# Patient Record
Sex: FEMALE | Race: OTHER | NOT HISPANIC OR LATINO | URBAN - METROPOLITAN AREA
[De-identification: names, ages, dates, MRNs, and addresses within clinical notes are randomized per-mention and may not be internally consistent; named-entity substitution may affect disease eponyms.]

---

## 2018-01-16 ENCOUNTER — INPATIENT (INPATIENT)
Facility: HOSPITAL | Age: 78
LOS: 6 days | Discharge: ANOTHER HOSPITAL | DRG: 72 | End: 2018-01-23
Attending: INTERNAL MEDICINE | Admitting: INTERNAL MEDICINE
Payer: MEDICARE

## 2018-01-16 VITALS — WEIGHT: 139.99 LBS | HEIGHT: 64 IN

## 2018-01-16 DIAGNOSIS — G40.201 LOCALIZATION-RELATED (FOCAL) (PARTIAL) SYMPTOMATIC EPILEPSY AND EPILEPTIC SYNDROMES WITH COMPLEX PARTIAL SEIZURES, NOT INTRACTABLE, WITH STATUS EPILEPTICUS: ICD-10-CM

## 2018-01-16 DIAGNOSIS — Z98.890 OTHER SPECIFIED POSTPROCEDURAL STATES: Chronic | ICD-10-CM

## 2018-01-16 LAB
ALBUMIN SERPL ELPH-MCNC: 3.9 G/DL — SIGNIFICANT CHANGE UP (ref 3.5–5)
ALP SERPL-CCNC: 73 U/L — SIGNIFICANT CHANGE UP (ref 40–120)
ALT FLD-CCNC: 29 U/L DA — SIGNIFICANT CHANGE UP (ref 10–60)
ANION GAP SERPL CALC-SCNC: 14 MMOL/L — SIGNIFICANT CHANGE UP (ref 5–17)
APPEARANCE UR: CLEAR — SIGNIFICANT CHANGE UP
APTT BLD: 25.7 SEC — LOW (ref 27.5–37.4)
AST SERPL-CCNC: 23 U/L — SIGNIFICANT CHANGE UP (ref 10–40)
BASOPHILS # BLD AUTO: 0.1 K/UL — SIGNIFICANT CHANGE UP (ref 0–0.2)
BASOPHILS NFR BLD AUTO: 0.9 % — SIGNIFICANT CHANGE UP (ref 0–2)
BILIRUB SERPL-MCNC: 0.4 MG/DL — SIGNIFICANT CHANGE UP (ref 0.2–1.2)
BILIRUB UR-MCNC: NEGATIVE — SIGNIFICANT CHANGE UP
BUN SERPL-MCNC: 14 MG/DL — SIGNIFICANT CHANGE UP (ref 7–18)
CALCIUM SERPL-MCNC: 9.6 MG/DL — SIGNIFICANT CHANGE UP (ref 8.4–10.5)
CHLORIDE SERPL-SCNC: 105 MMOL/L — SIGNIFICANT CHANGE UP (ref 96–108)
CO2 SERPL-SCNC: 19 MMOL/L — LOW (ref 22–31)
COLOR SPEC: YELLOW — SIGNIFICANT CHANGE UP
CREAT SERPL-MCNC: 0.94 MG/DL — SIGNIFICANT CHANGE UP (ref 0.5–1.3)
DIFF PNL FLD: NEGATIVE — SIGNIFICANT CHANGE UP
EOSINOPHIL # BLD AUTO: 0.4 K/UL — SIGNIFICANT CHANGE UP (ref 0–0.5)
EOSINOPHIL NFR BLD AUTO: 3.2 % — SIGNIFICANT CHANGE UP (ref 0–6)
GLUCOSE SERPL-MCNC: 139 MG/DL — HIGH (ref 70–99)
GLUCOSE UR QL: NEGATIVE — SIGNIFICANT CHANGE UP
HCT VFR BLD CALC: 39.8 % — SIGNIFICANT CHANGE UP (ref 34.5–45)
HGB BLD-MCNC: 12.8 G/DL — SIGNIFICANT CHANGE UP (ref 11.5–15.5)
INR BLD: 1.08 RATIO — SIGNIFICANT CHANGE UP (ref 0.88–1.16)
KETONES UR-MCNC: NEGATIVE — SIGNIFICANT CHANGE UP
LACTATE SERPL-SCNC: 8 MMOL/L — CRITICAL HIGH (ref 0.7–2)
LEUKOCYTE ESTERASE UR-ACNC: ABNORMAL
LYMPHOCYTES # BLD AUTO: 26.8 % — SIGNIFICANT CHANGE UP (ref 13–44)
LYMPHOCYTES # BLD AUTO: 3 K/UL — SIGNIFICANT CHANGE UP (ref 1–3.3)
MCHC RBC-ENTMCNC: 29.4 PG — SIGNIFICANT CHANGE UP (ref 27–34)
MCHC RBC-ENTMCNC: 32.1 GM/DL — SIGNIFICANT CHANGE UP (ref 32–36)
MCV RBC AUTO: 91.6 FL — SIGNIFICANT CHANGE UP (ref 80–100)
MONOCYTES # BLD AUTO: 1.2 K/UL — HIGH (ref 0–0.9)
MONOCYTES NFR BLD AUTO: 10.3 % — SIGNIFICANT CHANGE UP (ref 2–14)
NEUTROPHILS # BLD AUTO: 6.6 K/UL — SIGNIFICANT CHANGE UP (ref 1.8–7.4)
NEUTROPHILS NFR BLD AUTO: 58.8 % — SIGNIFICANT CHANGE UP (ref 43–77)
NITRITE UR-MCNC: NEGATIVE — SIGNIFICANT CHANGE UP
PH UR: 7 — SIGNIFICANT CHANGE UP (ref 5–8)
PLATELET # BLD AUTO: 460 K/UL — HIGH (ref 150–400)
POTASSIUM SERPL-MCNC: 4.5 MMOL/L — SIGNIFICANT CHANGE UP (ref 3.5–5.3)
POTASSIUM SERPL-SCNC: 4.5 MMOL/L — SIGNIFICANT CHANGE UP (ref 3.5–5.3)
PROT SERPL-MCNC: 8 G/DL — SIGNIFICANT CHANGE UP (ref 6–8.3)
PROT UR-MCNC: NEGATIVE — SIGNIFICANT CHANGE UP
PROTHROM AB SERPL-ACNC: 11.8 SEC — SIGNIFICANT CHANGE UP (ref 9.8–12.7)
RBC # BLD: 4.35 M/UL — SIGNIFICANT CHANGE UP (ref 3.8–5.2)
RBC # FLD: 13 % — SIGNIFICANT CHANGE UP (ref 10.3–14.5)
SODIUM SERPL-SCNC: 138 MMOL/L — SIGNIFICANT CHANGE UP (ref 135–145)
SP GR SPEC: 1.01 — SIGNIFICANT CHANGE UP (ref 1.01–1.02)
TROPONIN I SERPL-MCNC: <0.015 NG/ML — SIGNIFICANT CHANGE UP (ref 0–0.04)
UROBILINOGEN FLD QL: NEGATIVE — SIGNIFICANT CHANGE UP
WBC # BLD: 11.3 K/UL — HIGH (ref 3.8–10.5)
WBC # FLD AUTO: 11.3 K/UL — HIGH (ref 3.8–10.5)

## 2018-01-16 PROCEDURE — 70450 CT HEAD/BRAIN W/O DYE: CPT | Mod: 26

## 2018-01-16 PROCEDURE — 99285 EMERGENCY DEPT VISIT HI MDM: CPT

## 2018-01-16 PROCEDURE — 71045 X-RAY EXAM CHEST 1 VIEW: CPT | Mod: 26

## 2018-01-16 RX ORDER — SODIUM CHLORIDE 9 MG/ML
1000 INJECTION INTRAMUSCULAR; INTRAVENOUS; SUBCUTANEOUS ONCE
Qty: 0 | Refills: 0 | Status: COMPLETED | OUTPATIENT
Start: 2018-01-16 | End: 2018-01-16

## 2018-01-16 RX ORDER — DEXAMETHASONE 0.5 MG/5ML
10 ELIXIR ORAL ONCE
Qty: 0 | Refills: 0 | Status: COMPLETED | OUTPATIENT
Start: 2018-01-16 | End: 2018-01-16

## 2018-01-16 RX ORDER — VALPROIC ACID (AS SODIUM SALT) 250 MG/5ML
1000 SOLUTION, ORAL ORAL ONCE
Qty: 0 | Refills: 0 | Status: DISCONTINUED | OUTPATIENT
Start: 2018-01-16 | End: 2018-01-16

## 2018-01-16 RX ORDER — FOSPHENYTOIN 50 MG/ML
1200 INJECTION INTRAMUSCULAR; INTRAVENOUS ONCE
Qty: 0 | Refills: 0 | Status: COMPLETED | OUTPATIENT
Start: 2018-01-16 | End: 2018-01-16

## 2018-01-16 RX ADMIN — SODIUM CHLORIDE 4000 MILLILITER(S): 9 INJECTION INTRAMUSCULAR; INTRAVENOUS; SUBCUTANEOUS at 18:45

## 2018-01-16 RX ADMIN — Medication 2 MILLIGRAM(S): at 18:07

## 2018-01-16 RX ADMIN — Medication 2 MILLIGRAM(S): at 18:05

## 2018-01-16 NOTE — H&P ADULT - ASSESSMENT
Patient is a 78 y/o F pt with PMHx of Breast CA Dx Nov 2017 (s/p resection of L breast x1 week ago at [facility]) with recent negative PET scan and BIB EMS to ED with 30 min seizure (L facial twitch) at home while seated, witnessed by , with associated L-sided facial droop since 5pm on 1/16. As per EMS, pt was awake and alert throughout the entire duration seizure like episodes with facial twitching. Admitted for new onset seizure, status epilepticus, likely 2/2 brain mets from breast CA.

## 2018-01-16 NOTE — ED PROVIDER NOTE - CONDUCTED A DETAILED DISCUSSION WITH PATIENT AND/OR GUARDIAN REGARDING, MDM
lab results/radiology results/need for outpatient follow-up radiology results/need to admit/lab results

## 2018-01-16 NOTE — ED PROVIDER NOTE - OBJECTIVE STATEMENT
78 y/o F pt with PMHx of Breast CA (s/p resection of L breast x1 week ago) and PSHx of Breast Surgery (resection of L breast) BIB EMS to ED with onset of tremors to the L side of the face with associated L-sided facial droop since 17:00pm today. Per EMS, pt was awake and alert throughout the entire duration fo sx's onset. In ED, pt denies any other complaints. Pt also denies recent infections. NKDA.

## 2018-01-16 NOTE — H&P ADULT - HISTORY OF PRESENT ILLNESS
Patient is a 76 y/o F pt with PMHx of Breast CA (s/p resection of L breast x1 week ago) and PSHx of Breast Surgery (resection of L breast) BIB EMS to ED with onset of tremors to the L side of the face with associated L-sided facial droop since 17:00pm today. As per EMS, pt was awake and alert throughout the entire duration seizure like episodes with facial twitching. In the ED, patient was seen and examined, follows commands but disoriented, AAOx2, can state her name and  but does not know date, month or year. Unable to move entire L side on exam. ICU was consulted for concern for status epilepticus in the setting of brain mass, and downgraded. In the ED was given 2mg ativan x 2 IM, with temporary resolution of L facial twitching, but then returned, so was started on dilantin 100mg TID after 1 dose of 1200mg fosphenytoin loading. Decadron x 1 given for vasogenic edema on CT head. Patient denies fever, chills, SOB, palpitations, chest pain, nausea, vomiting, diarrhea or constipation but is confused and does not know how she came to the hospital. Unable to reach , Seb, over the phone, left message with callback to discuss further history as patient cannot provide. Primary team to get medication list. Patient is a 76 y/o F pt with PMHx of Breast CA Dx 2017 (s/p resection of L breast x1 week ago at [facility]) with recent negative PET scan and BIB EMS to ED with 30 min seizure (L facial twitch) at home while seated, witnessed by , with associated L-sided facial droop since 5pm on . As per EMS, pt was awake and alert throughout the entire duration seizure like episodes with facial twitching. In the ED, patient was seen and examined, follows commands but disoriented, AAOx2, can state her name and  but does not know date, month or year. Unable to move entire L side on exam. ICU was consulted for concern for status epilepticus in the setting of brain mass, and downgraded. In the ED was given 2mg ativan x 2 IM, with temporary resolution of L facial twitching, but then returned, so was started on dilantin 100mg TID after 1 dose of 1200mg fosphenytoin loading. Decadron x 1 given for vasogenic edema on CT head. Patient denies fever, chills, SOB, palpitations, chest pain, nausea, vomiting, diarrhea or constipation but is confused and does not know how she came to the hospital. Unable to reach , Seb, over the phone, left message with callback to discuss further history as patient cannot provide. Primary team to obtain med list, patient cannot recall medications or pharmacy.

## 2018-01-16 NOTE — H&P ADULT - NSHPLABSRESULTS_GEN_ALL_CORE
LABS:                        12.8   11.3  )-----------( 460      ( 2018 18:15 )             39.8         138  |  105  |  14  ----------------------------<  139<H>  4.5   |  19<L>  |  0.94    Ca    9.6      2018 18:15    TPro  8.0  /  Alb  3.9  /  TBili  0.4  /  DBili  x   /  AST  23  /  ALT  29  /  AlkPhos  73  -    PT/INR - ( 2018 18:15 )   PT: 11.8 sec;   INR: 1.08 ratio       PTT - ( 2018 18:15 )  PTT:25.7 sec  Urinalysis Basic - ( 2018 22:38 )    Color: Yellow / Appearance: Clear / S.010 / pH: x  Gluc: x / Ketone: Negative  / Bili: Negative / Urobili: Negative   Blood: x / Protein: Negative / Nitrite: Negative   Leuk Esterase: Small / RBC: 0-2 /HPF / WBC 3-5 /HPF   Sq Epi: x / Non Sq Epi: Moderate /HPF / Bacteria: Few /HPF    < from: CT Brain Stroke Protocol (18 @ 18:33) >    INTERPRETATION:  CT brain without contrast    History left facial droop    There is no intracranial hemorrhage or cortical edema or hydrocephalus.   There is a segment of vasogenic edema in the centrum semiovale measuring   roughly 3.5 cm in maximum axial dimension consistent with an underlying   mass. There is mild ventricular effacement without midline shift. There   is no skull fracture.    IMPRESSION:    Probable intracranial mass lesion. Contrast-enhanced MR suggested    < end of copied text >

## 2018-01-16 NOTE — H&P ADULT - PROBLEM SELECTOR PLAN 1
30 min episode of seizure like activity witnessed by  at home  s/p ativan x 2 with temporary resolution of L facial twitch, which later resumed on admission  1 dose fosphenytoin followed by dilantin 100mg TID  CT head: There is no intracranial hemorrhage or cortical edema or hydrocephalus.   There is a segment of vasogenic edema in the centrum semiovale measuring   roughly 3.5 cm in maximum axial dimension consistent with an underlying   mass. There is mild ventricular effacement without midline shift. There   is no skull fracture. Probable intracranial mass lesion. Contrast-enhanced MR suggested  f/u MRI head with IV contrast  EEG  Dr. Mckeon neuro  decadron x 1 (10mg), c/w 4mg q 6 hours x 2 days for vasogenic edema  seizure precautions, aspiration precautions  ativan 2mg q 5 min x 3 doses PRN for seizure  elevated lactate 8 on admission 2/2 seizure, f/u am lactate s/p 2L bolus  maintenance fluid with dextrose while NPO  NPO for now  UA and cxr neg, follow up cultures  ICU evaluation appreciated  monitor for improvement of mental status on anti-epileptics and steroids

## 2018-01-16 NOTE — H&P ADULT - NSHPPHYSICALEXAM_GEN_ALL_CORE
INTERVAL HPI/OVERNIGHT EVENTS:  T(C): --  HR: 121 (01-16-18 @ 19:05) (121 - 121)  BP: 96/62 (01-16-18 @ 19:05) (96/62 - 96/62)  RR: 22 (01-16-18 @ 19:05) (22 - 22)  SpO2: 100% (01-16-18 @ 19:05) (100% - 100%)    PHYSICAL EXAM:  GENERAL: appears disoriented, well-groomed, well-developed, noticeable L facial twitching  HEAD:  Atraumatic, Normocephalic  EYES: EOMI, PERRLA, conjunctiva and sclera clear  ENMT: No tonsillar erythema, exudates, or enlargement; Moist mucous membranes  NECK: Supple, No JVD, Normal thyroid  NERVOUS SYSTEM:  Alert & Oriented X3, Good concentration; Motor Strength 0/5 L arm and L leg, 5/5 R arm and R leg; mild L facial droop; loss of sensation L arm and leg; DTRs 2+ intact and symmetric  CHEST/LUNG: Clear to percussion bilaterally; No rales, rhonchi, wheezing, or rubs  HEART: Regular rate and rhythm; No murmurs, rubs, or gallops  ABDOMEN: Soft, Nontender, Nondistended; Bowel sounds present  EXTREMITIES:  2+ Peripheral Pulses, No clubbing, cyanosis, or edema  LYMPH: No lymphadenopathy noted  SKIN: No rashes or lesions

## 2018-01-16 NOTE — H&P ADULT - ATTENDING COMMENTS
Patient seen/evaluated at bedside 1/17/2018 at 4:30 pm in the ED. I agree with the resident H&p note/outlined plan of care. My independent findings and conclusions are documented. Patient seen/evaluated at bedside 1/17/2018 at 4:30 pm in the ED. I agree with the resident H&p note/outlined plan of care. My independent findings and conclusions are documented.      Briefly, this is a 76 y/o f w/ pmhx of anemia, breast ca (dx 11/2017) p/w left sided facial twitching/droop x 30 min. In the ED with noted left sided shaking, confusion administered ativan 2mg x 2 doses with termination of seizure event. During the first episode, patient remained awake and alert. In the ED, she was noted to be altered with inability to move her left side entirely. ICU was consulted with concern for status epilepticus. Patient's mental status improved and patient was deemed appropriate for management on the medical floor.   Pt states at the time of diagnosis she was informed her breast ca was stage I and is s/p L breast mastectomy with SUSSY drains currently in place. Per patient report, she was supposed to be seen by her breast surgeon for staple removal either today or tomorrow. Patient reports she noted left lower extremity weakness 2 months ago and was seen by a vascular surgeon, neurology with no explanation for symptoms in outpt setting.     Patient reports breast surgeon is Dr. Lee Zepeda  PMD: Dr. Dumont    PE: AAOx3 NAD  PERRLA, HEENT  S1S2 RRR  thorax: surgical incision with staples, 2 sussy drains in place with 10 cc of serosanguinous fluid  CTAB/l no accessory muscle use  soft, NT, ND, + BS  left lower extremity strength 4/5    1. seizure  2. brain lesions  3. breast ca s/p mastectomy  4. left sided paresis (improved)  5.  h/o anemia, likely of chronic disease    CT chest abd/pelvis ordered  -hematology/oncology: Dr Flaherty notified  -appreciate input from ICU and neurology  -decadron, dilantin load  -neuro checks  -needs MRI brain, however, may not be able to obtain due to staples in chest  -reach out to breast surgeon for notification, may need to discuss with our inpatient surgical staff as well

## 2018-01-16 NOTE — ED PROVIDER NOTE - PHYSICAL EXAMINATION
NEUROLOGICAL: L arm jerking. Tremulous on the L side of the face with associated L-sided facial droop.

## 2018-01-16 NOTE — ED PROVIDER NOTE - MEDICAL DECISION MAKING DETAILS
Code Stroke cancelled. Code Stroke cancelled.   shaking and droop resolved. ct concerning for brain met. admit for mri, eeg, neuro consult and further workup.

## 2018-01-16 NOTE — ED ADULT NURSE NOTE - OBJECTIVE STATEMENT
BIBA as notification to r/o stroke patient has a r  facial droop an hour ago s/p surgery of l breast recently SARBJIT drain present with bloody drainage. on arrival patient has tonic contractions BIBA as notification to r/o stroke patient has a L  facial droop an hour ago s/p surgery of l breast recently SARBJIT drain present with bloody drainage. on arrival patient has tonic contractions

## 2018-01-17 DIAGNOSIS — Z29.9 ENCOUNTER FOR PROPHYLACTIC MEASURES, UNSPECIFIED: ICD-10-CM

## 2018-01-17 DIAGNOSIS — G40.901 EPILEPSY, UNSPECIFIED, NOT INTRACTABLE, WITH STATUS EPILEPTICUS: ICD-10-CM

## 2018-01-17 DIAGNOSIS — C50.919 MALIGNANT NEOPLASM OF UNSPECIFIED SITE OF UNSPECIFIED FEMALE BREAST: ICD-10-CM

## 2018-01-17 DIAGNOSIS — G93.9 DISORDER OF BRAIN, UNSPECIFIED: ICD-10-CM

## 2018-01-17 LAB
BASOPHILS # BLD AUTO: 0 K/UL — SIGNIFICANT CHANGE UP (ref 0–0.2)
BASOPHILS NFR BLD AUTO: 0.2 % — SIGNIFICANT CHANGE UP (ref 0–2)
CHOLEST SERPL-MCNC: 189 MG/DL — SIGNIFICANT CHANGE UP (ref 10–199)
EOSINOPHIL # BLD AUTO: 0 K/UL — SIGNIFICANT CHANGE UP (ref 0–0.5)
EOSINOPHIL NFR BLD AUTO: 0.3 % — SIGNIFICANT CHANGE UP (ref 0–6)
FOLATE SERPL-MCNC: >20 NG/ML — SIGNIFICANT CHANGE UP (ref 4.8–24.2)
HBA1C BLD-MCNC: 5.7 % — HIGH (ref 4–5.6)
HCT VFR BLD CALC: 38.1 % — SIGNIFICANT CHANGE UP (ref 34.5–45)
HDLC SERPL-MCNC: 51 MG/DL — SIGNIFICANT CHANGE UP (ref 40–125)
HGB BLD-MCNC: 11.9 G/DL — SIGNIFICANT CHANGE UP (ref 11.5–15.5)
LACTATE SERPL-SCNC: 1.5 MMOL/L — SIGNIFICANT CHANGE UP (ref 0.7–2)
LIPID PNL WITH DIRECT LDL SERPL: 118 MG/DL — SIGNIFICANT CHANGE UP
LYMPHOCYTES # BLD AUTO: 0.6 K/UL — LOW (ref 1–3.3)
LYMPHOCYTES # BLD AUTO: 6.1 % — LOW (ref 13–44)
MAGNESIUM SERPL-MCNC: 2.1 MG/DL — SIGNIFICANT CHANGE UP (ref 1.6–2.6)
MCHC RBC-ENTMCNC: 28.4 PG — SIGNIFICANT CHANGE UP (ref 27–34)
MCHC RBC-ENTMCNC: 31.2 GM/DL — LOW (ref 32–36)
MCV RBC AUTO: 90.9 FL — SIGNIFICANT CHANGE UP (ref 80–100)
MONOCYTES # BLD AUTO: 0.4 K/UL — SIGNIFICANT CHANGE UP (ref 0–0.9)
MONOCYTES NFR BLD AUTO: 3.7 % — SIGNIFICANT CHANGE UP (ref 2–14)
NEUTROPHILS # BLD AUTO: 9.5 K/UL — HIGH (ref 1.8–7.4)
NEUTROPHILS NFR BLD AUTO: 89.6 % — HIGH (ref 43–77)
PHOSPHATE SERPL-MCNC: 2.6 MG/DL — SIGNIFICANT CHANGE UP (ref 2.5–4.5)
PLATELET # BLD AUTO: 384 K/UL — SIGNIFICANT CHANGE UP (ref 150–400)
RBC # BLD: 4.19 M/UL — SIGNIFICANT CHANGE UP (ref 3.8–5.2)
RBC # FLD: 13.2 % — SIGNIFICANT CHANGE UP (ref 10.3–14.5)
TOTAL CHOLESTEROL/HDL RATIO MEASUREMENT: 3.7 RATIO — SIGNIFICANT CHANGE UP (ref 3.3–7.1)
TRIGL SERPL-MCNC: 99 MG/DL — SIGNIFICANT CHANGE UP (ref 10–149)
TROPONIN I SERPL-MCNC: <0.015 NG/ML — SIGNIFICANT CHANGE UP (ref 0–0.04)
TSH SERPL-MCNC: 0.67 UU/ML — SIGNIFICANT CHANGE UP (ref 0.34–4.82)
VIT B12 SERPL-MCNC: 620 PG/ML — SIGNIFICANT CHANGE UP (ref 232–1245)
WBC # BLD: 10.6 K/UL — HIGH (ref 3.8–10.5)
WBC # FLD AUTO: 10.6 K/UL — HIGH (ref 3.8–10.5)

## 2018-01-17 PROCEDURE — 99223 1ST HOSP IP/OBS HIGH 75: CPT | Mod: GC

## 2018-01-17 PROCEDURE — 74177 CT ABD & PELVIS W/CONTRAST: CPT | Mod: 26

## 2018-01-17 PROCEDURE — 95819 EEG AWAKE AND ASLEEP: CPT | Mod: 26

## 2018-01-17 PROCEDURE — 71260 CT THORAX DX C+: CPT | Mod: 26

## 2018-01-17 PROCEDURE — 99222 1ST HOSP IP/OBS MODERATE 55: CPT | Mod: GC

## 2018-01-17 PROCEDURE — 70552 MRI BRAIN STEM W/DYE: CPT | Mod: 26

## 2018-01-17 PROCEDURE — 99223 1ST HOSP IP/OBS HIGH 75: CPT

## 2018-01-17 RX ORDER — SODIUM CHLORIDE 9 MG/ML
1000 INJECTION, SOLUTION INTRAVENOUS
Qty: 0 | Refills: 0 | Status: DISCONTINUED | OUTPATIENT
Start: 2018-01-17 | End: 2018-01-18

## 2018-01-17 RX ORDER — INSULIN LISPRO 100/ML
VIAL (ML) SUBCUTANEOUS EVERY 6 HOURS
Qty: 0 | Refills: 0 | Status: DISCONTINUED | OUTPATIENT
Start: 2018-01-17 | End: 2018-01-23

## 2018-01-17 RX ORDER — DEXTROSE 50 % IN WATER 50 %
25 SYRINGE (ML) INTRAVENOUS ONCE
Qty: 0 | Refills: 0 | Status: DISCONTINUED | OUTPATIENT
Start: 2018-01-17 | End: 2018-01-23

## 2018-01-17 RX ORDER — DEXAMETHASONE 0.5 MG/5ML
4 ELIXIR ORAL EVERY 6 HOURS
Qty: 0 | Refills: 0 | Status: COMPLETED | OUTPATIENT
Start: 2018-01-17 | End: 2018-01-18

## 2018-01-17 RX ORDER — GLUCAGON INJECTION, SOLUTION 0.5 MG/.1ML
1 INJECTION, SOLUTION SUBCUTANEOUS ONCE
Qty: 0 | Refills: 0 | Status: DISCONTINUED | OUTPATIENT
Start: 2018-01-17 | End: 2018-01-23

## 2018-01-17 RX ORDER — SODIUM CHLORIDE 9 MG/ML
1000 INJECTION, SOLUTION INTRAVENOUS
Qty: 0 | Refills: 0 | Status: DISCONTINUED | OUTPATIENT
Start: 2018-01-17 | End: 2018-01-19

## 2018-01-17 RX ORDER — DEXTROSE 50 % IN WATER 50 %
12.5 SYRINGE (ML) INTRAVENOUS ONCE
Qty: 0 | Refills: 0 | Status: DISCONTINUED | OUTPATIENT
Start: 2018-01-17 | End: 2018-01-19

## 2018-01-17 RX ORDER — SODIUM CHLORIDE 9 MG/ML
1000 INJECTION INTRAMUSCULAR; INTRAVENOUS; SUBCUTANEOUS
Qty: 0 | Refills: 0 | Status: DISCONTINUED | OUTPATIENT
Start: 2018-01-17 | End: 2018-01-17

## 2018-01-17 RX ORDER — ENOXAPARIN SODIUM 100 MG/ML
40 INJECTION SUBCUTANEOUS DAILY
Qty: 0 | Refills: 0 | Status: DISCONTINUED | OUTPATIENT
Start: 2018-01-17 | End: 2018-01-23

## 2018-01-17 RX ORDER — DEXTROSE 50 % IN WATER 50 %
1 SYRINGE (ML) INTRAVENOUS ONCE
Qty: 0 | Refills: 0 | Status: DISCONTINUED | OUTPATIENT
Start: 2018-01-17 | End: 2018-01-23

## 2018-01-17 RX ADMIN — Medication 104 MILLIGRAM(S): at 06:02

## 2018-01-17 RX ADMIN — Medication 4 MILLIGRAM(S): at 23:33

## 2018-01-17 RX ADMIN — Medication 204 MILLIGRAM(S): at 23:33

## 2018-01-17 RX ADMIN — Medication 10 MILLIGRAM(S): at 01:53

## 2018-01-17 RX ADMIN — Medication 4 MILLIGRAM(S): at 13:56

## 2018-01-17 RX ADMIN — ENOXAPARIN SODIUM 40 MILLIGRAM(S): 100 INJECTION SUBCUTANEOUS at 13:56

## 2018-01-17 RX ADMIN — Medication 4 MILLIGRAM(S): at 08:48

## 2018-01-17 RX ADMIN — Medication 4 MILLIGRAM(S): at 21:47

## 2018-01-17 RX ADMIN — Medication 104 MILLIGRAM(S): at 17:38

## 2018-01-17 RX ADMIN — FOSPHENYTOIN 148 MILLIGRAM(S) PE: 50 INJECTION INTRAMUSCULAR; INTRAVENOUS at 01:53

## 2018-01-17 RX ADMIN — Medication: at 22:00

## 2018-01-17 NOTE — CONSULT NOTE ADULT - RS GEN PE MLT RESP DETAILS PC
clear to auscultation bilaterally/no chest wall tenderness/respirations non-labored/good air movement/breath sounds equal/normal/airway patent

## 2018-01-17 NOTE — SWALLOW BEDSIDE ASSESSMENT ADULT - ASR SWALLOW ASPIRATION MONITOR
pneumonia/upper respiratory infection/change of breathing pattern/oral hygiene/position upright (90Y)/gurgly voice/cough/fever/throat clearing

## 2018-01-17 NOTE — CONSULT NOTE ADULT - GASTROINTESTINAL DETAILS
soft/no bruit/normal/bowel sounds normal/no guarding/no rebound tenderness/no rigidity/no distention/no masses palpable/nontender

## 2018-01-17 NOTE — ED ADULT NURSE REASSESSMENT NOTE - NS ED NURSE REASSESS COMMENT FT1
Pt. assisted with bedpan through out the shift. Pt. is more alert at the end of the shift. Pt. is responsive and interactive with staff. Pt. chanced and turned. No complaints voiced. No signs of acute distress noted. Pt. awaiting bed.
Pt received in no distress. SARBJIT DRAIN TO LEFT BREAST RESECTION NOTED.

## 2018-01-17 NOTE — SWALLOW BEDSIDE ASSESSMENT ADULT - SWALLOW EVAL: DIAGNOSIS
Adequate labial seal, Functional rotary chew, Unremarkable oral phase; Timely and efficient oropharyngeal swallow with no overt s/s of laryngeal penetration or aspiration at this exam.

## 2018-01-17 NOTE — CONSULT NOTE ADULT - SUBJECTIVE AND OBJECTIVE BOX
Patient is a 77y old  Female who presents with a chief complaint of     HPI:  Patient is a 78 y/o F pt with PMHx of Breast CA Dx 2017 (s/p resection of L breast x1 week ago at [facility]) with recent negative PET scan and BIB EMS to ED with 30 min seizure (L facial twitch) at home while seated, witnessed by , with associated L-sided facial droop since 5pm on . As per EMS, pt was awake and alert throughout the entire duration seizure like episodes with facial twitching. In the ED, patient was seen and examined, follows commands but disoriented, AAOx2, can state her name and  but does not know date, month or year. Unable to move entire L side on exam. ICU was consulted for concern for status epilepticus in the setting of brain mass, and downgraded. In the ED was given 2mg ativan x 2 IM, with temporary resolution of L facial twitching, but then returned, so was started on dilantin 100mg TID after 1 dose of 1200mg fosphenytoin loading. Decadron x 1 given for vasogenic edema on CT head. Patient denies fever, chills, SOB, palpitations, chest pain, nausea, vomiting, diarrhea or constipation but is confused and does not know how she came to the hospital. Unable to reach , Seb, over the phone, left message with callback to discuss further history as patient cannot provide. Primary team to obtain med list, patient cannot recall medications or pharmacy. (2018 23:54)         The seizure is described as  Seizure onset at  The frequency of seizure is  The seizure is brought up by  The patient has been previously on     History of head trauma/ concussion:  History of meningitis:  History of febrile seizures:  Family history of seizures:    Neurological Review of Systems:  No difficulty with language.  No vision loss or double vision.  No dizziness, vertigo or new hearing loss.  No difficulty with speech or swallowing.  No focal weakness.  No focal sensory changes.  No numbness or tingling in the bilateral lower extremities.  No difficulty with balance.  No difficulty with ambulation.        MEDICATIONS  (STANDING):  dexamethasone  Injectable 4 milliGRAM(s) IV Push every 6 hours  dextrose 5% + sodium chloride 0.9%. 1000 milliLiter(s) (75 mL/Hr) IV Continuous <Continuous>  dextrose 5%. 1000 milliLiter(s) (50 mL/Hr) IV Continuous <Continuous>  dextrose 50% Injectable 12.5 Gram(s) IV Push once  dextrose 50% Injectable 25 Gram(s) IV Push once  dextrose 50% Injectable 25 Gram(s) IV Push once  enoxaparin Injectable 40 milliGRAM(s) SubCutaneous daily  insulin lispro (HumaLOG) corrective regimen sliding scale   SubCutaneous every 6 hours  LORazepam     Tablet 0.5 milliGRAM(s) Oral once  phenytoin  IVPB 100 milliGRAM(s) IV Intermittent three times a day    MEDICATIONS  (PRN):  dextrose Gel 1 Dose(s) Oral once PRN Blood Glucose LESS THAN 70 milliGRAM(s)/deciliter  glucagon  Injectable 1 milliGRAM(s) IntraMuscular once PRN Glucose LESS THAN 70 milligrams/deciliter  LORazepam   Injectable 2 milliGRAM(s) IV Push every 5 minutes PRN seizure    Allergies    No Known Allergies    Intolerances      PAST MEDICAL & SURGICAL HISTORY:  Breast cancer  H/O breast surgery    FAMILY HISTORY:  No pertinent family history in first degree relatives    SOCIAL HISTORY: non smoker/ former smoker/ active smoker    Review of Systems:  Constitutional: No generalized weakness. No fevers or chills.                    Eyes, Ears, Mouth, Throat: No vision loss   Respiratory: No shortness of breath or cough.                                Cardiovascular: No chest pain or palpitations  Gastrointestinal: No nausea or vomiting.                                         Genitourinary: No urinary incontinence or burning on urination.  Musculoskeletal: No joint pain.                                                           Dermatologic: No rash.  Neurological: as per HPI                                                                      Psychiatric: No behavioral problems.  Endocrine: No known hypoglycemia.               Hematologic/Lymphatic: No easy bleeding.    O:  Vital Signs Last 24 Hrs  T(C): 36.4 (2018 08:28), Max: 36.8 (2018 05:37)  T(F): 97.5 (2018 08:28), Max: 98.2 (2018 05:37)  HR: 117 (2018 08:28) (96 - 121)  BP: 126/73 (2018 08:28) (96/62 - 132/72)  BP(mean): --  RR: 24 (2018 08:28) (20 - 24)  SpO2: 98% (2018 08:28) (98% - 100%)    General Exam:   General appearance: No acute distress                 Cardiovascular: Pedal dorsalis pulses intact bilaterally    Mental Status: Orientated to self, date and place.  Attention intact.  No dysarthria, aphasia or neglect.  Knowledge intact.  Registration intact.  Short and long term memory grossly intact.      Cranial Nerves: CN I - not tested.  PERRL, EOMI, VFF, no nystagmus or diplopia.  No APD.  Fundi not visualized.  CN V1-3 intact to light touch and pinprick.  No facial asymmetry.  Hearing intact to finger rub bilaterally.  Tongue, uvula and palate midline.  Sternocleidomastoid and Trapezius intact bilaterally.    Motor:   Tone: normal.                  Strength intact throughout  No pronator drift bilaterally                      No dysmetria on finger-nose-finger or heel-shin-heel  No truncal ataxia.  No resting, postural or action tremor.  No myoclonus.    Sensation: intact to light touch, pinprick, vibration and proprioception    Deep Tendon Reflexes: 1+ bilateral biceps, triceps, brachioradialis, knee and ankle  Toes flexor bilaterally    Gait: normal and stable.  Rhomberg -raymond.    Other:     LABS:                        11.9   10.6  )-----------( 384      ( 2018 05:55 )             38.1     -16    138  |  105  |  14  ----------------------------<  139<H>  4.5   |  19<L>  |  0.94    Ca    9.6      2018 18:15  Phos  2.6       Mg     2.1         TPro  8.0  /  Alb  3.9  /  TBili  0.4  /  DBili  x   /  AST  23  /  ALT  29  /  AlkPhos  73  -16    PT/INR - ( 2018 18:15 )   PT: 11.8 sec;   INR: 1.08 ratio         PTT - ( 2018 18:15 )  PTT:25.7 sec  Urinalysis Basic - ( 2018 22:38 )    Color: Yellow / Appearance: Clear / S.010 / pH: x  Gluc: x / Ketone: Negative  / Bili: Negative / Urobili: Negative   Blood: x / Protein: Negative / Nitrite: Negative   Leuk Esterase: Small / RBC: 0-2 /HPF / WBC 3-5 /HPF   Sq Epi: x / Non Sq Epi: Moderate /HPF / Bacteria: Few /HPF        RADIOLOGY & ADDITIONAL STUDIES:    EKG:  < from: CT Brain Stroke Protocol (18 @ 18:33) > (iamgzeus reviwed)  NTERPRETATION:  CT brain without contrast    History left facial droop    There is no intracranial hemorrhage or cortical edema or hydrocephalus.   There is a segment of vasogenic edema in the centrum semiovale measuring   roughly 3.5 cm in maximum axial dimension consistent with an underlying   mass. There is mild ventricular effacement without midline shift. There   is no skull fracture.    IMPRESSION:    Probable intracranial mass lesion. Contrast-enhanced MR suggested    < end of copied text >      Impression:  Left sided weakness and twitching for 30min in patient with right sided mass with vasogenic edema and Hx of breast cancer concerning for seizure foci due to primary or metastatic brain mass.  The seizure resolved and reoccured, patient loaded with Fosphenytoin.       Recommendations:  1.         cw Dilantin 200mg tid, check Dilantin level and LFT in AM  2.         MRI brain with and without contrast  3.         EEG  4.         Please give Ativan 2mg for active seizure, can give another 2mg dose of Ativan if the seizure continues or re-occurs, for a maximum of 6mg Ativan in 24 hours.  5.         Seizure and fall precautions  6.        The patient has been advised that driving is not allowed for 1 year after a seizure by [place] law.  The patient is advised to avoid swimming and any activities that may put their life at danger shall they have a seizure.    7.        DVT PPx    Thank you for the courtesy of this consult. Patient is a 77y old  Female who presents with a chief complaint of     HPI:  Patient is a 76 y/o F pt with PMHx of Breast CA Dx 2017 (s/p resection of L breast x1 week ago at [facility]) with recent negative PET scan and BIB EMS to ED with 30 min seizure (L facial twitch) at home while seated, witnessed by , with associated L-sided facial droop since 5pm on . As per EMS, pt was awake and alert throughout the entire duration event.  The patient and  also endorsed right arm twitching as well. In the ED was given 2mg ativan x 2 IM, with temporary resolution of L facial twitching, but then reoccured, so was started on dilantin 100mg TID after 1 dose of 1200mg fosphenytoin loading. Decadron x 1 given for vasogenic edema on CT head.    The patient also had left leg weakness for the past 2 months, unchanged.  She was seen by neurologist and as per the patient had normal ncs.emg of the legs.  Her leg weakness is currently unchanged but she has new left arm weakness since after the seizure.  The facial weakness has resolved.    Neurological Review of Systems:  No difficulty with language.  No vision loss or double vision.  No dizziness, vertigo or new hearing loss.  No difficulty with speech or swallowing.  No focal sensory changes.  No numbness or tingling in the bilateral lower extremities.  No difficulty with balance.  + difficulty with ambulation.  The patient denies headaches. Denies waking up from sleep due to headache.      MEDICATIONS  (STANDING):  dexamethasone  Injectable 4 milliGRAM(s) IV Push every 6 hours  dextrose 5% + sodium chloride 0.9%. 1000 milliLiter(s) (75 mL/Hr) IV Continuous <Continuous>  dextrose 5%. 1000 milliLiter(s) (50 mL/Hr) IV Continuous <Continuous>  dextrose 50% Injectable 12.5 Gram(s) IV Push once  dextrose 50% Injectable 25 Gram(s) IV Push once  dextrose 50% Injectable 25 Gram(s) IV Push once  enoxaparin Injectable 40 milliGRAM(s) SubCutaneous daily  insulin lispro (HumaLOG) corrective regimen sliding scale   SubCutaneous every 6 hours  LORazepam     Tablet 0.5 milliGRAM(s) Oral once  phenytoin  IVPB 100 milliGRAM(s) IV Intermittent three times a day    MEDICATIONS  (PRN):  dextrose Gel 1 Dose(s) Oral once PRN Blood Glucose LESS THAN 70 milliGRAM(s)/deciliter  glucagon  Injectable 1 milliGRAM(s) IntraMuscular once PRN Glucose LESS THAN 70 milligrams/deciliter  LORazepam   Injectable 2 milliGRAM(s) IV Push every 5 minutes PRN seizure    Allergies    No Known Allergies    Intolerances      PAST MEDICAL & SURGICAL HISTORY:  Breast cancer  H/O breast surgery    FAMILY HISTORY:  seizure in son    SOCIAL HISTORY: non smoker    Review of Systems:  Constitutional: No fevers or chills.                    Eyes, Ears, Mouth, Throat: No vision loss   Respiratory: No shortness of breath.                                Cardiovascular: No chest pain or palpitations  Gastrointestinal: No vomiting.                                         Genitourinary: No  burning on urination.  Musculoskeletal: No joint pain.                                                           Dermatologic: No rash.  Neurological: as per HPI                                                                      Psychiatric: +anxiety.  Endocrine: No known hypoglycemia.               Hematologic/Lymphatic: No easy bleeding.    O:  Vital Signs Last 24 Hrs  T(C): 36.4 (2018 08:28), Max: 36.8 (2018 05:37)  T(F): 97.5 (2018 08:28), Max: 98.2 (2018 05:37)  HR: 117 (2018 08:28) (96 - 121)  BP: 126/73 (2018 08:28) (96/62 - 132/72)  BP(mean): --  RR: 24 (2018 08:28) (20 - 24)  SpO2: 98% (2018 08:28) (98% - 100%)    General Exam:   General appearance: No acute distress                 Cardiovascular: Pedal dorsalis pulses intact bilaterally    Mental Status: Orientated to self, date and place.  Attention intact.  No dysarthria, aphasia or neglect.  Knowledge intact.  Registration intact.  Short and long term memory grossly intact.      Cranial Nerves: CN I - not tested.  PERRL, EOMI, VFF, no nystagmus or diplopia.  No APD.  Fundi not visualized.  CN V1-3 intact to light touch.  left NLF, central (1).  Hearing intact to finger rub bilaterally.  Tongue, uvula and palate midline.  Sternocleidomastoid and Trapezius intact bilaterally.    Motor:   Tone: decreased in the left arm and increased in the left leg.                  Strength intact on right.  left arm (3/4) and left leg (2/4) on NIHSS.      No dysmetria on finger-nose-finger or heel-shin-heel    Sensation: intact to light touch    Deep Tendon Reflexes: 1+ right biceps, triceps, brachioradialis, knee and 2+ on the left  Toes flexor on right and extensor on left.    Gait: unable to ambulate due to weakness.    Other:     LABS:                        11.9   10.6  )-----------( 384      ( 2018 05:55 )             38.1         138  |  105  |  14  ----------------------------<  139<H>  4.5   |  19<L>  |  0.94    Ca    9.6      2018 18:15  Phos  2.6       Mg     2.1         TPro  8.0  /  Alb  3.9  /  TBili  0.4  /  DBili  x   /  AST  23  /  ALT  29  /  AlkPhos  73      PT/INR - ( 2018 18:15 )   PT: 11.8 sec;   INR: 1.08 ratio         PTT - ( 2018 18:15 )  PTT:25.7 sec  Urinalysis Basic - ( 2018 22:38 )    Color: Yellow / Appearance: Clear / S.010 / pH: x  Gluc: x / Ketone: Negative  / Bili: Negative / Urobili: Negative   Blood: x / Protein: Negative / Nitrite: Negative   Leuk Esterase: Small / RBC: 0-2 /HPF / WBC 3-5 /HPF   Sq Epi: x / Non Sq Epi: Moderate /HPF / Bacteria: Few /HPF        RADIOLOGY & ADDITIONAL STUDIES:    EKG: NST  < from: CT Brain Stroke Protocol (18 @ 18:33) > (iaes reviwed)  NTERPRETATION:  CT brain without contrast    History left facial droop    There is no intracranial hemorrhage or cortical edema or hydrocephalus.   There is a segment of vasogenic edema in the centrum semiovale measuring   roughly 3.5 cm in maximum axial dimension consistent with an underlying   mass. There is mild ventricular effacement without midline shift. There   is no skull fracture.    IMPRESSION:    Probable intracranial mass lesion. Contrast-enhanced MR suggested    < end of copied text >  < from: MR Head w/ IV Cont (18 @ 10:55) > (reviwed with rads)  IMPRESSION:  Multiple enhancing lesions in the posterior right frontal and parietal   lobes as described above which may represent metastases given history of   breast cancer. GBM could have similar appearance. Lymphoma is less likely.    < end of copied text >

## 2018-01-17 NOTE — CONSULT NOTE ADULT - ATTENDING COMMENTS
Agree with above. Pt seen and examined in ED. Agree with resident history, physical, assessment and plan.    76 y/o female with breast ca s/p mastectomy presenting with left sided weakness found to have right brain lesion and new onset seizure.    Possible metastatic disease  MRI brain  Hemodynamically stable  Airway stable  Currently no indication for MICU at this time  Agree with decadrone and empiric load with phosphentyoin with monitoring of levels after loading and adjusting from albumin levels.

## 2018-01-17 NOTE — CONSULT NOTE ADULT - NEUROLOGICAL DETAILS
alert and oriented x 3/sensation intact responds to pain/sensation intact/responds to verbal commands

## 2018-01-17 NOTE — EEG REPORT - NS EEG TEXT BOX
1/17/2018    Hx: Concern for Seizures      Study Interpretation:    FINDINGS:  The background was continuous, spontaneously variable and reactive.  During wakefulness, the posteriorly dominant rhythm consisted of 7 Hz activity, with an amplitude to 40 uV, that attenuated to eye opening.  Low amplitude central beta was noted in wakefulness.    Sleep Background:  Stages of sleep could not be delineated.    Background Slowing:  Continuous excess of polymorphic delta and theta frequency activity, worse on the right.    Other Paroxysmal Non-Epileptiform Findings:    None.    Epileptiform Activity:   No epileptiform discharges were present.    Events:  No clinical events were recorded.  No seizures were recorded.    Activation Procedures:   -Hyperventilation was not performed.    -Photic stimulation was not performed.    Artifacts:  Intermittent myogenic and movement artifacts were noted.    EEG Classification:  Abnormal study  - moderate diffuse slowing, right worse than left    Impression:  Findings indicate non-specific moderate diffuse or multifocal cerebral dysfunction, right worse than left. There were no epileptiform abnormalities recorded, which does not exclude the diagnosis of epilepsy.  Consider repeat study if clinically indicated.

## 2018-01-17 NOTE — SWALLOW BEDSIDE ASSESSMENT ADULT - SLP PERTINENT HISTORY OF CURRENT PROBLEM
78 y/o F pt with PMHx of Breast CA Dx Nov 2017 (s/p resection of L breast x1 week ago at [facility]) with recent negative PET scan and BIB EMS to ED with 30 min seizure (L facial twitch) at home while seated, witnessed by , with associated L-sided facial droop since 5pm on 1/16. Reportedly, pt was awake and alert throughout the entire duration seizure like episodes with facial twitching. CT Head (+)Probable intracranial mass lesion.

## 2018-01-17 NOTE — SWALLOW BEDSIDE ASSESSMENT ADULT - COMMENTS
Pt alert & cooperative, A+Ox2-3. Pt seen seated upright at edge of bed. LUE & LLE weakness apparent; only able to self-feed with right hand.

## 2018-01-17 NOTE — CONSULT NOTE ADULT - SUBJECTIVE AND OBJECTIVE BOX
PGY 3 note    77 F from home history of left breast cancer s/p mastectomy 5 days ago came with left arm weakness and generalized seizure. Seizure activity was witnessed by the . She was sitting on the chair when she started seizing which lasted for 30 minutes. Seizure was generalized followed by loss of consciousness. EMS brought the patient to ED. As per ED provider she was awake and alert in the ED and was given 2 mg ativan. Patient has residual left arm weakness and left facial droop. ICU consult was called for status epilepticus.    Patient was assessed at the bedside. She is AAO x 3, she is complaining of left arm weakness. She was diagnosed with breast cancer in November 2017. Her PET scan was negative for any mets. She received antibiotics post surgery for few days as per .    Patient denies chest pain, shortness of breath, nausea, vomiting, fall, trauma, diarrhea, photophobia, phonophobia, hematochezia, dizziness, smoking, alcohol abuse and illicit drug use.    Vital Signs Last 24 Hrs    T(F): --98 F  HR: 121 (16 Jan 2018 19:05) (121 - 121)  BP: 96/62 (16 Jan 2018 19:05) (96/62 - 96/62)  BP(mean): --  RR: 22 (16 Jan 2018 19:05) (22 - 22)  SpO2: 100% (16 Jan 2018 19:05) (100% - 100%)

## 2018-01-18 ENCOUNTER — TRANSCRIPTION ENCOUNTER (OUTPATIENT)
Age: 78
End: 2018-01-18

## 2018-01-18 LAB
ALBUMIN SERPL ELPH-MCNC: 3.1 G/DL — LOW (ref 3.5–5)
ALP SERPL-CCNC: 62 U/L — SIGNIFICANT CHANGE UP (ref 40–120)
ALT FLD-CCNC: 29 U/L DA — SIGNIFICANT CHANGE UP (ref 10–60)
ANION GAP SERPL CALC-SCNC: 11 MMOL/L — SIGNIFICANT CHANGE UP (ref 5–17)
AST SERPL-CCNC: 21 U/L — SIGNIFICANT CHANGE UP (ref 10–40)
BILIRUB DIRECT SERPL-MCNC: 0.1 MG/DL — SIGNIFICANT CHANGE UP (ref 0–0.2)
BILIRUB INDIRECT FLD-MCNC: 0.2 MG/DL — SIGNIFICANT CHANGE UP (ref 0.2–1)
BILIRUB SERPL-MCNC: 0.3 MG/DL — SIGNIFICANT CHANGE UP (ref 0.2–1.2)
BUN SERPL-MCNC: 16 MG/DL — SIGNIFICANT CHANGE UP (ref 7–18)
CALCIUM SERPL-MCNC: 9.3 MG/DL — SIGNIFICANT CHANGE UP (ref 8.4–10.5)
CHLORIDE SERPL-SCNC: 107 MMOL/L — SIGNIFICANT CHANGE UP (ref 96–108)
CO2 SERPL-SCNC: 23 MMOL/L — SIGNIFICANT CHANGE UP (ref 22–31)
CREAT SERPL-MCNC: 0.51 MG/DL — SIGNIFICANT CHANGE UP (ref 0.5–1.3)
GLUCOSE SERPL-MCNC: 111 MG/DL — HIGH (ref 70–99)
HCT VFR BLD CALC: 34.2 % — LOW (ref 34.5–45)
HGB BLD-MCNC: 11.7 G/DL — SIGNIFICANT CHANGE UP (ref 11.5–15.5)
MCHC RBC-ENTMCNC: 31.1 PG — SIGNIFICANT CHANGE UP (ref 27–34)
MCHC RBC-ENTMCNC: 34.3 GM/DL — SIGNIFICANT CHANGE UP (ref 32–36)
MCV RBC AUTO: 90.8 FL — SIGNIFICANT CHANGE UP (ref 80–100)
PHENYTOIN FREE SERPL-MCNC: 1.9 UG/ML — LOW (ref 10–20)
PLATELET # BLD AUTO: 413 K/UL — HIGH (ref 150–400)
POTASSIUM SERPL-MCNC: 3.3 MMOL/L — LOW (ref 3.5–5.3)
POTASSIUM SERPL-SCNC: 3.3 MMOL/L — LOW (ref 3.5–5.3)
PROT SERPL-MCNC: 6.9 G/DL — SIGNIFICANT CHANGE UP (ref 6–8.3)
RBC # BLD: 3.77 M/UL — LOW (ref 3.8–5.2)
RBC # FLD: 13.2 % — SIGNIFICANT CHANGE UP (ref 10.3–14.5)
SODIUM SERPL-SCNC: 141 MMOL/L — SIGNIFICANT CHANGE UP (ref 135–145)
WBC # BLD: 14.4 K/UL — HIGH (ref 3.8–10.5)
WBC # FLD AUTO: 14.4 K/UL — HIGH (ref 3.8–10.5)

## 2018-01-18 PROCEDURE — 99233 SBSQ HOSP IP/OBS HIGH 50: CPT | Mod: GC

## 2018-01-18 PROCEDURE — 99232 SBSQ HOSP IP/OBS MODERATE 35: CPT

## 2018-01-18 RX ORDER — POTASSIUM CHLORIDE 20 MEQ
40 PACKET (EA) ORAL EVERY 4 HOURS
Qty: 0 | Refills: 0 | Status: COMPLETED | OUTPATIENT
Start: 2018-01-18 | End: 2018-01-18

## 2018-01-18 RX ADMIN — SODIUM CHLORIDE 75 MILLILITER(S): 9 INJECTION, SOLUTION INTRAVENOUS at 01:43

## 2018-01-18 RX ADMIN — Medication 100 MILLIGRAM(S): at 21:40

## 2018-01-18 RX ADMIN — Medication 40 MILLIEQUIVALENT(S): at 10:17

## 2018-01-18 RX ADMIN — Medication 4 MILLIGRAM(S): at 17:29

## 2018-01-18 RX ADMIN — SODIUM CHLORIDE 75 MILLILITER(S): 9 INJECTION, SOLUTION INTRAVENOUS at 10:20

## 2018-01-18 RX ADMIN — Medication 4 MILLIGRAM(S): at 23:56

## 2018-01-18 RX ADMIN — Medication 4 MILLIGRAM(S): at 05:24

## 2018-01-18 RX ADMIN — Medication 40 MILLIEQUIVALENT(S): at 13:35

## 2018-01-18 RX ADMIN — Medication 1: at 18:11

## 2018-01-18 RX ADMIN — Medication 4 MILLIGRAM(S): at 12:00

## 2018-01-18 RX ADMIN — Medication 204 MILLIGRAM(S): at 05:24

## 2018-01-18 RX ADMIN — Medication 100 MILLIGRAM(S): at 13:36

## 2018-01-18 RX ADMIN — ENOXAPARIN SODIUM 40 MILLIGRAM(S): 100 INJECTION SUBCUTANEOUS at 12:00

## 2018-01-18 NOTE — PROGRESS NOTE ADULT - SUBJECTIVE AND OBJECTIVE BOX
PGY1 Note discussed with supervising resident and primary attending.    Patient is a 77y old  Female who presents with a chief complaint of     INTERVAL HPI/OVERNIGHT EVENTS:    MEDICATIONS  (STANDING):  dexamethasone  Injectable 4 milliGRAM(s) IV Push every 6 hours  dextrose 5% + sodium chloride 0.9%. 1000 milliLiter(s) (75 mL/Hr) IV Continuous <Continuous>  dextrose 5%. 1000 milliLiter(s) (50 mL/Hr) IV Continuous <Continuous>  dextrose 50% Injectable 12.5 Gram(s) IV Push once  dextrose 50% Injectable 25 Gram(s) IV Push once  dextrose 50% Injectable 25 Gram(s) IV Push once  enoxaparin Injectable 40 milliGRAM(s) SubCutaneous daily  insulin lispro (HumaLOG) corrective regimen sliding scale   SubCutaneous every 6 hours  phenytoin  IVPB 100 milliGRAM(s) IV Intermittent three times a day    MEDICATIONS  (PRN):  dextrose Gel 1 Dose(s) Oral once PRN Blood Glucose LESS THAN 70 milliGRAM(s)/deciliter  glucagon  Injectable 1 milliGRAM(s) IntraMuscular once PRN Glucose LESS THAN 70 milligrams/deciliter  LORazepam   Injectable 2 milliGRAM(s) IV Push every 5 minutes PRN seizure      Allergies    No Known Allergies    Intolerances        REVIEW OF SYSTEMS:  CONSTITUTIONAL: No fever, weight loss, or fatigue  RESPIRATORY: No cough, wheezing, chills or hemoptysis; No shortness of breath  CARDIOVASCULAR: No chest pain, palpitations, dizziness, or leg swelling  GASTROINTESTINAL: No abdominal or epigastric pain. No nausea, vomiting, or hematemesis; No diarrhea or constipation. No melena or hematochezia.  NEUROLOGICAL: No headaches, memory loss, loss of strength, numbness, or tremors  SKIN: No itching, burning, rashes, or lesions     Vital Signs Last 24 Hrs  T(C): 37 (2018 05:00), Max: 37.2 (2018 21:08)  T(F): 98.6 (2018 05:00), Max: 99 (2018 21:08)  HR: 110 (2018 05:00) (86 - 117)  BP: 137/69 (2018 05:00) (122/74 - 156/73)  BP(mean): --  RR: 20 (2018 05:00) (17 - 24)  SpO2: 95% (2018 05:00) (95% - 100%)    PHYSICAL EXAM:  GENERAL: NAD, well-groomed, well-developed  HEAD:  Atraumatic, Normocephalic  EYES: EOMI, PERRLA, conjunctiva and sclera clear  NECK: Supple, No JVD, Normal thyroid  CHEST/LUNG: Clear to percussion bilaterally; No rales, rhonchi, wheezing, or rubs  HEART: Regular rate and rhythm; No murmurs, rubs, or gallops  ABDOMEN: Soft, Nontender, Nondistended; Bowel sounds present  NERVOUS SYSTEM:  Alert & Oriented X3, Good concentration; Motor Strength 5/5 B/L   EXTREMITIES:  2+ Peripheral Pulses, No clubbing, cyanosis, or edema  SKIN;    LABS:                        11.9   10.6  )-----------( 384      ( 2018 05:55 )             38.1     01-16    138  |  105  |  14  ----------------------------<  139<H>  4.5   |  19<L>  |  0.94    Ca    9.6      2018 18:15  Phos  2.6     -  Mg     2.1     -17    TPro  8.0  /  Alb  3.9  /  TBili  0.4  /  DBili  x   /  AST  23  /  ALT  29  /  AlkPhos  73  01-16    PT/INR - ( 2018 18:15 )   PT: 11.8 sec;   INR: 1.08 ratio         PTT - ( 2018 18:15 )  PTT:25.7 sec  Urinalysis Basic - ( 2018 22:38 )    Color: Yellow / Appearance: Clear / S.010 / pH: x  Gluc: x / Ketone: Negative  / Bili: Negative / Urobili: Negative   Blood: x / Protein: Negative / Nitrite: Negative   Leuk Esterase: Small / RBC: 0-2 /HPF / WBC 3-5 /HPF   Sq Epi: x / Non Sq Epi: Moderate /HPF / Bacteria: Few /HPF      CAPILLARY BLOOD GLUCOSE      POCT Blood Glucose.: 103 mg/dL (2018 07:11)  POCT Blood Glucose.: 103 mg/dL (2018 23:35)  POCT Blood Glucose.: 201 mg/dL (2018 20:13)      RADIOLOGY & ADDITIONAL TESTS:    Imaging Personally Reviewed:  [ ] YES  [ ] NO    Consultant(s) Notes Reviewed:  [ ] YES  [ ] NO PGY1 Note discussed with supervising resident and primary attending.    Patient is a 77y old  Female who presents with a chief complaint of     INTERVAL HPI/OVERNIGHT EVENTS: no new overnight events. Pt muscle strengthen 4/5 on left arm and leg, no left facial twitching, mild left facial droop.     MEDICATIONS  (STANDING):  dexamethasone  Injectable 4 milliGRAM(s) IV Push every 6 hours  dextrose 5% + sodium chloride 0.9%. 1000 milliLiter(s) (75 mL/Hr) IV Continuous <Continuous>  dextrose 5%. 1000 milliLiter(s) (50 mL/Hr) IV Continuous <Continuous>  dextrose 50% Injectable 12.5 Gram(s) IV Push once  dextrose 50% Injectable 25 Gram(s) IV Push once  dextrose 50% Injectable 25 Gram(s) IV Push once  enoxaparin Injectable 40 milliGRAM(s) SubCutaneous daily  insulin lispro (HumaLOG) corrective regimen sliding scale   SubCutaneous every 6 hours  phenytoin  IVPB 100 milliGRAM(s) IV Intermittent three times a day    MEDICATIONS  (PRN):  dextrose Gel 1 Dose(s) Oral once PRN Blood Glucose LESS THAN 70 milliGRAM(s)/deciliter  glucagon  Injectable 1 milliGRAM(s) IntraMuscular once PRN Glucose LESS THAN 70 milligrams/deciliter  LORazepam   Injectable 2 milliGRAM(s) IV Push every 5 minutes PRN seizure      Allergies    No Known Allergies    Intolerances        REVIEW OF SYSTEMS:  CONSTITUTIONAL: No fever, weight loss, or fatigue  RESPIRATORY: No cough, wheezing, chills or hemoptysis; No shortness of breath  CARDIOVASCULAR: No chest pain, palpitations, dizziness, or leg swelling  GASTROINTESTINAL: No abdominal or epigastric pain. No nausea, vomiting, or hematemesis; No diarrhea or constipation. No melena or hematochezia.  NEUROLOGICAL: No headaches, memory loss, mild loss of strength on left arm and leg   SKIN: No itching, burning, rashes, or lesions     Vital Signs Last 24 Hrs  T(C): 37 (2018 05:00), Max: 37.2 (2018 21:08)  T(F): 98.6 (2018 05:00), Max: 99 (2018 21:08)  HR: 110 (2018 05:00) (86 - 117)  BP: 137/69 (2018 05:00) (122/74 - 156/73)  BP(mean): --  RR: 20 (2018 05:00) (17 - 24)  SpO2: 95% (2018 05:00) (95% - 100%)    PHYSICAL EXAM:  GENERAL: NAD, well-groomed, well-developed  CHEST/LUNG: Clear to percussion bilaterally; No rales, rhonchi, wheezing, or rubs  HEART: Regular rate and rhythm; No murmurs, rubs, or gallops  ABDOMEN: Soft, Nontender, Nondistended; Bowel sounds present  NERVOUS SYSTEM:  Alert & Oriented X3, Good concentration; muscle strengthen 4/5 on left arm and leg, mild left facial droop.   EXTREMITIES:  2+ Peripheral Pulses, No clubbing, cyanosis, or edema      LABS:                        11.9   10.6  )-----------( 384      ( 2018 05:55 )             38.1     01-16    138  |  105  |  14  ----------------------------<  139<H>  4.5   |  19<L>  |  0.94    Ca    9.6      2018 18:15  Phos  2.6     -  Mg     2.1     -17    TPro  8.0  /  Alb  3.9  /  TBili  0.4  /  DBili  x   /  AST  23  /  ALT  29  /  AlkPhos  73  01-16    PT/INR - ( 2018 18:15 )   PT: 11.8 sec;   INR: 1.08 ratio         PTT - ( 2018 18:15 )  PTT:25.7 sec  Urinalysis Basic - ( 2018 22:38 )    Color: Yellow / Appearance: Clear / S.010 / pH: x  Gluc: x / Ketone: Negative  / Bili: Negative / Urobili: Negative   Blood: x / Protein: Negative / Nitrite: Negative   Leuk Esterase: Small / RBC: 0-2 /HPF / WBC 3-5 /HPF   Sq Epi: x / Non Sq Epi: Moderate /HPF / Bacteria: Few /HPF      CAPILLARY BLOOD GLUCOSE      POCT Blood Glucose.: 103 mg/dL (2018 07:11)  POCT Blood Glucose.: 103 mg/dL (2018 23:35)  POCT Blood Glucose.: 201 mg/dL (2018 20:13)      RADIOLOGY & ADDITIONAL TESTS:    Imaging Personally Reviewed:  [ ] YES  [ ] NO    Consultant(s) Notes Reviewed:  [ ] YES  [ ] NO

## 2018-01-18 NOTE — CONSULT NOTE ADULT - PROBLEM SELECTOR RECOMMENDATION 2
one dominant mass with adjacent small ones.  not typical for mets, ?glioblastoma.  will need biopsy or debulking  on steroid
F/u outpatient hemeonc

## 2018-01-18 NOTE — DISCHARGE NOTE ADULT - MEDICATION SUMMARY - MEDICATIONS TO TAKE
I will START or STAY ON the medications listed below when I get home from the hospital:    dexamethasone 4 mg oral tablet  -- 1 tab(s) by mouth every 6 hours  -- Indication: For Brain mass    phenytoin 100 mg oral capsule, extended release  -- 1 cap(s) by mouth 3 times a day  -- Indication: For Status epilepticus

## 2018-01-18 NOTE — DISCHARGE NOTE ADULT - PLAN OF CARE
symptom resolved, prevent reoccur Please continue with dilantin 100mg, three times a day and follow up with neurologist with in one week. The patient has been advised that driving is not allowed for 1 year after a seizure by [place] state law.  The patient is advised to avoid swimming and any activities that may put their life at danger shall they have a seizure. transfer for brain mass biopsy Patient had one dominant mass with adjacent small ones.  not typical for mets, doubt glioblastoma.  consulted with Heme oncologist Dr. Flaherty. Patient will be transferred to [facility] for further management for brain mass biopsy. Accepted attending is Dr. Alberts. Continue dexamethasone 4mg every 6 hrs. Left breast cancer status post mastectomy and LND 2 weeks ago, stage 2. Two  SARBJIT Drains removed by surgery PA on 01/22, defers removal of staples with concern for wound dehiscence.

## 2018-01-18 NOTE — PROGRESS NOTE ADULT - PROBLEM SELECTOR PLAN 2
s/p L breast surgery 1 week ago at [facility]  MRI head shows Multiple enhancing lesions in the posterior right frontal and parietal lobes , represent metastases given history of breast cancer  two Drains need to be remove today  f/u surgery consult s/p L breast surgery 1 week ago at [facility]  MRI head shows Multiple enhancing lesions in the posterior right frontal and parietal lobes , represent metastases given history of breast cancer  CT chest and abd/pelvic no acute findings.  Two Drains need to be removed today as per her Surgeon Dr. Deep Zepeda ([phone])  f/u [facility] consult with Dr. Flaherty s/p L breast surgery 1 week ago at [facility]  MRI head shows Multiple enhancing lesions in the posterior right frontal and parietal lobes , represent metastases given history of breast cancer  CT chest and abd/pelvic no acute findings.  Two Drains need to be removed today as per her Surgeon Dr. Deep Zepeda ([phone])  f/u [facility] consult with Dr. Flaherty  f/u  surgery consult

## 2018-01-18 NOTE — PROGRESS NOTE ADULT - SUBJECTIVE AND OBJECTIVE BOX
Neurology Follow up note    Name  MAGALI HUGHES    HPI:  Patient is a 76 y/o F pt with PMHx of Breast CA Dx 2017 (s/p resection of L breast x1 week ago at [facility]) with recent negative PET scan and BIB EMS to ED with 30 min seizure (L facial twitch) at home while seated, witnessed by , with associated L-sided facial droop since 5pm on . As per EMS, pt was awake and alert throughout the entire duration seizure like episodes with facial twitching. In the ED, patient was seen and examined, follows commands but disoriented, AAOx2, can state her name and  but does not know date, month or year. Unable to move entire L side on exam. ICU was consulted for concern for status epilepticus in the setting of brain mass, and downgraded. In the ED was given 2mg ativan x 2 IM, with temporary resolution of L facial twitching, but then returned, so was started on dilantin 100mg TID after 1 dose of 1200mg fosphenytoin loading. Decadron x 1 given for vasogenic edema on CT head. Patient denies fever, chills, SOB, palpitations, chest pain, nausea, vomiting, diarrhea or constipation but is confused and does not know how she came to the hospital. Unable to reach , Seb, over the phone, left message with callback to discuss further history as patient cannot provide. Primary team to obtain med list, patient cannot recall medications or pharmacy. (2018 23:54)      Interval History -        Subjective:    Review of Systems:  Constitutional:        Eyes, Ears, Mouth, Throat:   Respiratory:                            Cardiovascular:   Gastrointestinal:                                     Genitourinary:   Musculoskeletal:                                    Dermatologic:   Neurological: as per above                                                                 Psychiatric:   Endocrine:              Hematologic/Lymphatic:     MEDICATIONS  (STANDING):  dexamethasone  Injectable 4 milliGRAM(s) IV Push every 6 hours  dextrose 5% + sodium chloride 0.9%. 1000 milliLiter(s) (75 mL/Hr) IV Continuous <Continuous>  dextrose 5%. 1000 milliLiter(s) (50 mL/Hr) IV Continuous <Continuous>  dextrose 50% Injectable 12.5 Gram(s) IV Push once  dextrose 50% Injectable 25 Gram(s) IV Push once  dextrose 50% Injectable 25 Gram(s) IV Push once  enoxaparin Injectable 40 milliGRAM(s) SubCutaneous daily  insulin lispro (HumaLOG) corrective regimen sliding scale   SubCutaneous every 6 hours  phenytoin   Capsule 100 milliGRAM(s) Oral three times a day  potassium chloride    Tablet ER 40 milliEquivalent(s) Oral every 4 hours    MEDICATIONS  (PRN):  dextrose Gel 1 Dose(s) Oral once PRN Blood Glucose LESS THAN 70 milliGRAM(s)/deciliter  glucagon  Injectable 1 milliGRAM(s) IntraMuscular once PRN Glucose LESS THAN 70 milligrams/deciliter  LORazepam   Injectable 2 milliGRAM(s) IV Push every 5 minutes PRN seizure      Allergies    No Known Allergies    Intolerances        Objective:   Vital Signs Last 24 Hrs  T(C): 37 (2018 07:00), Max: 37.2 (2018 21:08)  T(F): 98.6 (2018 07:00), Max: 99 (2018 21:08)  HR: 115 (2018 07:00) (86 - 115)  BP: 156/82 (2018 07:00) (122/74 - 156/82)  BP(mean): --  RR: 20 (2018 07:00) (17 - 21)  SpO2: 97% (2018 07:00) (95% - 100%)    General Exam:   General appearance: No acute distress                 Cardiovascular: Pedal dorsalis pulses intact bilaterally    Neurological Exam:  Mental Status: Orientated to self, date and place.  Attention intact.  No dysarthria, aphasia or neglect.  Knowledge intact.  Registration intact.  Short and long term memory grossly intact.      Cranial Nerves: CN I - not tested.  PERRL, EOMI, VFF, no nystagmus or diplopia.  No APD.  Fundi not visualized bilaterally.  CN V1-3 intact to light touch and pinprick.  No facial asymmetry.  Hearing intact to finger rub bilaterally.  Tongue, uvula and palate midline.  Sternocleidomastoid and Trapezius intact bilaterally.    Motor:   Tone: normal.                  Strength: intact throughout  Pronator drift: none                 Dysmeria: None to finger-nose-finger or heel-shin-heel  No truncal ataxia.    Tremor: No resting, postural or action tremor.  No myoclonus.    Sensation: intact to light touch, pinprick, vibration and proprioception    Deep Tendon Reflexes: 1+ bilateral biceps, triceps, brachioradialis, knee and ankle  Toes flexor bilaterally    Gait: normal and stable.      Other:    01-18    141  |  107  |  16  ----------------------------<  111<H>  3.3<L>   |  23  |  0.51    Ca    9.3      2018 07:28  Phos  2.6       Mg     2.1         TPro  6.9  /  Alb  3.1<L>  /  TBili  0.3  /  DBili  0.1  /  AST  21  /  ALT  29  /  AlkPhos  62      141  |  107  |  16  ----------------------------<  111<H>  3.3<L>   |  23  |  0.51    Ca    9.3      2018 07:28  Phos  2.6       Mg     2.1         TPro  6.9  /  Alb  3.1<L>  /  TBili  0.3  /  DBili  0.1  /  AST  21  /  ALT  29  /  AlkPhos  62      LIVER FUNCTIONS - ( 2018 10:36 )  Alb: 3.1 g/dL / Pro: 6.9 g/dL / ALK PHOS: 62 U/L / ALT: 29 U/L DA / AST: 21 U/L / GGT: x               Radiology    EKG:  tele:  TTE:  EEG: non specific diffuse dysfunction right worse than left.  no epileptoform activity Neurology Follow up note    Name  MAGALI HUGHES    Subjective:  left arm strength continues to improve  no change in left leg weakness  no further shaking episodes    Review of Systems:  Respiratory:           no sob                 Cardiovascular: no cp    MEDICATIONS  (STANDING):  dexamethasone  Injectable 4 milliGRAM(s) IV Push every 6 hours  dextrose 5% + sodium chloride 0.9%. 1000 milliLiter(s) (75 mL/Hr) IV Continuous <Continuous>  dextrose 5%. 1000 milliLiter(s) (50 mL/Hr) IV Continuous <Continuous>  dextrose 50% Injectable 12.5 Gram(s) IV Push once  dextrose 50% Injectable 25 Gram(s) IV Push once  dextrose 50% Injectable 25 Gram(s) IV Push once  enoxaparin Injectable 40 milliGRAM(s) SubCutaneous daily  insulin lispro (HumaLOG) corrective regimen sliding scale   SubCutaneous every 6 hours  phenytoin   Capsule 100 milliGRAM(s) Oral three times a day  potassium chloride    Tablet ER 40 milliEquivalent(s) Oral every 4 hours    MEDICATIONS  (PRN):  dextrose Gel 1 Dose(s) Oral once PRN Blood Glucose LESS THAN 70 milliGRAM(s)/deciliter  glucagon  Injectable 1 milliGRAM(s) IntraMuscular once PRN Glucose LESS THAN 70 milligrams/deciliter  LORazepam   Injectable 2 milliGRAM(s) IV Push every 5 minutes PRN seizure      Allergies    No Known Allergies    Intolerances        Objective:   Vital Signs Last 24 Hrs  T(C): 37 (18 Jan 2018 07:00), Max: 37.2 (17 Jan 2018 21:08)  T(F): 98.6 (18 Jan 2018 07:00), Max: 99 (17 Jan 2018 21:08)  HR: 115 (18 Jan 2018 07:00) (86 - 115)  BP: 156/82 (18 Jan 2018 07:00) (122/74 - 156/82)  BP(mean): --  RR: 20 (18 Jan 2018 07:00) (17 - 21)  SpO2: 97% (18 Jan 2018 07:00) (95% - 100%)    General Exam:   General appearance: No acute distress                 Cardiovascular: Pedal dorsalis pulses intact bilaterally    Neurological Exam:  Mental Status: Orientated to self, date and place.  Attention intact.  No dysarthria, aphasia or neglect.  Knowledge intact.  Registration intact.  Short and long term memory grossly intact.      Cranial Nerves: CN I - not tested.  PERRL, EOMI, VFF, no nystagmus or diplopia.  No APD.  Fundi not visualized bilaterally.   left NLF.    Motor:   Tone: increased left leg.                  Strength: left leg 3/4 and left arm 2/4 on nihss      Other:    01-18    141  |  107  |  16  ----------------------------<  111<H>  3.3<L>   |  23  |  0.51    Ca    9.3      18 Jan 2018 07:28  Phos  2.6     01-17  Mg     2.1     01-17    TPro  6.9  /  Alb  3.1<L>  /  TBili  0.3  /  DBili  0.1  /  AST  21  /  ALT  29  /  AlkPhos  62  01-18 01-18    141  |  107  |  16  ----------------------------<  111<H>  3.3<L>   |  23  |  0.51    Ca    9.3      18 Jan 2018 07:28  Phos  2.6     01-17  Mg     2.1     01-17    TPro  6.9  /  Alb  3.1<L>  /  TBili  0.3  /  DBili  0.1  /  AST  21  /  ALT  29  /  AlkPhos  62  01-18    LIVER FUNCTIONS - ( 18 Jan 2018 10:36 )  Alb: 3.1 g/dL / Pro: 6.9 g/dL / ALK PHOS: 62 U/L / ALT: 29 U/L DA / AST: 21 U/L / GGT: x             Phenytoin Level, Serum: 1.9 ug/mL (01.18.18 @ 10:36)        Radiology    EEG: non specific diffuse dysfunction right worse than left.  no epileptiform activity    < from: CT Chest w/ Oral Cont and w/ IV Cont (01.17.18 @ 21:30) >  MPRESSION:  Postsurgical changes of the left chest identified in this   patient status post mastectomy. No CT findings to suggest intrathoracic,   intra-abdominal or pelvic malignancy. Nonspecific nodular pleural-based   opacities identified as described above for which follow-up CT of the   chest in 3-4 months recommended to establish continuing stability.    Preliminary result by Dr. Logan follows:    "No acute emergent findings."    < end of copied text >

## 2018-01-18 NOTE — CONSULT NOTE ADULT - SUBJECTIVE AND OBJECTIVE BOX
77 year old lady with left breast ca s/p mastectomy and LND.  the cancer was a inch size and 2/14 nodes were positive.  partial seizure at left face and left arm developed and came to ER.  she never lost her consciousness.  but her left extremities have been weak since then.  no fever, chills, headache, N/V.  alert, no plapbale nodes at neck. chest is clear. wound well healed. no nodes at axillae. abd is soft and flat. no leg edema.                        11.7   14.4  )-----------( 413      ( 18 Jan 2018 07:28 )             34.2   01-18    141  |  107  |  16  ----------------------------<  111<H>  3.3<L>   |  23  |  0.51    Ca    9.3      18 Jan 2018 07:28  Phos  2.6     01-17  Mg     2.1     01-17    TPro  6.9  /  Alb  3.1<L>  /  TBili  0.3  /  DBili  0.1  /  AST  21  /  ALT  29  /  AlkPhos  62  01-18  < from: MR Head w/ IV Cont (01.17.18 @ 10:55) >  INTERPRETATION:  CLINICAL STATEMENT: Seizure. History of breast cancer    TECHNIQUE: MRI of the brain was performed with and without gadolinium.    COMPARISON: CT head 1/16/2018    FINDINGS:  There is abnormal paramedian heterogeneous enhancement in the posterior   right frontal parietal region measuring 3.0 x 2.1 x 3.4 cm with   surrounding vasogenic edema. There are associated areas of mild   restricted diffusion. There are adjacent small 3-4 nodular enhancements   in the right parietal lobe.    There is mild diffuse parenchymal volume loss. There are T2 prolongation   signal abnormalities in the periventricular white matter likely related   to mild chronicmicrovascular ischemic changes.    There is no. Midline shift. There is no extra-axial fluid collection.    There is no hydrocephalus.  There is no acute infarct.    Small retention cyst/polyp sphenoid sinus.    IMPRESSION:  Multiple enhancing lesions in the posterior right frontal and parietal   lobes as described above which may represent metastases given history of   breast cancer. GBM could have similar appearance. Lymphoma is less likely.    < end of copied text >

## 2018-01-18 NOTE — CONSULT NOTE ADULT - SUBJECTIVE AND OBJECTIVE BOX
General Surgery  Consultation Note        HPI:  Patient is a 76 y/o F pt with PMHx of Breast CA Dx 2017 s/p L MRM at [facility]  admitted for seizure and recent finding of brain metastases on MRI with L MRM wound staples and JPs. Per patient and daughter, monitoring SARBJIT output from  to . Patient has not measured output in past 2 days. Pt denies fever, chills, swelling of L chest or L arm.      PAST MEDICAL & SURGICAL HISTORY:  Breast cancer  H/O L MRM      Allergies    No Known Allergies    Intolerances        MEDICATIONS  (STANDING):  dexamethasone  Injectable 4 milliGRAM(s) IV Push every 6 hours  dextrose 5% + sodium chloride 0.9%. 1000 milliLiter(s) (75 mL/Hr) IV Continuous <Continuous>  dextrose 5%. 1000 milliLiter(s) (50 mL/Hr) IV Continuous <Continuous>  dextrose 50% Injectable 12.5 Gram(s) IV Push once  dextrose 50% Injectable 25 Gram(s) IV Push once  dextrose 50% Injectable 25 Gram(s) IV Push once  enoxaparin Injectable 40 milliGRAM(s) SubCutaneous daily  insulin lispro (HumaLOG) corrective regimen sliding scale   SubCutaneous every 6 hours  phenytoin   Capsule 100 milliGRAM(s) Oral three times a day    MEDICATIONS  (PRN):  dextrose Gel 1 Dose(s) Oral once PRN Blood Glucose LESS THAN 70 milliGRAM(s)/deciliter  glucagon  Injectable 1 milliGRAM(s) IntraMuscular once PRN Glucose LESS THAN 70 milligrams/deciliter  LORazepam   Injectable 2 milliGRAM(s) IV Push every 5 minutes PRN seizure      Vital Signs Last 24 Hrs  T(C): 36.6 (2018 11:10), Max: 37.2 (2018 21:08)  T(F): 97.8 (2018 11:10), Max: 99 (2018 21:08)  HR: 111 (2018 11:10) (86 - 115)  BP: 143/66 (2018 11:10) (122/74 - 156/82)  BP(mean): --  RR: 20 (2018 11:10) (17 - 21)  SpO2: 95% (2018 11:10) (95% - 100%)    Physical Exam:  Gen: awake, alert oriented NAD  Chest: L chest wound stapled, flat, ld drains with serosanguinous output, no suction on tubes. Suction replaced and drain milked. Axillary drain with 25ml and chest drain with 5cc.     Labs:                          11.7   14.4  )-----------( 413      ( 2018 07:28 )             34.2     01-18    141  |  107  |  16  ----------------------------<  111<H>  3.3<L>   |  23  |  0.51    Ca    9.3      2018 07:28  Phos  2.6     17  Mg     2.1     17    TPro  6.9  /  Alb  3.1<L>  /  TBili  0.3  /  DBili  0.1  /  AST  21  /  ALT  29  /  AlkPhos  62  01-18    PT/INR - ( 2018 18:15 )   PT: 11.8 sec;   INR: 1.08 ratio         PTT - ( 2018 18:15 )  PTT:25.7 sec  Urinalysis Basic - ( 2018 22:38 )    Color: Yellow / Appearance: Clear / S.010 / pH: x  Gluc: x / Ketone: Negative  / Bili: Negative / Urobili: Negative   Blood: x / Protein: Negative / Nitrite: Negative   Leuk Esterase: Small / RBC: 0-2 /HPF / WBC 3-5 /HPF   Sq Epi: x / Non Sq Epi: Moderate /HPF / Bacteria: Few /HPF

## 2018-01-18 NOTE — DISCHARGE NOTE ADULT - CARE PLAN
Principal Discharge DX:	Status epilepticus  Goal:	symptom resolved, prevent reoccur  Assessment and plan of treatment:	Please continue with dilantin 100mg, three times a day and follow up with neurologist with in one week. The patient has been advised that driving is not allowed for 1 year after a seizure by [place] state law.  The patient is advised to avoid swimming and any activities that may put their life at danger shall they have a seizure.  Secondary Diagnosis:	Breast cancer  Secondary Diagnosis:	Brain mass Principal Discharge DX:	Brain mass  Goal:	transfer for brain mass biopsy  Assessment and plan of treatment:	Patient had one dominant mass with adjacent small ones.  not typical for mets, doubt glioblastoma.  consulted with Heme oncologist Dr. Flaherty. Patient will be transferred to [facility] for further management for brain mass biopsy. Accepted attending is Dr. Alberts. Continue dexamethasone 4mg every 6 hrs.  Secondary Diagnosis:	Breast cancer  Assessment and plan of treatment:	Left breast cancer status post mastectomy and LND 2 weeks ago, stage 2. Two  SARBJIT Drains removed by surgery PA on 01/22, defers removal of staples with concern for wound dehiscence.  Secondary Diagnosis:	Status epilepticus  Assessment and plan of treatment:	Please continue with dilantin 100mg, three times a day and follow up with neurologist with in one week. The patient has been advised that driving is not allowed for 1 year after a seizure by [place] state law.  The patient is advised to avoid swimming and any activities that may put their life at danger shall they have a seizure.

## 2018-01-18 NOTE — DISCHARGE NOTE ADULT - PATIENT PORTAL LINK FT
“You can access the [employer] Patient Portal, offered by [facility], by registering with the following website: [email or web address]”

## 2018-01-18 NOTE — CONSULT NOTE ADULT - ASSESSMENT
breast ca and brain mass
h/o L MRM at [facility] 1/8 with wound staples and ld drains x 2  1. Recommend evaluation of drain after 24hrs to see output. Possible removal of drain tomorrow afternoon  2. will not remove staples as the wound has risk of dehiscence.   3. d/w Ricardo and agreed.
Impression:  Left sided weakness and twitching for 30min in patient with right sided mass with vasogenic edema and Hx of breast cancer concerning for seizure foci due to primary or metastatic brain mass (likely breast ca but should r/o other primaries).  The seizure resolved and reoccurred patient loaded with Fosphenytoin.       Recommendations:  1.         cw Dilantin 200mg tid, check Dilantin level and LFT in AM  2.         CT chest abd and plevis with and w/o timmy  3.         EEG  4.         Please give Ativan 2mg for active seizure, can give another 2mg dose of Ativan if the seizure continues or re-occurs, for a maximum of 6mg Ativan in 24 hours.  5.         Seizure and fall precautions  6.        The patient has been advised that driving is not allowed for 1 year after a seizure by [place] law.  The patient is advised to avoid swimming and any activities that may put their life at danger shall they have a seizure.    7.        DVT PPx    Thank you for the courtesy of this consult.
77 F h/o left breast cancer s/p mastectomy came with left arm weakness and seizure.

## 2018-01-18 NOTE — PHYSICAL THERAPY INITIAL EVALUATION ADULT - GENERAL OBSERVATIONS, REHAB EVAL
Consult received,EMR, radiology and labs reviewed. Patient received supine in bed, NAD, Left arm precaution, 2 SARBJIT tube drains.  Patient agreed to EVALUATION from Physical Therapist.

## 2018-01-18 NOTE — CONSULT NOTE ADULT - PROBLEM SELECTOR RECOMMENDATION 9
left breast s/p mastectomy and LND 2 weeks ago, stage 2
Likely 2/2 brain mets with primary breast cancer  CT head showed a segment of vasogenic edema in the centrum semiovale measuring roughly 3.5 cm in maximum axial dimension consistent with an underlying mass.  Lactate 8, given 2 L bolus. c/w IVF  Given lorazepam 2 mg and fosphenytoin 1200mg  Starting on decadron 4 mg q 6.  c/w phenytoin  NPO till swallow evaluation  F/u contrast enhanced MR head  Neuro consult Dr. Bridges  On fall, seizure and aspiration precaution  Keep on tele for now

## 2018-01-18 NOTE — DISCHARGE NOTE ADULT - HOSPITAL COURSE
Patient is a 76 y/o F pt with PMHx of Breast CA Dx 2017 (s/p resection of L breast x1 week ago at [facility]) with recent negative PET scan and BIB EMS to ED with 30 min seizure (L facial twitch) at home while seated, witnessed by , with associated L-sided facial droop since 5pm on . As per EMS, pt was awake and alert throughout the entire duration seizure like episodes with facial twitching. In the ED, patient was seen and examined, follows commands but disoriented, AAOx2, can state her name and  but does not know date, month or year. Unable to move entire L side on exam. ICU was consulted for concern for status epilepticus in the setting of brain mass, and downgraded. In the ED was given 2mg ativan x 2 IM, with temporary resolution of L facial twitching, but then returned, so was started on dilantin 100mg TID after 1 dose of 1200mg fosphenytoin loading. Decadron x 1 given for vasogenic edema on CT head. Patient is a 78 y/o F pt with PMHx of Breast CA Dx Nov 2017 (s/p resection of L breast x1 week ago at [facility]) with recent negative PET scan and BIB EMS to ED with 30 min seizure (L facial twitch) at home while seated, witnessed by , with associated L-sided facial droop since 5pm on 1/16. As per EMS, pt was awake and alert throughout the entire duration seizure like episodes with facial twitching. Admitted for new onset seizure, status epilepticus, likely 2/2 brain mets from breast CA. Neurologist consulted for seizure. one dose fosphenytoin followed by dilantin IV 100mg TID, changed IV to PO  decadron x 1 (10mg), c/w 4mg q 6 hours for vasogenic edema. Two SARBJIT Drains were removed by surgery. CT chest and abd/pelvic no acute findings. MRI head shows Multiple enhancing lesions in the posterior right frontal and parietal lobes , represent metastases given history of breast cancer.  One dominant mass with adjacent small ones, not typical for mets. Hemeoncology consulted , doubt glioblastoma, and recommended brain mass biopsy. Pt will be transferred to [facility] for Brain mass biopsy, accepted attending is Dr. Alberts. Discussed with Dr. Pascal about the transfer plan.

## 2018-01-19 LAB
ANION GAP SERPL CALC-SCNC: 7 MMOL/L — SIGNIFICANT CHANGE UP (ref 5–17)
BUN SERPL-MCNC: 14 MG/DL — SIGNIFICANT CHANGE UP (ref 7–18)
CALCIUM SERPL-MCNC: 9.2 MG/DL — SIGNIFICANT CHANGE UP (ref 8.4–10.5)
CHLORIDE SERPL-SCNC: 106 MMOL/L — SIGNIFICANT CHANGE UP (ref 96–108)
CO2 SERPL-SCNC: 28 MMOL/L — SIGNIFICANT CHANGE UP (ref 22–31)
CREAT SERPL-MCNC: 0.47 MG/DL — LOW (ref 0.5–1.3)
GLUCOSE SERPL-MCNC: 104 MG/DL — HIGH (ref 70–99)
HCT VFR BLD CALC: 36 % — SIGNIFICANT CHANGE UP (ref 34.5–45)
HGB BLD-MCNC: 11.5 G/DL — SIGNIFICANT CHANGE UP (ref 11.5–15.5)
MAGNESIUM SERPL-MCNC: 2.2 MG/DL — SIGNIFICANT CHANGE UP (ref 1.6–2.6)
MCHC RBC-ENTMCNC: 28.9 PG — SIGNIFICANT CHANGE UP (ref 27–34)
MCHC RBC-ENTMCNC: 31.9 GM/DL — LOW (ref 32–36)
MCV RBC AUTO: 90.8 FL — SIGNIFICANT CHANGE UP (ref 80–100)
PHOSPHATE SERPL-MCNC: 3.4 MG/DL — SIGNIFICANT CHANGE UP (ref 2.5–4.5)
PLATELET # BLD AUTO: 382 K/UL — SIGNIFICANT CHANGE UP (ref 150–400)
POTASSIUM SERPL-MCNC: 3.8 MMOL/L — SIGNIFICANT CHANGE UP (ref 3.5–5.3)
POTASSIUM SERPL-SCNC: 3.8 MMOL/L — SIGNIFICANT CHANGE UP (ref 3.5–5.3)
RBC # BLD: 3.97 M/UL — SIGNIFICANT CHANGE UP (ref 3.8–5.2)
RBC # FLD: 13.2 % — SIGNIFICANT CHANGE UP (ref 10.3–14.5)
SODIUM SERPL-SCNC: 141 MMOL/L — SIGNIFICANT CHANGE UP (ref 135–145)
WBC # BLD: 12.4 K/UL — HIGH (ref 3.8–10.5)
WBC # FLD AUTO: 12.4 K/UL — HIGH (ref 3.8–10.5)

## 2018-01-19 PROCEDURE — 99233 SBSQ HOSP IP/OBS HIGH 50: CPT | Mod: GC

## 2018-01-19 PROCEDURE — 70450 CT HEAD/BRAIN W/O DYE: CPT | Mod: 26

## 2018-01-19 RX ORDER — DEXAMETHASONE 0.5 MG/5ML
4 ELIXIR ORAL EVERY 6 HOURS
Qty: 0 | Refills: 0 | Status: DISCONTINUED | OUTPATIENT
Start: 2018-01-19 | End: 2018-01-19

## 2018-01-19 RX ORDER — PANTOPRAZOLE SODIUM 20 MG/1
40 TABLET, DELAYED RELEASE ORAL
Qty: 0 | Refills: 0 | Status: DISCONTINUED | OUTPATIENT
Start: 2018-01-19 | End: 2018-01-23

## 2018-01-19 RX ORDER — DEXAMETHASONE 0.5 MG/5ML
4 ELIXIR ORAL EVERY 6 HOURS
Qty: 0 | Refills: 0 | Status: DISCONTINUED | OUTPATIENT
Start: 2018-01-19 | End: 2018-01-23

## 2018-01-19 RX ADMIN — ENOXAPARIN SODIUM 40 MILLIGRAM(S): 100 INJECTION SUBCUTANEOUS at 13:09

## 2018-01-19 RX ADMIN — Medication 100 MILLIGRAM(S): at 22:28

## 2018-01-19 RX ADMIN — Medication 100 MILLIGRAM(S): at 13:09

## 2018-01-19 RX ADMIN — Medication 4 MILLIGRAM(S): at 17:57

## 2018-01-19 RX ADMIN — Medication 100 MILLIGRAM(S): at 05:38

## 2018-01-19 RX ADMIN — Medication 2 MILLIGRAM(S): at 06:57

## 2018-01-19 RX ADMIN — Medication 1: at 18:01

## 2018-01-19 NOTE — ACUTE INTERFACILITY TRANSFER NOTE - PLAN OF CARE
Brain mass biospy Patient will be transferred to [facility] for Brain mass biopsy, accepted attending is Dr. Alberts. Continue dexamethasone 4mg every 6 hrs. left breast cancer status post mastectomy and LND 2 weeks ago, stage 2. Left breast cancer status post mastectomy and LND 2 weeks ago, stage 2. Two Drains need to be removed as per her Surgeon Dr. Deep Zepeda ([phone])  surgery consulted for SARBJIT drains removal, defers removal of staples with concern for wound dehiscence. please continue Dilantin 100mg, three times a day Iglesia had one dominant mass with adjacent small ones.  not typical for mets, doubt glioblastoma.  consulted with Hemeoncologist Dr. Flaherty. Plan to transfer to [facility] for further management for brain mass biopsy, pending for bed available. Patient will be transferred to [facility] for Brain mass biopsy, accepted attending is Dr. Alberts. Continue dexamethasone 4mg every 6 hrs. Left breast cancer status post mastectomy and LND 2 weeks ago, stage 2. Two  SARBJIT Drains removed by surgery PA on 01/22, defers removal of staples with concern for wound dehiscence. Neurology consulted with , recommended continue Dilantin 100mg, three times a day, patient has been advised that driving is not allowed for 1 year after a seizure by [place] law.  The patient is advised to avoid swimming and any activities that may put their life at danger shall they have a seizure. Brain mass biopsy

## 2018-01-19 NOTE — PROGRESS NOTE ADULT - PROBLEM SELECTOR PLAN 2
one dominant mass with adjacent small ones.  not typical for mets, ?glioblastoma.  consulted with Dr. Flaherty, will need biopsy or debulking  c/w steroid.   plan transfer to [facility] one dominant mass with adjacent small ones.  not typical for mets, ?glioblastoma.  consulted with Dr. Flaherty, will need biopsy or debulking  c/w steroid.   PT recommended to [facility]  Plan to transfer to [facility] for further management for malignancy

## 2018-01-19 NOTE — PROGRESS NOTE ADULT - SUBJECTIVE AND OBJECTIVE BOX
Patient examined at bedside, no complaints  No nausea, no vomiting  Tolerating diet    T(C): 36.8 (01-19-18 @ 05:19), Max: 37 (01-19-18 @ 00:09)  HR: 76 (01-19-18 @ 05:19) (76 - 111)  BP: 119/58 (01-19-18 @ 05:19) (119/58 - 144/75)  RR: 18 (01-19-18 @ 05:19) (18 - 20)  SpO2: 98% (01-19-18 @ 05:19) (93% - 98%)  Wt(kg): --      01-18 @ 07:01  -  01-19 @ 07:00  --------------------------------------------------------  IN: 10 mL / OUT: 825 mL / NET: -815 mL        Physical Exam  General: AAOx3, No acute distress  Skin: No jaundice, no icterus  Chest: left chest wall mastectomy scar c/d/i, no hematoma, no erythema, staples in place, SARBJIT drainsx2 with serosanguinous fluid (25,10)  Abdomen: soft, nontender, nondistended  Extremities: non edematous, no calf pain bilaterally                          11.5   12.4  )-----------( 382      ( 19 Jan 2018 06:18 )             36.0

## 2018-01-19 NOTE — ACUTE INTERFACILITY TRANSFER NOTE - HOSPITAL COURSE
Patient is a 78 y/o F pt with PMHx of Breast CA Dx Nov 2017 (s/p resection of L breast x1 week ago at [facility]) with recent negative PET scan and BIB EMS to ED with 30 min seizure (L facial twitch) at home while seated, witnessed by , with associated L-sided facial droop since 5pm on 1/16. As per EMS, pt was awake and alert throughout the entire duration seizure like episodes with facial twitching. Admitted for new onset seizure, status epilepticus, likely 2/2 brain mets from breast CA. Patient is a 78 y/o F pt with PMHx of Breast CA Dx Nov 2017 (s/p resection of L breast x1 week ago at [facility]) with recent negative PET scan and BIB EMS to ED with 30 min seizure (L facial twitch) at home while seated, witnessed by , with associated L-sided facial droop since 5pm on 1/16. As per EMS, pt was awake and alert throughout the entire duration seizure like episodes with facial twitching. Admitted for new onset seizure, status epilepticus, likely 2/2 brain mets from breast CA. one dose fosphenytoin followed by dilantin IV 100mg TID, changed IV to PO  decadron x 1 (10mg), c/w 4mg q 6 hours for vasogenic edema. Two Drains need to be removed as per her Surgeon Dr. Deep Zepeda ([phone]),  surgery consulted for SARBJIT drains removal, defers removal of staples with concern for wound dehiscence.  CT chest and abd/pelvic no acute findings. MRI head shows Multiple enhancing lesions in the posterior right frontal and parietal lobes , represent metastases given history of breast cancer.  One dominant mass with adjacent small ones, not typical for mets, ?glioblastoma.  consulted with Dr. Flaherty,Pt will be transferred to [facility] for Brain mass biopsy on Monday, accepted attending is Dr. Alberts.   . Patient is a 76 y/o F pt with PMHx of Breast CA Dx Nov 2017 (s/p resection of L breast x1 week ago at [facility]) with recent negative PET scan and BIB EMS to ED with 30 min seizure (L facial twitch) at home while seated, witnessed by , with associated L-sided facial droop since 5pm on 1/16. As per EMS, pt was awake and alert throughout the entire duration seizure like episodes with facial twitching. Admitted for new onset seizure, status epilepticus, likely 2/2 brain mets from breast CA. Neurologist consulted for seizure. one dose fosphenytoin followed by dilantin IV 100mg TID, changed IV to PO  decadron x 1 (10mg), c/w 4mg q 6 hours for vasogenic edema. Two SARBJIT Drains were removed by surgery. CT chest and abd/pelvic no acute findings. MRI head shows Multiple enhancing lesions in the posterior right frontal and parietal lobes , represent metastases given history of breast cancer.  One dominant mass with adjacent small ones, not typical for mets. Hemeoncology consulted , doubt glioblastoma, and recommended brain mass biopsy. Pt will be transferred to [facility] for Brain mass biopsy, accepted attending is Dr. Alberts. DIscussed with Dr. Pascal about the transfer plan.

## 2018-01-19 NOTE — PROGRESS NOTE ADULT - PROBLEM SELECTOR PLAN 3
left breast s/p mastectomy and LND 2 weeks ago, stage 2 at [facility]  MRI head shows Multiple enhancing lesions in the posterior right frontal and parietal lobes , represent metastases given history of breast cancer  CT chest and abd/pelvic no acute findings.  Two Drains need to be removed today as per her Surgeon Dr. Deep Zepeda ([phone])  f/u [facility] consult with Dr. Flaherty  f/u  surgery consult left breast s/p mastectomy and LND 2 weeks ago, stage 2 at [facility]  MRI head shows Multiple enhancing lesions in the posterior right frontal and parietal lobes , represent metastases given history of breast cancer  CT chest and abd/pelvic no acute findings.  Two Drains need to be removed as per her Surgeon Dr. Deep Zepeda ([phone])  surgery consulted for SARBJIT drains later today, defers removal of staples with concern for wound dehiscence

## 2018-01-19 NOTE — PROGRESS NOTE ADULT - SUBJECTIVE AND OBJECTIVE BOX
PGY1 Note discussed with supervising resident and primary attending.    Patient is a 77y old  Female who presents with a chief complaint of seziure (18 Jan 2018 20:20)      INTERVAL HPI/OVERNIGHT EVENTS:    MEDICATIONS  (STANDING):  dextrose 5%. 1000 milliLiter(s) (50 mL/Hr) IV Continuous <Continuous>  dextrose 50% Injectable 12.5 Gram(s) IV Push once  dextrose 50% Injectable 25 Gram(s) IV Push once  dextrose 50% Injectable 25 Gram(s) IV Push once  enoxaparin Injectable 40 milliGRAM(s) SubCutaneous daily  insulin lispro (HumaLOG) corrective regimen sliding scale   SubCutaneous every 6 hours  phenytoin   Capsule 100 milliGRAM(s) Oral three times a day    MEDICATIONS  (PRN):  dextrose Gel 1 Dose(s) Oral once PRN Blood Glucose LESS THAN 70 milliGRAM(s)/deciliter  glucagon  Injectable 1 milliGRAM(s) IntraMuscular once PRN Glucose LESS THAN 70 milligrams/deciliter  LORazepam   Injectable 2 milliGRAM(s) IV Push every 5 minutes PRN seizure      Allergies    No Known Allergies    Intolerances        REVIEW OF SYSTEMS:  CONSTITUTIONAL: No fever, weight loss, or fatigue  RESPIRATORY: No cough, wheezing, chills or hemoptysis; No shortness of breath  CARDIOVASCULAR: No chest pain, palpitations, dizziness, or leg swelling  GASTROINTESTINAL: No abdominal or epigastric pain. No nausea, vomiting, or hematemesis; No diarrhea or constipation. No melena or hematochezia.  NEUROLOGICAL: No headaches, memory loss, loss of strength, numbness, or tremors  SKIN: No itching, burning, rashes, or lesions     Vital Signs Last 24 Hrs  T(C): 36.3 (19 Jan 2018 08:29), Max: 37 (19 Jan 2018 00:09)  T(F): 97.3 (19 Jan 2018 08:29), Max: 98.6 (19 Jan 2018 00:09)  HR: 83 (19 Jan 2018 08:29) (76 - 111)  BP: 103/75 (19 Jan 2018 08:29) (103/75 - 144/75)  BP(mean): --  RR: 14 (19 Jan 2018 08:29) (14 - 20)  SpO2: 99% (19 Jan 2018 08:29) (93% - 99%)    PHYSICAL EXAM:  GENERAL: NAD, well-groomed, well-developed  HEAD:  Atraumatic, Normocephalic  EYES: EOMI, PERRLA, conjunctiva and sclera clear  NECK: Supple, No JVD, Normal thyroid  CHEST/LUNG: Clear to percussion bilaterally; No rales, rhonchi, wheezing, or rubs  HEART: Regular rate and rhythm; No murmurs, rubs, or gallops  ABDOMEN: Soft, Nontender, Nondistended; Bowel sounds present  NERVOUS SYSTEM:  Alert & Oriented X3, Good concentration; Motor Strength 5/5 B/L   EXTREMITIES:  2+ Peripheral Pulses, No clubbing, cyanosis, or edema  SKIN;    LABS:                        11.5   12.4  )-----------( 382      ( 19 Jan 2018 06:18 )             36.0     01-19    141  |  106  |  14  ----------------------------<  104<H>  3.8   |  28  |  0.47<L>    Ca    9.2      19 Jan 2018 06:18  Phos  3.4     01-19  Mg     2.2     01-19    TPro  6.9  /  Alb  3.1<L>  /  TBili  0.3  /  DBili  0.1  /  AST  21  /  ALT  29  /  AlkPhos  62  01-18        CAPILLARY BLOOD GLUCOSE      POCT Blood Glucose.: 107 mg/dL (19 Jan 2018 05:37)  POCT Blood Glucose.: 132 mg/dL (18 Jan 2018 23:30)  POCT Blood Glucose.: 163 mg/dL (18 Jan 2018 18:01)  POCT Blood Glucose.: 135 mg/dL (18 Jan 2018 11:06)      RADIOLOGY & ADDITIONAL TESTS:    Imaging Personally Reviewed:  [ ] YES  [ ] NO    Consultant(s) Notes Reviewed:  [ ] YES  [ ] NO PGY1 Note discussed with supervising resident and primary attending.    Patient is a 77y old  Female who presents with a chief complaint of seziure (18 Jan 2018 20:20)      INTERVAL HPI/OVERNIGHT EVENTS: Pt had worsening weakness on her left leg and arm, noted that 2mg ativan was given by RN due to pt agitate.  Urgent CT head show stable vasogenic edema related to the right parafalcine mass lesion. There is no acute hemorrhage, cortical edema or hydrocephalus.    MEDICATIONS  (STANDING):  dextrose 5%. 1000 milliLiter(s) (50 mL/Hr) IV Continuous <Continuous>  dextrose 50% Injectable 12.5 Gram(s) IV Push once  dextrose 50% Injectable 25 Gram(s) IV Push once  dextrose 50% Injectable 25 Gram(s) IV Push once  enoxaparin Injectable 40 milliGRAM(s) SubCutaneous daily  insulin lispro (HumaLOG) corrective regimen sliding scale   SubCutaneous every 6 hours  phenytoin   Capsule 100 milliGRAM(s) Oral three times a day    MEDICATIONS  (PRN):  dextrose Gel 1 Dose(s) Oral once PRN Blood Glucose LESS THAN 70 milliGRAM(s)/deciliter  glucagon  Injectable 1 milliGRAM(s) IntraMuscular once PRN Glucose LESS THAN 70 milligrams/deciliter  LORazepam   Injectable 2 milliGRAM(s) IV Push every 5 minutes PRN seizure      Allergies    No Known Allergies    Intolerances        REVIEW OF SYSTEMS:  CONSTITUTIONAL: No fever, weight loss, or fatigue  RESPIRATORY: No cough, wheezing, chills or hemoptysis; No shortness of breath  CARDIOVASCULAR: No chest pain, palpitations, dizziness, or leg swelling  GASTROINTESTINAL: No abdominal or epigastric pain. No nausea, vomiting, or hematemesis; No diarrhea or constipation. No melena or hematochezia.  NEUROLOGICAL: No headaches, memory loss,  worsening weakness on left arm and leg compared to yesterday       Vital Signs Last 24 Hrs  T(C): 36.3 (19 Jan 2018 08:29), Max: 37 (19 Jan 2018 00:09)  T(F): 97.3 (19 Jan 2018 08:29), Max: 98.6 (19 Jan 2018 00:09)  HR: 83 (19 Jan 2018 08:29) (76 - 111)  BP: 103/75 (19 Jan 2018 08:29) (103/75 - 144/75)  BP(mean): --  RR: 14 (19 Jan 2018 08:29) (14 - 20)  SpO2: 99% (19 Jan 2018 08:29) (93% - 99%)    PHYSICAL EXAM:  GENERAL: NAD, well-groomed, well-developed  CHEST/LUNG: Clear to percussion bilaterally; No rales, rhonchi, wheezing, or rubs  HEART: Regular rate and rhythm; No murmurs, rubs, or gallops  ABDOMEN: Soft, Nontender, Nondistended; Bowel sounds present  NERVOUS SYSTEM: Alert & Oriented X3, muscle strengthen 3/5 on left arm and leg  EXTREMITIES:  2+ Peripheral Pulses, No clubbing, cyanosis, or edema    LABS:                        11.5   12.4  )-----------( 382      ( 19 Jan 2018 06:18 )             36.0     01-19    141  |  106  |  14  ----------------------------<  104<H>  3.8   |  28  |  0.47<L>    Ca    9.2      19 Jan 2018 06:18  Phos  3.4     01-19  Mg     2.2     01-19    TPro  6.9  /  Alb  3.1<L>  /  TBili  0.3  /  DBili  0.1  /  AST  21  /  ALT  29  /  AlkPhos  62  01-18        CAPILLARY BLOOD GLUCOSE      POCT Blood Glucose.: 107 mg/dL (19 Jan 2018 05:37)  POCT Blood Glucose.: 132 mg/dL (18 Jan 2018 23:30)  POCT Blood Glucose.: 163 mg/dL (18 Jan 2018 18:01)  POCT Blood Glucose.: 135 mg/dL (18 Jan 2018 11:06)      RADIOLOGY & ADDITIONAL TESTS:    Imaging Personally Reviewed:  [x ] YES  [ ] NO    Consultant(s) Notes Reviewed:  [ x] YES  [ ] NO

## 2018-01-20 LAB
HCT VFR BLD CALC: 35.4 % — SIGNIFICANT CHANGE UP (ref 34.5–45)
HGB BLD-MCNC: 11.7 G/DL — SIGNIFICANT CHANGE UP (ref 11.5–15.5)
MAGNESIUM SERPL-MCNC: 2.3 MG/DL — SIGNIFICANT CHANGE UP (ref 1.6–2.6)
MCHC RBC-ENTMCNC: 30.4 PG — SIGNIFICANT CHANGE UP (ref 27–34)
MCHC RBC-ENTMCNC: 33.1 GM/DL — SIGNIFICANT CHANGE UP (ref 32–36)
MCV RBC AUTO: 91.8 FL — SIGNIFICANT CHANGE UP (ref 80–100)
PHENYTOIN FREE SERPL-MCNC: < 0.5 MG/L — LOW (ref 1–2)
PHOSPHATE SERPL-MCNC: 3.8 MG/DL — SIGNIFICANT CHANGE UP (ref 2.5–4.5)
PLATELET # BLD AUTO: 407 K/UL — HIGH (ref 150–400)
RBC # BLD: 3.85 M/UL — SIGNIFICANT CHANGE UP (ref 3.8–5.2)
RBC # FLD: 13.2 % — SIGNIFICANT CHANGE UP (ref 10.3–14.5)
WBC # BLD: 10.9 K/UL — HIGH (ref 3.8–10.5)
WBC # FLD AUTO: 10.9 K/UL — HIGH (ref 3.8–10.5)

## 2018-01-20 PROCEDURE — 99233 SBSQ HOSP IP/OBS HIGH 50: CPT

## 2018-01-20 RX ADMIN — Medication 100 MILLIGRAM(S): at 05:45

## 2018-01-20 RX ADMIN — Medication 1: at 12:16

## 2018-01-20 RX ADMIN — Medication 100 MILLIGRAM(S): at 13:38

## 2018-01-20 RX ADMIN — PANTOPRAZOLE SODIUM 40 MILLIGRAM(S): 20 TABLET, DELAYED RELEASE ORAL at 05:45

## 2018-01-20 RX ADMIN — Medication 4 MILLIGRAM(S): at 05:45

## 2018-01-20 RX ADMIN — Medication 4 MILLIGRAM(S): at 18:33

## 2018-01-20 RX ADMIN — Medication 4 MILLIGRAM(S): at 12:01

## 2018-01-20 RX ADMIN — Medication 100 MILLIGRAM(S): at 21:18

## 2018-01-20 RX ADMIN — Medication 4 MILLIGRAM(S): at 00:22

## 2018-01-20 RX ADMIN — ENOXAPARIN SODIUM 40 MILLIGRAM(S): 100 INJECTION SUBCUTANEOUS at 12:00

## 2018-01-20 NOTE — PROGRESS NOTE ADULT - PROBLEM SELECTOR PLAN 3
left breast s/p mastectomy and LND 2 weeks ago, stage 2 at [facility]  MRI head shows Multiple enhancing lesions in the posterior right frontal and parietal lobes , represent metastases given history of breast cancer  CT chest and abd/pelvic no acute findings.  Two Drains need to be removed as per her Surgeon Dr. Deep Zepeda ([phone])  surgery consulted for SARBJIT drains later today, defers removal of staples with concern for wound dehiscence

## 2018-01-20 NOTE — PROGRESS NOTE ADULT - PROBLEM SELECTOR PLAN 2
one dominant mass with adjacent small ones.  not typical for mets, ?glioblastoma.  consulted with Dr. Flaherty, will need biopsy or debulking  c/w steroid.   PT recommended to [facility]  Plan to transfer to [facility] for further management for malignancy

## 2018-01-21 LAB
ANION GAP SERPL CALC-SCNC: 7 MMOL/L — SIGNIFICANT CHANGE UP (ref 5–17)
BUN SERPL-MCNC: 20 MG/DL — HIGH (ref 7–18)
CALCIUM SERPL-MCNC: 8.9 MG/DL — SIGNIFICANT CHANGE UP (ref 8.4–10.5)
CHLORIDE SERPL-SCNC: 103 MMOL/L — SIGNIFICANT CHANGE UP (ref 96–108)
CO2 SERPL-SCNC: 28 MMOL/L — SIGNIFICANT CHANGE UP (ref 22–31)
CREAT SERPL-MCNC: 0.45 MG/DL — LOW (ref 0.5–1.3)
GLUCOSE SERPL-MCNC: 88 MG/DL — SIGNIFICANT CHANGE UP (ref 70–99)
HCT VFR BLD CALC: 36.6 % — SIGNIFICANT CHANGE UP (ref 34.5–45)
HGB BLD-MCNC: 12 G/DL — SIGNIFICANT CHANGE UP (ref 11.5–15.5)
MAGNESIUM SERPL-MCNC: 2.3 MG/DL — SIGNIFICANT CHANGE UP (ref 1.6–2.6)
MCHC RBC-ENTMCNC: 30 PG — SIGNIFICANT CHANGE UP (ref 27–34)
MCHC RBC-ENTMCNC: 32.7 GM/DL — SIGNIFICANT CHANGE UP (ref 32–36)
MCV RBC AUTO: 91.9 FL — SIGNIFICANT CHANGE UP (ref 80–100)
PHOSPHATE SERPL-MCNC: 3.9 MG/DL — SIGNIFICANT CHANGE UP (ref 2.5–4.5)
PLATELET # BLD AUTO: 423 K/UL — HIGH (ref 150–400)
POTASSIUM SERPL-MCNC: 3.4 MMOL/L — LOW (ref 3.5–5.3)
POTASSIUM SERPL-SCNC: 3.4 MMOL/L — LOW (ref 3.5–5.3)
RBC # BLD: 3.98 M/UL — SIGNIFICANT CHANGE UP (ref 3.8–5.2)
RBC # FLD: 13 % — SIGNIFICANT CHANGE UP (ref 10.3–14.5)
SODIUM SERPL-SCNC: 138 MMOL/L — SIGNIFICANT CHANGE UP (ref 135–145)
WBC # BLD: 10.7 K/UL — HIGH (ref 3.8–10.5)
WBC # FLD AUTO: 10.7 K/UL — HIGH (ref 3.8–10.5)

## 2018-01-21 PROCEDURE — 99233 SBSQ HOSP IP/OBS HIGH 50: CPT | Mod: GC

## 2018-01-21 RX ORDER — POTASSIUM CHLORIDE 20 MEQ
40 PACKET (EA) ORAL EVERY 4 HOURS
Qty: 0 | Refills: 0 | Status: COMPLETED | OUTPATIENT
Start: 2018-01-21 | End: 2018-01-21

## 2018-01-21 RX ORDER — SIMVASTATIN 20 MG/1
1 TABLET, FILM COATED ORAL
Qty: 0 | Refills: 0 | COMMUNITY

## 2018-01-21 RX ORDER — RAMIPRIL 5 MG
1 CAPSULE ORAL
Qty: 0 | Refills: 0 | COMMUNITY

## 2018-01-21 RX ADMIN — Medication 100 MILLIGRAM(S): at 22:39

## 2018-01-21 RX ADMIN — Medication 100 MILLIGRAM(S): at 05:40

## 2018-01-21 RX ADMIN — Medication 100 MILLIGRAM(S): at 14:47

## 2018-01-21 RX ADMIN — Medication 4 MILLIGRAM(S): at 18:05

## 2018-01-21 RX ADMIN — Medication 40 MILLIEQUIVALENT(S): at 14:47

## 2018-01-21 RX ADMIN — PANTOPRAZOLE SODIUM 40 MILLIGRAM(S): 20 TABLET, DELAYED RELEASE ORAL at 05:40

## 2018-01-21 RX ADMIN — Medication 4 MILLIGRAM(S): at 12:43

## 2018-01-21 RX ADMIN — ENOXAPARIN SODIUM 40 MILLIGRAM(S): 100 INJECTION SUBCUTANEOUS at 12:43

## 2018-01-21 RX ADMIN — Medication 40 MILLIEQUIVALENT(S): at 18:06

## 2018-01-21 RX ADMIN — Medication 4 MILLIGRAM(S): at 05:40

## 2018-01-21 RX ADMIN — Medication 4 MILLIGRAM(S): at 00:55

## 2018-01-21 NOTE — PROGRESS NOTE ADULT - PROBLEM SELECTOR PLAN 2
one dominant mass with adjacent small ones.  not typical for mets, ?glioblastoma.  consulted with Dr. Flaherty, will need biopsy or debulking  c/w steroid.   PT recommended to [facility]  Plan to transfer to [facility] for further management for brain mass biopsy on Monday

## 2018-01-21 NOTE — PROGRESS NOTE ADULT - SUBJECTIVE AND OBJECTIVE BOX
PGY1 Note discussed with supervising resident and primary attending.    Patient is a 77y old  Female who presents with a chief complaint of seziure (19 Jan 2018 10:20)      INTERVAL HPI/OVERNIGHT EVENTS: no new overnight events.     MEDICATIONS  (STANDING):  dexamethasone     Tablet 4 milliGRAM(s) Oral every 6 hours  dextrose 50% Injectable 25 Gram(s) IV Push once  dextrose 50% Injectable 25 Gram(s) IV Push once  enoxaparin Injectable 40 milliGRAM(s) SubCutaneous daily  insulin lispro (HumaLOG) corrective regimen sliding scale   SubCutaneous every 6 hours  pantoprazole    Tablet 40 milliGRAM(s) Oral before breakfast  phenytoin   Capsule 100 milliGRAM(s) Oral three times a day    MEDICATIONS  (PRN):  dextrose Gel 1 Dose(s) Oral once PRN Blood Glucose LESS THAN 70 milliGRAM(s)/deciliter  glucagon  Injectable 1 milliGRAM(s) IntraMuscular once PRN Glucose LESS THAN 70 milligrams/deciliter  LORazepam   Injectable 2 milliGRAM(s) IV Push every 5 minutes PRN seizure      Allergies    No Known Allergies    Intolerances        REVIEW OF SYSTEMS:  CONSTITUTIONAL: No fever, weight loss, or fatigue  RESPIRATORY: No cough, wheezing, chills or hemoptysis; No shortness of breath  CARDIOVASCULAR: No chest pain, palpitations, dizziness, or leg swelling  GASTROINTESTINAL: No abdominal or epigastric pain. No nausea, vomiting,   NEUROLOGICAL: No headaches, memory loss, loss of strength, numbness, or tremors  SKIN: No itching, burning, rashes, or lesions     Vital Signs Last 24 Hrs  T(C): 36.8 (21 Jan 2018 04:38), Max: 36.8 (21 Jan 2018 04:38)  T(F): 98.2 (21 Jan 2018 04:38), Max: 98.2 (21 Jan 2018 04:38)  HR: 105 (21 Jan 2018 04:38) (99 - 107)  BP: 129/76 (21 Jan 2018 04:38) (114/68 - 132/77)  BP(mean): --  RR: 20 (21 Jan 2018 04:38) (16 - 20)  SpO2: 98% (21 Jan 2018 04:38) (97% - 98%)    PHYSICAL EXAM:  GENERAL: NAD, well-groomed, well-developed  CHEST/LUNG: Clear to percussion bilaterally; No rales, rhonchi, wheezing, or rubs  HEART: Regular rate and rhythm; No murmurs, rubs, or gallops  ABDOMEN: Soft, Nontender, Nondistended; Bowel sounds present  NERVOUS SYSTEM:  Alert & Oriented X3, Good concentration; Motor Strength 5/5 B/L   EXTREMITIES:  2+ Peripheral Pulses, No clubbing, cyanosis, or edema      LABS:                        11.7   10.9  )-----------( 407      ( 20 Jan 2018 07:44 )             35.4     01-20    137  |  101  |  19<H>  ----------------------------<  94  3.6   |  29  |  0.46<L>    Ca    8.9      20 Jan 2018 07:44  Phos  3.8     01-20  Mg     2.3     01-20          CAPILLARY BLOOD GLUCOSE      POCT Blood Glucose.: 100 mg/dL (21 Jan 2018 05:39)  POCT Blood Glucose.: 117 mg/dL (21 Jan 2018 00:53)  POCT Blood Glucose.: 110 mg/dL (20 Jan 2018 16:26)  POCT Blood Glucose.: 151 mg/dL (20 Jan 2018 11:55)      RADIOLOGY & ADDITIONAL TESTS:    Imaging Personally Reviewed:  [x ] YES  [ ] NO    Consultant(s) Notes Reviewed:  [x ] YES  [ ] NO

## 2018-01-21 NOTE — PROGRESS NOTE ADULT - SUBJECTIVE AND OBJECTIVE BOX
condition stable, awake alert  able to move left arm and leg although still weak  await transfer tp [facility] for biopsy

## 2018-01-21 NOTE — PROGRESS NOTE ADULT - PROBLEM SELECTOR PLAN 3
left breast s/p mastectomy and LND 2 weeks ago, stage 2 at [facility]  MRI head shows Multiple enhancing lesions in the posterior right frontal and parietal lobes , represent metastases given history of breast cancer  CT chest and abd/pelvic no acute findings.  Two Drains need to be removed as per her Surgeon Dr. Deep Zepeda ([phone])  surgery consulted for SARBJIT drains removal, defers removal of staples with concern for wound dehiscence

## 2018-01-22 LAB
ANION GAP SERPL CALC-SCNC: 9 MMOL/L — SIGNIFICANT CHANGE UP (ref 5–17)
BUN SERPL-MCNC: 20 MG/DL — HIGH (ref 7–18)
CALCIUM SERPL-MCNC: 9.1 MG/DL — SIGNIFICANT CHANGE UP (ref 8.4–10.5)
CANCER AG27-29 SERPL-ACNC: 20.8 U/ML — SIGNIFICANT CHANGE UP (ref 0–37.7)
CEA SERPL-MCNC: 1.6 NG/ML — SIGNIFICANT CHANGE UP (ref 0–3.8)
CHLORIDE SERPL-SCNC: 103 MMOL/L — SIGNIFICANT CHANGE UP (ref 96–108)
CO2 SERPL-SCNC: 26 MMOL/L — SIGNIFICANT CHANGE UP (ref 22–31)
CREAT SERPL-MCNC: 0.45 MG/DL — LOW (ref 0.5–1.3)
CULTURE RESULTS: SIGNIFICANT CHANGE UP
CULTURE RESULTS: SIGNIFICANT CHANGE UP
GLUCOSE SERPL-MCNC: 97 MG/DL — SIGNIFICANT CHANGE UP (ref 70–99)
HCT VFR BLD CALC: 38.6 % — SIGNIFICANT CHANGE UP (ref 34.5–45)
HGB BLD-MCNC: 12.6 G/DL — SIGNIFICANT CHANGE UP (ref 11.5–15.5)
MAGNESIUM SERPL-MCNC: 2.4 MG/DL — SIGNIFICANT CHANGE UP (ref 1.6–2.6)
MCHC RBC-ENTMCNC: 30 PG — SIGNIFICANT CHANGE UP (ref 27–34)
MCHC RBC-ENTMCNC: 32.7 GM/DL — SIGNIFICANT CHANGE UP (ref 32–36)
MCV RBC AUTO: 91.6 FL — SIGNIFICANT CHANGE UP (ref 80–100)
PHOSPHATE SERPL-MCNC: 3.6 MG/DL — SIGNIFICANT CHANGE UP (ref 2.5–4.5)
PLATELET # BLD AUTO: 462 K/UL — HIGH (ref 150–400)
POTASSIUM SERPL-MCNC: 3.9 MMOL/L — SIGNIFICANT CHANGE UP (ref 3.5–5.3)
POTASSIUM SERPL-SCNC: 3.9 MMOL/L — SIGNIFICANT CHANGE UP (ref 3.5–5.3)
RBC # BLD: 4.22 M/UL — SIGNIFICANT CHANGE UP (ref 3.8–5.2)
RBC # FLD: 12.9 % — SIGNIFICANT CHANGE UP (ref 10.3–14.5)
SODIUM SERPL-SCNC: 138 MMOL/L — SIGNIFICANT CHANGE UP (ref 135–145)
SPECIMEN SOURCE: SIGNIFICANT CHANGE UP
SPECIMEN SOURCE: SIGNIFICANT CHANGE UP
WBC # BLD: 10 K/UL — SIGNIFICANT CHANGE UP (ref 3.8–10.5)
WBC # FLD AUTO: 10 K/UL — SIGNIFICANT CHANGE UP (ref 3.8–10.5)

## 2018-01-22 PROCEDURE — 99233 SBSQ HOSP IP/OBS HIGH 50: CPT | Mod: GC

## 2018-01-22 RX ADMIN — Medication 4 MILLIGRAM(S): at 12:00

## 2018-01-22 RX ADMIN — Medication 100 MILLIGRAM(S): at 22:13

## 2018-01-22 RX ADMIN — Medication 100 MILLIGRAM(S): at 05:49

## 2018-01-22 RX ADMIN — ENOXAPARIN SODIUM 40 MILLIGRAM(S): 100 INJECTION SUBCUTANEOUS at 12:00

## 2018-01-22 RX ADMIN — Medication 4 MILLIGRAM(S): at 00:37

## 2018-01-22 RX ADMIN — Medication 100 MILLIGRAM(S): at 13:39

## 2018-01-22 RX ADMIN — PANTOPRAZOLE SODIUM 40 MILLIGRAM(S): 20 TABLET, DELAYED RELEASE ORAL at 05:49

## 2018-01-22 RX ADMIN — Medication 4 MILLIGRAM(S): at 05:49

## 2018-01-22 RX ADMIN — Medication 4 MILLIGRAM(S): at 18:09

## 2018-01-22 NOTE — CHART NOTE - NSCHARTNOTEFT_GEN_A_CORE
2 SARBJIT's removed from L breast at bedside. Pt tolerated procedure well. No active drainage or bleeding. Tegaderm applied. May d/c dressing 1/24/18

## 2018-01-22 NOTE — PROGRESS NOTE ADULT - PROBLEM SELECTOR PLAN 2
one dominant mass with adjacent small ones.  not typical for mets, ?glioblastoma.  consulted with Dr. Flaherty, will need biopsy or debulking  c/w steroid.   PT recommended to [facility]  Plan to transfer to [facility] for further management for brain mass biopsy, pending for bed available

## 2018-01-22 NOTE — PROGRESS NOTE ADULT - SUBJECTIVE AND OBJECTIVE BOX
condition stable  no new deficit  drains very little form mastectomy site  drain can be removed  for brain biopsy

## 2018-01-22 NOTE — PROGRESS NOTE ADULT - PROBLEM SELECTOR PLAN 3
left breast s/p mastectomy and LND 2 weeks ago, stage 2 at [facility]  MRI head shows Multiple enhancing lesions in the posterior right frontal and parietal lobes , represent metastases given history of breast cancer  CT chest and abd/pelvic no acute findings.  Two SARBJIT Drains removed by surgery PA today, defers removal of staples with concern for wound dehiscence

## 2018-01-22 NOTE — PROGRESS NOTE ADULT - ATTENDING COMMENTS
Patient seen/evaluated at bedside 1/18/2018. I agree with the resident progress note/outlined plan of care. My independent findings and conclusions are documented.    Still with LLE weakness. No twitching, convulsions, HA, nausea/vomiting, visual changes  Update provided to daughter at bedside and Dr. Zepeda breast surgeon    PE: AAOx3 NAD  neck suupple  PERRLA, HEENT  S1S2 RRR  thorax: surgical incision with staples, 2 sussy drains in place with 10 cc of serosanguinous fluid  CTAB/l no accessory muscle use  soft, NT, ND, + BS  left lower extremity strength 4/5    MRI brain: Multiple enhancing lesions in the posterior right frontal and parietal   lobes as described above which may represent metastases given history of   breast cancer. GBM could have similar appearance. Lymphoma is less likely.    CT chest/abd/pelvis: Postsurgical changes of the left chest identified in this   patient status post mastectomy. No CT findings to suggest intrathoracic,   intra-abdominal or pelvic malignancy. Nonspecific nodular pleural-based   opacities identified as described above for which follow-up CT of the   chest in 3-4 months recommended to establish continuing stability.    Preliminary result by Dr. Logan follows:    "No acute emergent findings."    1. seizure  2. brain lesions  3. breast ca s/p mastectomy  4. left sided paresis (improved)  5.  h/o anemia, likely of chronic disease    -hematology/oncology: Dr Flaherty notified  -appreciate input from ICU and neurology  -decadron, dilantin load  -neuro checks  -surgery may potentially remove drain tomorrow, defers removal of staples with concern for wound dehiscence  -needs close follow-up with outpt neuro-oncology  -patient has no primary oncologist in outpt setting  -physical therapy has rec [facility], however, will need to discuss next step for malignancy management with heme onc
Patient seen/evaluated at bedside 1/20/2018.  She is alert and cooperative.     Transfer procedure initiated with acceptance to Neurosurgeon Dr. Alberts' service with planned f/u by neuro-hospitalist service at [facility]. Informed the patient is unlikely to obtain a bed this weekend, more likely Monday. Patient aware and consented to transfer.     AAOx3 NAD (at time of re-assessment)  neck supple  thorax: surgical incision w/ staples: clean/dry, drains x2 in place  S1S2 RRR  CTAb/l no accessory muscle use  soft, NT, ND, + BS  4/5 LUE  3-4/5 LLE    1. seizure  2. brain lesions  3. breast ca s/p mastectomy  4. left sided paresis (improved)  5.  h/o anemia, likely of chronic disease    -appreciate discussion w/ neurology/neurosurgery/ [facility] hospitalist staff (Dr. Lopez), heme-onc  -records/imaging/clinical case reviewed by neurosurgery Dr. Alberts, pt would benefit from brain biopsy. Not deemed an emergency transfer and there are no bed currently available. Patient most likely to be transferred on Monday  -convert decadron to oral,  c/w dilantin l  -neuro checks  -general surgery may potentially remove drain pending output (less than 10 cc currently) defers removal of staples with concern for wound dehiscence  -needs close follow-up with outpt neuro-oncology as well  -patient has no primary oncologist in outpt setting  -physical therapy has rec [facility], however, will need to discuss next step for malignancy management with heme onc .
Patient seen/evaluated at bedside 1/22/2018. I agree with the resident progress note/outlined plan of care. My independent findings and conclusions are documented.    s/p removal of 2 post surgical drains. No chest pain, sob    vitals reviewed  AAOx3 NAD   neck supple  thorax: surgical incision w/ staples: clean/dry,   S1S2 RRR  CTAb/l no accessory muscle use  soft, NT, ND, + BS  4/5 LUE (improved)  -4/5 LLE    1. seizure  2. brain lesions  3. breast ca s/p mastectomy  4. left sided paresis (improved)  5.  h/o anemia, likely of chronic disease    -appreciate discussion w/ neurology/neurosurgery/ [facility] hospitalist staff (Dr. Lopez), heme-onc  -records/imaging/clinical case reviewed by neurosurgery Dr. Alberts, pt would benefit from brain biopsy. Not deemed an emergency transfer and there are no bed currently available. Patient most likely to be transferred on Monday  -converted IV decadron to oral,  c/w dilantin 4mg q 6 hours oral  -neuro checks  -general surgery may potentially remove drain pending output (less than 10 cc currently) defers removal of staples with concern for wound dehiscence  -needs close follow-up with outpt neuro-oncology as well  -patient has no primary oncologist in outpt setting  -physical therapy has rec [facility], however, will need to discuss next step for malignancy management with heme onc   -awaiting bed on neurosurgery service
Patient was seen at the bedside earlier today with Dr. Peters.  Her neurologic status is improving slightly.  Plan is to transfer for biopsy in AM.
Patient seen/evaluated at bedside 1/19/2018. I agree with the resident progress note/outlined plan of care. My independent findings and conclusions are documented.    Notified by heme onc provider this am that patient appeared less responsive at approx 9:50 am with left sided weakness. I assessed patient immediately and bedside and found her to be somnolent with stable left lower extremity weakness, though worsened LUE weakness. Chart review revealed she had received ativan 2mg IV at 6 am for "anxiety".  Active order is for seizure only. There was no seizure event overnight or this morning  Stat CT head showed stability in neuro imaging and frequent assessment revealed the patient had returned to her mental status baseline. Discussed with neurology who indicated patient has some degree of Tod's Paralysis with some exacerbation of symptomology by ativan administered this morning.    Transfer procedure initiated with acceptance to Neurosurgeon Dr. Alberts' service with planned f/u by neuro-hospitalist service at [facility]. Informed the patient is unlikely to obtain a bed this weekend, more likely Monday. Patient aware and consented to transfer.     AAOx3 NAD (at time of re-assessment)  neck supple  thorax: surgical incision w/ staples: clean/dry, drains x2 in place  S1S2 RRR  CTAb/l no accessory muscle use  soft, NT, ND, + BS  4/5 LUE  3-4/5 LLE    1. seizure  2. brain lesions  3. breast ca s/p mastectomy  4. left sided paresis (improved)  5.  h/o anemia, likely of chronic disease    -appreciate discussion w/ neurology/neurosurgery/ [facility] hospitalist staff (Dr. Lopez), heme-onc  -records/imaging/clinical case reviewed by neurosurgery Dr. Alberts, pt would benefit from brain biopsy. Not deemed an emergency transfer and there are no bed currently available. Patient most likely to be transferred on Monday  -convert decadron to oral,  c/w dilantin l  -neuro checks  -general surgery may potentially remove drain pending output (less than 10 cc currently) defers removal of staples with concern for wound dehiscence  -needs close follow-up with outpt neuro-oncology as well  -patient has no primary oncologist in outpt setting  -physical therapy has rec [facility], however, will need to discuss next step for malignancy management with heme onc .

## 2018-01-22 NOTE — PROGRESS NOTE ADULT - SUBJECTIVE AND OBJECTIVE BOX
PGY1 Note discussed with supervising resident and primary attending.    Patient is a 77y old  Female who presents with a chief complaint of seziure (19 Jan 2018 10:20)      INTERVAL HPI/OVERNIGHT EVENTS: No new overnight events.     MEDICATIONS  (STANDING):  dexamethasone     Tablet 4 milliGRAM(s) Oral every 6 hours  dextrose 50% Injectable 25 Gram(s) IV Push once  dextrose 50% Injectable 25 Gram(s) IV Push once  enoxaparin Injectable 40 milliGRAM(s) SubCutaneous daily  insulin lispro (HumaLOG) corrective regimen sliding scale   SubCutaneous every 6 hours  pantoprazole    Tablet 40 milliGRAM(s) Oral before breakfast  phenytoin   Capsule 100 milliGRAM(s) Oral three times a day    MEDICATIONS  (PRN):  dextrose Gel 1 Dose(s) Oral once PRN Blood Glucose LESS THAN 70 milliGRAM(s)/deciliter  glucagon  Injectable 1 milliGRAM(s) IntraMuscular once PRN Glucose LESS THAN 70 milligrams/deciliter  LORazepam   Injectable 2 milliGRAM(s) IV Push every 5 minutes PRN seizure      Allergies    No Known Allergies    Intolerances        REVIEW OF SYSTEMS:  CONSTITUTIONAL: No fever, weight loss, or fatigue  RESPIRATORY: No cough, wheezing, chills or hemoptysis; No shortness of breath  CARDIOVASCULAR: No chest pain, palpitations, dizziness, or leg swelling  GASTROINTESTINAL: No abdominal or epigastric pain. No nausea, vomiting,   NEUROLOGICAL: No headaches, memory loss, loss of strength, numbness, or tremors    Vital Signs Last 24 Hrs  T(C): 36.9 (22 Jan 2018 11:28), Max: 36.9 (22 Jan 2018 08:00)  T(F): 98.4 (22 Jan 2018 11:28), Max: 98.4 (22 Jan 2018 08:00)  HR: 99 (22 Jan 2018 11:28) (89 - 104)  BP: 138/85 (22 Jan 2018 11:28) (128/72 - 138/85)  BP(mean): --  RR: 18 (22 Jan 2018 11:28) (18 - 20)  SpO2: 97% (22 Jan 2018 11:28) (97% - 98%)    PHYSICAL EXAM:  GENERAL: NAD, well-groomed, well-developed  CHEST/LUNG: Clear to percussion bilaterally; No rales, rhonchi, wheezing, or rubs  HEART: Regular rate and rhythm; No murmurs, rubs, or gallops  ABDOMEN: Soft, Nontender, Nondistended; Bowel sounds present  NERVOUS SYSTEM:  Alert & Oriented X3, Good concentration; Motor Strength 5/5 B/L   EXTREMITIES:  2+ Peripheral Pulses, No clubbing, cyanosis, or edema    LABS:                        12.6   10.0  )-----------( 462      ( 22 Jan 2018 06:00 )             38.6     01-22    138  |  103  |  20<H>  ----------------------------<  97  3.9   |  26  |  0.45<L>    Ca    9.1      22 Jan 2018 06:00  Phos  3.6     01-22  Mg     2.4     01-22          CAPILLARY BLOOD GLUCOSE      POCT Blood Glucose.: 95 mg/dL (22 Jan 2018 05:47)  POCT Blood Glucose.: 105 mg/dL (21 Jan 2018 23:35)  POCT Blood Glucose.: 118 mg/dL (21 Jan 2018 16:07)      RADIOLOGY & ADDITIONAL TESTS:    Imaging Personally Reviewed:  [x ] YES  [ ] NO    Consultant(s) Notes Reviewed:  [x ] YES  [ ] NO

## 2018-01-23 ENCOUNTER — INPATIENT (INPATIENT)
Facility: HOSPITAL | Age: 78
LOS: 7 days | Discharge: NURSING, REHAB OR LONG-TERM CARE | DRG: 25 | End: 2018-01-31
Attending: NEUROLOGICAL SURGERY | Admitting: NEUROLOGICAL SURGERY
Payer: MEDICARE

## 2018-01-23 VITALS
TEMPERATURE: 98 F | DIASTOLIC BLOOD PRESSURE: 83 MMHG | RESPIRATION RATE: 18 BRPM | HEART RATE: 125 BPM | OXYGEN SATURATION: 99 % | SYSTOLIC BLOOD PRESSURE: 151 MMHG

## 2018-01-23 VITALS
WEIGHT: 119.93 LBS | HEART RATE: 117 BPM | DIASTOLIC BLOOD PRESSURE: 85 MMHG | RESPIRATION RATE: 16 BRPM | SYSTOLIC BLOOD PRESSURE: 127 MMHG | OXYGEN SATURATION: 96 % | TEMPERATURE: 98 F | HEIGHT: 61 IN

## 2018-01-23 DIAGNOSIS — G93.9 DISORDER OF BRAIN, UNSPECIFIED: ICD-10-CM

## 2018-01-23 DIAGNOSIS — Z98.890 OTHER SPECIFIED POSTPROCEDURAL STATES: Chronic | ICD-10-CM

## 2018-01-23 LAB
ANION GAP SERPL CALC-SCNC: 7 MMOL/L — SIGNIFICANT CHANGE UP (ref 5–17)
BUN SERPL-MCNC: 28 MG/DL — HIGH (ref 7–18)
CALCIUM SERPL-MCNC: 9.7 MG/DL — SIGNIFICANT CHANGE UP (ref 8.4–10.5)
CHLORIDE SERPL-SCNC: 99 MMOL/L — SIGNIFICANT CHANGE UP (ref 96–108)
CO2 SERPL-SCNC: 29 MMOL/L — SIGNIFICANT CHANGE UP (ref 22–31)
CREAT SERPL-MCNC: 0.48 MG/DL — LOW (ref 0.5–1.3)
GLUCOSE BLDC GLUCOMTR-MCNC: 116 MG/DL — HIGH (ref 70–99)
GLUCOSE SERPL-MCNC: 108 MG/DL — HIGH (ref 70–99)
HCT VFR BLD CALC: 46.2 % — HIGH (ref 34.5–45)
HGB BLD-MCNC: 14.1 G/DL — SIGNIFICANT CHANGE UP (ref 11.5–15.5)
MCHC RBC-ENTMCNC: 27.8 PG — SIGNIFICANT CHANGE UP (ref 27–34)
MCHC RBC-ENTMCNC: 30.6 GM/DL — LOW (ref 32–36)
MCV RBC AUTO: 91.1 FL — SIGNIFICANT CHANGE UP (ref 80–100)
PLATELET # BLD AUTO: 619 K/UL — HIGH (ref 150–400)
POTASSIUM SERPL-MCNC: 3.7 MMOL/L — SIGNIFICANT CHANGE UP (ref 3.5–5.3)
POTASSIUM SERPL-SCNC: 3.7 MMOL/L — SIGNIFICANT CHANGE UP (ref 3.5–5.3)
RBC # BLD: 5.07 M/UL — SIGNIFICANT CHANGE UP (ref 3.8–5.2)
RBC # FLD: 12.9 % — SIGNIFICANT CHANGE UP (ref 10.3–14.5)
SODIUM SERPL-SCNC: 135 MMOL/L — SIGNIFICANT CHANGE UP (ref 135–145)
WBC # BLD: 10.4 K/UL — SIGNIFICANT CHANGE UP (ref 3.8–10.5)
WBC # FLD AUTO: 10.4 K/UL — SIGNIFICANT CHANGE UP (ref 3.8–10.5)

## 2018-01-23 PROCEDURE — 84100 ASSAY OF PHOSPHORUS: CPT

## 2018-01-23 PROCEDURE — 87040 BLOOD CULTURE FOR BACTERIA: CPT

## 2018-01-23 PROCEDURE — 74177 CT ABD & PELVIS W/CONTRAST: CPT

## 2018-01-23 PROCEDURE — 80048 BASIC METABOLIC PNL TOTAL CA: CPT

## 2018-01-23 PROCEDURE — 83735 ASSAY OF MAGNESIUM: CPT

## 2018-01-23 PROCEDURE — 83036 HEMOGLOBIN GLYCOSYLATED A1C: CPT

## 2018-01-23 PROCEDURE — 99239 HOSP IP/OBS DSCHRG MGMT >30: CPT

## 2018-01-23 PROCEDURE — 95819 EEG AWAKE AND ASLEEP: CPT

## 2018-01-23 PROCEDURE — 70450 CT HEAD/BRAIN W/O DYE: CPT

## 2018-01-23 PROCEDURE — 80076 HEPATIC FUNCTION PANEL: CPT

## 2018-01-23 PROCEDURE — 86300 IMMUNOASSAY TUMOR CA 15-3: CPT

## 2018-01-23 PROCEDURE — 84443 ASSAY THYROID STIM HORMONE: CPT

## 2018-01-23 PROCEDURE — 92610 EVALUATE SWALLOWING FUNCTION: CPT

## 2018-01-23 PROCEDURE — 71260 CT THORAX DX C+: CPT

## 2018-01-23 PROCEDURE — 85610 PROTHROMBIN TIME: CPT

## 2018-01-23 PROCEDURE — 85027 COMPLETE CBC AUTOMATED: CPT

## 2018-01-23 PROCEDURE — 80186 ASSAY OF PHENYTOIN FREE: CPT

## 2018-01-23 PROCEDURE — 99285 EMERGENCY DEPT VISIT HI MDM: CPT | Mod: 25

## 2018-01-23 PROCEDURE — 85730 THROMBOPLASTIN TIME PARTIAL: CPT

## 2018-01-23 PROCEDURE — 80061 LIPID PANEL: CPT

## 2018-01-23 PROCEDURE — 82607 VITAMIN B-12: CPT

## 2018-01-23 PROCEDURE — 71045 X-RAY EXAM CHEST 1 VIEW: CPT

## 2018-01-23 PROCEDURE — 80185 ASSAY OF PHENYTOIN TOTAL: CPT

## 2018-01-23 PROCEDURE — 82962 GLUCOSE BLOOD TEST: CPT

## 2018-01-23 PROCEDURE — 96372 THER/PROPH/DIAG INJ SC/IM: CPT | Mod: XU

## 2018-01-23 PROCEDURE — 81001 URINALYSIS AUTO W/SCOPE: CPT

## 2018-01-23 PROCEDURE — 93005 ELECTROCARDIOGRAM TRACING: CPT

## 2018-01-23 PROCEDURE — 82746 ASSAY OF FOLIC ACID SERUM: CPT

## 2018-01-23 PROCEDURE — 70552 MRI BRAIN STEM W/DYE: CPT

## 2018-01-23 PROCEDURE — 82378 CARCINOEMBRYONIC ANTIGEN: CPT

## 2018-01-23 PROCEDURE — 97163 PT EVAL HIGH COMPLEX 45 MIN: CPT

## 2018-01-23 PROCEDURE — 83605 ASSAY OF LACTIC ACID: CPT

## 2018-01-23 PROCEDURE — 84484 ASSAY OF TROPONIN QUANT: CPT

## 2018-01-23 PROCEDURE — 95957 EEG DIGITAL ANALYSIS: CPT

## 2018-01-23 PROCEDURE — 80053 COMPREHEN METABOLIC PANEL: CPT

## 2018-01-23 RX ORDER — DEXTROSE 50 % IN WATER 50 %
25 SYRINGE (ML) INTRAVENOUS ONCE
Qty: 0 | Refills: 0 | Status: DISCONTINUED | OUTPATIENT
Start: 2018-01-23 | End: 2018-01-26

## 2018-01-23 RX ORDER — PANTOPRAZOLE SODIUM 20 MG/1
40 TABLET, DELAYED RELEASE ORAL DAILY
Qty: 0 | Refills: 0 | Status: DISCONTINUED | OUTPATIENT
Start: 2018-01-23 | End: 2018-01-26

## 2018-01-23 RX ORDER — DEXTROSE 50 % IN WATER 50 %
1 SYRINGE (ML) INTRAVENOUS ONCE
Qty: 0 | Refills: 0 | Status: DISCONTINUED | OUTPATIENT
Start: 2018-01-23 | End: 2018-01-26

## 2018-01-23 RX ORDER — DEXTROSE 50 % IN WATER 50 %
12.5 SYRINGE (ML) INTRAVENOUS ONCE
Qty: 0 | Refills: 0 | Status: DISCONTINUED | OUTPATIENT
Start: 2018-01-23 | End: 2018-01-26

## 2018-01-23 RX ORDER — INSULIN LISPRO 100/ML
VIAL (ML) SUBCUTANEOUS
Qty: 0 | Refills: 0 | Status: DISCONTINUED | OUTPATIENT
Start: 2018-01-23 | End: 2018-01-26

## 2018-01-23 RX ORDER — SODIUM CHLORIDE 9 MG/ML
1000 INJECTION, SOLUTION INTRAVENOUS
Qty: 0 | Refills: 0 | Status: DISCONTINUED | OUTPATIENT
Start: 2018-01-23 | End: 2018-01-26

## 2018-01-23 RX ORDER — DEXAMETHASONE 0.5 MG/5ML
4 ELIXIR ORAL EVERY 6 HOURS
Qty: 0 | Refills: 0 | Status: DISCONTINUED | OUTPATIENT
Start: 2018-01-23 | End: 2018-01-26

## 2018-01-23 RX ORDER — INSULIN LISPRO 100/ML
VIAL (ML) SUBCUTANEOUS AT BEDTIME
Qty: 0 | Refills: 0 | Status: DISCONTINUED | OUTPATIENT
Start: 2018-01-23 | End: 2018-01-26

## 2018-01-23 RX ORDER — SENNA PLUS 8.6 MG/1
2 TABLET ORAL AT BEDTIME
Qty: 0 | Refills: 0 | Status: DISCONTINUED | OUTPATIENT
Start: 2018-01-23 | End: 2018-01-26

## 2018-01-23 RX ORDER — LISINOPRIL 2.5 MG/1
40 TABLET ORAL DAILY
Qty: 0 | Refills: 0 | Status: DISCONTINUED | OUTPATIENT
Start: 2018-01-23 | End: 2018-01-26

## 2018-01-23 RX ORDER — ACETAMINOPHEN 500 MG
650 TABLET ORAL EVERY 6 HOURS
Qty: 0 | Refills: 0 | Status: DISCONTINUED | OUTPATIENT
Start: 2018-01-23 | End: 2018-01-26

## 2018-01-23 RX ORDER — DEXAMETHASONE 0.5 MG/5ML
1 ELIXIR ORAL
Qty: 120 | Refills: 0 | OUTPATIENT
Start: 2018-01-23 | End: 2018-02-21

## 2018-01-23 RX ORDER — LANOLIN ALCOHOL/MO/W.PET/CERES
1 CREAM (GRAM) TOPICAL AT BEDTIME
Qty: 0 | Refills: 0 | Status: DISCONTINUED | OUTPATIENT
Start: 2018-01-23 | End: 2018-01-23

## 2018-01-23 RX ORDER — DEXTROSE MONOHYDRATE, SODIUM CHLORIDE, AND POTASSIUM CHLORIDE 50; .745; 4.5 G/1000ML; G/1000ML; G/1000ML
1000 INJECTION, SOLUTION INTRAVENOUS
Qty: 0 | Refills: 0 | Status: DISCONTINUED | OUTPATIENT
Start: 2018-01-23 | End: 2018-01-24

## 2018-01-23 RX ORDER — ONDANSETRON 8 MG/1
4 TABLET, FILM COATED ORAL EVERY 6 HOURS
Qty: 0 | Refills: 0 | Status: DISCONTINUED | OUTPATIENT
Start: 2018-01-23 | End: 2018-01-26

## 2018-01-23 RX ORDER — DOCUSATE SODIUM 100 MG
100 CAPSULE ORAL THREE TIMES A DAY
Qty: 0 | Refills: 0 | Status: DISCONTINUED | OUTPATIENT
Start: 2018-01-23 | End: 2018-01-26

## 2018-01-23 RX ORDER — GLUCAGON INJECTION, SOLUTION 0.5 MG/.1ML
1 INJECTION, SOLUTION SUBCUTANEOUS ONCE
Qty: 0 | Refills: 0 | Status: DISCONTINUED | OUTPATIENT
Start: 2018-01-23 | End: 2018-01-26

## 2018-01-23 RX ADMIN — Medication 1: at 11:27

## 2018-01-23 RX ADMIN — Medication 100 MILLIGRAM(S): at 13:21

## 2018-01-23 RX ADMIN — ENOXAPARIN SODIUM 40 MILLIGRAM(S): 100 INJECTION SUBCUTANEOUS at 11:27

## 2018-01-23 RX ADMIN — Medication 4 MILLIGRAM(S): at 00:54

## 2018-01-23 RX ADMIN — Medication 4 MILLIGRAM(S): at 11:26

## 2018-01-23 RX ADMIN — Medication 100 MILLIGRAM(S): at 21:47

## 2018-01-23 RX ADMIN — Medication 4 MILLIGRAM(S): at 23:59

## 2018-01-23 RX ADMIN — Medication 4 MILLIGRAM(S): at 05:49

## 2018-01-23 RX ADMIN — Medication 100 MILLIGRAM(S): at 05:48

## 2018-01-23 RX ADMIN — PANTOPRAZOLE SODIUM 40 MILLIGRAM(S): 20 TABLET, DELAYED RELEASE ORAL at 05:48

## 2018-01-23 NOTE — H&P ADULT - NSHPLABSRESULTS_GEN_ALL_CORE
< from: MR Head w/ IV Cont (01.17.18 @ 10:55) >    IMPRESSION:  Multiple enhancing lesions in the posterior right frontal and parietal   lobes as described above which may represent metastases given history of   breast cancer. GBM could have similar appearance. Lymphoma is less likely.    < end of copied text >

## 2018-01-23 NOTE — PROGRESS NOTE ADULT - PROBLEM SELECTOR PLAN 1
30 min episode of seizure like activity witnessed by  at home  MRI head shows Multiple enhancing lesions in the posterior right frontal and parietal lobes  EEG negative   one dose fosphenytoin followed by dilantin IV 100mg TID, changed IV to PO    decadron x 1 (10mg), c/w 4mg q 6 hours x 2 days for vasogenic edema  seizure precautions, aspiration precautions  ativan 2mg q 5 min x 3 doses PRN for seizure  UA, BCx and CXR negative   passed speech and swallow, on regular diet  d/cd tele as per Dr. Morena Bridges- neuro  The patient has been advised that driving is not allowed for 1 year after a seizure by [place] law.  The patient is advised to avoid swimming and any activities that may put their life at danger shall they have a seizure.
30 min episode of seizure like activity witnessed by  at home  MRI head shows Multiple enhancing lesions in the posterior right frontal and parietal lobes  EEG negative   one dose fosphenytoin followed by dilantin IV 100mg TID, changed IV to PO    decadron x 1 (10mg), c/w 4mg q 6 hours x 2 days for vasogenic edema  seizure precautions, aspiration precautions  ativan 2mg q 5 min x 3 doses PRN for seizure  UA, BCx and CXR negative   passed speech and swallow, on regular diet  d/cd tele as per Dr. Morena Bridges- neuro  The patient has been advised that driving is not allowed for 1 year after a seizure by [email or web address] law.  The patient is advised to avoid swimming and any activities that may put their life at danger shall they have a seizure.
30 min episode of seizure like activity witnessed by  at home  MRI head shows Multiple enhancing lesions in the posterior right frontal and parietal lobes  EEG negative   one dose fosphenytoin followed by dilantin IV 100mg TID, changed IV to PO    decadron x 1 (10mg), c/w 4mg q 6 hours x 2 days for vasogenic edema  seizure precautions, aspiration precautions  ativan 2mg q 5 min x 3 doses PRN for seizure  UA, BCx and CXR negative   passed speech and swallow, on regular diet  d/cd tele as per Dr. Morena Bridges- neuro  The patient has been advised that driving is not allowed for 1 year after a seizure by [place] law.  The patient is advised to avoid swimming and any activities that may put their life at danger shall they have a seizure.
30 min episode of seizure like activity witnessed by  at home  MRI head shows Multiple enhancing lesions in the posterior right frontal and parietal lobes , represent metastases given history of breast cancer  EEG negative   one dose fosphenytoin followed by dilantin IV 100mg TID, changed IV to PO today   decadron x 1 (10mg), c/w 4mg q 6 hours x 2 days for vasogenic edema  seizure precautions, aspiration precautions  ativan 2mg q 5 min x 3 doses PRN for seizure  UA, BCx and CXR negative   passed speech and swallow, on regular diet  f/u Dilatin level   d/cd tele as per Dr. Morena Bridges- neuro
30 min episode of seizure like activity witnessed by  at home  MRI head shows Multiple enhancing lesions in the posterior right frontal and parietal lobes  EEG negative   one dose fosphenytoin followed by dilantin IV 100mg TID, changed IV to PO    decadron x 1 (10mg), c/w 4mg q 6 hours x 2 days for vasogenic edema  seizure precautions, aspiration precautions  ativan 2mg q 5 min x 3 doses PRN for seizure  UA, BCx and CXR negative   passed speech and swallow, on regular diet  d/cd tele as per Dr. Morena Bridges- neuro  The patient has been advised that driving is not allowed for 1 year after a seizure by [employer] law.  The patient is advised to avoid swimming and any activities that may put their life at danger shall they have a seizure.
30 min episode of seizure like activity witnessed by  at home  MRI head shows Multiple enhancing lesions in the posterior right frontal and parietal lobes  EEG negative   one dose fosphenytoin followed by dilantin IV 100mg TID, changed IV to PO    decadron x 1 (10mg), c/w 4mg q 6 hours x 2 days for vasogenic edema  seizure precautions, aspiration precautions  ativan 2mg q 5 min x 3 doses PRN for seizure  UA, BCx and CXR negative   passed speech and swallow, on regular diet  d/cd tele as per Dr. Morena Bridges- neuro  The patient has been advised that driving is not allowed for 1 year after a seizure by [place] law.  The patient is advised to avoid swimming and any activities that may put their life at danger shall they have a seizure.

## 2018-01-23 NOTE — PROGRESS NOTE ADULT - PROBLEM SELECTOR PLAN 4
improve score 4, lovenox for DVT ppx

## 2018-01-23 NOTE — DIETITIAN INITIAL EVALUATION ADULT. - NS FNS WEIGHT USED FOR CALC
ideal/Ht=5' 4"   DDR=647 lb   Admission nf=599 lb ??   Current nh=028.9 lb 1/18/18  (BMI=19.9)-->108.9 lb 1/22/18 ??, may due to scale variance

## 2018-01-23 NOTE — DIETITIAN INITIAL EVALUATION ADULT. - OTHER INFO
nutrition assessment for length of stay; lives home with family; s/p swallow evaluation with regular food, thin liquid recommended 1/17/18, denied GI distress, chewing or swallowing problem at present, no specific food choices reported, but dislikes food served at times depending on how food prepared, varied intake 50 to 100% per flow sheets; skin surgical wound noted, s/p left mastectomy recently for breast cancer, ? metastasis, awaiting transfer to [facility] for brain mass biopsy; denied recent wt changes, usual wt 110 to 120 lb

## 2018-01-23 NOTE — H&P ADULT - HISTORY OF PRESENT ILLNESS
Patient is a 76 y/o F pt with PMHx of Breast CA Dx Nov 2017 (s/p resection of L breast x2 week ago at [facility]) with recent negative PET scan and BIB EMS to [facility] ED s/p 30 min seizure (L facial twitch) at home while seated, witnessed by , with associated L-sided facial droop since 5pm on 1/16. She was found to have multiple brain lesions. Heme onc consulted, reported the lesions were not classic for breast CA mets, possible GBM. The patient was transferred to [facility] for biopsy versus resection. Patient denies fever, chills, SOB, palpitations, chest pain, nausea, vomiting, diarrhea or constipation.

## 2018-01-23 NOTE — H&P ADULT - ASSESSMENT
77F PMH breast CA s/p mastectomy 2 weeks ago, now s/p partial seizure.   -Continue dilantin/dexamethasone  - Medical clearance   - Pre-op per Dr. Alberts   - q. 4 neuro checks

## 2018-01-23 NOTE — PROGRESS NOTE ADULT - PROBLEM SELECTOR PLAN 2
one dominant mass with adjacent small ones.  not typical for mets, ?glioblastoma.  consulted with Dr. Flaherty, will need biopsy or debulking  c/w steroid.   PT recommended to [facility]  Plan to transfer to [facility] for further management for brain mass biopsy, pending for bed available

## 2018-01-23 NOTE — PROGRESS NOTE ADULT - PROBLEM SELECTOR PROBLEM 3
Breast cancer
Breast cancer in female
Need for prophylactic measure
Breast cancer in female
Breast cancer in female

## 2018-01-23 NOTE — DIETITIAN INITIAL EVALUATION ADULT. - NUTRITION INTERVENTION
Medical Food Supplements/Meals and Snack/Feeding Assistance/Collaboration and Referral of Nutrition Care

## 2018-01-23 NOTE — H&P ADULT - NSHPPHYSICALEXAM_GEN_ALL_CORE
AOX3  PERRL, EOMI, face equal, tongue m/l  LUE 4/5, RUE 5/5   LLE 4/5 with DF/PF 0/5 + clonus  RLE 5/5   SILT

## 2018-01-23 NOTE — PROGRESS NOTE ADULT - PROBLEM SELECTOR PROBLEM 1
Orthopedics
Status epilepticus

## 2018-01-23 NOTE — PROGRESS NOTE ADULT - SUBJECTIVE AND OBJECTIVE BOX
PGY1 Note discussed with supervising resident and primary attending.    Patient is a 77y old  Female who presents with a chief complaint of seziure (19 Jan 2018 10:20)      INTERVAL HPI/OVERNIGHT EVENTS:    MEDICATIONS  (STANDING):  dexamethasone     Tablet 4 milliGRAM(s) Oral every 6 hours  dextrose 50% Injectable 25 Gram(s) IV Push once  dextrose 50% Injectable 25 Gram(s) IV Push once  enoxaparin Injectable 40 milliGRAM(s) SubCutaneous daily  insulin lispro (HumaLOG) corrective regimen sliding scale   SubCutaneous every 6 hours  pantoprazole    Tablet 40 milliGRAM(s) Oral before breakfast  phenytoin   Capsule 100 milliGRAM(s) Oral three times a day    MEDICATIONS  (PRN):  dextrose Gel 1 Dose(s) Oral once PRN Blood Glucose LESS THAN 70 milliGRAM(s)/deciliter  glucagon  Injectable 1 milliGRAM(s) IntraMuscular once PRN Glucose LESS THAN 70 milligrams/deciliter  LORazepam   Injectable 2 milliGRAM(s) IV Push every 5 minutes PRN seizure      Allergies    No Known Allergies    Intolerances        REVIEW OF SYSTEMS:  CONSTITUTIONAL: No fever, weight loss, or fatigue  RESPIRATORY: No cough, wheezing, chills or hemoptysis; No shortness of breath  CARDIOVASCULAR: No chest pain, palpitations, dizziness, or leg swelling  GASTROINTESTINAL: No abdominal or epigastric pain. No nausea, vomiting, or hematemesis; No diarrhea or constipation. No melena or hematochezia.  NEUROLOGICAL: No headaches, memory loss, loss of strength, numbness, or tremors  SKIN: No itching, burning, rashes, or lesions     Vital Signs Last 24 Hrs  T(C): 37 (23 Jan 2018 08:03), Max: 37.1 (23 Jan 2018 04:56)  T(F): 98.6 (23 Jan 2018 08:03), Max: 98.7 (23 Jan 2018 04:56)  HR: 118 (23 Jan 2018 08:58) (51 - 118)  BP: 127/76 (23 Jan 2018 08:58) (124/76 - 164/48)  BP(mean): --  RR: 17 (23 Jan 2018 08:58) (16 - 181)  SpO2: 96% (23 Jan 2018 04:56) (96% - 97%)    PHYSICAL EXAM:  GENERAL: NAD, well-groomed, well-developed  HEAD:  Atraumatic, Normocephalic  EYES: EOMI, PERRLA, conjunctiva and sclera clear  NECK: Supple, No JVD, Normal thyroid  CHEST/LUNG: Clear to percussion bilaterally; No rales, rhonchi, wheezing, or rubs  HEART: Regular rate and rhythm; No murmurs, rubs, or gallops  ABDOMEN: Soft, Nontender, Nondistended; Bowel sounds present  NERVOUS SYSTEM:  Alert & Oriented X3, Good concentration; Motor Strength 5/5 B/L   EXTREMITIES:  2+ Peripheral Pulses, No clubbing, cyanosis, or edema  SKIN;    LABS:                        14.1   10.4  )-----------( 619      ( 23 Jan 2018 08:42 )             46.2     01-23    135  |  99  |  28<H>  ----------------------------<  108<H>  3.7   |  29  |  0.48<L>    Ca    9.7      23 Jan 2018 08:42  Phos  3.6     01-22  Mg     2.4     01-22          CAPILLARY BLOOD GLUCOSE      POCT Blood Glucose.: 100 mg/dL (23 Jan 2018 07:24)  POCT Blood Glucose.: 99 mg/dL (23 Jan 2018 05:18)  POCT Blood Glucose.: 134 mg/dL (22 Jan 2018 23:48)  POCT Blood Glucose.: 108 mg/dL (22 Jan 2018 19:10)  POCT Blood Glucose.: 83 mg/dL (22 Jan 2018 12:14)      RADIOLOGY & ADDITIONAL TESTS:    Imaging Personally Reviewed:  [ ] YES  [ ] NO    Consultant(s) Notes Reviewed:  [ ] YES  [ ] NO PGY1 Note discussed with supervising resident and primary attending.    Patient is a 77y old  Female who presents with a chief complaint of seziure (19 Jan 2018 10:20)      INTERVAL HPI/OVERNIGHT EVENTS: no new overnight events. pt vitals stable    MEDICATIONS  (STANDING):  dexamethasone     Tablet 4 milliGRAM(s) Oral every 6 hours  dextrose 50% Injectable 25 Gram(s) IV Push once  dextrose 50% Injectable 25 Gram(s) IV Push once  enoxaparin Injectable 40 milliGRAM(s) SubCutaneous daily  insulin lispro (HumaLOG) corrective regimen sliding scale   SubCutaneous every 6 hours  pantoprazole    Tablet 40 milliGRAM(s) Oral before breakfast  phenytoin   Capsule 100 milliGRAM(s) Oral three times a day    MEDICATIONS  (PRN):  dextrose Gel 1 Dose(s) Oral once PRN Blood Glucose LESS THAN 70 milliGRAM(s)/deciliter  glucagon  Injectable 1 milliGRAM(s) IntraMuscular once PRN Glucose LESS THAN 70 milligrams/deciliter  LORazepam   Injectable 2 milliGRAM(s) IV Push every 5 minutes PRN seizure      Allergies    No Known Allergies    Intolerances        REVIEW OF SYSTEMS:  CONSTITUTIONAL: No fever, weight loss, or fatigue  RESPIRATORY: No cough, wheezing, chills or hemoptysis; No shortness of breath  CARDIOVASCULAR: No chest pain, palpitations, dizziness, or leg swelling  GASTROINTESTINAL: No abdominal or epigastric pain.   NEUROLOGICAL: No headaches, memory loss, loss of strength, numbness, or tremors      Vital Signs Last 24 Hrs  T(C): 37 (23 Jan 2018 08:03), Max: 37.1 (23 Jan 2018 04:56)  T(F): 98.6 (23 Jan 2018 08:03), Max: 98.7 (23 Jan 2018 04:56)  HR: 118 (23 Jan 2018 08:58) (51 - 118)  BP: 127/76 (23 Jan 2018 08:58) (124/76 - 164/48)  BP(mean): --  RR: 17 (23 Jan 2018 08:58) (16 - 181)  SpO2: 96% (23 Jan 2018 04:56) (96% - 97%)    PHYSICAL EXAM:  GENERAL: NAD, well-groomed, well-developed  CHEST/LUNG: Clear to percussion bilaterally; No rales, rhonchi, wheezing, or rubs  HEART: Regular rate and rhythm; No murmurs, rubs, or gallops  ABDOMEN: Soft, Nontender, Nondistended; Bowel sounds present  NERVOUS SYSTEM:  Alert & Oriented X3, Good concentration; Motor Strength 5/5 B/L   EXTREMITIES:  2+ Peripheral Pulses, No clubbing, cyanosis, or edema    LABS:                        14.1   10.4  )-----------( 619      ( 23 Jan 2018 08:42 )             46.2     01-23    135  |  99  |  28<H>  ----------------------------<  108<H>  3.7   |  29  |  0.48<L>    Ca    9.7      23 Jan 2018 08:42  Phos  3.6     01-22  Mg     2.4     01-22          CAPILLARY BLOOD GLUCOSE      POCT Blood Glucose.: 100 mg/dL (23 Jan 2018 07:24)  POCT Blood Glucose.: 99 mg/dL (23 Jan 2018 05:18)  POCT Blood Glucose.: 134 mg/dL (22 Jan 2018 23:48)  POCT Blood Glucose.: 108 mg/dL (22 Jan 2018 19:10)  POCT Blood Glucose.: 83 mg/dL (22 Jan 2018 12:14)      RADIOLOGY & ADDITIONAL TESTS:    Imaging Personally Reviewed:  [x ] YES  [ ] NO    Consultant(s) Notes Reviewed:  [x ] YES  [ ] NO

## 2018-01-23 NOTE — PROGRESS NOTE ADULT - PROBLEM SELECTOR PLAN 3
left breast s/p mastectomy and LND 2 weeks ago, stage 2 at [facility]  MRI head shows Multiple enhancing lesions in the posterior right frontal and parietal lobes , represent metastases given history of breast cancer  CT chest and abd/pelvic no acute findings.  Two SARBJIT Drains removed yesterday.

## 2018-01-23 NOTE — PROGRESS NOTE ADULT - ASSESSMENT
77 year old female s/p Left MRM in [facility] in 1/8    1) keep staples in place  2) monitor SARBJIT drain output  3) cont medical management
Patient is a 76 y/o F pt with PMHx of Breast CA Dx Nov 2017 (s/p resection of L breast x1 week ago at [facility]) with recent negative PET scan and BIB EMS to ED with 30 min seizure (L facial twitch) at home while seated, witnessed by , with associated L-sided facial droop since 5pm on 1/16. As per EMS, pt was awake and alert throughout the entire duration seizure like episodes with facial twitching. Admitted for new onset seizure, status epilepticus, likely 2/2 brain mets from breast CA.
Patient is a 76 y/o F pt with PMHx of Breast CA Dx Nov 2017 (s/p resection of L breast x1 week ago at [facility]) with recent negative PET scan and BIB EMS to ED with 30 min seizure (L facial twitch) at home while seated, witnessed by , with associated L-sided facial droop since 5pm on 1/16. As per EMS, pt was awake and alert throughout the entire duration seizure like episodes with facial twitching. Admitted for new onset seizure, status epilepticus, likely 2/2 brain mets from breast CA.
Patient is a 78 y/o F pt with PMHx of Breast CA Dx Nov 2017 (s/p resection of L breast x1 week ago at [facility]) with recent negative PET scan and BIB EMS to ED with 30 min seizure (L facial twitch) at home while seated, witnessed by , with associated L-sided facial droop since 5pm on 1/16. As per EMS, pt was awake and alert throughout the entire duration seizure like episodes with facial twitching. Admitted for new onset seizure, status epilepticus, likely 2/2 brain mets from breast CA.
Patient is a 78 y/o F pt with PMHx of Breast CA Dx Nov 2017 (s/p resection of L breast x1 week ago at [facility]) with recent negative PET scan and BIB EMS to ED with 30 min seizure (L facial twitch) at home while seated, witnessed by , with associated L-sided facial droop since 5pm on 1/16. As per EMS, pt was awake and alert throughout the entire duration seizure like episodes with facial twitching. Admitted for new onset seizure, status epilepticus, likely 2/2 brain mets from breast CA.
Impression:  Left sided weakness and twitching for 30min in patient with right sided mass with vasogenic edema and Hx of breast cancer concerning for seizure foci due to metastatic brain mass (breast ca primary  The seizure resolved and reoccurred patient loaded with Fosphenytoin.       Recommendations:  1.         cw Dilantin 100mg tid, give extra Dilantin 500mg po  2.         heme-onc and will need neurooncology  3. spoke with daughter, patient due to have her staples removed  4.         Please give Ativan 2mg for active seizure, can give another 2mg dose of Ativan if the seizure continues or re-occurs, for a maximum of 6mg Ativan in 24 hours.  5.         Seizure and fall precautions  6.        The patient has been advised that driving is not allowed for 1 year after a seizure by [place] law.  The patient is advised to avoid swimming and any activities that may put their life at danger shall they have a seizure.    7.        DVT PPx
Patient is a 76 y/o F pt with PMHx of Breast CA Dx Nov 2017 (s/p resection of L breast x1 week ago at [facility]) with recent negative PET scan and BIB EMS to ED with 30 min seizure (L facial twitch) at home while seated, witnessed by , with associated L-sided facial droop since 5pm on 1/16. As per EMS, pt was awake and alert throughout the entire duration seizure like episodes with facial twitching. Admitted for new onset seizure, status epilepticus, likely 2/2 brain mets from breast CA vs glioblastoma.
Patient is a 76 y/o F pt with PMHx of Breast CA Dx Nov 2017 (s/p resection of L breast x1 week ago at [facility]) with recent negative PET scan and BIB EMS to ED with 30 min seizure (L facial twitch) at home while seated, witnessed by , with associated L-sided facial droop since 5pm on 1/16. As per EMS, pt was awake and alert throughout the entire duration seizure like episodes with facial twitching. Admitted for new onset seizure, status epilepticus, likely 2/2 brain mets from breast CA.

## 2018-01-24 DIAGNOSIS — E87.5 HYPERKALEMIA: ICD-10-CM

## 2018-01-24 DIAGNOSIS — D72.829 ELEVATED WHITE BLOOD CELL COUNT, UNSPECIFIED: ICD-10-CM

## 2018-01-24 DIAGNOSIS — I10 ESSENTIAL (PRIMARY) HYPERTENSION: ICD-10-CM

## 2018-01-24 DIAGNOSIS — Z01.818 ENCOUNTER FOR OTHER PREPROCEDURAL EXAMINATION: ICD-10-CM

## 2018-01-24 DIAGNOSIS — C50.912 MALIGNANT NEOPLASM OF UNSPECIFIED SITE OF LEFT FEMALE BREAST: ICD-10-CM

## 2018-01-24 DIAGNOSIS — E78.5 HYPERLIPIDEMIA, UNSPECIFIED: ICD-10-CM

## 2018-01-24 DIAGNOSIS — G93.9 DISORDER OF BRAIN, UNSPECIFIED: ICD-10-CM

## 2018-01-24 DIAGNOSIS — R56.9 UNSPECIFIED CONVULSIONS: ICD-10-CM

## 2018-01-24 LAB
ALBUMIN SERPL ELPH-MCNC: 3.7 G/DL — SIGNIFICANT CHANGE UP (ref 3.3–5)
ALP SERPL-CCNC: 59 U/L — SIGNIFICANT CHANGE UP (ref 40–120)
ALT FLD-CCNC: 30 U/L — SIGNIFICANT CHANGE UP (ref 10–45)
ANION GAP SERPL CALC-SCNC: 13 MMOL/L — SIGNIFICANT CHANGE UP (ref 5–17)
APTT BLD: 22.9 SEC — LOW (ref 27.5–37.4)
AST SERPL-CCNC: 22 U/L — SIGNIFICANT CHANGE UP (ref 10–40)
BASOPHILS # BLD AUTO: 0.01 K/UL — SIGNIFICANT CHANGE UP (ref 0–0.2)
BASOPHILS NFR BLD AUTO: 0.1 % — SIGNIFICANT CHANGE UP (ref 0–2)
BILIRUB SERPL-MCNC: 0.2 MG/DL — SIGNIFICANT CHANGE UP (ref 0.2–1.2)
BUN SERPL-MCNC: 31 MG/DL — HIGH (ref 7–23)
CALCIUM SERPL-MCNC: 8.5 MG/DL — SIGNIFICANT CHANGE UP (ref 8.4–10.5)
CHLORIDE SERPL-SCNC: 104 MMOL/L — SIGNIFICANT CHANGE UP (ref 96–108)
CO2 SERPL-SCNC: 23 MMOL/L — SIGNIFICANT CHANGE UP (ref 22–31)
CREAT SERPL-MCNC: 0.5 MG/DL — SIGNIFICANT CHANGE UP (ref 0.5–1.3)
EOSINOPHIL # BLD AUTO: 0.06 K/UL — SIGNIFICANT CHANGE UP (ref 0–0.5)
EOSINOPHIL NFR BLD AUTO: 0.6 % — SIGNIFICANT CHANGE UP (ref 0–6)
GLUCOSE BLDC GLUCOMTR-MCNC: 116 MG/DL — HIGH (ref 70–99)
GLUCOSE BLDC GLUCOMTR-MCNC: 136 MG/DL — HIGH (ref 70–99)
GLUCOSE BLDC GLUCOMTR-MCNC: 162 MG/DL — HIGH (ref 70–99)
GLUCOSE BLDC GLUCOMTR-MCNC: 79 MG/DL — SIGNIFICANT CHANGE UP (ref 70–99)
GLUCOSE SERPL-MCNC: 98 MG/DL — SIGNIFICANT CHANGE UP (ref 70–99)
HCT VFR BLD CALC: 35.2 % — SIGNIFICANT CHANGE UP (ref 34.5–45)
HGB BLD-MCNC: 11.7 G/DL — SIGNIFICANT CHANGE UP (ref 11.5–15.5)
IMM GRANULOCYTES NFR BLD AUTO: 0.3 % — SIGNIFICANT CHANGE UP (ref 0–1.5)
INR BLD: 0.98 RATIO — SIGNIFICANT CHANGE UP (ref 0.88–1.16)
LYMPHOCYTES # BLD AUTO: 1.03 K/UL — SIGNIFICANT CHANGE UP (ref 1–3.3)
LYMPHOCYTES # BLD AUTO: 9.7 % — LOW (ref 13–44)
MCHC RBC-ENTMCNC: 29.3 PG — SIGNIFICANT CHANGE UP (ref 27–34)
MCHC RBC-ENTMCNC: 33.2 GM/DL — SIGNIFICANT CHANGE UP (ref 32–36)
MCV RBC AUTO: 88.2 FL — SIGNIFICANT CHANGE UP (ref 80–100)
MONOCYTES # BLD AUTO: 0.89 K/UL — SIGNIFICANT CHANGE UP (ref 0–0.9)
MONOCYTES NFR BLD AUTO: 8.3 % — SIGNIFICANT CHANGE UP (ref 2–14)
NEUTROPHILS # BLD AUTO: 8.64 K/UL — HIGH (ref 1.8–7.4)
NEUTROPHILS NFR BLD AUTO: 81 % — HIGH (ref 43–77)
PLATELET # BLD AUTO: 501 K/UL — HIGH (ref 150–400)
POTASSIUM SERPL-MCNC: 5.6 MMOL/L — HIGH (ref 3.5–5.3)
POTASSIUM SERPL-SCNC: 5.6 MMOL/L — HIGH (ref 3.5–5.3)
PROT SERPL-MCNC: 6.8 G/DL — SIGNIFICANT CHANGE UP (ref 6–8.3)
PROTHROM AB SERPL-ACNC: 11.1 SEC — SIGNIFICANT CHANGE UP (ref 10–13.1)
RBC # BLD: 3.99 M/UL — SIGNIFICANT CHANGE UP (ref 3.8–5.2)
RBC # FLD: 14 % — SIGNIFICANT CHANGE UP (ref 10.3–14.5)
SODIUM SERPL-SCNC: 140 MMOL/L — SIGNIFICANT CHANGE UP (ref 135–145)
WBC # BLD: 10.66 K/UL — HIGH (ref 3.8–10.5)
WBC # FLD AUTO: 10.66 K/UL — HIGH (ref 3.8–10.5)

## 2018-01-24 PROCEDURE — 70553 MRI BRAIN STEM W/O & W/DYE: CPT | Mod: 26

## 2018-01-24 PROCEDURE — 99223 1ST HOSP IP/OBS HIGH 75: CPT

## 2018-01-24 PROCEDURE — 93010 ELECTROCARDIOGRAM REPORT: CPT

## 2018-01-24 RX ORDER — DIAZEPAM 5 MG
5 TABLET ORAL ONCE
Qty: 0 | Refills: 0 | Status: DISCONTINUED | OUTPATIENT
Start: 2018-01-24 | End: 2018-01-24

## 2018-01-24 RX ORDER — SIMVASTATIN 20 MG/1
10 TABLET, FILM COATED ORAL AT BEDTIME
Qty: 0 | Refills: 0 | Status: DISCONTINUED | OUTPATIENT
Start: 2018-01-24 | End: 2018-01-26

## 2018-01-24 RX ADMIN — Medication 100 MILLIGRAM(S): at 15:15

## 2018-01-24 RX ADMIN — Medication 100 MILLIGRAM(S): at 06:20

## 2018-01-24 RX ADMIN — Medication 4 MILLIGRAM(S): at 06:20

## 2018-01-24 RX ADMIN — Medication 100 MILLIGRAM(S): at 23:08

## 2018-01-24 RX ADMIN — Medication 4 MILLIGRAM(S): at 18:29

## 2018-01-24 RX ADMIN — SIMVASTATIN 10 MILLIGRAM(S): 20 TABLET, FILM COATED ORAL at 23:08

## 2018-01-24 RX ADMIN — PANTOPRAZOLE SODIUM 40 MILLIGRAM(S): 20 TABLET, DELAYED RELEASE ORAL at 15:14

## 2018-01-24 RX ADMIN — Medication 100 MILLIGRAM(S): at 23:09

## 2018-01-24 RX ADMIN — Medication 650 MILLIGRAM(S): at 23:32

## 2018-01-24 RX ADMIN — Medication 5 MILLIGRAM(S): at 11:47

## 2018-01-24 RX ADMIN — Medication 4 MILLIGRAM(S): at 23:09

## 2018-01-24 RX ADMIN — Medication 650 MILLIGRAM(S): at 00:33

## 2018-01-24 RX ADMIN — Medication 100 MILLIGRAM(S): at 06:21

## 2018-01-24 RX ADMIN — LISINOPRIL 40 MILLIGRAM(S): 2.5 TABLET ORAL at 07:07

## 2018-01-24 RX ADMIN — Medication 650 MILLIGRAM(S): at 01:20

## 2018-01-24 RX ADMIN — Medication 4 MILLIGRAM(S): at 15:15

## 2018-01-24 NOTE — PROGRESS NOTE ADULT - SUBJECTIVE AND OBJECTIVE BOX
SUBJECTIVE: Pt seen and examined, reports left leg weakness, otherwise no complaints    OVERNIGHT EVENTS: none    Vital Signs Last 24 Hrs  T(C): 36.7 (24 Jan 2018 07:47), Max: 36.9 (23 Jan 2018 23:39)  T(F): 98 (24 Jan 2018 07:47), Max: 98.4 (23 Jan 2018 23:39)  HR: 98 (24 Jan 2018 07:47) (97 - 125)  BP: 109/71 (24 Jan 2018 07:47) (109/71 - 151/83)  BP(mean): --  RR: 18 (24 Jan 2018 07:47) (16 - 18)  SpO2: 96% (24 Jan 2018 07:47) (96% - 99%)    PHYSICAL EXAM:    General: No Acute Distress     Neurological: Awake, alert oriented to person, place and time, Following Commands, Face Symmetrical, Speech Fluent, LLE 4/5 with DF/PF 0/5 + clonus, No Drift, Sensation to Light Touch Intact    Pulmonary: Clear to Auscultation, No Rales, No Rhonchi, No Wheezes     Cardiovascular: S1, S2, Regular Rate and Rhythm     Gastrointestinal: Soft, Nontender, Nondistended     Extremities: No calf tenderness     Incision: none    LABS:                        11.7   10.66 )-----------( 501      ( 24 Jan 2018 07:42 )             35.2    01-24    140  |  104  |  31<H>  ----------------------------<  98  5.6<H>   |  23  |  0.50    Ca    8.5      24 Jan 2018 07:27    TPro  6.8  /  Alb  3.7  /  TBili  0.2  /  DBili  x   /  AST  22  /  ALT  30  /  AlkPhos  59  01-24  PT/INR - ( 24 Jan 2018 07:42 )   PT: 11.1 sec;   INR: 0.98 ratio         PTT - ( 24 Jan 2018 07:42 )  PTT:22.9 sec  Hemoglobin A1C, Whole Blood: 5.7 % (01-17 @ 10:02)      01-23 @ 07:01  -  01-24 @ 07:00  --------------------------------------------------------  IN: 1340 mL / OUT: 0 mL / NET: 1340 mL    01-24 @ 07:01  - 01-24 @ 12:04  --------------------------------------------------------  IN: 280 mL / OUT: 0 mL / NET: 280 mL      DRAINS: none    MEDICATIONS:  Antibiotics:    Neuro:  acetaminophen   Tablet 650 milliGRAM(s) Oral every 6 hours PRN For Temp greater than 38 C (100.4 F)  acetaminophen   Tablet. 650 milliGRAM(s) Oral every 6 hours PRN Mild Pain (1 - 3)  ondansetron   Disintegrating Tablet 4 milliGRAM(s) Oral every 6 hours PRN Nausea  phenytoin   Capsule 100 milliGRAM(s) Oral three times a day    Cardiac:  lisinopril 40 milliGRAM(s) Oral daily    Pulm:    GI/:  docusate sodium 100 milliGRAM(s) Oral three times a day  pantoprazole    Tablet 40 milliGRAM(s) Oral daily  senna 2 Tablet(s) Oral at bedtime PRN Constipation    Other:   dexamethasone     Tablet 4 milliGRAM(s) Oral every 6 hours  dextrose 5%. 1000 milliLiter(s) IV Continuous <Continuous>  dextrose 50% Injectable 12.5 Gram(s) IV Push once  dextrose 50% Injectable 25 Gram(s) IV Push once  dextrose 50% Injectable 25 Gram(s) IV Push once  dextrose Gel 1 Dose(s) Oral once PRN Blood Glucose LESS THAN 70 milliGRAM(s)/deciliter  glucagon  Injectable 1 milliGRAM(s) IntraMuscular once PRN Glucose LESS THAN 70 milligrams/deciliter  insulin lispro (HumaLOG) corrective regimen sliding scale   SubCutaneous three times a day before meals  insulin lispro (HumaLOG) corrective regimen sliding scale   SubCutaneous at bedtime  sodium chloride 0.9% with potassium chloride 20 mEq/L 1000 milliLiter(s) IV Continuous <Continuous>    DIET: [x] Regular [] CCD [] Renal [] Puree [] Dysphagia [] Tube Feeds:     IMAGING: < from: MR Head w/ IV Cont (01.17.18 @ 10:55) >    IMPRESSION:  Multiple enhancing lesions in the posterior right frontal and parietal   lobes as described above which may represent metastases given history of   breast cancer. GBM could have similar appearance. Lymphoma is less likely.    < end of copied text >  < from: CT Chest w/ Oral Cont and w/ IV Cont (01.17.18 @ 21:30) >  IMPRESSION:  Postsurgical changes of the left chest identified in this   patient status post mastectomy. No CT findings to suggest intrathoracic,   intra-abdominal or pelvic malignancy. Nonspecific nodular pleural-based   opacities identified as described above for which follow-up CT of the   chest in 3-4 months recommended to establish continuing stability.    < end of copied text >

## 2018-01-24 NOTE — CONSULT NOTE ADULT - PROBLEM SELECTOR RECOMMENDATION 8
-EKG reviewed, no acute ischemic changes   -patient denies any CP/SOB/palpitations, exercises 3 times a week (METS>4)   -creatinine wnl   -patient is medically optimized for surgical procedure, no further testing required -EKG reviewed, no acute ischemic changes   -patient denies any CP/SOB/palpitations, exercises 3 times a week (METS>4)   -creatinine wnl   -patient is medically optimized for surgical procedure as long as K normalizes, no further cardiac testing required

## 2018-01-24 NOTE — PROGRESS NOTE ADULT - ASSESSMENT
Patient is a 78 y/o F pt with PMHx of Breast CA Dx Nov 2017 (s/p resection of L breast x2 week ago at [facility]) with recent negative PET scan and BIB EMS to [facility] ED s/p 30 min seizure (L facial twitch) at home while seated, witnessed by , with associated L-sided facial droop since 5pm on 1/16. She was found to have multiple brain lesions. Heme onc consulted, reported the lesions were not classic for breast CA mets, possible GBM. The patient was transferred to [facility] for biopsy versus resection. Patient denies fever, chills, SOB, palpitations, chest pain, nausea, vomiting, diarrhea or constipation.         PLAN:  Neuro:   - MRI brain pending, pt needs to be pre-medicated for MRI  - likely no plan for surgery at this time, Pt will likely have radiation treatment to multiple brain lesions  - continue decadron 4q6 for cerebral edema  - continue phenytoin for seizure ppx  -leukocytosis likely to steroids  Respiratory:   - encouraged incentive spirometry  CV:  -HTN- continue lisinopril  Endocrine:   -Hgb A1c 5.7  -continue fingersticks while on decadron, FS all <200  Heme/Onc:      -pt will need Radiation to brain tumors as outpatient           DVT ppx:   -venodynes, chemoprophylaxis  PT/OT: TBD  Discussed with Dr Aristeo Thomas # [id number]

## 2018-01-24 NOTE — CONSULT NOTE ADULT - PROBLEM SELECTOR RECOMMENDATION 3
-s/p recent L mastectomy 2 weeks ago  -recent PET scan negative   -contact Oncologist -recently diagnosed stage 2 breast CA s/p recent L mastectomy 2 weeks ago  -recent PET scan negative

## 2018-01-24 NOTE — CONSULT NOTE ADULT - PROBLEM SELECTOR RECOMMENDATION 7
-repeat STAT potassium level   -will hold ACE if still elevated -repeat STAT potassium level   -d/c IVF w/ KCL   -will hold ACE if still elevated -repeat potassium level, reverse as needed (may call [phone] with questions)   -d/c IVF w/ KCL   -will hold ACE if still elevated

## 2018-01-24 NOTE — PROGRESS NOTE ADULT - ASSESSMENT
Patient presented at tumor board  today and consensus was for fractionated SRS for multiple mets to brain from breast primary.

## 2018-01-24 NOTE — CONSULT NOTE ADULT - SUBJECTIVE AND OBJECTIVE BOX
HPI:  77F h/o HTN, HLD, breast CA diagnosed Nov 2017 s/p L mastectomy 2 weeks ago at [facility] and with negative PET scan, admitted to [facility] 1/16/18 after seizure. Patient had a seizure at home while she was sitting, it was witnessed by her , lasted about 30 minutes (L facial twitch) with associated L-sided facial droop since 5pm on 1/16. Patient says she remembers starting to shake. Seizure was generalized followed by loss of consciousness. Patient was given 2mg of Ativan in ER. She was found to have multiple brain lesions. Heme onc consulted, reported the lesions were not classic for breast CA mets, possible GBM. The patient was transferred to [facility] for biopsy versus resection. Patient denies fever, chills, SOB, palpitations, chest pain, nausea, vomiting, diarrhea or constipation.       PAST MEDICAL & SURGICAL HISTORY:  Breast cancer  H/O breast surgery  HTN  HLD       Review of Systems:   CONSTITUTIONAL: No fever, weight loss, or fatigue  EYES: No eye pain, visual disturbances, or discharge  ENMT:  No difficulty hearing, tinnitus, vertigo; No sinus or throat pain  NECK: No pain or stiffness  BREASTS: No pain, masses, or nipple discharge  RESPIRATORY: No cough, wheezing, chills or hemoptysis; No shortness of breath  CARDIOVASCULAR: No chest pain, palpitations, dizziness, or leg swelling  GASTROINTESTINAL: No abdominal or epigastric pain. No nausea, vomiting, or hematemesis; No diarrhea or constipation. No melena or hematochezia.  GENITOURINARY: No dysuria, frequency, hematuria, or incontinence  NEUROLOGICAL: No headaches, memory loss, loss of strength, numbness, or tremors.   SKIN: No itching, burning, rashes, or lesions   LYMPH NODES: No enlarged glands  ENDOCRINE: No heat or cold intolerance; No hair loss  MUSCULOSKELETAL: No joint pain or swelling; No muscle, back, or extremity pain  PSYCHIATRIC: No depression, anxiety, mood swings, or difficulty sleeping  HEME/LYMPH: No easy bruising, or bleeding gums  ALLERY AND IMMUNOLOGIC: No hives or eczema    Allergies    No Known Allergies    Intolerances        Social History:   Never smoker  Drinks a glass of wine some days     FAMILY HISTORY:  No pertinent family history in first degree relatives  No family history of cancer       HOME MEDICATIONS:  Ramipril 10mg Oral daily   Simvastatin 10mg Oral daily       Vital Signs Last 24 Hrs  T(C): 36.7 (24 Jan 2018 07:47), Max: 36.9 (23 Jan 2018 23:39)  T(F): 98 (24 Jan 2018 07:47), Max: 98.4 (23 Jan 2018 23:39)  HR: 98 (24 Jan 2018 07:47) (97 - 125)  BP: 109/71 (24 Jan 2018 07:47) (109/71 - 151/83)  BP(mean): --  RR: 18 (24 Jan 2018 07:47) (16 - 18)  SpO2: 96% (24 Jan 2018 07:47) (96% - 99%)  CAPILLARY BLOOD GLUCOSE      POCT Blood Glucose.: 116 mg/dL (24 Jan 2018 10:11)  POCT Blood Glucose.: 116 mg/dL (23 Jan 2018 21:46)    I&O's Summary    23 Jan 2018 07:01  -  24 Jan 2018 07:00  --------------------------------------------------------  IN: 1340 mL / OUT: 0 mL / NET: 1340 mL    24 Jan 2018 07:01  -  24 Jan 2018 12:40  --------------------------------------------------------  IN: 280 mL / OUT: 0 mL / NET: 280 mL        PHYSICAL EXAM:  GENERAL: NAD, well-developed  HEAD:  Atraumatic, Normocephalic  EYES: EOMI, PERRLA, conjunctiva and sclera clear  NECK: Supple, No JVD  CHEST/LUNG: Clear to auscultation bilaterally; No wheeze  HEART: Regular rate and rhythm; No murmurs, rubs, or gallops  ABDOMEN: Soft, Nontender, Nondistended; Bowel sounds present  EXTREMITIES:  2+ Peripheral Pulses, No clubbing, cyanosis, or edema  PSYCH: AAOx3  NEUROLOGY: CN's intact, LLE 4/5 with DF/PF 0/5   SKIN: No rashes or lesions    LABS:                        11.7   10.66 )-----------( 501      ( 24 Jan 2018 07:42 )             35.2     01-24    140  |  104  |  31<H>  ----------------------------<  98  5.6<H>   |  23  |  0.50    Ca    8.5      24 Jan 2018 07:27    TPro  6.8  /  Alb  3.7  /  TBili  0.2  /  DBili  x   /  AST  22  /  ALT  30  /  AlkPhos  59  01-24    PT/INR - ( 24 Jan 2018 07:42 )   PT: 11.1 sec;   INR: 0.98 ratio         PTT - ( 24 Jan 2018 07:42 )  PTT:22.9 sec          RADIOLOGY & ADDITIONAL TESTS:    Imaging Personally Reviewed:  MRI brain:   Right frontal parietal dural based mass with   surrounding edema.    CXR 1/16: clear lungs     EKG tracing reviewed and interpreted: Sinus tach, left axis deviation     Consultant(s) Notes Reviewed:      Care Discussed with Consultants/Other Providers: HPI:  77F h/o HTN, HLD, breast CA diagnosed Nov 2017 s/p L mastectomy 2 weeks ago at [facility] and with negative PET scan, admitted to [facility] 1/16/18 after seizure. Patient had a seizure at home while she was sitting, it was witnessed by her , lasted about 30 minutes (L facial twitch) with associated L-sided facial droop since 5pm on 1/16. Patient says she remembers starting to shake. Seizure was generalized followed by loss of consciousness. Patient was given 2mg of Ativan in ER. She was found to have multiple brain lesions. Heme onc consulted, reported the lesions were not classic for breast CA mets, possible GBM. The patient was transferred to [facility] for biopsy versus resection. Patient denies fever, chills, SOB, palpitations, chest pain, nausea, vomiting, diarrhea or constipation.       PAST MEDICAL & SURGICAL HISTORY:  Breast cancer  H/O breast surgery  HTN  HLD       Review of Systems:   CONSTITUTIONAL: No fever, weight loss, or fatigue  EYES: No eye pain, visual disturbances, or discharge  ENMT:  No difficulty hearing, tinnitus, vertigo; No sinus or throat pain  NECK: No pain or stiffness  BREASTS: No pain, masses, or nipple discharge  RESPIRATORY: No cough, wheezing, chills or hemoptysis; No shortness of breath  CARDIOVASCULAR: No chest pain, palpitations, dizziness, or leg swelling  GASTROINTESTINAL: No abdominal or epigastric pain. No nausea, vomiting, or hematemesis; No diarrhea or constipation. No melena or hematochezia.  GENITOURINARY: No dysuria, frequency, hematuria, or incontinence  NEUROLOGICAL: No headaches, memory loss, loss of strength, numbness, or tremors.   SKIN: No itching, burning, rashes, or lesions   LYMPH NODES: No enlarged glands  ENDOCRINE: No heat or cold intolerance; No hair loss  MUSCULOSKELETAL: No joint pain or swelling; No muscle, back, or extremity pain  PSYCHIATRIC: No depression, anxiety, mood swings, or difficulty sleeping  HEME/LYMPH: No easy bruising, or bleeding gums  ALLERY AND IMMUNOLOGIC: No hives or eczema    Allergies    No Known Allergies    Intolerances        Social History:   Never smoker  Drinks a glass of wine some days     FAMILY HISTORY:  No pertinent family history in first degree relatives  No family history of cancer       HOME MEDICATIONS:  Ramipril 10mg Oral daily   Simvastatin 10mg Oral daily       Vital Signs Last 24 Hrs  T(C): 36.7 (24 Jan 2018 07:47), Max: 36.9 (23 Jan 2018 23:39)  T(F): 98 (24 Jan 2018 07:47), Max: 98.4 (23 Jan 2018 23:39)  HR: 98 (24 Jan 2018 07:47) (97 - 125)  BP: 109/71 (24 Jan 2018 07:47) (109/71 - 151/83)  BP(mean): --  RR: 18 (24 Jan 2018 07:47) (16 - 18)  SpO2: 96% (24 Jan 2018 07:47) (96% - 99%)  CAPILLARY BLOOD GLUCOSE      POCT Blood Glucose.: 116 mg/dL (24 Jan 2018 10:11)  POCT Blood Glucose.: 116 mg/dL (23 Jan 2018 21:46)    I&O's Summary    23 Jan 2018 07:01  -  24 Jan 2018 07:00  --------------------------------------------------------  IN: 1340 mL / OUT: 0 mL / NET: 1340 mL    24 Jan 2018 07:01  -  24 Jan 2018 12:40  --------------------------------------------------------  IN: 280 mL / OUT: 0 mL / NET: 280 mL        PHYSICAL EXAM:  GENERAL: NAD, well-developed  HEAD:  Atraumatic, Normocephalic  EYES: EOMI, PERRLA, conjunctiva and sclera clear  NECK: Supple, No JVD  CHEST/LUNG: Clear to auscultation bilaterally; No wheeze. S/P L mastectomy, surgical site c/d/i   HEART: Regular rate and rhythm; No murmurs, rubs, or gallops  ABDOMEN: Soft, Nontender, Nondistended; Bowel sounds present  EXTREMITIES:  2+ Peripheral Pulses, No clubbing, cyanosis, or edema  PSYCH: AAOx3  NEUROLOGY: CN's intact, LLE 4/5 with DF/PF 0/5   SKIN: No rashes or lesions    LABS:                        11.7   10.66 )-----------( 501      ( 24 Jan 2018 07:42 )             35.2     01-24    140  |  104  |  31<H>  ----------------------------<  98  5.6<H>   |  23  |  0.50    Ca    8.5      24 Jan 2018 07:27    TPro  6.8  /  Alb  3.7  /  TBili  0.2  /  DBili  x   /  AST  22  /  ALT  30  /  AlkPhos  59  01-24    PT/INR - ( 24 Jan 2018 07:42 )   PT: 11.1 sec;   INR: 0.98 ratio         PTT - ( 24 Jan 2018 07:42 )  PTT:22.9 sec          RADIOLOGY & ADDITIONAL TESTS:    Imaging Personally Reviewed:  MRI brain:   Right frontal parietal dural based mass with   surrounding edema.    CXR 1/16: clear lungs     EKG tracing reviewed and interpreted: Sinus tach, left axis deviation     Consultant(s) Notes Reviewed:      Care Discussed with Consultants/Other Providers:

## 2018-01-24 NOTE — PROGRESS NOTE ADULT - SUBJECTIVE AND OBJECTIVE BOX
subjective: c/o left leg weakness    PHYSICAL EXAM:    Constitutional:    Neurological:     Motor exam:          Upper extremity                         Delt     Bicep     Tricep    HG                                                 R         5/5 5/5        5/5       5/5                                               L          4/5 4/5        4/5       4/5          Lower extremity                        HF         KF        KE       DF         PF                                                  R        5/5 5/5 5/5 5/5         5/5                                               L         4/5 4/5       4/5       0/5          0/5                                                        [] warm well perfused; capillary refill <3 seconds     Sensation: [x] intact to light touch  [] decrease      Incision:     Misc:

## 2018-01-24 NOTE — CONSULT NOTE ADULT - PROBLEM SELECTOR RECOMMENDATION 9
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

## 2018-01-24 NOTE — CONSULT NOTE ADULT - SUBJECTIVE AND OBJECTIVE BOX
CC: seizure     HPI:  Patient is a 76 y/o F with recent pmhx of Breast CA Dx Nov 2017 (s/p resection of L breast x2 week ago at [facility]) presented to [facility] ED s/p 30 min seizure (L facial twitch) at home while seated, witnessed by , with associated L-sided facial droop.  She came to the ED on 1/23/18 and was a.o. x 3 .  She had a brain MRI on 1/24/18 without contrast that showed a right frontal parietal dural based mass with surrounding edema however this exam was limited due to her claustrophobia.   Patient denies fever, chills, SOB, palpitations, chest pain, nausea, vomiting, diarrhea or constipation.  The case was discussed at tumor board and there was consensus for Ms. Rivas to have outpatient SRS arranged.      I have arranged an outpatient appointment with Dr. Morris on 1/31/18 at 11am at: [address] [facility], [facility] radiation department which can be reached at  if the patient needs to change this appointment.  Our business card was left with our department contact information.       Allergies    No Known Allergies    Intolerances        ROS: [  ] Fever  [  ] Chills  [  ]Chest Pain [  ] SOB  [  ]Cough [  ] N/V  [  ] Diarrhea [  ]Constipation [  ]Other ROS:  [  ] ROS otherwise negative    PAST MEDICAL & SURGICAL HISTORY:  Breast cancer  H/O breast surgery      FAMILY HISTORY:  No pertinent family history in first degree relatives      MEDICATIONS  (STANDING):  dexamethasone     Tablet 4 milliGRAM(s) Oral every 6 hours  dextrose 5%. 1000 milliLiter(s) (50 mL/Hr) IV Continuous <Continuous>  dextrose 50% Injectable 12.5 Gram(s) IV Push once  dextrose 50% Injectable 25 Gram(s) IV Push once  dextrose 50% Injectable 25 Gram(s) IV Push once  docusate sodium 100 milliGRAM(s) Oral three times a day  insulin lispro (HumaLOG) corrective regimen sliding scale   SubCutaneous three times a day before meals  insulin lispro (HumaLOG) corrective regimen sliding scale   SubCutaneous at bedtime  lisinopril 40 milliGRAM(s) Oral daily  pantoprazole    Tablet 40 milliGRAM(s) Oral daily  phenytoin   Capsule 100 milliGRAM(s) Oral three times a day  simvastatin 10 milliGRAM(s) Oral at bedtime    MEDICATIONS  (PRN):  acetaminophen   Tablet 650 milliGRAM(s) Oral every 6 hours PRN For Temp greater than 38 C (100.4 F)  acetaminophen   Tablet. 650 milliGRAM(s) Oral every 6 hours PRN Mild Pain (1 - 3)  dextrose Gel 1 Dose(s) Oral once PRN Blood Glucose LESS THAN 70 milliGRAM(s)/deciliter  glucagon  Injectable 1 milliGRAM(s) IntraMuscular once PRN Glucose LESS THAN 70 milligrams/deciliter  ondansetron   Disintegrating Tablet 4 milliGRAM(s) Oral every 6 hours PRN Nausea  senna 2 Tablet(s) Oral at bedtime PRN Constipation      PHYSICAL EXAM  Vital Signs Last 24 Hrs  T(C): 36.7 (24 Jan 2018 07:47), Max: 36.9 (23 Jan 2018 23:39)  T(F): 98 (24 Jan 2018 07:47), Max: 98.4 (23 Jan 2018 23:39)  HR: 98 (24 Jan 2018 07:47) (97 - 125)  BP: 109/71 (24 Jan 2018 07:47) (109/71 - 151/83)  BP(mean): --  RR: 18 (24 Jan 2018 07:47) (16 - 18)  SpO2: 96% (24 Jan 2018 07:47) (96% - 99%)    General: Well nourished, well developed, no acute distress  HEENT: NC/AT; EOMI, PERRL, sclera nonicteric; external ears normal; no rhinorrhea or epistaxis; mucous membranes moist; oropharynx clear and without erythema  CV: NR, RR; no appreciable r/m/g  Lungs: CTAB, no increased work of breathing  Abodmen: Bowel sounds present; soft, NTND  MSK: Vertebral spine non-tender to palpation  Neuro: AAOx3; cranial nerves II-XII intact; strength 5/5 in upper and lower extremities; sensation to light touch in tact bilaterally.  Psych: Full affect; mood congruent  Skin: no visible rashes on limited examination    IMAGING/LABS/PATHOLOGY: I have personally reviewed the relevant labs, pathology, and imaging as noted in the HPI.  In addition,                          11.7   10.66 )-----------( 501      ( 24 Jan 2018 07:42 )             35.2     01-24    140  |  104  |  31<H>  ----------------------------<  98  5.6<H>   |  23  |  0.50    Ca    8.5      24 Jan 2018 07:27    TPro  6.8  /  Alb  3.7  /  TBili  0.2  /  DBili  x   /  AST  22  /  ALT  30  /  AlkPhos  59  01-24    PT/INR - ( 24 Jan 2018 07:42 )   PT: 11.1 sec;   INR: 0.98 ratio         PTT - ( 24 Jan 2018 07:42 )  PTT:22.9 sec      ASSESSMENT/PLAN    MAGALI RIVAS is a 77y woman with     We discussed the use of palliative radiation in this setting, namely to improve quality of life through the reduction of symptoms.  We talked about the risks, benefits, acute and long term side effects, as well as expected treatment outcomes.  He/She was given the opportunity to ask questions, which were answered to his/her apparent satisfaction.  He/She provided written consent to proceed with radiation therapy. We will arrange for inpatient/outpatient treatment. CC: seizure     HPI:  Patient is a 76 y/o F with recent pmhx of Breast CA Dx Nov 2017 (s/p resection of L breast x2 week ago at [facility]) presented to [facility] ED s/p 30 min seizure (L facial twitch) at home while seated, witnessed by , with associated L-sided facial droop.  She came to the ED on 1/23/18 and was a.o. x 3 .  An MRI of the brain was performed on 1/17/18 with and without contrast which showed Multiple enhancing lesions in the posterior right frontal and parietal lobes as described above which may represent metastases given history of breast cancer. GBM could have similar appearance. Lymphoma is less likely.    She had a repeat brain MRI on 1/24/18 without contrast that showed a right frontal parietal dural based mass with surrounding edema however this exam was limited due to her claustrophobia.   Patient denies fever, chills, SOB, palpitations, chest pain, nausea, vomiting, diarrhea or constipation.  The case was discussed at tumor board and there was consensus for Ms. Rivas to have outpatient SRS arranged.      I have arranged an outpatient appointment with Dr. Morris on 1/31/18 at 11am at: [address] [facility] radiation department which can be reached at  if the patient needs to change this appointment.  Our business card was left with our department contact information.       Allergies    No Known Allergies    Intolerances        ROS: [  ] Fever  [  ] Chills  [  ]Chest Pain [  ] SOB  [  ]Cough [  ] N/V  [  ] Diarrhea [  ]Constipation [  ]Other ROS:  [  ] ROS otherwise negative    PAST MEDICAL & SURGICAL HISTORY:  Breast cancer  H/O breast surgery      FAMILY HISTORY:  No pertinent family history in first degree relatives      MEDICATIONS  (STANDING):  dexamethasone     Tablet 4 milliGRAM(s) Oral every 6 hours  dextrose 5%. 1000 milliLiter(s) (50 mL/Hr) IV Continuous <Continuous>  dextrose 50% Injectable 12.5 Gram(s) IV Push once  dextrose 50% Injectable 25 Gram(s) IV Push once  dextrose 50% Injectable 25 Gram(s) IV Push once  docusate sodium 100 milliGRAM(s) Oral three times a day  insulin lispro (HumaLOG) corrective regimen sliding scale   SubCutaneous three times a day before meals  insulin lispro (HumaLOG) corrective regimen sliding scale   SubCutaneous at bedtime  lisinopril 40 milliGRAM(s) Oral daily  pantoprazole    Tablet 40 milliGRAM(s) Oral daily  phenytoin   Capsule 100 milliGRAM(s) Oral three times a day  simvastatin 10 milliGRAM(s) Oral at bedtime    MEDICATIONS  (PRN):  acetaminophen   Tablet 650 milliGRAM(s) Oral every 6 hours PRN For Temp greater than 38 C (100.4 F)  acetaminophen   Tablet. 650 milliGRAM(s) Oral every 6 hours PRN Mild Pain (1 - 3)  dextrose Gel 1 Dose(s) Oral once PRN Blood Glucose LESS THAN 70 milliGRAM(s)/deciliter  glucagon  Injectable 1 milliGRAM(s) IntraMuscular once PRN Glucose LESS THAN 70 milligrams/deciliter  ondansetron   Disintegrating Tablet 4 milliGRAM(s) Oral every 6 hours PRN Nausea  senna 2 Tablet(s) Oral at bedtime PRN Constipation      PHYSICAL EXAM  Vital Signs Last 24 Hrs  T(C): 36.7 (24 Jan 2018 07:47), Max: 36.9 (23 Jan 2018 23:39)  T(F): 98 (24 Jan 2018 07:47), Max: 98.4 (23 Jan 2018 23:39)  HR: 98 (24 Jan 2018 07:47) (97 - 125)  BP: 109/71 (24 Jan 2018 07:47) (109/71 - 151/83)  BP(mean): --  RR: 18 (24 Jan 2018 07:47) (16 - 18)  SpO2: 96% (24 Jan 2018 07:47) (96% - 99%)    General: Well nourished, well developed, no acute distress  HEENT: NC/AT; EOMI, PERRL, sclera nonicteric; external ears normal; no rhinorrhea or epistaxis; mucous membranes moist; oropharynx clear and without erythema  CV: NR, RR; no appreciable r/m/g  Lungs: CTAB, no increased work of breathing  Abodmen: Bowel sounds present; soft, NTND  MSK: Vertebral spine non-tender to palpation  Neuro: AAOx3; cranial nerves II-XII intact; strength 5/5 in upper and lower extremities; sensation to light touch in tact bilaterally.  Psych: Full affect; mood congruent  Skin: no visible rashes on limited examination    IMAGING/LABS/PATHOLOGY: I have personally reviewed the relevant labs, pathology, and imaging as noted in the HPI.  In addition,                          11.7   10.66 )-----------( 501      ( 24 Jan 2018 07:42 )             35.2     01-24    140  |  104  |  31<H>  ----------------------------<  98  5.6<H>   |  23  |  0.50    Ca    8.5      24 Jan 2018 07:27    TPro  6.8  /  Alb  3.7  /  TBili  0.2  /  DBili  x   /  AST  22  /  ALT  30  /  AlkPhos  59  01-24    PT/INR - ( 24 Jan 2018 07:42 )   PT: 11.1 sec;   INR: 0.98 ratio         PTT - ( 24 Jan 2018 07:42 )  PTT:22.9 sec      ASSESSMENT/PLAN    MAGALI RIVAS is a 77y woman with     We discussed the use of palliative radiation in this setting, namely to improve quality of life through the reduction of symptoms.  We talked about the risks, benefits, acute and long term side effects, as well as expected treatment outcomes.  He/She was given the opportunity to ask questions, which were answered to his/her apparent satisfaction.  He/She provided written consent to proceed with radiation therapy. We will arrange for inpatient/outpatient treatment. CC: seizure     HPI:  Patient is a 76 y/o F with recent pmhx of Breast CA Dx Nov 2017 (s/p resection of L breast x2 week ago at [facility]) presented to ED s/p 30 min seizure (L facial twitch) at home while seated, witnessed by , with associated Left sided facial droop which has now resolved.  She came to the ED on 1/23/18 and was a.o. x 3 .  She was recently transferred from [facility] to [facility] after brain lesions were found with possible plan for surgery and biopsy.     An MRI of the brain was performed on 1/17/18 at [facility] with and without contrast which showed Multiple enhancing lesions in the posterior right frontal and parietal lobes as described above which may represent metastases given history of breast cancer. GBM could have similar appearance. Lymphoma is less likely.    She had a repeat brain MRI on 1/24/18 at [facility] without contrast that showed a right frontal parietal dural based mass with surrounding edema however this exam was limited due to her claustrophobia.   Patient denies fever, chills, SOB, palpitations, chest pain, nausea, vomiting, diarrhea or constipation.  The case was discussed at tumor board and there was consensus for Ms. Rivas to have outpatient SRS arranged which will be discussed with Dr. Morris.      She does admit to weakness to her left leg and admits to difficulty with ambulation.   Her  and daughter were present during the time of this H/P and indicated that a few months ago Ms. Rivas was c/o numbness, tingling, and occasional foot drop symptoms to her left leg/foot.  Her daughter was insisting her mother go for a brain MRI but due to the patient's claustrophobia she was hesitant and instead was seen by a neurologist who ordered nerve conduction testing to her left leg/foot.   It was at [facility] s/p seizure that she had the MRI which revealed this new onset of brain tumor.       Allergies    No Known Allergies          ROS: [  ] Fever  [  ] Chills  [  ]Chest Pain [  ] SOB  [  ]Cough [  ] N/V  [  ] Diarrhea [  ]Constipation [  X]Other ROS: left leg weakness, fatigue  [  ] ROS otherwise negative    PAST MEDICAL & SURGICAL HISTORY:  Breast cancer  H/O breast surgery      FAMILY HISTORY:  No pertinent family history in first degree relatives      MEDICATIONS  (STANDING):  dexamethasone     Tablet 4 milliGRAM(s) Oral every 6 hours  dextrose 5%. 1000 milliLiter(s) (50 mL/Hr) IV Continuous <Continuous>  dextrose 50% Injectable 12.5 Gram(s) IV Push once  dextrose 50% Injectable 25 Gram(s) IV Push once  dextrose 50% Injectable 25 Gram(s) IV Push once  docusate sodium 100 milliGRAM(s) Oral three times a day  insulin lispro (HumaLOG) corrective regimen sliding scale   SubCutaneous three times a day before meals  insulin lispro (HumaLOG) corrective regimen sliding scale   SubCutaneous at bedtime  lisinopril 40 milliGRAM(s) Oral daily  pantoprazole    Tablet 40 milliGRAM(s) Oral daily  phenytoin   Capsule 100 milliGRAM(s) Oral three times a day  simvastatin 10 milliGRAM(s) Oral at bedtime    MEDICATIONS  (PRN):  acetaminophen   Tablet 650 milliGRAM(s) Oral every 6 hours PRN For Temp greater than 38 C (100.4 F)  acetaminophen   Tablet. 650 milliGRAM(s) Oral every 6 hours PRN Mild Pain (1 - 3)  dextrose Gel 1 Dose(s) Oral once PRN Blood Glucose LESS THAN 70 milliGRAM(s)/deciliter  glucagon  Injectable 1 milliGRAM(s) IntraMuscular once PRN Glucose LESS THAN 70 milligrams/deciliter  ondansetron   Disintegrating Tablet 4 milliGRAM(s) Oral every 6 hours PRN Nausea  senna 2 Tablet(s) Oral at bedtime PRN Constipation      PHYSICAL EXAM  Vital Signs Last 24 Hrs  T(C): 36.7 (24 Jan 2018 07:47), Max: 36.9 (23 Jan 2018 23:39)  T(F): 98 (24 Jan 2018 07:47), Max: 98.4 (23 Jan 2018 23:39)  HR: 98 (24 Jan 2018 07:47) (97 - 125)  BP: 109/71 (24 Jan 2018 07:47) (109/71 - 151/83)  BP(mean): --  RR: 18 (24 Jan 2018 07:47) (16 - 18)  SpO2: 96% (24 Jan 2018 07:47) (96% - 99%)    General: Well nourished, well developed, no acute distress  HEENT: NC/AT; EOMI, sclera nonicteric; external ears normal; no rhinorrhea or epistaxis; mucous membranes moist; oropharynx clear and without erythema, no facial droop seen, symmetrical smile.   CV: NR, RR; no appreciable r/m/g  Lungs: CTAB, no increased work of breathing  Abodmen: Bowel sounds present; soft, NTND  MSK: Vertebral spine non-tender to palpation  Neuro: AAOx3; cranial nerves II-X intact; strength 5/5 in RUE/RLE, 4/5 to LUE/LLE, sensation to light touch intact bilaterally.  decreased left hand grasp.   Psych: Full affect; mood congruent  Skin: no visible rashes on limited examination      ASSESSMENT/PLAN    MAGALI RIVAS is a 77y woman with a h/o breast cancer diagnosed in November of 2017 treated by left mastectomy most recently at [facility].  She has no h/o chemotherapy or radiation treatment prior.      We discussed the use of palliative radiation in this setting, as either SRS fractionated radiation treatment, and we also discussed Gamma Knife to make her aware of several options and techniques that may be presented to her at a later date.  We talked about the goals of treatment namely to improve quality of life through the reduction of symptoms.  We talked about the risks, benefits, acute and long term side effects, as well as expected treatment outcomes.  She was given the opportunity to ask questions, which were answered to the satisfaction of both the patient and her family that were present.  We did discuss the importance of MRI's that may need to be repeated during the course of her treatment and surveillance.  She may benefit from medication when MRI's are ordered.        I have arranged an outpatient appointment with Dr. Morris on 1/31/18 at 11am at: [address] [facility] radiation department which can be reached at  if the patient needs to change this appointment.  Our business card was left with our department contact information.  Ms. Rivas did mention a possible plan for her to be sent to [place] for rehabilitation for her left sided weakness. I informed her that [facility] could transport her to her appointments as well as for treatment if the patient was to be transferred to [place] for rehabilitation. CC: seizure     HPI:  Patient is a 76 y/o F with recent pmhx of Breast CA Dx Nov 2017 (s/p resection of L breast x2 week ago at [facility]) presented to ED s/p 30 min seizure (L facial twitch) at home while seated, witnessed by , with associated Left sided facial droop which has now resolved. She was a.o. x 3 but while at home she instructed her  to call 911 and told him "I am either having a stroke or heart attack."  She was recently transferred from [facility] to [facility] after brain lesions were found with possible plan for surgery and biopsy.     An MRI of the brain was performed on 1/17/18 at [facility] with and without contrast which showed Multiple enhancing lesions in the posterior right frontal and parietal lobes as described above which may represent metastases given history of breast cancer. GBM could have similar appearance. Lymphoma is less likely.    She had a repeat brain MRI on 1/24/18 at [facility] without contrast that showed a right frontal parietal dural based mass with surrounding edema however this exam was limited due to her claustrophobia.   Patient denies fever, chills, SOB, palpitations, chest pain, nausea, vomiting, diarrhea or constipation.  The case was discussed at tumor board and there was consensus for Ms. Rivas to have outpatient SRS arranged which will be discussed with Dr. Morris.      She does admit to weakness to her left leg and admits to difficulty with ambulation.   Her  and daughter were present during the time of this H/P and indicated that a few months ago Ms. Rivas was c/o numbness, tingling, and occasional foot drop symptoms to her left leg/foot.  Her daughter was insisting her mother go for a brain MRI but due to the patient's claustrophobia she was hesitant and instead was seen by a neurologist who ordered nerve conduction testing to her left leg/foot.   It was at [facility] s/p seizure that she had the MRI which revealed this new onset of brain tumor.  The patient's family indicates that neurosurgery may not be an option since they were informed it could compromise her motor function so they have been told radiation may be the goal of treatment.   Ms. Rivas understands this and would consent to having this treatment.        Allergies    No Known Allergies          ROS: [  ] Fever  [  ] Chills  [  ]Chest Pain [  ] SOB  [  ]Cough [  ] N/V  [  ] Diarrhea [  ]Constipation [  X]Other ROS: left leg weakness, fatigue  [  ] ROS otherwise negative    PAST MEDICAL & SURGICAL HISTORY:  Breast cancer  H/O breast surgery      FAMILY HISTORY:  No pertinent family history in first degree relatives      MEDICATIONS  (STANDING):  dexamethasone     Tablet 4 milliGRAM(s) Oral every 6 hours  dextrose 5%. 1000 milliLiter(s) (50 mL/Hr) IV Continuous <Continuous>  dextrose 50% Injectable 12.5 Gram(s) IV Push once  dextrose 50% Injectable 25 Gram(s) IV Push once  dextrose 50% Injectable 25 Gram(s) IV Push once  docusate sodium 100 milliGRAM(s) Oral three times a day  insulin lispro (HumaLOG) corrective regimen sliding scale   SubCutaneous three times a day before meals  insulin lispro (HumaLOG) corrective regimen sliding scale   SubCutaneous at bedtime  lisinopril 40 milliGRAM(s) Oral daily  pantoprazole    Tablet 40 milliGRAM(s) Oral daily  phenytoin   Capsule 100 milliGRAM(s) Oral three times a day  simvastatin 10 milliGRAM(s) Oral at bedtime    MEDICATIONS  (PRN):  acetaminophen   Tablet 650 milliGRAM(s) Oral every 6 hours PRN For Temp greater than 38 C (100.4 F)  acetaminophen   Tablet. 650 milliGRAM(s) Oral every 6 hours PRN Mild Pain (1 - 3)  dextrose Gel 1 Dose(s) Oral once PRN Blood Glucose LESS THAN 70 milliGRAM(s)/deciliter  glucagon  Injectable 1 milliGRAM(s) IntraMuscular once PRN Glucose LESS THAN 70 milligrams/deciliter  ondansetron   Disintegrating Tablet 4 milliGRAM(s) Oral every 6 hours PRN Nausea  senna 2 Tablet(s) Oral at bedtime PRN Constipation      PHYSICAL EXAM  Vital Signs Last 24 Hrs  T(C): 36.7 (24 Jan 2018 07:47), Max: 36.9 (23 Jan 2018 23:39)  T(F): 98 (24 Jan 2018 07:47), Max: 98.4 (23 Jan 2018 23:39)  HR: 98 (24 Jan 2018 07:47) (97 - 125)  BP: 109/71 (24 Jan 2018 07:47) (109/71 - 151/83)  BP(mean): --  RR: 18 (24 Jan 2018 07:47) (16 - 18)  SpO2: 96% (24 Jan 2018 07:47) (96% - 99%)    General: Well nourished, well developed, no acute distress  HEENT: NC/AT; EOMI, sclera nonicteric; external ears normal; no rhinorrhea or epistaxis; mucous membranes moist; oropharynx clear and without erythema, no facial droop seen, symmetrical smile.   CV: NR, RR; no appreciable r/m/g  Lungs: CTAB, no increased work of breathing  Abodmen: Bowel sounds present; soft, NTND  MSK: Vertebral spine non-tender to palpation  Neuro: AAOx3; cranial nerves II-X intact; strength 5/5 in RUE/RLE, 4/5 to LUE/LLE, sensation to light touch intact bilaterally.  decreased left hand grasp.   Psych: Full affect; mood congruent  Skin: no visible rashes on limited examination      ASSESSMENT/PLAN    MAGALI RIVAS is a 77y woman with a h/o breast cancer diagnosed in November of 2017 treated by left mastectomy most recently at [facility].  She has no h/o chemotherapy or radiation treatment prior.      We discussed the use of palliative radiation in this setting, as either SRS fractionated radiation treatment, and we also discussed Gamma Knife to make her aware of several options and techniques that may be presented to her at a later date.  We talked about the goals of treatment namely to improve quality of life through the reduction of symptoms.  We talked about the risks, benefits, acute and long term side effects, as well as expected treatment outcomes.  She was given the opportunity to ask questions, which were answered to the satisfaction of both the patient and her family that were present.  We did discuss the importance of MRI's that may need to be repeated during the course of her treatment and surveillance.  She may benefit from medication when MRI's are ordered.        I have arranged an outpatient appointment with Dr. Morris on 1/31/18 at 11am at: [address] [facility] radiation department which can be reached at  if the patient needs to change this appointment.  Our business card was left with our department contact information.  Ms. Rivas did mention a possible plan for her to be sent to [place] for rehabilitation for her left sided weakness. I informed her that [facility] could transport her to her appointments as well as for treatment if the patient was to be transferred to [place] for rehabilitation.      Thank you for this consultation. CC: seizure     HPI:  Patient is a 76 y/o F with recent pmhx of Breast CA Dx Nov 2017 (s/p resection of L breast x2 week ago at [facility]) presented to ED s/p 30 min seizure (L facial twitch) at home while seated, witnessed by , with associated Left sided facial droop which has now resolved. She was a.o. x 3 but while at home she instructed her  to call 911 and told him "I am either having a stroke or heart attack."  She was recently transferred from [facility] to [facility] after brain lesions were found with possible plan for surgery and biopsy.     An MRI of the brain was performed on 1/17/18 at [facility] with and without contrast which showed Multiple enhancing lesions in the posterior right frontal and parietal lobes as described above which may represent metastases given history of breast cancer. GBM could have similar appearance. Lymphoma is less likely.    She had a repeat brain MRI on 1/24/18 at [facility] without contrast that showed a right frontal parietal dural based mass with surrounding edema however this exam was limited due to her claustrophobia.   Patient denies fever, chills, SOB, palpitations, chest pain, nausea, vomiting, diarrhea or constipation.   .      She does admit to weakness to her left leg and admits to difficulty with ambulation.   Her  and daughter were present during the time of this H/P and indicated that a few months ago Ms. Rivas was c/o numbness, tingling, and occasional foot drop symptoms to her left leg/foot.  Her daughter was insisting her mother go for a brain MRI but due to the patient's claustrophobia she was hesitant and instead was seen by a neurologist who ordered nerve conduction testing to her left leg/foot.   It was at [facility] s/p seizure that she had the MRI which revealed this new onset of brain tumor.  The patient's family indicates that neurosurgery may not be an option since they were informed it could compromise her motor function so they have been told radiation may be the goal of treatment.   Ms. Rivas understands this and would consent to having this treatment.        Allergies    No Known Allergies          ROS: [  ] Fever  [  ] Chills  [  ]Chest Pain [  ] SOB  [  ]Cough [  ] N/V  [  ] Diarrhea [  ]Constipation [  X]Other ROS: left leg weakness, fatigue  [  ] ROS otherwise negative    PAST MEDICAL & SURGICAL HISTORY:  Breast cancer  H/O breast surgery      FAMILY HISTORY:  No pertinent family history in first degree relatives      MEDICATIONS  (STANDING):  dexamethasone     Tablet 4 milliGRAM(s) Oral every 6 hours  dextrose 5%. 1000 milliLiter(s) (50 mL/Hr) IV Continuous <Continuous>  dextrose 50% Injectable 12.5 Gram(s) IV Push once  dextrose 50% Injectable 25 Gram(s) IV Push once  dextrose 50% Injectable 25 Gram(s) IV Push once  docusate sodium 100 milliGRAM(s) Oral three times a day  insulin lispro (HumaLOG) corrective regimen sliding scale   SubCutaneous three times a day before meals  insulin lispro (HumaLOG) corrective regimen sliding scale   SubCutaneous at bedtime  lisinopril 40 milliGRAM(s) Oral daily  pantoprazole    Tablet 40 milliGRAM(s) Oral daily  phenytoin   Capsule 100 milliGRAM(s) Oral three times a day  simvastatin 10 milliGRAM(s) Oral at bedtime    MEDICATIONS  (PRN):  acetaminophen   Tablet 650 milliGRAM(s) Oral every 6 hours PRN For Temp greater than 38 C (100.4 F)  acetaminophen   Tablet. 650 milliGRAM(s) Oral every 6 hours PRN Mild Pain (1 - 3)  dextrose Gel 1 Dose(s) Oral once PRN Blood Glucose LESS THAN 70 milliGRAM(s)/deciliter  glucagon  Injectable 1 milliGRAM(s) IntraMuscular once PRN Glucose LESS THAN 70 milligrams/deciliter  ondansetron   Disintegrating Tablet 4 milliGRAM(s) Oral every 6 hours PRN Nausea  senna 2 Tablet(s) Oral at bedtime PRN Constipation      PHYSICAL EXAM  Vital Signs Last 24 Hrs  T(C): 36.7 (24 Jan 2018 07:47), Max: 36.9 (23 Jan 2018 23:39)  T(F): 98 (24 Jan 2018 07:47), Max: 98.4 (23 Jan 2018 23:39)  HR: 98 (24 Jan 2018 07:47) (97 - 125)  BP: 109/71 (24 Jan 2018 07:47) (109/71 - 151/83)  BP(mean): --  RR: 18 (24 Jan 2018 07:47) (16 - 18)  SpO2: 96% (24 Jan 2018 07:47) (96% - 99%)    General: Well nourished, well developed, no acute distress  HEENT: NC/AT; EOMI, sclera nonicteric; external ears normal; no rhinorrhea or epistaxis; mucous membranes moist; oropharynx clear and without erythema, no facial droop seen, symmetrical smile.   CV: NR, RR; no appreciable r/m/g  Lungs: CTAB, no increased work of breathing  Abodmen: Bowel sounds present; soft, NTND  MSK: Vertebral spine non-tender to palpation  Neuro: AAOx3; cranial nerves II-X intact; strength 5/5 in RUE/RLE, 4/5 to LUE/ 2/5 LLE, sensation to light touch intact bilaterally.  decreased left hand grasp.   Psych: Full affect; mood congruent  Skin: no visible rashes on limited examination

## 2018-01-25 ENCOUNTER — APPOINTMENT (OUTPATIENT)
Dept: SPINE | Facility: CLINIC | Age: 78
End: 2018-01-25

## 2018-01-25 ENCOUNTER — APPOINTMENT (OUTPATIENT)
Dept: SPINE | Facility: HOSPITAL | Age: 78
End: 2018-01-25

## 2018-01-25 LAB
BLD GP AB SCN SERPL QL: NEGATIVE — SIGNIFICANT CHANGE UP
GLUCOSE BLDC GLUCOMTR-MCNC: 101 MG/DL — HIGH (ref 70–99)
GLUCOSE BLDC GLUCOMTR-MCNC: 110 MG/DL — HIGH (ref 70–99)
GLUCOSE BLDC GLUCOMTR-MCNC: 114 MG/DL — HIGH (ref 70–99)
GLUCOSE BLDC GLUCOMTR-MCNC: 125 MG/DL — HIGH (ref 70–99)
GLUCOSE BLDC GLUCOMTR-MCNC: 97 MG/DL — SIGNIFICANT CHANGE UP (ref 70–99)
PHENYTOIN FREE SERPL-MCNC: 6.5 UG/ML — LOW (ref 10–20)
POTASSIUM SERPL-MCNC: 4.2 MMOL/L — SIGNIFICANT CHANGE UP (ref 3.5–5.3)
POTASSIUM SERPL-SCNC: 4.2 MMOL/L — SIGNIFICANT CHANGE UP (ref 3.5–5.3)
RH IG SCN BLD-IMP: NEGATIVE — SIGNIFICANT CHANGE UP

## 2018-01-25 PROCEDURE — 99223 1ST HOSP IP/OBS HIGH 75: CPT | Mod: GC

## 2018-01-25 PROCEDURE — 99233 SBSQ HOSP IP/OBS HIGH 50: CPT

## 2018-01-25 RX ORDER — FOSPHENYTOIN 50 MG/ML
500 INJECTION INTRAMUSCULAR; INTRAVENOUS ONCE
Qty: 0 | Refills: 0 | Status: COMPLETED | OUTPATIENT
Start: 2018-01-25 | End: 2018-01-25

## 2018-01-25 RX ORDER — DIAZEPAM 5 MG
5 TABLET ORAL ONCE
Qty: 0 | Refills: 0 | Status: DISCONTINUED | OUTPATIENT
Start: 2018-01-25 | End: 2018-01-25

## 2018-01-25 RX ORDER — SODIUM CHLORIDE 9 MG/ML
1000 INJECTION INTRAMUSCULAR; INTRAVENOUS; SUBCUTANEOUS
Qty: 0 | Refills: 0 | Status: DISCONTINUED | OUTPATIENT
Start: 2018-01-25 | End: 2018-01-26

## 2018-01-25 RX ORDER — ENOXAPARIN SODIUM 100 MG/ML
40 INJECTION SUBCUTANEOUS ONCE
Qty: 0 | Refills: 0 | Status: COMPLETED | OUTPATIENT
Start: 2018-01-25 | End: 2018-01-25

## 2018-01-25 RX ORDER — ENOXAPARIN SODIUM 100 MG/ML
40 INJECTION SUBCUTANEOUS AT BEDTIME
Qty: 0 | Refills: 0 | Status: DISCONTINUED | OUTPATIENT
Start: 2018-01-25 | End: 2018-01-25

## 2018-01-25 RX ADMIN — FOSPHENYTOIN 120 MILLIGRAM(S) PE: 50 INJECTION INTRAMUSCULAR; INTRAVENOUS at 17:10

## 2018-01-25 RX ADMIN — Medication 100 MILLIGRAM(S): at 05:59

## 2018-01-25 RX ADMIN — Medication 4 MILLIGRAM(S): at 05:59

## 2018-01-25 RX ADMIN — Medication 100 MILLIGRAM(S): at 21:54

## 2018-01-25 RX ADMIN — Medication 4 MILLIGRAM(S): at 23:50

## 2018-01-25 RX ADMIN — Medication 5 MILLIGRAM(S): at 23:43

## 2018-01-25 RX ADMIN — SIMVASTATIN 10 MILLIGRAM(S): 20 TABLET, FILM COATED ORAL at 21:54

## 2018-01-25 RX ADMIN — LISINOPRIL 40 MILLIGRAM(S): 2.5 TABLET ORAL at 05:59

## 2018-01-25 RX ADMIN — PANTOPRAZOLE SODIUM 40 MILLIGRAM(S): 20 TABLET, DELAYED RELEASE ORAL at 12:13

## 2018-01-25 RX ADMIN — ENOXAPARIN SODIUM 40 MILLIGRAM(S): 100 INJECTION SUBCUTANEOUS at 17:10

## 2018-01-25 RX ADMIN — Medication 100 MILLIGRAM(S): at 15:46

## 2018-01-25 RX ADMIN — Medication 4 MILLIGRAM(S): at 17:10

## 2018-01-25 RX ADMIN — Medication 130 MILLIGRAM(S): at 22:07

## 2018-01-25 RX ADMIN — Medication 4 MILLIGRAM(S): at 12:13

## 2018-01-25 NOTE — PROGRESS NOTE ADULT - PROBLEM SELECTOR PLAN 1
1 Out of bed   2 continue decadron   3 continue dilantin   4 continue lisinopril   5 continue statin   6 no mri needed   7 dvt ppx sql scds   8 regular diet   9 continue protonix   10 stool softeners   11 Dr. Yin to decide on plan

## 2018-01-25 NOTE — PROGRESS NOTE ADULT - SUBJECTIVE AND OBJECTIVE BOX
Patient is a 77y old  Female who presents with a chief complaint of s/p seizure, newly diagnosed brain mets, h/o breast CA (23 Jan 2018 18:40)      SUBJECTIVE / OVERNIGHT EVENTS: No new complaints.     MEDICATIONS  (STANDING):  dexamethasone     Tablet 4 milliGRAM(s) Oral every 6 hours  dextrose 5%. 1000 milliLiter(s) (50 mL/Hr) IV Continuous <Continuous>  dextrose 50% Injectable 12.5 Gram(s) IV Push once  dextrose 50% Injectable 25 Gram(s) IV Push once  dextrose 50% Injectable 25 Gram(s) IV Push once  docusate sodium 100 milliGRAM(s) Oral three times a day  insulin lispro (HumaLOG) corrective regimen sliding scale   SubCutaneous three times a day before meals  insulin lispro (HumaLOG) corrective regimen sliding scale   SubCutaneous at bedtime  lisinopril 40 milliGRAM(s) Oral daily  pantoprazole    Tablet 40 milliGRAM(s) Oral daily  phenytoin   Capsule 100 milliGRAM(s) Oral three times a day  simvastatin 10 milliGRAM(s) Oral at bedtime    MEDICATIONS  (PRN):  acetaminophen   Tablet 650 milliGRAM(s) Oral every 6 hours PRN For Temp greater than 38 C (100.4 F)  acetaminophen   Tablet. 650 milliGRAM(s) Oral every 6 hours PRN Mild Pain (1 - 3)  dextrose Gel 1 Dose(s) Oral once PRN Blood Glucose LESS THAN 70 milliGRAM(s)/deciliter  glucagon  Injectable 1 milliGRAM(s) IntraMuscular once PRN Glucose LESS THAN 70 milligrams/deciliter  ondansetron   Disintegrating Tablet 4 milliGRAM(s) Oral every 6 hours PRN Nausea  senna 2 Tablet(s) Oral at bedtime PRN Constipation      Vital Signs Last 24 Hrs  T(C): 36.2 (25 Jan 2018 07:51), Max: 36.6 (24 Jan 2018 15:39)  T(F): 97.1 (25 Jan 2018 07:51), Max: 97.9 (25 Jan 2018 05:07)  HR: 97 (25 Jan 2018 07:51) (95 - 108)  BP: 109/69 (25 Jan 2018 07:51) (93/55 - 109/71)  BP(mean): --  RR: 18 (25 Jan 2018 07:51) (18 - 18)  SpO2: 98% (25 Jan 2018 07:51) (95% - 98%)  CAPILLARY BLOOD GLUCOSE      POCT Blood Glucose.: 101 mg/dL (25 Jan 2018 08:25)  POCT Blood Glucose.: 162 mg/dL (24 Jan 2018 21:55)  POCT Blood Glucose.: 79 mg/dL (24 Jan 2018 17:34)  POCT Blood Glucose.: 136 mg/dL (24 Jan 2018 15:14)    I&O's Summary    24 Jan 2018 07:01  -  25 Jan 2018 07:00  --------------------------------------------------------  IN: 600 mL / OUT: 0 mL / NET: 600 mL    25 Jan 2018 07:01  -  25 Jan 2018 11:35  --------------------------------------------------------  IN: 320 mL / OUT: 0 mL / NET: 320 mL        PHYSICAL EXAM:  GENERAL: NAD, well-developed  HEAD:  Atraumatic, Normocephalic  EYES: EOMI, PERRLA, conjunctiva and sclera clear  NECK: Supple, No JVD  CHEST/LUNG: Clear to auscultation bilaterally; No wheeze. S/P L mastectomy, surgical site c/d/i   HEART: Regular rate and rhythm; No murmurs, rubs, or gallops  ABDOMEN: Soft, Nontender, Nondistended; Bowel sounds present  EXTREMITIES:  2+ Peripheral Pulses, No clubbing, cyanosis, or edema  PSYCH: AAOx3  NEUROLOGY: CN's intact, left sided weakness   SKIN: No rashes or lesions    LABS:             Potassium 4.2           RADIOLOGY & ADDITIONAL TESTS:    Imaging Personally Reviewed:  EKG ordered 1/24     Consultant(s) Notes Reviewed:      Care Discussed with Consultants/Other Providers: Spoke with neurosurgery, plan for radiation to brain Patient is a 77y old  Female who presents with a chief complaint of s/p seizure, newly diagnosed brain mets, h/o breast CA (23 Jan 2018 18:40)      SUBJECTIVE / OVERNIGHT EVENTS: No new complaints.     MEDICATIONS  (STANDING):  dexamethasone     Tablet 4 milliGRAM(s) Oral every 6 hours  dextrose 5%. 1000 milliLiter(s) (50 mL/Hr) IV Continuous <Continuous>  dextrose 50% Injectable 12.5 Gram(s) IV Push once  dextrose 50% Injectable 25 Gram(s) IV Push once  dextrose 50% Injectable 25 Gram(s) IV Push once  docusate sodium 100 milliGRAM(s) Oral three times a day  insulin lispro (HumaLOG) corrective regimen sliding scale   SubCutaneous three times a day before meals  insulin lispro (HumaLOG) corrective regimen sliding scale   SubCutaneous at bedtime  lisinopril 40 milliGRAM(s) Oral daily  pantoprazole    Tablet 40 milliGRAM(s) Oral daily  phenytoin   Capsule 100 milliGRAM(s) Oral three times a day  simvastatin 10 milliGRAM(s) Oral at bedtime    MEDICATIONS  (PRN):  acetaminophen   Tablet 650 milliGRAM(s) Oral every 6 hours PRN For Temp greater than 38 C (100.4 F)  acetaminophen   Tablet. 650 milliGRAM(s) Oral every 6 hours PRN Mild Pain (1 - 3)  dextrose Gel 1 Dose(s) Oral once PRN Blood Glucose LESS THAN 70 milliGRAM(s)/deciliter  glucagon  Injectable 1 milliGRAM(s) IntraMuscular once PRN Glucose LESS THAN 70 milligrams/deciliter  ondansetron   Disintegrating Tablet 4 milliGRAM(s) Oral every 6 hours PRN Nausea  senna 2 Tablet(s) Oral at bedtime PRN Constipation      Vital Signs Last 24 Hrs  T(C): 36.2 (25 Jan 2018 07:51), Max: 36.6 (24 Jan 2018 15:39)  T(F): 97.1 (25 Jan 2018 07:51), Max: 97.9 (25 Jan 2018 05:07)  HR: 97 (25 Jan 2018 07:51) (95 - 108)  BP: 109/69 (25 Jan 2018 07:51) (93/55 - 109/71)  BP(mean): --  RR: 18 (25 Jan 2018 07:51) (18 - 18)  SpO2: 98% (25 Jan 2018 07:51) (95% - 98%)  CAPILLARY BLOOD GLUCOSE      POCT Blood Glucose.: 101 mg/dL (25 Jan 2018 08:25)  POCT Blood Glucose.: 162 mg/dL (24 Jan 2018 21:55)  POCT Blood Glucose.: 79 mg/dL (24 Jan 2018 17:34)  POCT Blood Glucose.: 136 mg/dL (24 Jan 2018 15:14)    I&O's Summary    24 Jan 2018 07:01  -  25 Jan 2018 07:00  --------------------------------------------------------  IN: 600 mL / OUT: 0 mL / NET: 600 mL    25 Jan 2018 07:01  -  25 Jan 2018 11:35  --------------------------------------------------------  IN: 320 mL / OUT: 0 mL / NET: 320 mL        PHYSICAL EXAM:  GENERAL: NAD, well-developed  HEAD:  Atraumatic, Normocephalic  EYES: EOMI, PERRLA, conjunctiva and sclera clear  NECK: Supple, No JVD  CHEST/LUNG: Clear to auscultation bilaterally; No wheeze. S/P L mastectomy, staples in place- site c/d/i   HEART: Regular rate and rhythm; No murmurs, rubs, or gallops  ABDOMEN: Soft, Nontender, Nondistended; Bowel sounds present  EXTREMITIES:  2+ Peripheral Pulses, No clubbing, cyanosis, or edema  PSYCH: AAOx3  NEUROLOGY: CN's intact, left sided weakness   SKIN: No rashes or lesions    LABS:             Potassium 4.2           RADIOLOGY & ADDITIONAL TESTS:    Imaging Personally Reviewed:  EKG ordered 1/24     Consultant(s) Notes Reviewed:      Care Discussed with Consultants/Other Providers: Spoke with neurosurgery, plan for radiation to brain

## 2018-01-25 NOTE — PROGRESS NOTE ADULT - PROBLEM SELECTOR PLAN 1
-likely metastatic breast CA with multiple brain mets  -CT C/A/P negative for other malignancy   -plan for radiation therapy   -continue Decadron   -continue Dilantin  -PT

## 2018-01-25 NOTE — PROGRESS NOTE ADULT - SUBJECTIVE AND OBJECTIVE BOX
SUBJECTIVE:  Patient was seen and evaluated at bedside. Patient is resting comfortably in bed and is in no new acute distress.     OVERNIGHT EVENTS: none     Vital Signs Last 24 Hrs  T(C): 36.2 (25 Jan 2018 07:51), Max: 36.6 (24 Jan 2018 15:39)  T(F): 97.1 (25 Jan 2018 07:51), Max: 97.9 (25 Jan 2018 05:07)  HR: 97 (25 Jan 2018 07:51) (95 - 108)  BP: 109/69 (25 Jan 2018 07:51) (93/55 - 109/71)  BP(mean): --  RR: 18 (25 Jan 2018 07:51) (18 - 18)  SpO2: 98% (25 Jan 2018 07:51) (95% - 98%)    PHYSICAL EXAM:    General: No Acute Distress     Neurological: Awake, alert oriented to person, place and time, Following Commands, PERRL, EOMI, Face Symmetrical, Speech Fluent, Moving all extremities, left upper 4/5 , lle 4/5 proximal and 0/5 df/pf.   Pulmonary: Clear to Auscultation, No Rales, No Rhonchi, No Wheezes     Cardiovascular: S1, S2, Regular Rate and Rhythm     Gastrointestinal: Soft, Nontender, Nondistended     Extremities: No calf tenderness     Incision:     LABS:                        11.7   10.66 )-----------( 501      ( 24 Jan 2018 07:42 )             35.2    01-24    x   |  x   |  x   ----------------------------<  x   4.2   |  x   |  x     Ca    8.5      24 Jan 2018 07:27    TPro  6.8  /  Alb  3.7  /  TBili  0.2  /  DBili  x   /  AST  22  /  ALT  30  /  AlkPhos  59  01-24  PT/INR - ( 24 Jan 2018 07:42 )   PT: 11.1 sec;   INR: 0.98 ratio         PTT - ( 24 Jan 2018 07:42 )  PTT:22.9 sec  Hemoglobin A1C, Whole Blood: 5.7 % (01-17 @ 10:02)      01-24 @ 07:01  -  01-25 @ 07:00  --------------------------------------------------------  IN: 600 mL / OUT: 0 mL / NET: 600 mL    01-25 @ 07:01  - 01-25 @ 12:45  --------------------------------------------------------  IN: 320 mL / OUT: 0 mL / NET: 320 mL      DRAINS:     MEDICATIONS:  Antibiotics:    Neuro:  acetaminophen   Tablet 650 milliGRAM(s) Oral every 6 hours PRN For Temp greater than 38 C (100.4 F)  acetaminophen   Tablet. 650 milliGRAM(s) Oral every 6 hours PRN Mild Pain (1 - 3)  ondansetron   Disintegrating Tablet 4 milliGRAM(s) Oral every 6 hours PRN Nausea  phenytoin   Capsule 100 milliGRAM(s) Oral three times a day    Cardiac:  lisinopril 40 milliGRAM(s) Oral daily    Pulm:    GI/:  docusate sodium 100 milliGRAM(s) Oral three times a day  pantoprazole    Tablet 40 milliGRAM(s) Oral daily  senna 2 Tablet(s) Oral at bedtime PRN Constipation    Other:   dexamethasone     Tablet 4 milliGRAM(s) Oral every 6 hours  dextrose 5%. 1000 milliLiter(s) IV Continuous <Continuous>  dextrose 50% Injectable 12.5 Gram(s) IV Push once  dextrose 50% Injectable 25 Gram(s) IV Push once  dextrose 50% Injectable 25 Gram(s) IV Push once  dextrose Gel 1 Dose(s) Oral once PRN Blood Glucose LESS THAN 70 milliGRAM(s)/deciliter  glucagon  Injectable 1 milliGRAM(s) IntraMuscular once PRN Glucose LESS THAN 70 milligrams/deciliter  insulin lispro (HumaLOG) corrective regimen sliding scale   SubCutaneous three times a day before meals  insulin lispro (HumaLOG) corrective regimen sliding scale   SubCutaneous at bedtime  simvastatin 10 milliGRAM(s) Oral at bedtime    DIET: [] Regular [] CCD [] Renal [] Puree [] Dysphagia [] Tube Feeds:  regular     IMAGING: no new imaging

## 2018-01-25 NOTE — PROGRESS NOTE ADULT - ASSESSMENT
HPI:  Patient is a 78 y/o F pt with PMHx of Breast CA Dx Nov 2017 (s/p resection of L breast x2 week ago at [facility]) with recent negative PET scan and BIB EMS to [facility] ED s/p 30 min seizure (L facial twitch) at home while seated, witnessed by , with associated L-sided facial droop since 5pm on 1/16. She was found to have multiple brain lesions. Heme onc consulted, reported the lesions were not classic for breast CA mets, possible GBM. The patient was transferred to [facility] for biopsy versus resection. Patient denies fever, chills, SOB, palpitations, chest pain, nausea, vomiting, diarrhea or constipation. (23 Jan 2018 18:40)

## 2018-01-25 NOTE — PROGRESS NOTE ADULT - PROBLEM SELECTOR PLAN 3
-recently diagnosed breast CA s/p recent L mastectomy 2 weeks ago  -recent PET scan negative.  -needs an oncologist as outpatient -recently diagnosed breast CA s/p recent L mastectomy 2 weeks ago  -need to clarify when staples should be removed   -needs an oncologist as outpatient

## 2018-01-25 NOTE — PROGRESS NOTE ADULT - ASSESSMENT
77F h/o HTN, HLD, breast CA diagnosed Nov 2017 s/p L mastectomy 2 weeks ago at [facility] and with negative PET scan, admitted to [facility] 1/16/18 after seizure, found to have multiple brain lesion

## 2018-01-25 NOTE — PHYSICAL THERAPY INITIAL EVALUATION ADULT - ADDITIONAL COMMENTS
lives in private house with 2 steps without rail and then 4 steps with bilateral rails (far apart) to enter, 1 flight inside with 1 rail, right hand dominant

## 2018-01-25 NOTE — PHYSICAL THERAPY INITIAL EVALUATION ADULT - PRECAUTIONS/LIMITATIONS, REHAB EVAL
fall precautions repeat brain MRI on 1/24/18 at [facility]: R frontal parietal dural based mass with surrounding edema/fall precautions

## 2018-01-26 ENCOUNTER — RESULT REVIEW (OUTPATIENT)
Age: 78
End: 2018-01-26

## 2018-01-26 ENCOUNTER — APPOINTMENT (OUTPATIENT)
Dept: NEUROSURGERY | Facility: HOSPITAL | Age: 78
End: 2018-01-26

## 2018-01-26 ENCOUNTER — TRANSCRIPTION ENCOUNTER (OUTPATIENT)
Age: 78
End: 2018-01-26

## 2018-01-26 LAB
ANION GAP SERPL CALC-SCNC: 11 MMOL/L — SIGNIFICANT CHANGE UP (ref 5–17)
ANION GAP SERPL CALC-SCNC: 14 MMOL/L — SIGNIFICANT CHANGE UP (ref 5–17)
BASOPHILS # BLD AUTO: 0.04 K/UL — SIGNIFICANT CHANGE UP (ref 0–0.2)
BASOPHILS NFR BLD AUTO: 0.3 % — SIGNIFICANT CHANGE UP (ref 0–2)
BLD GP AB SCN SERPL QL: NEGATIVE — SIGNIFICANT CHANGE UP
BUN SERPL-MCNC: 13 MG/DL — SIGNIFICANT CHANGE UP (ref 7–23)
BUN SERPL-MCNC: 22 MG/DL — SIGNIFICANT CHANGE UP (ref 7–23)
CALCIUM SERPL-MCNC: 8.3 MG/DL — LOW (ref 8.4–10.5)
CALCIUM SERPL-MCNC: 9 MG/DL — SIGNIFICANT CHANGE UP (ref 8.4–10.5)
CHLORIDE SERPL-SCNC: 101 MMOL/L — SIGNIFICANT CHANGE UP (ref 96–108)
CHLORIDE SERPL-SCNC: 105 MMOL/L — SIGNIFICANT CHANGE UP (ref 96–108)
CO2 SERPL-SCNC: 23 MMOL/L — SIGNIFICANT CHANGE UP (ref 22–31)
CO2 SERPL-SCNC: 26 MMOL/L — SIGNIFICANT CHANGE UP (ref 22–31)
CREAT SERPL-MCNC: 0.45 MG/DL — LOW (ref 0.5–1.3)
CREAT SERPL-MCNC: 0.53 MG/DL — SIGNIFICANT CHANGE UP (ref 0.5–1.3)
EOSINOPHIL # BLD AUTO: 0.22 K/UL — SIGNIFICANT CHANGE UP (ref 0–0.5)
EOSINOPHIL NFR BLD AUTO: 1.7 % — SIGNIFICANT CHANGE UP (ref 0–6)
GLUCOSE BLDC GLUCOMTR-MCNC: 111 MG/DL — HIGH (ref 70–99)
GLUCOSE BLDC GLUCOMTR-MCNC: 96 MG/DL — SIGNIFICANT CHANGE UP (ref 70–99)
GLUCOSE SERPL-MCNC: 150 MG/DL — HIGH (ref 70–99)
GLUCOSE SERPL-MCNC: 90 MG/DL — SIGNIFICANT CHANGE UP (ref 70–99)
HCT VFR BLD CALC: 36 % — SIGNIFICANT CHANGE UP (ref 34.5–45)
HCT VFR BLD CALC: 36.4 % — SIGNIFICANT CHANGE UP (ref 34.5–45)
HGB BLD-MCNC: 12.1 G/DL — SIGNIFICANT CHANGE UP (ref 11.5–15.5)
HGB BLD-MCNC: 12.4 G/DL — SIGNIFICANT CHANGE UP (ref 11.5–15.5)
IMM GRANULOCYTES NFR BLD AUTO: 0.2 % — SIGNIFICANT CHANGE UP (ref 0–1.5)
LYMPHOCYTES # BLD AUTO: 1.56 K/UL — SIGNIFICANT CHANGE UP (ref 1–3.3)
LYMPHOCYTES # BLD AUTO: 12.2 % — LOW (ref 13–44)
MAGNESIUM SERPL-MCNC: 2.2 MG/DL — SIGNIFICANT CHANGE UP (ref 1.6–2.6)
MCHC RBC-ENTMCNC: 29.2 PG — SIGNIFICANT CHANGE UP (ref 27–34)
MCHC RBC-ENTMCNC: 32.1 PG — SIGNIFICANT CHANGE UP (ref 27–34)
MCHC RBC-ENTMCNC: 33.2 GM/DL — SIGNIFICANT CHANGE UP (ref 32–36)
MCHC RBC-ENTMCNC: 34.6 GM/DL — SIGNIFICANT CHANGE UP (ref 32–36)
MCV RBC AUTO: 87.7 FL — SIGNIFICANT CHANGE UP (ref 80–100)
MCV RBC AUTO: 92.9 FL — SIGNIFICANT CHANGE UP (ref 80–100)
MONOCYTES # BLD AUTO: 1.51 K/UL — HIGH (ref 0–0.9)
MONOCYTES NFR BLD AUTO: 11.8 % — SIGNIFICANT CHANGE UP (ref 2–14)
NEUTROPHILS # BLD AUTO: 9.43 K/UL — HIGH (ref 1.8–7.4)
NEUTROPHILS NFR BLD AUTO: 73.8 % — SIGNIFICANT CHANGE UP (ref 43–77)
PHOSPHATE SERPL-MCNC: 4 MG/DL — SIGNIFICANT CHANGE UP (ref 2.5–4.5)
PLATELET # BLD AUTO: 326 K/UL — SIGNIFICANT CHANGE UP (ref 150–400)
PLATELET # BLD AUTO: 517 K/UL — HIGH (ref 150–400)
POTASSIUM SERPL-MCNC: 4.2 MMOL/L — SIGNIFICANT CHANGE UP (ref 3.5–5.3)
POTASSIUM SERPL-MCNC: 4.3 MMOL/L — SIGNIFICANT CHANGE UP (ref 3.5–5.3)
POTASSIUM SERPL-SCNC: 4.2 MMOL/L — SIGNIFICANT CHANGE UP (ref 3.5–5.3)
POTASSIUM SERPL-SCNC: 4.3 MMOL/L — SIGNIFICANT CHANGE UP (ref 3.5–5.3)
RBC # BLD: 3.87 M/UL — SIGNIFICANT CHANGE UP (ref 3.8–5.2)
RBC # BLD: 4.15 M/UL — SIGNIFICANT CHANGE UP (ref 3.8–5.2)
RBC # FLD: 12.4 % — SIGNIFICANT CHANGE UP (ref 10.3–14.5)
RBC # FLD: 14.1 % — SIGNIFICANT CHANGE UP (ref 10.3–14.5)
RH IG SCN BLD-IMP: NEGATIVE — SIGNIFICANT CHANGE UP
SODIUM SERPL-SCNC: 139 MMOL/L — SIGNIFICANT CHANGE UP (ref 135–145)
SODIUM SERPL-SCNC: 141 MMOL/L — SIGNIFICANT CHANGE UP (ref 135–145)
WBC # BLD: 12.79 K/UL — HIGH (ref 3.8–10.5)
WBC # BLD: 20.7 K/UL — HIGH (ref 3.8–10.5)
WBC # FLD AUTO: 12.79 K/UL — HIGH (ref 3.8–10.5)
WBC # FLD AUTO: 20.7 K/UL — HIGH (ref 3.8–10.5)

## 2018-01-26 PROCEDURE — 88334 PATH CONSLTJ SURG CYTO XM EA: CPT | Mod: 26,59

## 2018-01-26 PROCEDURE — 70551 MRI BRAIN STEM W/O DYE: CPT | Mod: 26

## 2018-01-26 PROCEDURE — 88331 PATH CONSLTJ SURG 1 BLK 1SPC: CPT | Mod: 26

## 2018-01-26 PROCEDURE — 61781 SCAN PROC CRANIAL INTRA: CPT

## 2018-01-26 PROCEDURE — 99233 SBSQ HOSP IP/OBS HIGH 50: CPT

## 2018-01-26 PROCEDURE — 95961 ELECTRODE STIMULATION BRAIN: CPT

## 2018-01-26 PROCEDURE — 88307 TISSUE EXAM BY PATHOLOGIST: CPT | Mod: 26

## 2018-01-26 PROCEDURE — 88360 TUMOR IMMUNOHISTOCHEM/MANUAL: CPT | Mod: 26

## 2018-01-26 PROCEDURE — 88341 IMHCHEM/IMCYTCHM EA ADD ANTB: CPT | Mod: 26,59

## 2018-01-26 PROCEDURE — 88342 IMHCHEM/IMCYTCHM 1ST ANTB: CPT | Mod: 26,59

## 2018-01-26 PROCEDURE — 61510 CRNEC TREPH EXC BRN TUM STTL: CPT

## 2018-01-26 RX ORDER — LEVETIRACETAM 250 MG/1
500 TABLET, FILM COATED ORAL
Qty: 0 | Refills: 0 | Status: DISCONTINUED | OUTPATIENT
Start: 2018-01-26 | End: 2018-01-29

## 2018-01-26 RX ORDER — INSULIN LISPRO 100/ML
VIAL (ML) SUBCUTANEOUS AT BEDTIME
Qty: 0 | Refills: 0 | Status: DISCONTINUED | OUTPATIENT
Start: 2018-01-26 | End: 2018-01-31

## 2018-01-26 RX ORDER — NAFCILLIN 10 G/100ML
1 INJECTION, POWDER, FOR SOLUTION INTRAVENOUS EVERY 4 HOURS
Qty: 0 | Refills: 0 | Status: COMPLETED | OUTPATIENT
Start: 2018-01-26 | End: 2018-01-27

## 2018-01-26 RX ORDER — ONDANSETRON 8 MG/1
8 TABLET, FILM COATED ORAL EVERY 8 HOURS
Qty: 0 | Refills: 0 | Status: DISCONTINUED | OUTPATIENT
Start: 2018-01-26 | End: 2018-01-31

## 2018-01-26 RX ORDER — NAFCILLIN 10 G/100ML
1 INJECTION, POWDER, FOR SOLUTION INTRAVENOUS ONCE
Qty: 0 | Refills: 0 | Status: COMPLETED | OUTPATIENT
Start: 2018-01-26 | End: 2018-01-26

## 2018-01-26 RX ORDER — DEXAMETHASONE 0.5 MG/5ML
4 ELIXIR ORAL EVERY 6 HOURS
Qty: 0 | Refills: 0 | Status: DISCONTINUED | OUTPATIENT
Start: 2018-01-26 | End: 2018-01-28

## 2018-01-26 RX ORDER — SODIUM CHLORIDE 9 MG/ML
1000 INJECTION INTRAMUSCULAR; INTRAVENOUS; SUBCUTANEOUS
Qty: 0 | Refills: 0 | Status: DISCONTINUED | OUTPATIENT
Start: 2018-01-26 | End: 2018-01-27

## 2018-01-26 RX ORDER — OXYCODONE HYDROCHLORIDE 5 MG/1
5 TABLET ORAL EVERY 4 HOURS
Qty: 0 | Refills: 0 | Status: DISCONTINUED | OUTPATIENT
Start: 2018-01-26 | End: 2018-01-31

## 2018-01-26 RX ORDER — SIMVASTATIN 20 MG/1
10 TABLET, FILM COATED ORAL AT BEDTIME
Qty: 0 | Refills: 0 | Status: DISCONTINUED | OUTPATIENT
Start: 2018-01-26 | End: 2018-01-31

## 2018-01-26 RX ORDER — DEXTROSE 50 % IN WATER 50 %
12.5 SYRINGE (ML) INTRAVENOUS ONCE
Qty: 0 | Refills: 0 | Status: DISCONTINUED | OUTPATIENT
Start: 2018-01-26 | End: 2018-01-31

## 2018-01-26 RX ORDER — INSULIN LISPRO 100/ML
VIAL (ML) SUBCUTANEOUS
Qty: 0 | Refills: 0 | Status: DISCONTINUED | OUTPATIENT
Start: 2018-01-26 | End: 2018-01-31

## 2018-01-26 RX ORDER — DEXTROSE 50 % IN WATER 50 %
25 SYRINGE (ML) INTRAVENOUS ONCE
Qty: 0 | Refills: 0 | Status: DISCONTINUED | OUTPATIENT
Start: 2018-01-26 | End: 2018-01-31

## 2018-01-26 RX ORDER — GLUCAGON INJECTION, SOLUTION 0.5 MG/.1ML
1 INJECTION, SOLUTION SUBCUTANEOUS ONCE
Qty: 0 | Refills: 0 | Status: DISCONTINUED | OUTPATIENT
Start: 2018-01-26 | End: 2018-01-31

## 2018-01-26 RX ORDER — ACETAMINOPHEN 500 MG
650 TABLET ORAL EVERY 6 HOURS
Qty: 0 | Refills: 0 | Status: DISCONTINUED | OUTPATIENT
Start: 2018-01-26 | End: 2018-01-31

## 2018-01-26 RX ORDER — NAFCILLIN 10 G/100ML
INJECTION, POWDER, FOR SOLUTION INTRAVENOUS
Qty: 0 | Refills: 0 | Status: COMPLETED | OUTPATIENT
Start: 2018-01-26 | End: 2018-01-27

## 2018-01-26 RX ORDER — PANTOPRAZOLE SODIUM 20 MG/1
40 TABLET, DELAYED RELEASE ORAL DAILY
Qty: 0 | Refills: 0 | Status: DISCONTINUED | OUTPATIENT
Start: 2018-01-26 | End: 2018-01-31

## 2018-01-26 RX ORDER — DOCUSATE SODIUM 100 MG
100 CAPSULE ORAL THREE TIMES A DAY
Qty: 0 | Refills: 0 | Status: DISCONTINUED | OUTPATIENT
Start: 2018-01-26 | End: 2018-01-31

## 2018-01-26 RX ORDER — ACETAMINOPHEN 500 MG
1000 TABLET ORAL ONCE
Qty: 0 | Refills: 0 | Status: COMPLETED | OUTPATIENT
Start: 2018-01-26 | End: 2018-01-26

## 2018-01-26 RX ORDER — LISINOPRIL 2.5 MG/1
40 TABLET ORAL DAILY
Qty: 0 | Refills: 0 | Status: DISCONTINUED | OUTPATIENT
Start: 2018-01-26 | End: 2018-01-31

## 2018-01-26 RX ORDER — SENNA PLUS 8.6 MG/1
2 TABLET ORAL AT BEDTIME
Qty: 0 | Refills: 0 | Status: DISCONTINUED | OUTPATIENT
Start: 2018-01-26 | End: 2018-01-31

## 2018-01-26 RX ADMIN — SODIUM CHLORIDE 100 MILLILITER(S): 9 INJECTION INTRAMUSCULAR; INTRAVENOUS; SUBCUTANEOUS at 20:52

## 2018-01-26 RX ADMIN — Medication 4 MILLIGRAM(S): at 23:33

## 2018-01-26 RX ADMIN — Medication 400 MILLIGRAM(S): at 23:00

## 2018-01-26 RX ADMIN — LISINOPRIL 40 MILLIGRAM(S): 2.5 TABLET ORAL at 05:39

## 2018-01-26 RX ADMIN — Medication 100 MILLIGRAM(S): at 05:39

## 2018-01-26 RX ADMIN — Medication 100 MILLIGRAM(S): at 21:21

## 2018-01-26 RX ADMIN — SODIUM CHLORIDE 60 MILLILITER(S): 9 INJECTION INTRAMUSCULAR; INTRAVENOUS; SUBCUTANEOUS at 05:40

## 2018-01-26 RX ADMIN — Medication 4 MILLIGRAM(S): at 05:39

## 2018-01-26 RX ADMIN — LEVETIRACETAM 500 MILLIGRAM(S): 250 TABLET, FILM COATED ORAL at 22:43

## 2018-01-26 RX ADMIN — OXYCODONE HYDROCHLORIDE 5 MILLIGRAM(S): 5 TABLET ORAL at 23:30

## 2018-01-26 RX ADMIN — Medication 100 MILLIGRAM(S): at 06:52

## 2018-01-26 RX ADMIN — NAFCILLIN 100 GRAM(S): 10 INJECTION, POWDER, FOR SOLUTION INTRAVENOUS at 22:44

## 2018-01-26 RX ADMIN — Medication 1000 MILLIGRAM(S): at 23:30

## 2018-01-26 RX ADMIN — NAFCILLIN 100 GRAM(S): 10 INJECTION, POWDER, FOR SOLUTION INTRAVENOUS at 19:34

## 2018-01-26 NOTE — PROGRESS NOTE ADULT - PROBLEM SELECTOR PLAN 1
-likely metastasis from breast CA vs primary CNS malignancy   -CT C/A/P negative for malignancy   -continue Decadron   -continue Dilantin  -plan for biopsy and subtotal resection today

## 2018-01-26 NOTE — CONSULT NOTE ADULT - SUBJECTIVE AND OBJECTIVE BOX
CC-     HPI:  78 y/o F with PMH as below including breast CA diagnosed in 11/2017 (s/p resection of L breast x2 week ago at [facility]) with recent negative PET scan presented to [facility] with seizures (left facial twitch) with subsequent left sided facial droop on 1/16, MRI (1/17) showed Multiple enhancing lesions in the posterior right frontal and parietal  lobe, unsure of  brain mets vs GBM and transferred to [facility] for further evaluation on 1/23.     Case discussed at tumor board and initially planned for fractionated stereotactic radiosurgery vs gamma knife for multiple mets to brain, however after further imaginf review and discussion with family - is currently planned for tumor resection.  On decadron for cerebral edema and dilantin for seizure ppx. Notable left focal seizure on 1/25 (dilantin level subtherapeutic).      REVIEW OF SYSTEMS  Constitutional - No fever, No weight loss, No fatigue  HEENT - No eye pain, No visual disturbances, No difficulty hearing, No tinnitus, No vertigo, No neck pain  Respiratory - No cough, No wheezing, No shortness of breath  Cardiovascular - No chest pain, No palpitations  Gastrointestinal - No abdominal pain, No nausea, No vomiting, No diarrhea, No constipation  Genitourinary - No dysuria, No frequency, No hematuria, No incontinence  Neurological - No headaches, No memory loss, No loss of strength, No numbness, No tremors  Skin - No itching, No rashes, No lesions   Endocrine - No temperature intolerance  Musculoskeletal - No joint pain, No joint swelling, No muscle pain  Psychiatric - No depression, No anxiety    PAST MEDICAL & SURGICAL HISTORY  Breast cancer  H/O breast surgery      SOCIAL HISTORY  Smoking - Denied  EtOH - Denied   Drugs - Denied    FUNCTIONAL HISTORY  Lives   Independent    CURRENT FUNCTIONAL STATUS  1/26  Bed mobility - min-mod assist  Sit-stand - mod assist  Ambulation max assist 1step     FAMILY HISTORY   No pertinent family history in first degree relatives      RECENT LABS/IMAGING  CBC Full  -  ( 26 Jan 2018 07:33 )  WBC Count : 12.79 K/uL  Hemoglobin : 12.1 g/dL  Hematocrit : 36.4 %  Platelet Count - Automated : 517 K/uL    01-26    141  |  101  |  22  ----------------------------<  90  4.2   |  26  |  0.53    Ca    9.0      26 Jan 2018 07:33          VITALS  T(C): 36.6 (01-26-18 @ 11:31), Max: 36.8 (01-25-18 @ 12:47)  HR: 96 (01-26-18 @ 11:31) (91 - 106)  BP: 94/62 (01-26-18 @ 11:31) (94/62 - 116/64)  RR: 18 (01-26-18 @ 11:31) (18 - 18)  SpO2: 98% (01-26-18 @ 11:31) (95% - 99%)  Wt(kg): --    ALLERGIES  No Known Allergies      MEDICATIONS   acetaminophen   Tablet 650 milliGRAM(s) Oral every 6 hours PRN  acetaminophen   Tablet. 650 milliGRAM(s) Oral every 6 hours PRN  dexamethasone     Tablet 4 milliGRAM(s) Oral every 6 hours  dextrose 5%. 1000 milliLiter(s) IV Continuous <Continuous>  dextrose 50% Injectable 12.5 Gram(s) IV Push once  dextrose 50% Injectable 25 Gram(s) IV Push once  dextrose 50% Injectable 25 Gram(s) IV Push once  dextrose Gel 1 Dose(s) Oral once PRN  docusate sodium 100 milliGRAM(s) Oral three times a day  glucagon  Injectable 1 milliGRAM(s) IntraMuscular once PRN  insulin lispro (HumaLOG) corrective regimen sliding scale   SubCutaneous three times a day before meals  insulin lispro (HumaLOG) corrective regimen sliding scale   SubCutaneous at bedtime  lisinopril 40 milliGRAM(s) Oral daily  ondansetron   Disintegrating Tablet 4 milliGRAM(s) Oral every 6 hours PRN  pantoprazole    Tablet 40 milliGRAM(s) Oral daily  phenytoin   Capsule 100 milliGRAM(s) Oral <User Schedule>  phenytoin   Capsule 130 milliGRAM(s) Oral at bedtime  senna 2 Tablet(s) Oral at bedtime PRN  simvastatin 10 milliGRAM(s) Oral at bedtime  sodium chloride 0.9%. 1000 milliLiter(s) IV Continuous <Continuous>      ----------------------------------------------------------------------------------------  PHYSICAL EXAM  Constitutional - NAD, Comfortable  HEENT - NCAT, EOMI  Neck - Supple, No limited ROM  Chest - CTA bilaterally, No wheeze, No rhonchi, No crackles  Cardiovascular - RRR, S1S2, No murmurs  Abdomen - BS+, Soft, NTND  Extremities - No C/C/E, No calf tenderness   Neurologic Exam -                    Cognitive - Awake, Alert, AAO to self, place, date, year, situation     Communication - Fluent, No dysarthria     Cranial Nerves - CN 2-12 intact     Motor - No focal deficits                    LEFT    UE - ShAB 5/5, EF 5/5, EE 5/5, WE 5/5,  5/5                    RIGHT UE - ShAB 5/5, EF 5/5, EE 5/5, WE 5/5,  5/5                    LEFT    LE - HF 5/5, KE 5/5, DF 5/5, PF 5/5                    RIGHT LE - HF 5/5, KE 5/5, DF 5/5, PF 5/5        Sensory - Intact to LT     Reflexes - DTR Intact, No primitive reflexive     Coordination - FTN intact     OculoVestibular - No saccades, No nystagmus, VOR         Balance - WNL Static  Psychiatric - Mood stable, Affect WNL  ----------------------------------------------------------------------------------------  ASSESSMENT/PLAN - 77F with history of breast CA admitted with focal seizures, found to have brain lesions (mets vs primary) now planned for surgical resection.     Cerebral edema - decadron  Seizures - Dilantin   Pain - Tylenol   PT&OT - ROM, strengthening, ADL/gait/balance training with AD  Precautions - Fall, seizure  Dispo- CC- Seizure found to have brain lesions (mets vs primary)    HPI:  76 y/o RHD F with PMH as below including breast CA diagnosed in 11/2017 (s/p resection of L breast x2 week ago at [facility]) with recent negative PET scan presented to [facility] with seizures (left facial twitch) with subsequent left sided facial droop on 1/16, MRI (1/17) showed Multiple enhancing lesions in the posterior right frontal and parietal  lobe, unsure of  brain mets vs GBM and transferred to [facility] for further evaluation on 1/23.     Case discussed at tumor board and initially planned for fractionated stereotactic radiosurgery vs gamma knife for multiple mets to brain, however after further imaginf review and discussion with family - is currently planned for tumor resection.  On decadron for cerebral edema and dilantin for seizure ppx. Notable left focal seizure on 1/25 (dilantin level subtherapeutic).      REVIEW OF SYSTEMS  Constitutional - No fever, No weight loss, No fatigue  HEENT - No eye pain, No visual disturbances, No difficulty hearing, No tinnitus, No vertigo, No neck pain  Respiratory - No cough, No wheezing, No shortness of breath  Cardiovascular - No chest pain, No palpitations  Gastrointestinal - No abdominal pain, No nausea, No vomiting, No diarrhea, No constipation  Genitourinary - No dysuria, No frequency, No hematuria, No incontinence  Neurological - No headaches, No memory loss, No loss of strength, No numbness, No tremors  Skin - No itching, No rashes, No lesions   Endocrine - No temperature intolerance  Musculoskeletal - No joint pain, No joint swelling, No muscle pain  Psychiatric - No depression, No anxiety    PAST MEDICAL & SURGICAL HISTORY  Breast cancer  H/O breast surgery      SOCIAL HISTORY  Smoking - Denied  EtOH - Denied   Drugs - Denied    FUNCTIONAL HISTORY  Lives with family in private house with 2 ELLIOTT (no HR) + 4 steps (1 HR) and 1 flight inside (1HR)  Independent    CURRENT FUNCTIONAL STATUS  1/26  Bed mobility - min-mod assist  Sit-stand - mod assist  Ambulation max assist 1step     FAMILY HISTORY   No pertinent family history in first degree relatives      RECENT LABS/IMAGING  CBC Full  -  ( 26 Jan 2018 07:33 )  WBC Count : 12.79 K/uL  Hemoglobin : 12.1 g/dL  Hematocrit : 36.4 %  Platelet Count - Automated : 517 K/uL    01-26    141  |  101  |  22  ----------------------------<  90  4.2   |  26  |  0.53    Ca    9.0      26 Jan 2018 07:33          VITALS  T(C): 36.6 (01-26-18 @ 11:31), Max: 36.8 (01-25-18 @ 12:47)  HR: 96 (01-26-18 @ 11:31) (91 - 106)  BP: 94/62 (01-26-18 @ 11:31) (94/62 - 116/64)  RR: 18 (01-26-18 @ 11:31) (18 - 18)  SpO2: 98% (01-26-18 @ 11:31) (95% - 99%)  Wt(kg): --    ALLERGIES  No Known Allergies      MEDICATIONS   acetaminophen   Tablet 650 milliGRAM(s) Oral every 6 hours PRN  acetaminophen   Tablet. 650 milliGRAM(s) Oral every 6 hours PRN  dexamethasone     Tablet 4 milliGRAM(s) Oral every 6 hours  dextrose 5%. 1000 milliLiter(s) IV Continuous <Continuous>  dextrose 50% Injectable 12.5 Gram(s) IV Push once  dextrose 50% Injectable 25 Gram(s) IV Push once  dextrose 50% Injectable 25 Gram(s) IV Push once  dextrose Gel 1 Dose(s) Oral once PRN  docusate sodium 100 milliGRAM(s) Oral three times a day  glucagon  Injectable 1 milliGRAM(s) IntraMuscular once PRN  insulin lispro (HumaLOG) corrective regimen sliding scale   SubCutaneous three times a day before meals  insulin lispro (HumaLOG) corrective regimen sliding scale   SubCutaneous at bedtime  lisinopril 40 milliGRAM(s) Oral daily  ondansetron   Disintegrating Tablet 4 milliGRAM(s) Oral every 6 hours PRN  pantoprazole    Tablet 40 milliGRAM(s) Oral daily  phenytoin   Capsule 100 milliGRAM(s) Oral <User Schedule>  phenytoin   Capsule 130 milliGRAM(s) Oral at bedtime  senna 2 Tablet(s) Oral at bedtime PRN  simvastatin 10 milliGRAM(s) Oral at bedtime  sodium chloride 0.9%. 1000 milliLiter(s) IV Continuous <Continuous>      ----------------------------------------------------------------------------------------  PHYSICAL EXAM  Constitutional - NAD, Comfortable  HEENT - NCAT, EOMI  Neck - Supple, No limited ROM  Chest - CTA bilaterally, No wheeze, No rhonchi, No crackles  Cardiovascular - RRR, S1S2, No murmurs  Abdomen - BS+, Soft, NTND  Extremities - No C/C/E, No calf tenderness   Neurologic Exam -                    Cognitive - Awake, Alert, AAO to self, place, date, year, situation     Communication - Fluent, No dysarthria     Cranial Nerves - CN 2-12 intact     Motor - No focal deficits                    LEFT    UE - ShAB 5/5, EF 5/5, EE 5/5, WE 5/5,  5/5                    RIGHT UE - ShAB 5/5, EF 5/5, EE 5/5, WE 5/5,  5/5                    LEFT    LE - HF 5/5, KE 5/5, DF 5/5, PF 5/5                    RIGHT LE - HF 5/5, KE 5/5, DF 5/5, PF 5/5        Sensory - Intact to LT     Reflexes - DTR Intact, No primitive reflexive     Coordination - FTN intact     OculoVestibular - No saccades, No nystagmus, VOR         Balance - WNL Static  Psychiatric - Mood stable, Affect WNL  ----------------------------------------------------------------------------------------  ASSESSMENT/PLAN - 77F with history of breast CA admitted with focal seizures, found to have brain lesions (mets vs primary) now planned for surgical resection.     Cerebral edema - decadron  Seizures - Dilantin   Pain - Tylenol   PT&OT - ROM, strengthening, ADL/gait/balance training with AD  Precautions - Fall, seizure  Dispo-

## 2018-01-26 NOTE — BRIEF OPERATIVE NOTE - PROCEDURE
<<-----Click on this checkbox to enter Procedure Craniotomy and excision of neoplasm of brain  01/26/2018    Active  [id number]

## 2018-01-26 NOTE — PROGRESS NOTE ADULT - ASSESSMENT
Mrs. Rivas is a 77-year old right handed woman w/ recently dx breast ca, s/p mastectomy 2 weeks ago at an outside hospital. She presented to [facility] w/ a seizure and left foot drop. Workup showed a large right parafalcine lesion and two smaller right sided satellite lesions concerning for metastatic disease versus GBM. She is preop for the OR today for a resection of this tumor. She has been counseled about the possibility of a permanent left hemiparesis because this lesion is in the motor strip on the right.   -NPO  -Medically cleared  -Fluids  -OR

## 2018-01-26 NOTE — PROGRESS NOTE ADULT - SUBJECTIVE AND OBJECTIVE BOX
Preop Note    SUBJECTIVE:   Patient awaiting surgery this afternoon     OVERNIGHT EVENTS:     Vital Signs Last 24 Hrs  T(C): 36.6 (26 Jan 2018 11:31), Max: 36.8 (25 Jan 2018 16:03)  T(F): 97.8 (26 Jan 2018 11:31), Max: 98.3 (25 Jan 2018 16:03)  HR: 96 (26 Jan 2018 11:31) (91 - 106)  BP: 94/62 (26 Jan 2018 11:31) (94/62 - 113/70)  BP(mean): --  RR: 18 (26 Jan 2018 11:31) (18 - 18)  SpO2: 98% (26 Jan 2018 11:31) (95% - 99%)    PHYSICAL EXAM:    General: No Acute Distress     Neurological: Awake, alert oriented to person, place and time, Following Commands   PERRL, EOMI, Face Symmetrical, Speech Fluent   Moving all extremities with LEFT hemiparesis  LUE 4/5   LLE 3/5 proximal, 0/5 DF, PF, EHL with heavy sensation     Extremities: No calf tenderness         LABS:                        12.1   12.79 )-----------( 517      ( 26 Jan 2018 07:33 )             36.4    01-26    141  |  101  |  22  ----------------------------<  90  4.2   |  26  |  0.53    Ca    9.0      26 Jan 2018 07:33      Hemoglobin A1C, Whole Blood: 5.7 % (01-17 @ 10:02)      01-25 @ 07:01  -  01-26 @ 07:00  --------------------------------------------------------  IN: 800 mL / OUT: 0 mL / NET: 800 mL      DRAINS:     MEDICATIONS:  Antibiotics:    Neuro: phenytoin, decadron     Cardiac:    Pulm:    GI/:    Other:     DIET: [] Regular [] CCD [] Renal [] Puree [] Dysphagia [] Tube Feeds:   NPO for surgery   IMAGING:

## 2018-01-26 NOTE — PROGRESS NOTE ADULT - SUBJECTIVE AND OBJECTIVE BOX
Asked by Dr. Alberts to assume care of Ms. Rivas. The plan had been for stereotactic radiosurgery. However, after further review of her history, and of the images with attending neuroradiologists, the question arose as to whether the MRI lesions represented a primary as opposed to metastatic tumors. (In addition the CT CAP was negative for tumor).    The patient also had a left focal seizure yesterday. Although her Dilantin level was low at 6.5 it suggested continued irritation by the largest tumor, which is compressing the right primary motor cortex. She also has not had any improvement in her left strength despite > a week on decadron 4 mg q6h.    For these reasons I discussed with the patient and her daughter doing a biopsy and subtotal resection of the tumor. Risks, benefits, and alternatives sere discussed, and all questions were answered. They would like for the surgery to proceed.

## 2018-01-26 NOTE — PROGRESS NOTE ADULT - SUBJECTIVE AND OBJECTIVE BOX
Patient is a 77y old  Female who presents with a chief complaint of s/p seizure, newly diagnosed brain mets, h/o breast CA (23 Jan 2018 18:40)      SUBJECTIVE / OVERNIGHT EVENTS: No new complaints.     MEDICATIONS  (STANDING):  dexamethasone     Tablet 4 milliGRAM(s) Oral every 6 hours  dextrose 5%. 1000 milliLiter(s) (50 mL/Hr) IV Continuous <Continuous>  dextrose 50% Injectable 12.5 Gram(s) IV Push once  dextrose 50% Injectable 25 Gram(s) IV Push once  dextrose 50% Injectable 25 Gram(s) IV Push once  docusate sodium 100 milliGRAM(s) Oral three times a day  insulin lispro (HumaLOG) corrective regimen sliding scale   SubCutaneous three times a day before meals  insulin lispro (HumaLOG) corrective regimen sliding scale   SubCutaneous at bedtime  lisinopril 40 milliGRAM(s) Oral daily  pantoprazole    Tablet 40 milliGRAM(s) Oral daily  phenytoin   Capsule 100 milliGRAM(s) Oral <User Schedule>  phenytoin   Capsule 130 milliGRAM(s) Oral at bedtime  simvastatin 10 milliGRAM(s) Oral at bedtime  sodium chloride 0.9%. 1000 milliLiter(s) (60 mL/Hr) IV Continuous <Continuous>    MEDICATIONS  (PRN):  acetaminophen   Tablet 650 milliGRAM(s) Oral every 6 hours PRN For Temp greater than 38 C (100.4 F)  acetaminophen   Tablet. 650 milliGRAM(s) Oral every 6 hours PRN Mild Pain (1 - 3)  dextrose Gel 1 Dose(s) Oral once PRN Blood Glucose LESS THAN 70 milliGRAM(s)/deciliter  glucagon  Injectable 1 milliGRAM(s) IntraMuscular once PRN Glucose LESS THAN 70 milligrams/deciliter  ondansetron   Disintegrating Tablet 4 milliGRAM(s) Oral every 6 hours PRN Nausea  senna 2 Tablet(s) Oral at bedtime PRN Constipation      Vital Signs Last 24 Hrs  T(C): 36.6 (26 Jan 2018 11:31), Max: 36.8 (25 Jan 2018 12:47)  T(F): 97.8 (26 Jan 2018 11:31), Max: 98.3 (25 Jan 2018 12:47)  HR: 96 (26 Jan 2018 11:31) (91 - 106)  BP: 94/62 (26 Jan 2018 11:31) (94/62 - 116/64)  BP(mean): --  RR: 18 (26 Jan 2018 11:31) (18 - 18)  SpO2: 98% (26 Jan 2018 11:31) (95% - 99%)  CAPILLARY BLOOD GLUCOSE      POCT Blood Glucose.: 96 mg/dL (26 Jan 2018 08:27)  POCT Blood Glucose.: 110 mg/dL (25 Jan 2018 22:33)  POCT Blood Glucose.: 114 mg/dL (25 Jan 2018 21:53)  POCT Blood Glucose.: 97 mg/dL (25 Jan 2018 17:34)  POCT Blood Glucose.: 125 mg/dL (25 Jan 2018 12:12)    I&O's Summary    25 Jan 2018 07:01  -  26 Jan 2018 07:00  --------------------------------------------------------  IN: 800 mL / OUT: 0 mL / NET: 800 mL        PHYSICAL EXAM:  GENERAL: NAD, well-developed  HEAD:  Atraumatic, Normocephalic  EYES: EOMI, PERRLA, conjunctiva and sclera clear  NECK: Supple, No JVD  CHEST/LUNG: Clear to auscultation bilaterally; No wheeze. S/P L mastectomy, staples in place- site c/d/i   HEART: Regular rate and rhythm; No murmurs, rubs, or gallops  ABDOMEN: Soft, Nontender, Nondistended; Bowel sounds present  EXTREMITIES:  2+ Peripheral Pulses, No clubbing, cyanosis, or edema  PSYCH: AAOx3  NEUROLOGY: CN's intact, left sided weakness   SKIN: No rashes or lesions    LABS:                        12.1   12.79 )-----------( 517      ( 26 Jan 2018 07:33 )             36.4     01-26    141  |  101  |  22  ----------------------------<  90  4.2   |  26  |  0.53    Ca    9.0      26 Jan 2018 07:33                RADIOLOGY & ADDITIONAL TESTS:    Imaging Personally Reviewed:  MRI head:   Limited exam due to lack of IV contrast.    Abnormal lesion with surrounding edema again seen as described above.      Consultant(s) Notes Reviewed:      Care Discussed with Consultants/Other Providers: Spoke with neurosurgery, plan for OR today

## 2018-01-26 NOTE — PROGRESS NOTE ADULT - PROBLEM SELECTOR PLAN 3
-recently diagnosed breast CA s/p recent L mastectomy 2 weeks ago  -need to clarify with surgery when staples can be removed

## 2018-01-26 NOTE — PROGRESS NOTE ADULT - ASSESSMENT
77F h/o HTN, HLD, breast CA diagnosed Nov 2017 s/p L mastectomy 2 weeks ago at [facility] and with negative PET scan, admitted to [facility] 1/16/18 after seizure, found to have multiple brain lesion

## 2018-01-27 LAB
ALBUMIN SERPL ELPH-MCNC: 3.2 G/DL — LOW (ref 3.3–5)
ALP SERPL-CCNC: 69 U/L — SIGNIFICANT CHANGE UP (ref 40–120)
ALT FLD-CCNC: 40 U/L RC — SIGNIFICANT CHANGE UP (ref 10–45)
AST SERPL-CCNC: 40 U/L — SIGNIFICANT CHANGE UP (ref 10–40)
BILIRUB DIRECT SERPL-MCNC: <0.1 MG/DL — SIGNIFICANT CHANGE UP (ref 0–0.2)
BILIRUB INDIRECT FLD-MCNC: >0.1 MG/DL — LOW (ref 0.2–1)
BILIRUB SERPL-MCNC: 0.2 MG/DL — SIGNIFICANT CHANGE UP (ref 0.2–1.2)
GLUCOSE BLDC GLUCOMTR-MCNC: 120 MG/DL — HIGH (ref 70–99)
GLUCOSE BLDC GLUCOMTR-MCNC: 135 MG/DL — HIGH (ref 70–99)
GLUCOSE BLDC GLUCOMTR-MCNC: 71 MG/DL — SIGNIFICANT CHANGE UP (ref 70–99)
GLUCOSE BLDC GLUCOMTR-MCNC: 90 MG/DL — SIGNIFICANT CHANGE UP (ref 70–99)
PHENYTOIN FREE SERPL-MCNC: 4.2 UG/ML — LOW (ref 10–20)
PROT SERPL-MCNC: 5.8 G/DL — LOW (ref 6–8.3)

## 2018-01-27 PROCEDURE — 99291 CRITICAL CARE FIRST HOUR: CPT

## 2018-01-27 PROCEDURE — 93931 UPPER EXTREMITY STUDY: CPT | Mod: 26

## 2018-01-27 PROCEDURE — 70450 CT HEAD/BRAIN W/O DYE: CPT | Mod: 26

## 2018-01-27 PROCEDURE — 99232 SBSQ HOSP IP/OBS MODERATE 35: CPT

## 2018-01-27 PROCEDURE — 77263 THER RADIOLOGY TX PLNG CPLX: CPT

## 2018-01-27 PROCEDURE — 70553 MRI BRAIN STEM W/O & W/DYE: CPT | Mod: 26

## 2018-01-27 RX ORDER — ENOXAPARIN SODIUM 100 MG/ML
40 INJECTION SUBCUTANEOUS AT BEDTIME
Qty: 0 | Refills: 0 | Status: DISCONTINUED | OUTPATIENT
Start: 2018-01-27 | End: 2018-01-31

## 2018-01-27 RX ORDER — OXYCODONE HYDROCHLORIDE 5 MG/1
10 TABLET ORAL EVERY 4 HOURS
Qty: 0 | Refills: 0 | Status: DISCONTINUED | OUTPATIENT
Start: 2018-01-27 | End: 2018-01-31

## 2018-01-27 RX ORDER — SODIUM CHLORIDE 9 MG/ML
1000 INJECTION INTRAMUSCULAR; INTRAVENOUS; SUBCUTANEOUS
Qty: 0 | Refills: 0 | Status: DISCONTINUED | OUTPATIENT
Start: 2018-01-27 | End: 2018-01-28

## 2018-01-27 RX ORDER — ACETAMINOPHEN 500 MG
1000 TABLET ORAL ONCE
Qty: 0 | Refills: 0 | Status: COMPLETED | OUTPATIENT
Start: 2018-01-27 | End: 2018-01-27

## 2018-01-27 RX ORDER — DIAZEPAM 5 MG
2 TABLET ORAL ONCE
Qty: 0 | Refills: 0 | Status: DISCONTINUED | OUTPATIENT
Start: 2018-01-27 | End: 2018-01-27

## 2018-01-27 RX ORDER — ALPRAZOLAM 0.25 MG
0.25 TABLET ORAL ONCE
Qty: 0 | Refills: 0 | Status: DISCONTINUED | OUTPATIENT
Start: 2018-01-27 | End: 2018-01-27

## 2018-01-27 RX ORDER — FOSPHENYTOIN 50 MG/ML
200 INJECTION INTRAMUSCULAR; INTRAVENOUS ONCE
Qty: 0 | Refills: 0 | Status: COMPLETED | OUTPATIENT
Start: 2018-01-27 | End: 2018-01-27

## 2018-01-27 RX ADMIN — LEVETIRACETAM 500 MILLIGRAM(S): 250 TABLET, FILM COATED ORAL at 06:26

## 2018-01-27 RX ADMIN — OXYCODONE HYDROCHLORIDE 5 MILLIGRAM(S): 5 TABLET ORAL at 23:50

## 2018-01-27 RX ADMIN — Medication 200 MILLIGRAM(S): at 06:26

## 2018-01-27 RX ADMIN — Medication 4 MILLIGRAM(S): at 06:26

## 2018-01-27 RX ADMIN — Medication 100 MILLIGRAM(S): at 07:06

## 2018-01-27 RX ADMIN — OXYCODONE HYDROCHLORIDE 5 MILLIGRAM(S): 5 TABLET ORAL at 00:00

## 2018-01-27 RX ADMIN — NAFCILLIN 100 GRAM(S): 10 INJECTION, POWDER, FOR SOLUTION INTRAVENOUS at 15:05

## 2018-01-27 RX ADMIN — FOSPHENYTOIN 108 MILLIGRAM(S) PE: 50 INJECTION INTRAMUSCULAR; INTRAVENOUS at 07:47

## 2018-01-27 RX ADMIN — NAFCILLIN 100 GRAM(S): 10 INJECTION, POWDER, FOR SOLUTION INTRAVENOUS at 10:47

## 2018-01-27 RX ADMIN — Medication 4 MILLIGRAM(S): at 11:46

## 2018-01-27 RX ADMIN — OXYCODONE HYDROCHLORIDE 5 MILLIGRAM(S): 5 TABLET ORAL at 03:17

## 2018-01-27 RX ADMIN — Medication 4 MILLIGRAM(S): at 18:08

## 2018-01-27 RX ADMIN — PANTOPRAZOLE SODIUM 40 MILLIGRAM(S): 20 TABLET, DELAYED RELEASE ORAL at 11:46

## 2018-01-27 RX ADMIN — SIMVASTATIN 10 MILLIGRAM(S): 20 TABLET, FILM COATED ORAL at 21:32

## 2018-01-27 RX ADMIN — Medication 4 MILLIGRAM(S): at 23:50

## 2018-01-27 RX ADMIN — Medication 100 MILLIGRAM(S): at 21:32

## 2018-01-27 RX ADMIN — Medication 400 MILLIGRAM(S): at 07:05

## 2018-01-27 RX ADMIN — NAFCILLIN 100 GRAM(S): 10 INJECTION, POWDER, FOR SOLUTION INTRAVENOUS at 03:58

## 2018-01-27 RX ADMIN — SODIUM CHLORIDE 100 MILLILITER(S): 9 INJECTION INTRAMUSCULAR; INTRAVENOUS; SUBCUTANEOUS at 21:43

## 2018-01-27 RX ADMIN — ENOXAPARIN SODIUM 40 MILLIGRAM(S): 100 INJECTION SUBCUTANEOUS at 21:32

## 2018-01-27 RX ADMIN — NAFCILLIN 100 GRAM(S): 10 INJECTION, POWDER, FOR SOLUTION INTRAVENOUS at 06:26

## 2018-01-27 RX ADMIN — LEVETIRACETAM 500 MILLIGRAM(S): 250 TABLET, FILM COATED ORAL at 18:09

## 2018-01-27 RX ADMIN — LISINOPRIL 40 MILLIGRAM(S): 2.5 TABLET ORAL at 18:08

## 2018-01-27 RX ADMIN — Medication 2 MILLIGRAM(S): at 19:07

## 2018-01-27 RX ADMIN — Medication 0.25 MILLIGRAM(S): at 14:54

## 2018-01-27 RX ADMIN — Medication 1000 MILLIGRAM(S): at 07:35

## 2018-01-27 RX ADMIN — OXYCODONE HYDROCHLORIDE 5 MILLIGRAM(S): 5 TABLET ORAL at 04:17

## 2018-01-27 NOTE — PROGRESS NOTE ADULT - SUBJECTIVE AND OBJECTIVE BOX
HPI:  Pt is a 76 yo WF with h/o Breast Ca dx'd Nov 2017 s/p resection of L breast x2 week ago at [facility] with recent negative PET scan. BIBEMS to [facility] ER s/p 30 min Sz (L facial twitch) associated L-sided facial droop. On work-up pt was found to have multiple brain lesions. Heme/Onc consulted, reported the lesions were not classic for breast Ca mets and possible GBM. Pt was transferred to [facility] and is s/p R craniotomy and excision of neoplasm of brain. 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 Initially post-op not moving L side        VITALS:  T(C): , Max: 37 (01-26-18 @ 21:30)  HR:  (90 - 113)  BP:  (88/50 - 110/70)  ABP:  (35/24 - 344/344)  RR:  (14 - 23)  SpO2:  (95% - 100%)  Wt(kg): --      01-26-18 @ 07:01  -  01-27-18 @ 07:00  --------------------------------------------------------  IN: 1600 mL / OUT: 1970 mL / NET: -370 mL      LABS:  Na: 139 (01-26 @ 21:13), 141 (01-26 @ 07:33)  K: 4.3 (01-26 @ 21:13), 4.2 (01-26 @ 07:33), 4.2 (01-24 @ 22:40)  Cl: 105 (01-26 @ 21:13), 101 (01-26 @ 07:33)  CO2: 23 (01-26 @ 21:13), 26 (01-26 @ 07:33)  BUN: 13 (01-26 @ 21:13), 22 (01-26 @ 07:33)  Cr: 0.45 (01-26 @ 21:13), 0.53 (01-26 @ 07:33)  Glu: 150(01-26 @ 21:13), 90(01-26 @ 07:33)    Hgb: 12.4 (01-26 @ 22:38), 12.1 (01-26 @ 07:33), 11.7 (01-24 @ 07:42)  Hct: 36.0 (01-26 @ 22:38), 36.4 (01-26 @ 07:33), 35.2 (01-24 @ 07:42)  WBC: 20.7 (01-26 @ 22:38), 12.79 (01-26 @ 07:33), 10.66 (01-24 @ 07:42)  Plt: 326 (01-26 @ 22:38), 517 (01-26 @ 07:33), 501 (01-24 @ 07:42)    INR: 0.98 01-24-18 @ 07:42  PTT: 22.9 01-24-18 @ 07:42    IMAGING:   Recent imaging studies were reviewed.    MEDICATIONS:  acetaminophen    Suspension. 650 milliGRAM(s) Oral every 6 hours  dexamethasone  Injectable 4 milliGRAM(s) IV Push every 6 hours  dextrose 50% Injectable 12.5 Gram(s) IV Push once  dextrose 50% Injectable 25 Gram(s) IV Push once  dextrose 50% Injectable 25 Gram(s) IV Push once  docusate sodium 100 milliGRAM(s) Oral three times a day  fosphenytoin IVPB 200 milliGRAM(s) PE IV Intermittent once  glucagon  Injectable 1 milliGRAM(s) IntraMuscular once PRN  insulin lispro (HumaLOG) corrective regimen sliding scale   SubCutaneous three times a day before meals  insulin lispro (HumaLOG) corrective regimen sliding scale   SubCutaneous at bedtime  levETIRAcetam 500 milliGRAM(s) Oral two times a day  lisinopril 40 milliGRAM(s) Oral daily  nafcillin  IVPB      nafcillin  IVPB 1 Gram(s) IV Intermittent every 4 hours  ondansetron Injectable 8 milliGRAM(s) IV Push every 8 hours PRN  oxyCODONE    IR 5 milliGRAM(s) Oral every 4 hours PRN  oxyCODONE    IR 10 milliGRAM(s) Oral every 4 hours PRN  pantoprazole    Tablet 40 milliGRAM(s) Oral daily  phenytoin   Capsule 300 milliGRAM(s) Oral two times a day  senna 2 Tablet(s) Oral at bedtime PRN  simvastatin 10 milliGRAM(s) Oral at bedtime  sodium chloride 0.9%. 1000 milliLiter(s) IV Continuous <Continuous>      EXAMINATION:  Gen: lying comfortably in bed in no apparent distress  Head: (+) dressing  Lungs: CTA  Heart: Tachy  Abd: Soft/(+)BS  Ext: No edema/ Decreased R radial pulse with doppler and hand slightly pale although both hands about same temp  Neuro: AAO x3/ Following commands and moving all extremities HPI:  Pt is a 78 yo WF with h/o Breast Ca dx'd Nov 2017 s/p resection of L breast x2 week ago at [facility] with recent negative PET scan. [facility]EMS to [facility] s/p 30 min Sz (L facial twitch) associated L-sided facial droop. On work-up pt was found to have multiple brain lesions. Heme/Onc consulted, reported the lesions were not classic for breast Ca mets and possible GBM. Pt was transferred to [facility] and is s/p R craniotomy and excision of neoplasm of brain. 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 Initially post-op not moving L side        VITALS:  T(C): , Max: 37 (01-26-18 @ 21:30)  HR:  (90 - 113)  BP:  (88/50 - 110/70)  ABP:  (35/24 - 344/344)  RR:  (14 - 23)  SpO2:  (95% - 100%)  Wt(kg): --      01-26-18 @ 07:01  -  01-27-18 @ 07:00  --------------------------------------------------------  IN: 1600 mL / OUT: 1970 mL / NET: -370 mL      LABS:  Na: 139 (01-26 @ 21:13), 141 (01-26 @ 07:33)  K: 4.3 (01-26 @ 21:13), 4.2 (01-26 @ 07:33), 4.2 (01-24 @ 22:40)  Cl: 105 (01-26 @ 21:13), 101 (01-26 @ 07:33)  CO2: 23 (01-26 @ 21:13), 26 (01-26 @ 07:33)  BUN: 13 (01-26 @ 21:13), 22 (01-26 @ 07:33)  Cr: 0.45 (01-26 @ 21:13), 0.53 (01-26 @ 07:33)  Glu: 150(01-26 @ 21:13), 90(01-26 @ 07:33)    Hgb: 12.4 (01-26 @ 22:38), 12.1 (01-26 @ 07:33), 11.7 (01-24 @ 07:42)  Hct: 36.0 (01-26 @ 22:38), 36.4 (01-26 @ 07:33), 35.2 (01-24 @ 07:42)  WBC: 20.7 (01-26 @ 22:38), 12.79 (01-26 @ 07:33), 10.66 (01-24 @ 07:42)  Plt: 326 (01-26 @ 22:38), 517 (01-26 @ 07:33), 501 (01-24 @ 07:42)    INR: 0.98 01-24-18 @ 07:42  PTT: 22.9 01-24-18 @ 07:42    IMAGING:   Recent imaging studies were reviewed.    MEDICATIONS:  acetaminophen    Suspension. 650 milliGRAM(s) Oral every 6 hours  dexamethasone  Injectable 4 milliGRAM(s) IV Push every 6 hours  dextrose 50% Injectable 12.5 Gram(s) IV Push once  dextrose 50% Injectable 25 Gram(s) IV Push once  dextrose 50% Injectable 25 Gram(s) IV Push once  docusate sodium 100 milliGRAM(s) Oral three times a day  fosphenytoin IVPB 200 milliGRAM(s) PE IV Intermittent once  glucagon  Injectable 1 milliGRAM(s) IntraMuscular once PRN  insulin lispro (HumaLOG) corrective regimen sliding scale   SubCutaneous three times a day before meals  insulin lispro (HumaLOG) corrective regimen sliding scale   SubCutaneous at bedtime  levETIRAcetam 500 milliGRAM(s) Oral two times a day  lisinopril 40 milliGRAM(s) Oral daily  nafcillin  IVPB      nafcillin  IVPB 1 Gram(s) IV Intermittent every 4 hours  ondansetron Injectable 8 milliGRAM(s) IV Push every 8 hours PRN  oxyCODONE    IR 5 milliGRAM(s) Oral every 4 hours PRN  oxyCODONE    IR 10 milliGRAM(s) Oral every 4 hours PRN  pantoprazole    Tablet 40 milliGRAM(s) Oral daily  phenytoin   Capsule 300 milliGRAM(s) Oral two times a day  senna 2 Tablet(s) Oral at bedtime PRN  simvastatin 10 milliGRAM(s) Oral at bedtime  sodium chloride 0.9%. 1000 milliLiter(s) IV Continuous <Continuous>      EXAMINATION:  Gen: lying comfortably in bed in no apparent distress  Head: (+) dressing  Lungs: CTA  Heart: sl increased rate   Abd: Soft/(+)BS  Ext: No edema// L hand warm ; no radial pulse yet dopplerable on ultrasound  Neuro: AAO x3/ Following commands, slightly anxious speech clear and intact ; R UE 4.5/5 UE and LE/  LUE -moves distal yet spastic and unable to lift proximally; LLE - 2/5 throughout.

## 2018-01-27 NOTE — PROGRESS NOTE ADULT - ASSESSMENT
Pt is a 78 yo WF with h/o Breast Ca dx'd Nov 2017 s/p resection of L breast x2 week ago at [facility] with recent negative PET scan. BIBEMS to [facility] ER s/p 30 min Sz (L facial twitch) associated L-sided facial droop. On work-up pt was found to have multiple brain lesions. Heme/Onc consulted, reported the lesions were not classic for breast Ca mets and possible GBM. Pt was transferred to [facility] and is s/p R craniotomy and excision of neoplasm of brain. 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 Initially post-op not moving L side and later began to move L side      Neuro:  CT head this AM   Cont Decadron and Dilantin + Keppra/ Neuro checks/ Pain control/ F/u as per Neurosurg    CVS: Pt with R brachial A-line (removed) with decreased R radial pulses; vascular checks    Resp: Supplemental O2 prn    ID: Nafcillin prophylaxis    HEME: Heme/Onc f/u regarding: high grade glioma    FEN: NPO/ Advance po diet in the am/ IVF with NS    Endo: Follow FS while on Steroids and cover with Lispro    GI: Cont Colace + Senna and PPI    DVT ppx:  not on A/C 2/2 post OP Pt is a 76 yo WF with h/o Breast Ca dx'd Nov 2017 s/p resection of L breast x2 week ago at [facility] with recent negative PET scan. BIBEMS to [facility] ER s/p 30 min Sz (L facial twitch) associated L-sided facial droop. On work-up pt was found to have multiple brain lesions. Heme/Onc consulted, reported the lesions were not classic for breast Ca mets and possible GBM. Pt was transferred to [facility] and is s/p R craniotomy and excision of neoplasm of brain. 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 Initially post-op not moving L side and later began to move L side      Neuro:  CT head - post op changes ; vasogenic edema / PT/ OT eval    Cont Decadron and Dilantin + Keppra/ Neuro checks/ Pain control/ F/u as per Neurosurg    CVS: removed R brachial catheter; vascular consult ; unable to give ASA due to recent OR     Resp: Supplemental O2 prn    ID: Nafcillin prophylaxis    HEME: Heme/Onc f/u regarding: high grade glioma    FEN: NPO/ Advance po diet in the am/ IVF with NS- 100cc/hr     Endo: Follow FS while on Steroids and cover with Humulog SSI    GI: Cont Colace + Senna and PPI  CCD diet     DVT - Pt high risk for DVT with hx of Brain tumor -    prophylaxsis- lovenox 40 mg qday     Time seen 1330    Time spent 35 min        Time spent 35 min Pt is a 78 yo WF with h/o Breast Ca dx'd Nov 2017 s/p resection of L breast x2 week ago at [facility] with recent negative PET scan.  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     Neuro:  CT head - post op changes ; vasogenic edema / PT/ OT eval / await MRI of brain -likely need anesthesia   Cont Decadron and Dilantin + Keppra/ Neuro checks/ Pain control/ F/u as per Neurosurg  Slightly agitated- xanax 0.25 mg q8 prn     CVS: Hx of HTN - lisinopril; HLD- simvastatin ;  removed R brachial catheter; vascular consult ; unable to give ASA due to recent OR     Resp: Supplemental O2 prn    ID: Nafcillin prophylaxis-     HEME: Heme/Onc f/u regarding: high grade glioma    FEN: Advance po diet  to CCD / IVF with NS- 100cc/hr - taper NSG team     Endo:  Pre- diabetic - Follow FS while on Steroids and cover with Humulog SSI    GI: Cont Colace + Senna and PPI  CCD diet     DVT - Pt high risk for DVT with hx of Brain tumor -    prophylaxsis- lovenox 40 mg qday     Time seen 1330    Time spent 35 min Pt is a 76 yo WF with h/o Breast Ca dx'd Nov 2017 s/p resection of L breast x2 week ago at [facility] with recent negative PET scan.  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     Neuro:  CT head - post op changes ; vasogenic edema / PT/ OT eval / await MRI of brain -likely need anesthesia   Cont Decadron and Dilantin + Keppra/ Neuro checks/ Pain control/ F/u as per Neurosurg  Slightly agitated- xanax 0.25 mg q8 prn     CVS: Hx of HTN - lisinopril; HLD- simvastatin ;  removed R brachial catheter- NO BP in R/L arm ; keep arm in dependent position; vascular consult-  ; unable to give ASA due to recent OR     Resp: Supplemental O2 prn    ID: Nafcillin prophylaxis-     HEME: Heme/Onc f/u regarding: high grade glioma    FEN: Advance po diet  to CCD / IVF with NS- 100cc/hr - taper NSG team     Endo:  Pre- diabetic - Follow FS while on Steroids and cover with Humulog SSI    GI: Cont Colace + Senna and PPI  CCD diet     DVT - Pt high risk for DVT with hx of Brain tumor -    prophylaxsis- lovenox 40 mg qday     Time seen 1330    Time spent 35 min Pt is a 76 yo WF with h/o Breast Ca dx'd Nov 2017 s/p resection of L breast x2 week ago at [facility] with recent negative PET scan.  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     Neuro:  CT head - post op changes ; vasogenic edema / PT/ OT eval / await MRI of brain -likely need anesthesia   Cont Decadron and Dilantin + Keppra/ Neuro checks/ Pain control/ F/u as per Neurosurg  Slightly agitated- xanax 0.25 mg q8 prn     CVS: Hx of HTN - lisinopril; HLD- simvastatin ;  removed R brachial catheter- NO BP in R/L arm ; keep arm in dependent position; vascular consult-  ; unable to give ASA due to recent OR     Resp: Supplemental O2 prn    ID: Nafcillin prophylaxis-     HEME: Heme/Onc f/u regarding: high grade glioma    FEN: Advance po diet  to CCD / IVF with NS- 70cc/hr - taper NSG team     Endo:  Pre- diabetic - Follow FS while on Steroids and cover with Humulog SSI    GI: Cont Colace + Senna and PPI  CCD diet     DVT - Pt high risk for DVT with hx of Brain tumor -    prophylaxsis- lovenox 40 mg qday     Time seen 1330    Time spent 35 min

## 2018-01-27 NOTE — PROGRESS NOTE ADULT - ASSESSMENT
Pt is a 78 yo WF with h/o Breast Ca dx'd Nov 2017 s/p resection of L breast x2 week ago at [facility] with recent negative PET scan. [facility] Sz (L facial twitch) associated L-sided facial droop. On work-up pt was found to have multiple brain lesions. Heme/Onc consulted, reported the lesions were not classic for breast Ca mets and possible GBM. Pt was transferred to [facility] and is s/p R craniotomy and excision of neoplasm of brain. 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 Initially post-op not moving L side    Resp:   ID:  CVS:  HEME:  FEN:  GI:  Neuro: Pt is a 78 yo WF with h/o Breast Ca dx'd Nov 2017 s/p resection of L breast x2 week ago at [facility] with recent negative PET scan. BIBEMS to [facility] ER s/p 30 min Sz (L facial twitch) associated L-sided facial droop. On work-up pt was found to have multiple brain lesions. Heme/Onc consulted, reported the lesions were not classic for breast Ca mets and possible GBM. Pt was transferred to [facility] and is s/p R craniotomy and excision of neoplasm of brain. 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 Initially post-op not moving L side and later began to move L side    Resp: Supplemental O2 prn  ID: Nafcillin prophylaxis  CVS: Pt with R brachial A-line (removed) with decreased R radial pulses; vascular checks  HEME: Heme/Onc f/u regarding: high grade glioma  FEN: NPO/ Advance po diet in the am/ IVF with NS  Endo: Follow FS while on Steroids and cover with Lispro  GI: Cont Colace + Senna and PPI  Neuro: Cont Decadron and Dilantin + Keppra/ Neuro checks/ Pain control/ F/u as per Neurosurg    CCT: 45 min    *Please note, pt seen 1/26 and note written 1/27*

## 2018-01-27 NOTE — PROGRESS NOTE ADULT - ASSESSMENT
77-year old woman s/p craniotomy for large right parafalcine/parasagittal mass w/ prelim path c/w HGG. Patient newly dx w/ LEFT breast ca. Preop foot drop.   -Increased dilantin to 300 BID and continuing Keppra   -Steroids 4Q6 today, will likely taper tomorrow, as suspect restlessness is 2/2 steroids   -MRI tomorrow  -will discuss any ? need for vascular consult w/ Dr. Dumont and Dr. Yin 77-year old woman s/p craniotomy for large right parafalcine/parasagittal mass w/ prelim path c/w HGG. Patient newly dx w/ LEFT breast ca. Preop foot drop.   -Increased dilantin to 300 BID and continuing Keppra   -Steroids 4Q6 today, will likely taper tomorrow, as suspect restlessness is 2/2 steroids   -MRI tomorrow  -will discuss any ? need for vascular consult w/ Dr. Dumont and Dr. Yin   -no signs or symptoms of ischemic disease/compartment syndrome in the RUE. Will continue to monitor. No BP measurements on that arm.

## 2018-01-27 NOTE — CONSULT NOTE ADULT - ASSESSMENT
77F h/o HTN, HLD, breast CA diagnosed Nov 2017 s/p L mastectomy 2 weeks ago at [facility] and with negative PET scan, admitted to [facility] 1/16/18 after seizure, found to have multiple brain lesion
77yF w/ R brachial artery occlusion    - Recommend therapeutic anticoagulation once cleared from neurosurgical perspective  - c/w q4hr neurovascular checks  - No acute surgical intervention at this time  - Pt discussed w/ Vascular surgery fellow, Dr. Nunez  - Please page [phone] w/ any questions    VITO Ayoub PGY-2
ASSESSMENT/PLAN    MAGALI HUGHES is a 77y woman with a h/o breast cancer diagnosed in November of 2017 treated by left mastectomy most recently at [facility].  She has no h/o chemotherapy or radiation treatment prior.  She has had approximately one to two months of gradually worsening weakness in her left lower extremity.  Her strength has minimally improved on decadron during her hospital stay.    Her case was discussed at tumor board, and I have reviewed with Dr. Yin.  I agree that the next step should be to consider biopsy to confirm histology.  This is likely a breast metastasis, but is not classic for presentation or radiographic appearance.  Would benefit from confirming histology and r/o primary CNS neoplasm.      I reviewed options of surgical debulking/resection vs stereotactic radiotherapy.  Surgical discussion per Dr. Yin.  Regarding radiation therapy, I discussed that the risk is that treatment could increase edema which may temporarily make the symptoms worse before better.  We would maintain decadron through treatment.  It is unlikely that RT would result in improvement in her neurological symptoms, though I cannot rule it out and am hopeful that it will.    Patient demonstrated appropriate understanding.  I will f/u biopsy results.  Plan for simulation Monday.      Thank you for this consultation.

## 2018-01-27 NOTE — PROGRESS NOTE ADULT - SUBJECTIVE AND OBJECTIVE BOX
Neurosurgery Progress Note    SUBJECTIVE:   Patient seen and examined  Seems restless and somewhat not cooperative  Asking for IV to be removed     OVERNIGHT EVENTS:     Vital Signs Last 24 Hrs  T(C): 36.4 (27 Jan 2018 08:00), Max: 37 (26 Jan 2018 21:30)  T(F): 97.5 (27 Jan 2018 08:00), Max: 98.6 (26 Jan 2018 21:30)  HR: 97 (27 Jan 2018 10:00) (90 - 113)  BP: 99/51 (27 Jan 2018 10:00) (88/50 - 108/66)  BP(mean): 71 (27 Jan 2018 10:00) (69 - 81)  RR: 18 (27 Jan 2018 10:00) (14 - 23)  SpO2: 98% (27 Jan 2018 10:00) (95% - 100%)    PHYSICAL EXAM:    General: No Acute Distress, somewhat restless     Neurological: Awake, alert oriented to person, place and time  Following Commands  PERRL, EOMI, Face Symmetrical, Speech Fluent   Moving all extremities  Right side 5/5  Left upper 4/5  LLE 3/5 proximal, 0/5 distal (DF, PF, EHL)   decreased positional sense in the LLE  + extinction when testing light touch in the LLE     Incision: Headwrap re-applied     LABS:                        12.4   20.7  )-----------( 326      ( 26 Jan 2018 22:38 )             36.0    01-26    139  |  105  |  13  ----------------------------<  150<H>  4.3   |  23  |  0.45<L>    Ca    8.3<L>      26 Jan 2018 21:13  Phos  4.0     01-26  Mg     2.2     01-26    TPro  5.8<L>  /  Alb  3.2<L>  /  TBili  0.2  /  DBili  <0.1  /  AST  40  /  ALT  40  /  AlkPhos  69  01-26    Hemoglobin A1C, Whole Blood: 5.7 % (01-17 @ 10:02)      01-26 @ 07:01  -  01-27 @ 07:00  --------------------------------------------------------  IN: 1600 mL / OUT: 1970 mL / NET: -370 mL    01-27 @ 07:01 - 01-27 @ 11:15  --------------------------------------------------------  IN: 550 mL / OUT: 250 mL / NET: 300 mL      DRAINS:     MEDICATIONS:  Antibiotics:  nafcillin  IVPB      nafcillin  IVPB 1 Gram(s) IV Intermittent every 4 hours    Neuro:  acetaminophen    Suspension. 650 milliGRAM(s) Oral every 6 hours  levETIRAcetam 500 milliGRAM(s) Oral two times a day  ondansetron Injectable 8 milliGRAM(s) IV Push every 8 hours PRN Nausea and/or Vomiting  oxyCODONE    IR 5 milliGRAM(s) Oral every 4 hours PRN Moderate Pain (4 - 6)  oxyCODONE    IR 10 milliGRAM(s) Oral every 4 hours PRN Severe Pain (7 - 10)  phenytoin   Capsule 300 milliGRAM(s) Oral two times a day    Cardiac:  lisinopril 40 milliGRAM(s) Oral daily    Pulm:    GI/:  docusate sodium 100 milliGRAM(s) Oral three times a day  pantoprazole    Tablet 40 milliGRAM(s) Oral daily  senna 2 Tablet(s) Oral at bedtime PRN Constipation    Other:   dexamethasone  Injectable 4 milliGRAM(s) IV Push every 6 hours  dextrose 50% Injectable 12.5 Gram(s) IV Push once  dextrose 50% Injectable 25 Gram(s) IV Push once  dextrose 50% Injectable 25 Gram(s) IV Push once  glucagon  Injectable 1 milliGRAM(s) IntraMuscular once PRN Glucose LESS THAN 70 milligrams/deciliter  insulin lispro (HumaLOG) corrective regimen sliding scale   SubCutaneous three times a day before meals  insulin lispro (HumaLOG) corrective regimen sliding scale   SubCutaneous at bedtime  simvastatin 10 milliGRAM(s) Oral at bedtime  sodium chloride 0.9%. 1000 milliLiter(s) IV Continuous <Continuous>    DIET: DASH     IMAGING:   Postop CTH: postsurgical changes, no large acute hemorrhage, no stroke, final read pending  Doppler of CHICHO done, prelim read is thrombus in the brachial artery, but open radial/ulnar arteries, will follow up final read Neurosurgery Progress Note    SUBJECTIVE:   Patient seen and examined  Seems restless and somewhat not cooperative  Asking for IV to be removed     OVERNIGHT EVENTS:     Vital Signs Last 24 Hrs  T(C): 36.4 (27 Jan 2018 08:00), Max: 37 (26 Jan 2018 21:30)  T(F): 97.5 (27 Jan 2018 08:00), Max: 98.6 (26 Jan 2018 21:30)  HR: 97 (27 Jan 2018 10:00) (90 - 113)  BP: 99/51 (27 Jan 2018 10:00) (88/50 - 108/66)  BP(mean): 71 (27 Jan 2018 10:00) (69 - 81)  RR: 18 (27 Jan 2018 10:00) (14 - 23)  SpO2: 98% (27 Jan 2018 10:00) (95% - 100%)    PHYSICAL EXAM:    General: No Acute Distress, somewhat restless     Neurological: Awake, alert oriented to person, place and time  Following Commands  PERRL, EOMI, Face Symmetrical, Speech Fluent   Moving all extremities  Right side 5/5  Left upper 4/5  LLE 3/5 proximal, 0/5 distal (DF, PF, EHL)   decreased positional sense in the LLE  + extinction when testing light touch in the LLE     extremities are warm and dry  no erythema or swelling     Incision: Headwrap re-applied     LABS:                        12.4   20.7  )-----------( 326      ( 26 Jan 2018 22:38 )             36.0    01-26    139  |  105  |  13  ----------------------------<  150<H>  4.3   |  23  |  0.45<L>    Ca    8.3<L>      26 Jan 2018 21:13  Phos  4.0     01-26  Mg     2.2     01-26    TPro  5.8<L>  /  Alb  3.2<L>  /  TBili  0.2  /  DBili  <0.1  /  AST  40  /  ALT  40  /  AlkPhos  69  01-26    Hemoglobin A1C, Whole Blood: 5.7 % (01-17 @ 10:02)      01-26 @ 07:01  -  01-27 @ 07:00  --------------------------------------------------------  IN: 1600 mL / OUT: 1970 mL / NET: -370 mL    01-27 @ 07:01 - 01-27 @ 11:15  --------------------------------------------------------  IN: 550 mL / OUT: 250 mL / NET: 300 mL      DRAINS:     MEDICATIONS:  Antibiotics:  nafcillin  IVPB      nafcillin  IVPB 1 Gram(s) IV Intermittent every 4 hours    Neuro:  acetaminophen    Suspension. 650 milliGRAM(s) Oral every 6 hours  levETIRAcetam 500 milliGRAM(s) Oral two times a day  ondansetron Injectable 8 milliGRAM(s) IV Push every 8 hours PRN Nausea and/or Vomiting  oxyCODONE    IR 5 milliGRAM(s) Oral every 4 hours PRN Moderate Pain (4 - 6)  oxyCODONE    IR 10 milliGRAM(s) Oral every 4 hours PRN Severe Pain (7 - 10)  phenytoin   Capsule 300 milliGRAM(s) Oral two times a day    Cardiac:  lisinopril 40 milliGRAM(s) Oral daily    Pulm:    GI/:  docusate sodium 100 milliGRAM(s) Oral three times a day  pantoprazole    Tablet 40 milliGRAM(s) Oral daily  senna 2 Tablet(s) Oral at bedtime PRN Constipation    Other:   dexamethasone  Injectable 4 milliGRAM(s) IV Push every 6 hours  dextrose 50% Injectable 12.5 Gram(s) IV Push once  dextrose 50% Injectable 25 Gram(s) IV Push once  dextrose 50% Injectable 25 Gram(s) IV Push once  glucagon  Injectable 1 milliGRAM(s) IntraMuscular once PRN Glucose LESS THAN 70 milligrams/deciliter  insulin lispro (HumaLOG) corrective regimen sliding scale   SubCutaneous three times a day before meals  insulin lispro (HumaLOG) corrective regimen sliding scale   SubCutaneous at bedtime  simvastatin 10 milliGRAM(s) Oral at bedtime  sodium chloride 0.9%. 1000 milliLiter(s) IV Continuous <Continuous>    DIET: DASH     IMAGING:   Postop CTH: postsurgical changes, no large acute hemorrhage, no stroke, final read pending  Doppler of LUE done, prelim read is thrombus in the brachial artery, but open radial/ulnar arteries, will follow up final read

## 2018-01-27 NOTE — PROGRESS NOTE ADULT - SUBJECTIVE AND OBJECTIVE BOX
HPI:  Pt is a 76 yo WF with h/o Breast Ca dx'd 2017 s/p resection of L breast x2 week ago at [facility] with recent negative PET scan. BIBEMS to [facility] ERs/p 30 min Sz (L facial twitch) associated L-sided facial droop. On work-up pt was found to have multiple brain lesions. Heme/Onc consulted, reported the lesions were not classic for breast Ca mets and possible GBM. Pt was transferred to [facility] and is s/p R craniotomy and excision of neoplasm of brain. 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 Initially post-op not moving L side      ## Labs:  CBC:                        12.4   20.7  )-----------( 326      ( 2018 22:38 )             36.0     Chem:      139  |  105  |  13  ----------------------------<  150<H>  4.3   |  23  |  0.45<L>    Ca    8.3<L>      2018 21:13  Phos  4.0       Mg     2.2         Coags:    ## Imaging:    ## Medications:  nafcillin  IVPB      nafcillin  IVPB 1 Gram(s) IV Intermittent every 4 hours    lisinopril 40 milliGRAM(s) Oral daily    dexamethasone  Injectable 4 milliGRAM(s) IV Push every 6 hours  dextrose 50% Injectable 12.5 Gram(s) IV Push once  dextrose 50% Injectable 25 Gram(s) IV Push once  dextrose 50% Injectable 25 Gram(s) IV Push once  glucagon  Injectable 1 milliGRAM(s) IntraMuscular once PRN  insulin lispro (HumaLOG) corrective regimen sliding scale   SubCutaneous three times a day before meals  insulin lispro (HumaLOG) corrective regimen sliding scale   SubCutaneous at bedtime  simvastatin 10 milliGRAM(s) Oral at bedtime    docusate sodium 100 milliGRAM(s) Oral three times a day  pantoprazole    Tablet 40 milliGRAM(s) Oral daily  senna 2 Tablet(s) Oral at bedtime PRN    acetaminophen    Suspension. 650 milliGRAM(s) Oral every 6 hours  levETIRAcetam 500 milliGRAM(s) Oral two times a day  ondansetron Injectable 8 milliGRAM(s) IV Push every 8 hours PRN  oxyCODONE    IR 5 milliGRAM(s) Oral every 4 hours PRN  phenytoin   Capsule 200 milliGRAM(s) Oral every 12 hours    ## Vitals:  T(C): 36.8 (18 @ 22:00), Max: 37 (18 @ 21:30)  HR: 100 (18 @ 01:00) (91 - 113)  BP: 93/52 (18 @ 01:00) (88/50 - 110/70)  BP(mean): 69 (18 @ 01:00) (69 - 81)  RR: 15 (18 @ 01:00) (14 - 18)  SpO2: 100% (18 @ 01:00) (97% - 100%)  Wt(kg): --  Vent:   AB-25 @ 07:  -   @ 07:00  --------------------------------------------------------  IN: 800 mL / OUT: 0 mL / NET: 800 mL     @ 07:01  -   @ 01:55  --------------------------------------------------------  IN: 850 mL / OUT: 1375 mL / NET: -525 mL      ## P/E:  Gen: lying comfortably in bed in no apparent distress  Head: (+) dressing  Lungs: CTA  Heart: Tachy  Abd: Soft/(+)BS  Ext: No edema/ Decreased R radial pulse with doppler and hand slightly pale although both hands about same temp  Neuro: AAO x3/ Following commands and moving all extremities    CENTRAL LINE: [ ] YES [ ] NO  LOCATION:   DATE INSERTED:  REMOVE: [ ] YES [ ] NO      BYNUM: [ ] YES [ ] NO    DATE INSERTED:  REMOVE:  [ ] YES [ ] NO      A-LINE:  [ ] YES [ ] NO  LOCATION:   DATE INSERTED:  REMOVE:  [x ] YES [ ] NO  EXPLAIN:    CODE STATUS: [ x] full code  [ ] DNR  [ ] DNI  [ ] MOLST  Goals of care discussion: [ ] yes HPI:  Pt is a 76 yo WF with h/o Breast Ca dx'd 2017 s/p resection of L breast x2 week ago at [facility] with recent negative PET scan. BIBEMS to [facility] ER s/p 30 min Sz (L facial twitch) associated L-sided facial droop. On work-up pt was found to have multiple brain lesions. Heme/Onc consulted, reported the lesions were not classic for breast Ca mets and possible GBM. Pt was transferred to [facility] and is s/p R craniotomy and excision of neoplasm of brain. 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 Initially post-op not moving L side      ## Labs:  CBC:                        12.4   20.7  )-----------( 326      ( 2018 22:38 )             36.0     Chem:      139  |  105  |  13  ----------------------------<  150<H>  4.3   |  23  |  0.45<L>    Ca    8.3<L>      2018 21:13  Phos  4.0       Mg     2.2         Coags:    ## Imaging:    ## Medications:  nafcillin  IVPB      nafcillin  IVPB 1 Gram(s) IV Intermittent every 4 hours    lisinopril 40 milliGRAM(s) Oral daily    dexamethasone  Injectable 4 milliGRAM(s) IV Push every 6 hours  dextrose 50% Injectable 12.5 Gram(s) IV Push once  dextrose 50% Injectable 25 Gram(s) IV Push once  dextrose 50% Injectable 25 Gram(s) IV Push once  glucagon  Injectable 1 milliGRAM(s) IntraMuscular once PRN  insulin lispro (HumaLOG) corrective regimen sliding scale   SubCutaneous three times a day before meals  insulin lispro (HumaLOG) corrective regimen sliding scale   SubCutaneous at bedtime  simvastatin 10 milliGRAM(s) Oral at bedtime    docusate sodium 100 milliGRAM(s) Oral three times a day  pantoprazole    Tablet 40 milliGRAM(s) Oral daily  senna 2 Tablet(s) Oral at bedtime PRN    acetaminophen    Suspension. 650 milliGRAM(s) Oral every 6 hours  levETIRAcetam 500 milliGRAM(s) Oral two times a day  ondansetron Injectable 8 milliGRAM(s) IV Push every 8 hours PRN  oxyCODONE    IR 5 milliGRAM(s) Oral every 4 hours PRN  phenytoin   Capsule 200 milliGRAM(s) Oral every 12 hours    ## Vitals:  T(C): 36.8 (18 @ 22:00), Max: 37 (18 @ 21:30)  HR: 100 (18 @ 01:00) (91 - 113)  BP: 93/52 (18 @ 01:00) (88/50 - 110/70)  BP(mean): 69 (18 @ 01:00) (69 - 81)  RR: 15 (18 @ 01:00) (14 - 18)  SpO2: 100% (18 @ 01:00) (97% - 100%)  Wt(kg): --  Vent:   AB-25 @ 07:  -   @ 07:00  --------------------------------------------------------  IN: 800 mL / OUT: 0 mL / NET: 800 mL     @ 07:01  -   @ 01:55  --------------------------------------------------------  IN: 850 mL / OUT: 1375 mL / NET: -525 mL      ## P/E:  Gen: lying comfortably in bed in no apparent distress  Head: (+) dressing  Lungs: CTA  Heart: Tachy  Abd: Soft/(+)BS  Ext: No edema/ Decreased R radial pulse with doppler and hand slightly pale although both hands about same temp  Neuro: AAO x3/ Following commands and moving all extremities    CENTRAL LINE: [ ] YES [ ] NO  LOCATION:   DATE INSERTED:  REMOVE: [ ] YES [ ] NO      BYNUM: [ ] YES [ ] NO    DATE INSERTED:  REMOVE:  [ ] YES [ ] NO      A-LINE:  [ ] YES [ ] NO  LOCATION:   DATE INSERTED:  REMOVE:  [x ] YES [ ] NO  EXPLAIN:    CODE STATUS: [ x] full code  [ ] DNR  [ ] DNI  [ ] MOLST  Goals of care discussion: [ ] yes

## 2018-01-27 NOTE — CONSULT NOTE ADULT - SUBJECTIVE AND OBJECTIVE BOX
VASCULAR SURGERY CONSULT NOTE  --------------------------------------------------------------------------------------------    Patient is a 77y old  Female who presents with a chief complaint of s/p seizure, newly diagnosed brain mets, h/o breast CA (23 Jan 2018 18:40)      HPI:  Patient is a 78 y/o F pt with PMHx of Breast CA Dx Nov 2017 (s/p resection of L breast x2 week ago at [facility]) with recent negative PET scan and BIB EMS to [facility] ED s/p 30 min seizure (L facial twitch) at home while seated, witnessed by , with associated L-sided facial droop since 5pm on 1/16. She was found to have multiple brain lesions. Heme onc consulted, reported the lesions were not classic for breast CA mets, possible GBM. The patient was transferred to [facility] for biopsy versus resection. Patient denies fever, chills, SOB, palpitations, chest pain, nausea, vomiting, diarrhea or constipation. (23 Jan 2018 18:40)    Pt POD1 s/p L craniotomy and excision of glioma. Pt had R brachial art line placed. She was noted to have a cool R hand w/ loss of pulses. Pt denies weakness, numbness, tingling in RUE.    ROS: 10-system review is otherwise negative except HPI above.      PAST MEDICAL & SURGICAL HISTORY:  Breast cancer  H/O breast surgery    FAMILY HISTORY:  No pertinent family history in first degree relatives      SOCIAL HISTORY:      ALLERGIES: No Known Allergies      HOME MEDICATIONS:     CURRENT MEDICATIONS  MEDICATIONS (STANDING): acetaminophen    Suspension. 650 milliGRAM(s) Oral every 6 hours  dexamethasone  Injectable 4 milliGRAM(s) IV Push every 6 hours  dextrose 50% Injectable 12.5 Gram(s) IV Push once  dextrose 50% Injectable 25 Gram(s) IV Push once  dextrose 50% Injectable 25 Gram(s) IV Push once  docusate sodium 100 milliGRAM(s) Oral three times a day  enoxaparin Injectable 40 milliGRAM(s) SubCutaneous at bedtime  insulin lispro (HumaLOG) corrective regimen sliding scale   SubCutaneous three times a day before meals  insulin lispro (HumaLOG) corrective regimen sliding scale   SubCutaneous at bedtime  levETIRAcetam 500 milliGRAM(s) Oral two times a day  lisinopril 40 milliGRAM(s) Oral daily  pantoprazole    Tablet 40 milliGRAM(s) Oral daily  phenytoin   Capsule 300 milliGRAM(s) Oral two times a day  simvastatin 10 milliGRAM(s) Oral at bedtime  sodium chloride 0.9%. 1000 milliLiter(s) IV Continuous <Continuous>    MEDICATIONS (PRN):glucagon  Injectable 1 milliGRAM(s) IntraMuscular once PRN Glucose LESS THAN 70 milligrams/deciliter  ondansetron Injectable 8 milliGRAM(s) IV Push every 8 hours PRN Nausea and/or Vomiting  oxyCODONE    IR 5 milliGRAM(s) Oral every 4 hours PRN Moderate Pain (4 - 6)  oxyCODONE    IR 10 milliGRAM(s) Oral every 4 hours PRN Severe Pain (7 - 10)  senna 2 Tablet(s) Oral at bedtime PRN Constipation    --------------------------------------------------------------------------------------------    Vitals:   T(C): 36.6 (01-27-18 @ 14:20), Max: 37 (01-26-18 @ 21:30)  HR: 102 (01-27-18 @ 14:20) (90 - 113)  BP: 167/78 (01-27-18 @ 14:20) (88/50 - 167/78)  BP(mean): 78 (01-27-18 @ 12:00) (69 - 81)  RR: 20 (01-27-18 @ 14:20) (14 - 23)  SpO2: 96% (01-27-18 @ 14:20) (95% - 100%)  Wt(kg): --  CAPILLARY BLOOD GLUCOSE      POCT Blood Glucose.: 135 mg/dL (27 Jan 2018 11:39)  POCT Blood Glucose.: 71 mg/dL (27 Jan 2018 07:14)  POCT Blood Glucose.: 111 mg/dL (26 Jan 2018 21:58)    CAPILLARY BLOOD GLUCOSE      POCT Blood Glucose.: 135 mg/dL (27 Jan 2018 11:39)  POCT Blood Glucose.: 71 mg/dL (27 Jan 2018 07:14)  POCT Blood Glucose.: 111 mg/dL (26 Jan 2018 21:58)      01-26 @ 07:01  -  01-27 @ 07:00  --------------------------------------------------------  IN:    IV PiggyBack: 100 mL    Oral Fluid: 100 mL    sodium chloride 0.9%: 1400 mL  Total IN: 1600 mL    OUT:    Indwelling Catheter - Urethral: 1970 mL  Total OUT: 1970 mL    Total NET: -370 mL      01-27 @ 07:01  -  01-27 @ 15:48  --------------------------------------------------------  IN:    IV PiggyBack: 50 mL    Oral Fluid: 200 mL    sodium chloride 0.9%: 600 mL  Total IN: 850 mL    OUT:    Indwelling Catheter - Urethral: 570 mL  Total OUT: 570 mL    Total NET: 280 mL        Height (cm): 154.94 (01-23 @ 17:35)  Weight (kg): 54.4 (01-23 @ 17:35)  BMI (kg/m2): 22.7 (01-23 @ 17:35)  BSA (m2): 1.52 (01-23 @ 17:35)    PHYSICAL EXAM:   General: NAD, Lying in bed comfortably  Neuro: A+Ox3  HEENT: NC/AT, EOMI  Neck: Soft, supple  Cardio: RRR, nml S1/S2  Resp: Good effort, CTA b/l  GI/Abd: Soft, NT/ND, no rebound/guarding, no masses palpated  Vascular:   Skin: Intact, no breakdown  Lymphatic/Nodes: No palpable lymphadenopathy  Musculoskeletal: All 4 extremities moving spontaneously, no limitations.  --------------------------------------------------------------------------------------------    LABS  CBC (01-26 @ 22:38)                              12.4                           20.7<H>  )----------------(  326        --    % Neutrophils, --    % Lymphocytes, ANC: --                                  36.0    CBC (01-26 @ 07:33)                              12.1                           12.79<H>  )----------------(  517<H>     73.8  % Neutrophils, 12.2<L>% Lymphocytes, ANC: 9.43<H>                              36.4      BMP (01-26 @ 21:13)             139     |  105     |  13    		Ca++ --      Ca 8.3<L>             ---------------------------------( 150<H>		Mg 2.2                4.3     |  23      |  0.45<L>			Ph 4.0     BMP (01-26 @ 07:33)             141     |  101     |  22    		Ca++ --      Ca 9.0                ---------------------------------( 90    		Mg --                 4.2     |  26      |  0.53  			Ph --        LFTs (01-26 @ 21:13)      TPro 5.8<L> / Alb 3.2<L> / TBili 0.2 / DBili <0.1 / AST 40 / ALT 40 / AlkPhos 69            --------------------------------------------------------------------------------------------    MICROBIOLOGY      --------------------------------------------------------------------------------------------    IMAGING  < from: VA Duplex Upper Extrem Arterial Limited, Right (01.27.18 @ 10:59) >  FINDINGS:     The subclavian artery was patent throughout the visualized portions. The   axillary artery is patent throughout. There is occlusion of the proximal   and mid brachial artery, with collaterals noted and reconstitution of the   distal brachial artery, although the velocity is decreased. The radial   and ulnar arteries are patent, although the velocities are also mildly   decreased.    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 Antegrade flow but low velocity waveforms   in the radial and ulnar arteries    This exam was discussed by the technologist with KENAN Glass at the time   of the exam.    < end of copied text >

## 2018-01-28 DIAGNOSIS — I70.208 UNSPECIFIED ATHEROSCLEROSIS OF NATIVE ARTERIES OF EXTREMITIES, OTHER EXTREMITY: ICD-10-CM

## 2018-01-28 LAB
ANION GAP SERPL CALC-SCNC: 16 MMOL/L — SIGNIFICANT CHANGE UP (ref 5–17)
BUN SERPL-MCNC: 10 MG/DL — SIGNIFICANT CHANGE UP (ref 7–23)
CALCIUM SERPL-MCNC: 9 MG/DL — SIGNIFICANT CHANGE UP (ref 8.4–10.5)
CHLORIDE SERPL-SCNC: 101 MMOL/L — SIGNIFICANT CHANGE UP (ref 96–108)
CO2 SERPL-SCNC: 24 MMOL/L — SIGNIFICANT CHANGE UP (ref 22–31)
CREAT SERPL-MCNC: 0.46 MG/DL — LOW (ref 0.5–1.3)
GLUCOSE BLDC GLUCOMTR-MCNC: 109 MG/DL — HIGH (ref 70–99)
GLUCOSE BLDC GLUCOMTR-MCNC: 138 MG/DL — HIGH (ref 70–99)
GLUCOSE BLDC GLUCOMTR-MCNC: 84 MG/DL — SIGNIFICANT CHANGE UP (ref 70–99)
GLUCOSE BLDC GLUCOMTR-MCNC: 99 MG/DL — SIGNIFICANT CHANGE UP (ref 70–99)
GLUCOSE SERPL-MCNC: 83 MG/DL — SIGNIFICANT CHANGE UP (ref 70–99)
HCT VFR BLD CALC: 34.4 % — LOW (ref 34.5–45)
HGB BLD-MCNC: 10.9 G/DL — LOW (ref 11.5–15.5)
MCHC RBC-ENTMCNC: 28.8 PG — SIGNIFICANT CHANGE UP (ref 27–34)
MCHC RBC-ENTMCNC: 31.7 GM/DL — LOW (ref 32–36)
MCV RBC AUTO: 90.8 FL — SIGNIFICANT CHANGE UP (ref 80–100)
PHENYTOIN FREE SERPL-MCNC: 21 UG/ML — HIGH (ref 10–20)
PLATELET # BLD AUTO: 410 K/UL — HIGH (ref 150–400)
POTASSIUM SERPL-MCNC: 3.8 MMOL/L — SIGNIFICANT CHANGE UP (ref 3.5–5.3)
POTASSIUM SERPL-SCNC: 3.8 MMOL/L — SIGNIFICANT CHANGE UP (ref 3.5–5.3)
RBC # BLD: 3.79 M/UL — LOW (ref 3.8–5.2)
RBC # FLD: 14.6 % — HIGH (ref 10.3–14.5)
SODIUM SERPL-SCNC: 141 MMOL/L — SIGNIFICANT CHANGE UP (ref 135–145)
WBC # BLD: 15.74 K/UL — HIGH (ref 3.8–10.5)
WBC # FLD AUTO: 15.74 K/UL — HIGH (ref 3.8–10.5)

## 2018-01-28 PROCEDURE — 99233 SBSQ HOSP IP/OBS HIGH 50: CPT

## 2018-01-28 RX ORDER — DEXAMETHASONE 0.5 MG/5ML
3 ELIXIR ORAL EVERY 8 HOURS
Qty: 0 | Refills: 0 | Status: DISCONTINUED | OUTPATIENT
Start: 2018-01-28 | End: 2018-01-29

## 2018-01-28 RX ORDER — ACETAMINOPHEN 500 MG
650 TABLET ORAL EVERY 6 HOURS
Qty: 0 | Refills: 0 | Status: DISCONTINUED | OUTPATIENT
Start: 2018-01-28 | End: 2018-01-31

## 2018-01-28 RX ORDER — ALPRAZOLAM 0.25 MG
0.25 TABLET ORAL ONCE
Qty: 0 | Refills: 0 | Status: DISCONTINUED | OUTPATIENT
Start: 2018-01-28 | End: 2018-01-28

## 2018-01-28 RX ORDER — DIAZEPAM 5 MG
2 TABLET ORAL ONCE
Qty: 0 | Refills: 0 | Status: DISCONTINUED | OUTPATIENT
Start: 2018-01-28 | End: 2018-01-28

## 2018-01-28 RX ADMIN — PANTOPRAZOLE SODIUM 40 MILLIGRAM(S): 20 TABLET, DELAYED RELEASE ORAL at 11:10

## 2018-01-28 RX ADMIN — ENOXAPARIN SODIUM 40 MILLIGRAM(S): 100 INJECTION SUBCUTANEOUS at 21:08

## 2018-01-28 RX ADMIN — Medication 0.25 MILLIGRAM(S): at 21:34

## 2018-01-28 RX ADMIN — Medication 650 MILLIGRAM(S): at 18:15

## 2018-01-28 RX ADMIN — Medication 650 MILLIGRAM(S): at 18:43

## 2018-01-28 RX ADMIN — Medication 3 MILLIGRAM(S): at 13:00

## 2018-01-28 RX ADMIN — Medication 100 MILLIGRAM(S): at 21:07

## 2018-01-28 RX ADMIN — OXYCODONE HYDROCHLORIDE 5 MILLIGRAM(S): 5 TABLET ORAL at 00:20

## 2018-01-28 RX ADMIN — SIMVASTATIN 10 MILLIGRAM(S): 20 TABLET, FILM COATED ORAL at 21:07

## 2018-01-28 RX ADMIN — LEVETIRACETAM 500 MILLIGRAM(S): 250 TABLET, FILM COATED ORAL at 17:29

## 2018-01-28 RX ADMIN — Medication 100 MILLIGRAM(S): at 05:30

## 2018-01-28 RX ADMIN — Medication 3 MILLIGRAM(S): at 06:07

## 2018-01-28 RX ADMIN — Medication 100 MILLIGRAM(S): at 13:00

## 2018-01-28 RX ADMIN — Medication 2 MILLIGRAM(S): at 00:15

## 2018-01-28 RX ADMIN — LISINOPRIL 40 MILLIGRAM(S): 2.5 TABLET ORAL at 05:32

## 2018-01-28 RX ADMIN — Medication 3 MILLIGRAM(S): at 21:07

## 2018-01-28 RX ADMIN — LEVETIRACETAM 500 MILLIGRAM(S): 250 TABLET, FILM COATED ORAL at 05:30

## 2018-01-28 NOTE — PROGRESS NOTE ADULT - SUBJECTIVE AND OBJECTIVE BOX
Visit Summary: Patient seen and evaluated at bedside. She had some episodes of disorientation overnight, but is currently awake, alert, and calmly eating breakfast in bed. Patient denies any HA or pain, and remembers that she was disoriented overnight. Her LUE remains warm w/ very trace pulse palpable in radial artery.    Exam:  T(C): 36.3 (01-28-18 @ 09:05), Max: 37.2 (01-27-18 @ 16:08)  HR: 95 (01-28-18 @ 09:05) (92 - 112)  BP: 176/91 (01-28-18 @ 09:05) (96/57 - 176/91)  RR: 18 (01-28-18 @ 09:05) (16 - 20)  SpO2: 98% (01-28-18 @ 09:05) (96% - 100%)  Wt(kg): --    AAOx3, EOS, FC  PERRL, EOMI, Face Symmetrical, Speech Fluent   Moving all extremities  Right side 5/5  Left upper 4/5  LLE 3/5 proximal, 0/5 distal (DF, PF, EHL)   decreased positional sense in the LLE                          12.4   20.7  )-----------( 326      ( 26 Jan 2018 22:38 )             36.0     01-28    141  |  101  |  10  ----------------------------<  83  3.8   |  24  |  0.46<L>    Ca    9.0      28 Jan 2018 08:13  Phos  4.0     01-26  Mg     2.2     01-26    TPro  5.8<L>  /  Alb  3.2<L>  /  TBili  0.2  /  DBili  <0.1  /  AST  40  /  ALT  40  /  AlkPhos  69  01-26

## 2018-01-28 NOTE — PROGRESS NOTE ADULT - ASSESSMENT
77F h/o HTN, HLD, breast CA diagnosed Nov 2017 s/p L mastectomy 2 weeks ago at [facility] and with negative PET scan, admitted to [facility] 1/16/18 after seizure, found to have multiple brain lesions

## 2018-01-28 NOTE — PROGRESS NOTE ADULT - PROBLEM SELECTOR PLAN 5
-continue Zocor 10mg qhs  -. - Continue Zocor 10mg qHS - Discontinue IV fluids  - Continue lisinopril 40 mg PO daily

## 2018-01-28 NOTE — PROGRESS NOTE ADULT - PROBLEM SELECTOR PLAN 6
-likely due to steroids, no s/s of infection   -monitor WBC. - Likely due to steroids, no s/s of infection   - Monitor leukocytosis - Continue Zocor 10mg qHS

## 2018-01-28 NOTE — PROGRESS NOTE ADULT - PROBLEM SELECTOR PLAN 4
-controlled, continue ACE. - Discontinue IV fluids  - Continue lisinopril 40 mg PO daily - Recently diagnosed breast CA s/p recent L mastectomy 2 weeks ago  - Need to clarify with surgery when staples can be removed

## 2018-01-28 NOTE — OCCUPATIONAL THERAPY INITIAL EVALUATION ADULT - PERTINENT HX OF CURRENT PROBLEM, REHAB EVAL
PMHx: Breast ca (s/p L breast rxn x2wk ago at [facility]), recent neg PET scan, BIBA to [facility] s/p 30min sz (L facial twitch), with associated L-sided facial droop on 1/16. found to have multiple brain lesions (not classic for breast CA mets, possible GBM). transferred to [facility] for bx vs resection. MRI brain 1/17 at [facility]: Multiple enhancing lesions posterior R frontal & parietal lobes which may represent metastases, but GBM could have similar appearance

## 2018-01-28 NOTE — PROGRESS NOTE ADULT - SUBJECTIVE AND OBJECTIVE BOX
Farhad Lehman MD  ([facility] Hospitalist)  Pager [phone]  (During off hours please page [phone])      Patient is a 77y old  Female who presents with a chief complaint of s/p seizure, newly diagnosed brain mets, h/o breast CA (23 Jan 2018 18:40)      SUBJECTIVE / OVERNIGHT EVENTS: No events overnight. No complaints.     MEDICATIONS  (STANDING):  acetaminophen    Suspension. 650 milliGRAM(s) Oral every 6 hours  dexamethasone     Tablet 3 milliGRAM(s) Oral every 8 hours  docusate sodium 100 milliGRAM(s) Oral three times a day  enoxaparin Injectable 40 milliGRAM(s) SubCutaneous at bedtime  insulin lispro (HumaLOG) corrective regimen sliding scale   SubCutaneous three times a day before meals  insulin lispro (HumaLOG) corrective regimen sliding scale   SubCutaneous at bedtime  levETIRAcetam 500 milliGRAM(s) Oral two times a day  lisinopril 40 milliGRAM(s) Oral daily  pantoprazole    Tablet 40 milliGRAM(s) Oral daily  phenytoin   Capsule 100 milliGRAM(s) Oral three times a day  simvastatin 10 milliGRAM(s) Oral at bedtime  sodium chloride 0.9%. 1000 milliLiter(s) (100 mL/Hr) IV Continuous <Continuous>    MEDICATIONS  (PRN):  glucagon  Injectable 1 milliGRAM(s) IntraMuscular once PRN Glucose LESS THAN 70 milligrams/deciliter  ondansetron Injectable 8 milliGRAM(s) IV Push every 8 hours PRN Nausea and/or Vomiting  oxyCODONE    IR 5 milliGRAM(s) Oral every 4 hours PRN Moderate Pain (4 - 6)  oxyCODONE    IR 10 milliGRAM(s) Oral every 4 hours PRN Severe Pain (7 - 10)  senna 2 Tablet(s) Oral at bedtime PRN Constipation      Vital Signs Last 24 Hrs  T(C): 36.3 (28 Jan 2018 09:05), Max: 37.2 (27 Jan 2018 16:08)  T(F): 97.4 (28 Jan 2018 09:05), Max: 98.9 (27 Jan 2018 16:08)  HR: 95 (28 Jan 2018 09:05) (92 - 112)  BP: 176/91 (28 Jan 2018 09:05) (96/57 - 176/91)  BP(mean): 78 (27 Jan 2018 12:00) (71 - 78)  RR: 18 (28 Jan 2018 09:05) (16 - 20)  SpO2: 98% (28 Jan 2018 09:05) (96% - 100%)  CAPILLARY BLOOD GLUCOSE      POCT Blood Glucose.: 84 mg/dL (28 Jan 2018 08:59)  POCT Blood Glucose.: 90 mg/dL (27 Jan 2018 21:18)  POCT Blood Glucose.: 120 mg/dL (27 Jan 2018 18:04)  POCT Blood Glucose.: 135 mg/dL (27 Jan 2018 11:39)    I&O's Summary    27 Jan 2018 07:01  -  28 Jan 2018 07:00  --------------------------------------------------------  IN: 2130 mL / OUT: 570 mL / NET: 1560 mL      PHYSICAL EXAM:  GENERAL: NAD, well-developed  HEAD:  post-op dressing in place  EYES: EOMI, PERRLA, conjunctiva and sclera clear  NECK: Supple, No JVD  CHEST/LUNG: Clear to auscultation bilaterally; No wheeze. S/P L mastectomy, staples in place- site c/d/i   HEART: Regular rate and rhythm; No murmurs, rubs, or gallops  ABDOMEN: Soft, Nontender, Nondistended; Bowel sounds present  PSYCH: AAOx3  NEUROLOGY: CN's intact, LUE 4/5 strength, RUE 5/5 strength. 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 Antegrade flow but low velocity waveforms in the radial and ulnar arteries  CT Head (1/27): Interval post high parietal craniotomy for surgical biopsy of mesial right parietal lobe mass, with expected postsurgical changes. Persistent moderate right parietal lobe vasogenic edema. No midline shift or hydrocephalus.    Consultant(s) Notes Reviewed: Neurosurgery, Vascular surgery    Care Discussed with Consultants/Other Providers: Neurosurgery re: plan of care Farhad Lehman MD  ([facility] Hospitalist)  Pager [phone]  (During off hours please page [phone])      Patient is a 77y old  Female who presents with a chief complaint of s/p seizure, newly diagnosed brain mets, h/o breast CA (23 Jan 2018 18:40)      SUBJECTIVE / OVERNIGHT EVENTS: No events overnight. No complaints.     MEDICATIONS  (STANDING):  enoxaparin Injectable 40 milliGRAM(s) SubCutaneous at bedtime  sodium chloride 0.9%. 1000 milliLiter(s) (100 mL/Hr) IV Continuous  acetaminophen    Suspension. 650 milliGRAM(s) Oral every 6 hours  dexamethasone     Tablet 3 milliGRAM(s) Oral every 8 hours  levETIRAcetam 500 milliGRAM(s) Oral two times a day  phenytoin   Capsule 100 milliGRAM(s) Oral three times a day  insulin lispro (HumaLOG) corrective regimen sliding scale   SubCutaneous three times a day before meals  insulin lispro (HumaLOG) corrective regimen sliding scale   SubCutaneous at bedtime  lisinopril 40 milliGRAM(s) Oral daily  simvastatin 10 milliGRAM(s) Oral at bedtime  pantoprazole    Tablet 40 milliGRAM(s) Oral daily  docusate sodium 100 milliGRAM(s) Oral three times a day      MEDICATIONS  (PRN):  glucagon  Injectable 1 milliGRAM(s) IntraMuscular once PRN Glucose LESS THAN 70 milligrams/deciliter  ondansetron Injectable 8 milliGRAM(s) IV Push every 8 hours PRN Nausea and/or Vomiting  oxyCODONE    IR 5 milliGRAM(s) Oral every 4 hours PRN Moderate Pain (4 - 6)  oxyCODONE    IR 10 milliGRAM(s) Oral every 4 hours PRN Severe Pain (7 - 10)  senna 2 Tablet(s) Oral at bedtime PRN Constipation      Vital Signs Last 24 Hrs  T(C): 36.3 (28 Jan 2018 09:05), Max: 37.2 (27 Jan 2018 16:08)  T(F): 97.4 (28 Jan 2018 09:05), Max: 98.9 (27 Jan 2018 16:08)  HR: 95 (28 Jan 2018 09:05) (92 - 112)  BP: 176/91 (28 Jan 2018 09:05) (96/57 - 176/91)  BP(mean): 78 (27 Jan 2018 12:00) (71 - 78)  RR: 18 (28 Jan 2018 09:05) (16 - 20)  SpO2: 98% (28 Jan 2018 09:05) (96% - 100%)  CAPILLARY BLOOD GLUCOSE      POCT Blood Glucose.: 84 mg/dL (28 Jan 2018 08:59)  POCT Blood Glucose.: 90 mg/dL (27 Jan 2018 21:18)  POCT Blood Glucose.: 120 mg/dL (27 Jan 2018 18:04)  POCT Blood Glucose.: 135 mg/dL (27 Jan 2018 11:39)    I&O's Summary    27 Jan 2018 07:01  -  28 Jan 2018 07:00  --------------------------------------------------------  IN: 2130 mL / OUT: 570 mL / NET: 1560 mL      PHYSICAL EXAM:  GENERAL: NAD, well-developed  HEAD:  post-op dressing in place  EYES: EOMI, PERRLA, conjunctiva and sclera clear  NECK: Supple, No JVD  CHEST/LUNG: Clear to auscultation bilaterally; No wheeze. S/P L mastectomy, staples in place- site c/d/i   HEART: Regular rate and rhythm; No murmurs, rubs, or gallops  ABDOMEN: Soft, Nontender, Nondistended; Bowel sounds present  PSYCH: AAOx3  NEUROLOGY: CN's intact, LUE 4/5 strength, RUE 5/5 strength. 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 Antegrade flow but low velocity waveforms in the radial and ulnar arteries  CT Head (1/27): Interval post high parietal craniotomy for surgical biopsy of mesial right parietal lobe mass, with expected postsurgical changes. Persistent moderate right parietal lobe vasogenic edema. No midline shift or hydrocephalus.    Consultant(s) Notes Reviewed: Neurosurgery, Vascular surgery    Care Discussed with Consultants/Other Providers: Neurosurgery re: plan of care

## 2018-01-28 NOTE — OCCUPATIONAL THERAPY INITIAL EVALUATION ADULT - RANGE OF MOTION EXAMINATION, UPPER EXTREMITY
CHICHO FULTON shoulder flexion 90 (obeying s/p breast reconstructive sx), elbow/wrist/digits WFL/Right UE Active ROM was WFL (within functional limits)

## 2018-01-28 NOTE — PROGRESS NOTE ADULT - PROBLEM SELECTOR PLAN 3
-recently diagnosed breast CA s/p recent L mastectomy 2 weeks ago  -need to clarify with surgery when staples can be removed - Recently diagnosed breast CA s/p recent L mastectomy 2 weeks ago  - Need to clarify with surgery when staples can be removed - Likely due to central pathology  - Continue Keppra 500mg PO BID, phenytoin 100mg PO TID  - Follow up phenytoin levels

## 2018-01-28 NOTE — PROGRESS NOTE ADULT - ASSESSMENT
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

## 2018-01-28 NOTE — OCCUPATIONAL THERAPY INITIAL EVALUATION ADULT - RANGE OF MOTION EXAMINATION, LOWER EXTREMITY
Right LE Active ROM was WFL   (within functional limits)/Left LE Active Assistive ROM was WFL (within functional limits)

## 2018-01-28 NOTE — PROGRESS NOTE ADULT - SUBJECTIVE AND OBJECTIVE BOX
Vascular Surgery Progress Note     Subjective/24hour Events: No acute events overnight last night    Vital Signs:  Vital Signs Last 24 Hrs  T(C): 36.3 (28 Jan 2018 09:05), Max: 37.2 (27 Jan 2018 16:08)  T(F): 97.4 (28 Jan 2018 09:05), Max: 98.9 (27 Jan 2018 16:08)  HR: 95 (28 Jan 2018 09:05) (92 - 112)  BP: 176/91 (28 Jan 2018 09:05) (96/57 - 176/91)  BP(mean): 78 (27 Jan 2018 12:00) (71 - 78)  RR: 18 (28 Jan 2018 09:05) (16 - 20)  SpO2: 98% (28 Jan 2018 09:05) (96% - 100%)    CAPILLARY BLOOD GLUCOSE      POCT Blood Glucose.: 84 mg/dL (28 Jan 2018 08:59)  POCT Blood Glucose.: 90 mg/dL (27 Jan 2018 21:18)  POCT Blood Glucose.: 120 mg/dL (27 Jan 2018 18:04)  POCT Blood Glucose.: 135 mg/dL (27 Jan 2018 11:39)      I&O's Detail    27 Jan 2018 07:01  -  28 Jan 2018 07:00  --------------------------------------------------------  IN:    IV PiggyBack: 50 mL    Oral Fluid: 680 mL    sodium chloride 0.9%: 600 mL    sodium chloride 0.9%.: 800 mL  Total IN: 2130 mL    OUT:    Indwelling Catheter - Urethral: 570 mL  Total OUT: 570 mL    Total NET: 1560 mL            MEDICATIONS  (STANDING):  acetaminophen    Suspension. 650 milliGRAM(s) Oral every 6 hours  dexamethasone     Tablet 3 milliGRAM(s) Oral every 8 hours  dextrose 50% Injectable 12.5 Gram(s) IV Push once  dextrose 50% Injectable 25 Gram(s) IV Push once  dextrose 50% Injectable 25 Gram(s) IV Push once  docusate sodium 100 milliGRAM(s) Oral three times a day  enoxaparin Injectable 40 milliGRAM(s) SubCutaneous at bedtime  insulin lispro (HumaLOG) corrective regimen sliding scale   SubCutaneous three times a day before meals  insulin lispro (HumaLOG) corrective regimen sliding scale   SubCutaneous at bedtime  levETIRAcetam 500 milliGRAM(s) Oral two times a day  lisinopril 40 milliGRAM(s) Oral daily  pantoprazole    Tablet 40 milliGRAM(s) Oral daily  phenytoin   Capsule 100 milliGRAM(s) Oral three times a day  simvastatin 10 milliGRAM(s) Oral at bedtime  sodium chloride 0.9%. 1000 milliLiter(s) (100 mL/Hr) IV Continuous <Continuous>    MEDICATIONS  (PRN):  glucagon  Injectable 1 milliGRAM(s) IntraMuscular once PRN Glucose LESS THAN 70 milligrams/deciliter  ondansetron Injectable 8 milliGRAM(s) IV Push every 8 hours PRN Nausea and/or Vomiting  oxyCODONE    IR 5 milliGRAM(s) Oral every 4 hours PRN Moderate Pain (4 - 6)  oxyCODONE    IR 10 milliGRAM(s) Oral every 4 hours PRN Severe Pain (7 - 10)  senna 2 Tablet(s) Oral at bedtime PRN Constipation        Physical Exam:  Gen: NAD  Ext: RUE warm to touch, Sensation and motor strength intact; (+)radial and ulnar dopplerable signal RUE; (-)c/o pain or tenderness with extension or flexion of RUE      Labs:    01-28    141  |  101  |  10  ----------------------------<  83  3.8   |  24  |  0.46<L>    Ca    9.0      28 Jan 2018 08:13  Phos  4.0     01-26  Mg     2.2     01-26    TPro  5.8<L>  /  Alb  3.2<L>  /  TBili  0.2  /  DBili  <0.1  /  AST  40  /  ALT  40  /  AlkPhos  69  01-26    LIVER FUNCTIONS - ( 26 Jan 2018 21:13 )  Alb: 3.2 g/dL / Pro: 5.8 g/dL / ALK PHOS: 69 U/L / ALT: 40 U/L RC / AST: 40 U/L / GGT: x                                 12.4   20.7  )-----------( 326      ( 26 Jan 2018 22:38 )             36.0               Imaging:

## 2018-01-28 NOTE — OCCUPATIONAL THERAPY INITIAL EVALUATION ADULT - LIVES WITH, PROFILE
children/spouse spouse/children/Pt lives in house with  and son, 4 steps to enter+b/l rail, 1 flight of stairs inside house, tub shower. Pt previously independent in all ADLs/IADLs, glasses for reading and distance, and R handed.

## 2018-01-28 NOTE — PROGRESS NOTE ADULT - PROBLEM SELECTOR PLAN 7
-likely d/t IVF with KCL, KCL discontinued and hyperkalemia resolved   -repeat potassium level 4.2 - Likely due to steroids, no s/s of infection   - Monitor leukocytosis

## 2018-01-28 NOTE — PROGRESS NOTE ADULT - PROBLEM SELECTOR PROBLEM 3
Malignant neoplasm of left female breast, unspecified estrogen receptor status, unspecified site of breast Seizure

## 2018-01-28 NOTE — PROGRESS NOTE ADULT - PROBLEM SELECTOR PROBLEM 4
Essential hypertension Malignant neoplasm of left female breast, unspecified estrogen receptor status, unspecified site of breast

## 2018-01-28 NOTE — OCCUPATIONAL THERAPY INITIAL EVALUATION ADULT - DIAGNOSIS, OT EVAL
Pt demonstrated decreased strength (more prominent in LUE/LE), balance, functional mobility, ?L inattention, and ADLs

## 2018-01-28 NOTE — PROGRESS NOTE ADULT - PROBLEM SELECTOR PLAN 2
-likely due to new brain lesions  -continue Dilantin. - Likely due to central pathology  - Continue Keppra 500mg PO BID, phenytoin 100mg PO TID  - Follow up phenytoin levels - Vascular surgery following  - High risk for anticoagulation given recent brain surgery. To be initiated when cleared per NSx team

## 2018-01-28 NOTE — PROGRESS NOTE ADULT - PROBLEM SELECTOR PLAN 1
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 Pathology pending.  - Post-op care per neurosurgery team.  - CT C/A/P negative for malignancy   - On dexamethasone 3 mg PO q8h, Keppra 500mg PO BID, phenytoin 100mg PO TID  - PT and OT follow up.

## 2018-01-28 NOTE — PROGRESS NOTE ADULT - ASSESSMENT
77F POD#2 s/p L crani for resection of mass. She was disoriented overnight but is now much better and fully aware/appropriately interactive.     q4 neuro checks  continue AEDs, taper decadron  f/u MRI   PT/OT/PMR  continue vascular observation of CHICHO

## 2018-01-28 NOTE — OCCUPATIONAL THERAPY INITIAL EVALUATION ADULT - PLANNED THERAPY INTERVENTIONS, OT EVAL
ADL retraining/balance training/neuromuscular re-education/transfer training/bed mobility training/strengthening

## 2018-01-29 DIAGNOSIS — G47.00 INSOMNIA, UNSPECIFIED: ICD-10-CM

## 2018-01-29 LAB
ANION GAP SERPL CALC-SCNC: 12 MMOL/L — SIGNIFICANT CHANGE UP (ref 5–17)
BUN SERPL-MCNC: 12 MG/DL — SIGNIFICANT CHANGE UP (ref 7–23)
CALCIUM SERPL-MCNC: 9.3 MG/DL — SIGNIFICANT CHANGE UP (ref 8.4–10.5)
CHLORIDE SERPL-SCNC: 102 MMOL/L — SIGNIFICANT CHANGE UP (ref 96–108)
CO2 SERPL-SCNC: 26 MMOL/L — SIGNIFICANT CHANGE UP (ref 22–31)
CREAT SERPL-MCNC: 0.37 MG/DL — LOW (ref 0.5–1.3)
GLUCOSE BLDC GLUCOMTR-MCNC: 124 MG/DL — HIGH (ref 70–99)
GLUCOSE BLDC GLUCOMTR-MCNC: 135 MG/DL — HIGH (ref 70–99)
GLUCOSE BLDC GLUCOMTR-MCNC: 155 MG/DL — HIGH (ref 70–99)
GLUCOSE BLDC GLUCOMTR-MCNC: 93 MG/DL — SIGNIFICANT CHANGE UP (ref 70–99)
GLUCOSE BLDC GLUCOMTR-MCNC: 93 MG/DL — SIGNIFICANT CHANGE UP (ref 70–99)
GLUCOSE SERPL-MCNC: 129 MG/DL — HIGH (ref 70–99)
HCT VFR BLD CALC: 33 % — LOW (ref 34.5–45)
HGB BLD-MCNC: 11.5 G/DL — SIGNIFICANT CHANGE UP (ref 11.5–15.5)
MCHC RBC-ENTMCNC: 31.8 PG — SIGNIFICANT CHANGE UP (ref 27–34)
MCHC RBC-ENTMCNC: 34.7 GM/DL — SIGNIFICANT CHANGE UP (ref 32–36)
MCV RBC AUTO: 91.6 FL — SIGNIFICANT CHANGE UP (ref 80–100)
PHENYTOIN FREE SERPL-MCNC: 17.4 UG/ML — SIGNIFICANT CHANGE UP (ref 10–20)
PLATELET # BLD AUTO: 463 K/UL — HIGH (ref 150–400)
POTASSIUM SERPL-MCNC: 3.8 MMOL/L — SIGNIFICANT CHANGE UP (ref 3.5–5.3)
POTASSIUM SERPL-SCNC: 3.8 MMOL/L — SIGNIFICANT CHANGE UP (ref 3.5–5.3)
RBC # BLD: 3.6 M/UL — LOW (ref 3.8–5.2)
RBC # FLD: 12.4 % — SIGNIFICANT CHANGE UP (ref 10.3–14.5)
SODIUM SERPL-SCNC: 140 MMOL/L — SIGNIFICANT CHANGE UP (ref 135–145)
WBC # BLD: 10.3 K/UL — SIGNIFICANT CHANGE UP (ref 3.8–10.5)
WBC # FLD AUTO: 10.3 K/UL — SIGNIFICANT CHANGE UP (ref 3.8–10.5)

## 2018-01-29 PROCEDURE — 99222 1ST HOSP IP/OBS MODERATE 55: CPT | Mod: GC

## 2018-01-29 PROCEDURE — 99233 SBSQ HOSP IP/OBS HIGH 50: CPT

## 2018-01-29 RX ORDER — ALPRAZOLAM 0.25 MG
0.25 TABLET ORAL ONCE
Qty: 0 | Refills: 0 | Status: DISCONTINUED | OUTPATIENT
Start: 2018-01-29 | End: 2018-01-29

## 2018-01-29 RX ORDER — DIPHENHYDRAMINE HCL 50 MG
25 CAPSULE ORAL DAILY
Qty: 0 | Refills: 0 | Status: DISCONTINUED | OUTPATIENT
Start: 2018-01-29 | End: 2018-01-30

## 2018-01-29 RX ORDER — POTASSIUM CHLORIDE 20 MEQ
20 PACKET (EA) ORAL ONCE
Qty: 0 | Refills: 0 | Status: COMPLETED | OUTPATIENT
Start: 2018-01-29 | End: 2018-01-29

## 2018-01-29 RX ORDER — DEXAMETHASONE 0.5 MG/5ML
2 ELIXIR ORAL EVERY 8 HOURS
Qty: 0 | Refills: 0 | Status: DISCONTINUED | OUTPATIENT
Start: 2018-01-29 | End: 2018-01-31

## 2018-01-29 RX ORDER — TRAZODONE HCL 50 MG
50 TABLET ORAL AT BEDTIME
Qty: 0 | Refills: 0 | Status: DISCONTINUED | OUTPATIENT
Start: 2018-01-29 | End: 2018-01-30

## 2018-01-29 RX ADMIN — Medication 0.25 MILLIGRAM(S): at 21:51

## 2018-01-29 RX ADMIN — LISINOPRIL 40 MILLIGRAM(S): 2.5 TABLET ORAL at 05:33

## 2018-01-29 RX ADMIN — Medication 2 MILLIGRAM(S): at 21:19

## 2018-01-29 RX ADMIN — SIMVASTATIN 10 MILLIGRAM(S): 20 TABLET, FILM COATED ORAL at 21:20

## 2018-01-29 RX ADMIN — LEVETIRACETAM 500 MILLIGRAM(S): 250 TABLET, FILM COATED ORAL at 05:33

## 2018-01-29 RX ADMIN — Medication 3 MILLIGRAM(S): at 05:33

## 2018-01-29 RX ADMIN — Medication 20 MILLIEQUIVALENT(S): at 11:45

## 2018-01-29 RX ADMIN — Medication 100 MILLIGRAM(S): at 21:20

## 2018-01-29 RX ADMIN — Medication 100 MILLIGRAM(S): at 16:24

## 2018-01-29 RX ADMIN — Medication 0.25 MILLIGRAM(S): at 03:33

## 2018-01-29 RX ADMIN — Medication 650 MILLIGRAM(S): at 03:22

## 2018-01-29 RX ADMIN — PANTOPRAZOLE SODIUM 40 MILLIGRAM(S): 20 TABLET, DELAYED RELEASE ORAL at 11:46

## 2018-01-29 RX ADMIN — Medication 25 MILLIGRAM(S): at 21:20

## 2018-01-29 RX ADMIN — Medication 1: at 12:40

## 2018-01-29 RX ADMIN — ENOXAPARIN SODIUM 40 MILLIGRAM(S): 100 INJECTION SUBCUTANEOUS at 21:20

## 2018-01-29 RX ADMIN — Medication 100 MILLIGRAM(S): at 05:34

## 2018-01-29 RX ADMIN — Medication 100 MILLIGRAM(S): at 16:22

## 2018-01-29 RX ADMIN — Medication 2 MILLIGRAM(S): at 16:23

## 2018-01-29 RX ADMIN — Medication 100 MILLIGRAM(S): at 05:33

## 2018-01-29 RX ADMIN — Medication 650 MILLIGRAM(S): at 03:50

## 2018-01-29 NOTE — PHYSICAL THERAPY INITIAL EVALUATION ADULT - PRECAUTIONS/LIMITATIONS, REHAB EVAL
MRI brain 1/17 at [facility]: Multiple enhancing lesions posterior R frontal & parietal lobes which may represent metastases, but GBM could have similar appearance. repeat brain MRI on 1/24/18 at [facility]: R frontal parietal dural based mass with surrounding edema/fall precautions

## 2018-01-29 NOTE — PHYSICAL THERAPY INITIAL EVALUATION ADULT - DIAGNOSIS, PT EVAL
decreased functional mobility due to weakness and inattention to L side
decreased functional mobility due to weakness

## 2018-01-29 NOTE — PROGRESS NOTE ADULT - SUBJECTIVE AND OBJECTIVE BOX
SUBJECTIVE: Patient was seen and evaluated at bedside. Patient is resting comfortably in bed and is in no new acute distress.     OVERNIGHT EVENTS:  none     Vital Signs Last 24 Hrs  T(C): 36.3 (29 Jan 2018 08:01), Max: 36.9 (28 Jan 2018 23:30)  T(F): 97.4 (29 Jan 2018 08:01), Max: 98.5 (28 Jan 2018 23:30)  HR: 87 (29 Jan 2018 08:01) (68 - 98)  BP: 151/87 (29 Jan 2018 08:01) (105/69 - 157/73)  BP(mean): --  RR: 18 (29 Jan 2018 08:01) (18 - 18)  SpO2: 96% (29 Jan 2018 08:01) (95% - 97%)    PHYSICAL EXAM:    General: No Acute Distress     Neurological: Awake, alert oriented to person, place and time, Following Commands, no sensory deficits, Motor- lue-bicept/tricept/deltoid- 3/5 , distal  3/5 , lle proximal-3/5 , distal df/pf 1/5 , righ side wnl   Pulmonary: Clear to Auscultation, No Rales, No Rhonchi, No Wheezes     Cardiovascular: S1, S2, Regular Rate and Rhythm     Gastrointestinal: Soft, Nontender, Nondistended     Extremities: No calf tenderness     Incision: clean and dry      LABS:                        11.5   10.3  )-----------( 463      ( 29 Jan 2018 10:50 )             33.0    01-29    140  |  102  |  12  ----------------------------<  129<H>  3.8   |  26  |  0.37<L>    Ca    9.3      29 Jan 2018 10:50      Hemoglobin A1C, Whole Blood: 5.7 % (01-17 @ 10:02)      01-28 @ 07:01  -  01-29 @ 07:00  --------------------------------------------------------  IN: 120 mL / OUT: 200 mL / NET: -80 mL      DRAINS:     MEDICATIONS:  Antibiotics:    Neuro:  acetaminophen    Suspension. 650 milliGRAM(s) Oral every 6 hours  acetaminophen   Tablet. 650 milliGRAM(s) Oral every 6 hours PRN Mild Pain (1 - 3)  ondansetron Injectable 8 milliGRAM(s) IV Push every 8 hours PRN Nausea and/or Vomiting  oxyCODONE    IR 5 milliGRAM(s) Oral every 4 hours PRN Moderate Pain (4 - 6)  oxyCODONE    IR 10 milliGRAM(s) Oral every 4 hours PRN Severe Pain (7 - 10)  phenytoin   Capsule 100 milliGRAM(s) Oral three times a day    Cardiac:  lisinopril 40 milliGRAM(s) Oral daily    Pulm:    GI/:  docusate sodium 100 milliGRAM(s) Oral three times a day  pantoprazole    Tablet 40 milliGRAM(s) Oral daily  senna 2 Tablet(s) Oral at bedtime PRN Constipation    Other:   dexamethasone     Tablet 2 milliGRAM(s) Oral every 8 hours  dextrose 50% Injectable 12.5 Gram(s) IV Push once  dextrose 50% Injectable 25 Gram(s) IV Push once  dextrose 50% Injectable 25 Gram(s) IV Push once  enoxaparin Injectable 40 milliGRAM(s) SubCutaneous at bedtime  glucagon  Injectable 1 milliGRAM(s) IntraMuscular once PRN Glucose LESS THAN 70 milligrams/deciliter  insulin lispro (HumaLOG) corrective regimen sliding scale   SubCutaneous three times a day before meals  insulin lispro (HumaLOG) corrective regimen sliding scale   SubCutaneous at bedtime  potassium chloride    Tablet ER 20 milliEquivalent(s) Oral once  simvastatin 10 milliGRAM(s) Oral at bedtime    DIET: [] Regular [] CCD [] Renal [] Puree [] Dysphagia [] Tube Feeds:  regular     IMAGING:  no new imaging.

## 2018-01-29 NOTE — PHYSICAL THERAPY INITIAL EVALUATION ADULT - PHYSICAL ASSIST/NONPHYSICAL ASSIST: SUPINE/SIT, REHAB EVAL
nonverbal cues (demo/gestures)/1 person assist/verbal cues
1 person assist/via rolling to L/nonverbal cues (demo/gestures)/verbal cues

## 2018-01-29 NOTE — PROGRESS NOTE ADULT - SUBJECTIVE AND OBJECTIVE BOX
afeb/VSS.     Felt very anxious, possibly from Keppra, now stopped. Still on DIlantin, no seizures since surgery 3 days ago.    Incision healing well. Calves NT. Alert, oriented, normal language.    L UE 3-4/5, L LE 2/5 proximally, 0/5 at ankle and foot.    MRI: Subtotal removal of tumor as planned.     Path: pending    P) PT/OT/PMR. Patient will need acute inpatient Rehab. After pathology is confirmed, a treatment plan will be finalized, presumably to include RT and some kind of chemotherapy.

## 2018-01-29 NOTE — PROGRESS NOTE ADULT - PROBLEM SELECTOR PLAN 1
s/p resection   1 Out of bed   2 continue pt   3 decadron taper   4 continue dilantin   5 dc keppra   6 dvt ppx sql scds   7 continue lisinopril   8 ot splint   9 dispo :p  10 regular diet   11 stool softeners s/p resection   1 Out of bed   2 continue pt   3 decadron taper   4 continue dilantin   5 dc keppra   6 dvt ppx sql scds   7 continue lisinopril   8 ot splint   9 dispo :p  10 regular diet   11 stool softeners    **No chemo or RT planned while patient is at rehab.

## 2018-01-29 NOTE — PROGRESS NOTE ADULT - ASSESSMENT
77F h/o HTN, HLD, breast CA diagnosed Nov 2017 s/p L mastectomy with LN dissection 2 weeks ago at [facility]SH admitted to [facility] 1/16/18 after seizure, found to have multiple brain lesions on imaging s/p OR on 1/26 77F h/o HTN, HLD, breast CA diagnosed Nov 2017 s/p L mastectomy with LN dissection 2 weeks ago at OSH admitted to [facility] 1/16/18 after seizure, found to have multiple brain lesions on imaging s/p right craniotomy with resection of tumor on 1/26. Course c/b right brachial artery occlusion. 77F h/o HTN, HLD, breast CA diagnosed Nov 2017 s/p L mastectomy with LN dissection 2 weeks ago at OSH admitted to [facility] 1/16/18 after seizure, found to have multiple brain lesions on imaging s/p right craniotomy with resection of tumor on 1/26. Course c/b right brachial artery occlusion.    PMD: Dr. Connie Dumont in [place]

## 2018-01-29 NOTE — PROGRESS NOTE ADULT - PROBLEM SELECTOR PLAN 1
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 Pathology pending.  - Post-op care per neurosurgery team.  - CT C/A/P negative for malignancy   - On dexamethasone 3 mg PO q8h, Keppra 500mg PO BID, phenytoin 100mg PO TID  - PT and OT follow up. 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   - f/u path result  - Post-op care per neurosurgery team.  - CT C/A/P negative for malignancy but reported nonspecific nodular pleural based opacities noted on CT chest. will need close outpatient follow up.  - patient will need neuro-onc follow up as outpatient  - On dexamethasone and phenytoin   - PT and OT follow up.

## 2018-01-29 NOTE — PROGRESS NOTE ADULT - PROBLEM SELECTOR PLAN 5
- Discontinue IV fluids  - Continue lisinopril 40 mg PO daily - Continue lisinopril 40 mg PO daily(patient at home takes ramipril)

## 2018-01-29 NOTE — PHYSICAL THERAPY INITIAL EVALUATION ADULT - PLANNED THERAPY INTERVENTIONS, PT EVAL
balance training/bed mobility training/stair training/transfer training/gait training/strengthening
balance training/bed mobility training/gait training/transfer training/strengthening

## 2018-01-29 NOTE — PROGRESS NOTE ADULT - SUBJECTIVE AND OBJECTIVE BOX
Patient is a 77y old  Female who presents with a chief complaint of s/p seizure, newly diagnosed brain mets, h/o breast CA (23 Jan 2018 18:40)      SUBJECTIVE / OVERNIGHT EVENTS: denies any pain, + left sided weakness. No n/v, ab pain. Patient with difficulty with sleeping at night, not improved with xanax.    MEDICATIONS  (STANDING):  acetaminophen    Suspension. 650 milliGRAM(s) Oral every 6 hours  dexamethasone     Tablet 2 milliGRAM(s) Oral every 8 hours  dextrose 50% Injectable 12.5 Gram(s) IV Push once  dextrose 50% Injectable 25 Gram(s) IV Push once  dextrose 50% Injectable 25 Gram(s) IV Push once  docusate sodium 100 milliGRAM(s) Oral three times a day  enoxaparin Injectable 40 milliGRAM(s) SubCutaneous at bedtime  insulin lispro (HumaLOG) corrective regimen sliding scale   SubCutaneous three times a day before meals  insulin lispro (HumaLOG) corrective regimen sliding scale   SubCutaneous at bedtime  lisinopril 40 milliGRAM(s) Oral daily  pantoprazole    Tablet 40 milliGRAM(s) Oral daily  phenytoin   Capsule 100 milliGRAM(s) Oral three times a day  simvastatin 10 milliGRAM(s) Oral at bedtime    MEDICATIONS  (PRN):  acetaminophen   Tablet. 650 milliGRAM(s) Oral every 6 hours PRN Mild Pain (1 - 3)  glucagon  Injectable 1 milliGRAM(s) IntraMuscular once PRN Glucose LESS THAN 70 milligrams/deciliter  ondansetron Injectable 8 milliGRAM(s) IV Push every 8 hours PRN Nausea and/or Vomiting  oxyCODONE    IR 5 milliGRAM(s) Oral every 4 hours PRN Moderate Pain (4 - 6)  oxyCODONE    IR 10 milliGRAM(s) Oral every 4 hours PRN Severe Pain (7 - 10)  senna 2 Tablet(s) Oral at bedtime PRN Constipation      Vital Signs Last 24 Hrs  T(C): 36.8 (29 Jan 2018 11:53), Max: 36.9 (28 Jan 2018 23:30)  T(F): 98.2 (29 Jan 2018 11:53), Max: 98.5 (28 Jan 2018 23:30)  HR: 103 (29 Jan 2018 11:53) (68 - 103)  BP: 132/83 (29 Jan 2018 11:53) (105/69 - 157/73)  BP(mean): --  RR: 18 (29 Jan 2018 11:53) (18 - 18)  SpO2: 97% (29 Jan 2018 11:53) (95% - 97%)  CAPILLARY BLOOD GLUCOSE      POCT Blood Glucose.: 155 mg/dL (29 Jan 2018 12:26)  POCT Blood Glucose.: 93 mg/dL (29 Jan 2018 08:13)  POCT Blood Glucose.: 93 mg/dL (29 Jan 2018 04:39)  POCT Blood Glucose.: 99 mg/dL (28 Jan 2018 22:14)  POCT Blood Glucose.: 109 mg/dL (28 Jan 2018 17:13)    I&O's Summary    28 Jan 2018 07:01  -  29 Jan 2018 07:00  --------------------------------------------------------  IN: 120 mL / OUT: 200 mL / NET: -80 mL    29 Jan 2018 07:01  -  29 Jan 2018 14:11  --------------------------------------------------------  IN: 180 mL / OUT: 0 mL / NET: 180 mL        PHYSICAL EXAM:  GENERAL: NAD  HEENT: neck supple  LUNG: Clear to auscultation bilaterally; No wheeze  HEART: S1, S2  ABDOMEN: Soft, Nontender, Nondistended; Bowel sounds present  EXTREMITIES: No leg edema  PSYCH: normal affect, calm  NEUROLOGY: AAO x3, LUE/LLE weakness    SKIN: No rashes      LABS:                        11.5   10.3  )-----------( 463      ( 29 Jan 2018 10:50 )             33.0     01-29    140  |  102  |  12  ----------------------------<  129<H>  3.8   |  26  |  0.37<L>    Ca    9.3      29 Jan 2018 10:50                RADIOLOGY & ADDITIONAL TESTS:    Imaging Personally Reviewed:    < from: CT Head No Cont (01.27.18 @ 09:25) >  IMPRESSION:    Interval post high parietal craniotomy for surgical biopsy of mesial   right parietal lobe mass, with expected postsurgical changes. Persistent   moderate right parietal lobe vasogenic edema. No midline shift or   hydrocephalus.    < end of copied text >    < from: MR Brain Stereotactic w/wo IV Cont (01.27.18 @ 19:57) >  IMPRESSION: Patient is status post partial resection of dominant mass in   the right high posterior frontal parietal parasagittal region. There is   residual neoplasm suggested along the periphery of the initial lesion in   the superior and anterolateral aspects of the originally identified   neoplasm. Some hemorrhage is seen at this level as well. The 2 smaller   satellite nodules that enhance are again recognized one posterior and one   inferolateral to the primary lesion site. Patient is status post a right   parietal craniotomy. Residual edema and mass effect is noted in   association with the lesion that has been partially resected. Right   frontal pneumocephalus    < end of copied text >    < from: CT Chest w/ Oral Cont and w/ IV Cont (01.17.18 @ 21:30) >  IMPRESSION:  Postsurgical changes of the left chest identified in this   patient status post mastectomy. No CT findings to suggest intrathoracic,   intra-abdominal or pelvic malignancy. Nonspecific nodular pleural-based   opacities identified as described above for which follow-up CT of the   chest in 3-4 months recommended to establish continuing stability.    < end of copied text >    Consultant(s) Notes Reviewed:  vascular c/s    Care Discussed with Consultants/Other Providers: neurosurgery PA Patient is a 77y old  Female who presents with a chief complaint of s/p seizure, newly diagnosed brain mets, h/o breast CA (23 Jan 2018 18:40)      SUBJECTIVE / OVERNIGHT EVENTS: denies any pain, + left sided weakness. No n/v, ab pain. Patient with difficulty with sleeping at night, not improved with xanax.    MEDICATIONS  (STANDING):  acetaminophen    Suspension. 650 milliGRAM(s) Oral every 6 hours  dexamethasone     Tablet 2 milliGRAM(s) Oral every 8 hours  dextrose 50% Injectable 12.5 Gram(s) IV Push once  dextrose 50% Injectable 25 Gram(s) IV Push once  dextrose 50% Injectable 25 Gram(s) IV Push once  docusate sodium 100 milliGRAM(s) Oral three times a day  enoxaparin Injectable 40 milliGRAM(s) SubCutaneous at bedtime  insulin lispro (HumaLOG) corrective regimen sliding scale   SubCutaneous three times a day before meals  insulin lispro (HumaLOG) corrective regimen sliding scale   SubCutaneous at bedtime  lisinopril 40 milliGRAM(s) Oral daily  pantoprazole    Tablet 40 milliGRAM(s) Oral daily  phenytoin   Capsule 100 milliGRAM(s) Oral three times a day  simvastatin 10 milliGRAM(s) Oral at bedtime    MEDICATIONS  (PRN):  acetaminophen   Tablet. 650 milliGRAM(s) Oral every 6 hours PRN Mild Pain (1 - 3)  glucagon  Injectable 1 milliGRAM(s) IntraMuscular once PRN Glucose LESS THAN 70 milligrams/deciliter  ondansetron Injectable 8 milliGRAM(s) IV Push every 8 hours PRN Nausea and/or Vomiting  oxyCODONE    IR 5 milliGRAM(s) Oral every 4 hours PRN Moderate Pain (4 - 6)  oxyCODONE    IR 10 milliGRAM(s) Oral every 4 hours PRN Severe Pain (7 - 10)  senna 2 Tablet(s) Oral at bedtime PRN Constipation      Vital Signs Last 24 Hrs  T(C): 36.8 (29 Jan 2018 11:53), Max: 36.9 (28 Jan 2018 23:30)  T(F): 98.2 (29 Jan 2018 11:53), Max: 98.5 (28 Jan 2018 23:30)  HR: 103 (29 Jan 2018 11:53) (68 - 103)  BP: 132/83 (29 Jan 2018 11:53) (105/69 - 157/73)  BP(mean): --  RR: 18 (29 Jan 2018 11:53) (18 - 18)  SpO2: 97% (29 Jan 2018 11:53) (95% - 97%)  CAPILLARY BLOOD GLUCOSE      POCT Blood Glucose.: 155 mg/dL (29 Jan 2018 12:26)  POCT Blood Glucose.: 93 mg/dL (29 Jan 2018 08:13)  POCT Blood Glucose.: 93 mg/dL (29 Jan 2018 04:39)  POCT Blood Glucose.: 99 mg/dL (28 Jan 2018 22:14)  POCT Blood Glucose.: 109 mg/dL (28 Jan 2018 17:13)    I&O's Summary    28 Jan 2018 07:01  -  29 Jan 2018 07:00  --------------------------------------------------------  IN: 120 mL / OUT: 200 mL / NET: -80 mL    29 Jan 2018 07:01  -  29 Jan 2018 14:11  --------------------------------------------------------  IN: 180 mL / OUT: 0 mL / NET: 180 mL        PHYSICAL EXAM:  GENERAL: NAD  HEENT: neck supple  LUNG: Clear to auscultation bilaterally; No wheeze  HEART: S1, S2  ABDOMEN: Soft, Nontender, Nondistended; Bowel sounds present  EXTREMITIES: No leg edema, + palpable right radial pulse  PSYCH: normal affect, calm  NEUROLOGY: AAO x3, LUE/LLE weakness    SKIN: No rashes      LABS:                        11.5   10.3  )-----------( 463      ( 29 Jan 2018 10:50 )             33.0     01-29    140  |  102  |  12  ----------------------------<  129<H>  3.8   |  26  |  0.37<L>    Ca    9.3      29 Jan 2018 10:50                RADIOLOGY & ADDITIONAL TESTS:    Imaging Personally Reviewed:    < from: CT Head No Cont (01.27.18 @ 09:25) >  IMPRESSION:    Interval post high parietal craniotomy for surgical biopsy of mesial   right parietal lobe mass, with expected postsurgical changes. Persistent   moderate right parietal lobe vasogenic edema. No midline shift or   hydrocephalus.    < end of copied text >    < from: MR Brain Stereotactic w/wo IV Cont (01.27.18 @ 19:57) >  IMPRESSION: Patient is status post partial resection of dominant mass in   the right high posterior frontal parietal parasagittal region. There is   residual neoplasm suggested along the periphery of the initial lesion in   the superior and anterolateral aspects of the originally identified   neoplasm. Some hemorrhage is seen at this level as well. The 2 smaller   satellite nodules that enhance are again recognized one posterior and one   inferolateral to the primary lesion site. Patient is status post a right   parietal craniotomy. Residual edema and mass effect is noted in   association with the lesion that has been partially resected. Right   frontal pneumocephalus    < end of copied text >    < from: CT Chest w/ Oral Cont and w/ IV Cont (01.17.18 @ 21:30) >  IMPRESSION:  Postsurgical changes of the left chest identified in this   patient status post mastectomy. No CT findings to suggest intrathoracic,   intra-abdominal or pelvic malignancy. Nonspecific nodular pleural-based   opacities identified as described above for which follow-up CT of the   chest in 3-4 months recommended to establish continuing stability.    < end of copied text >    Consultant(s) Notes Reviewed:  vascular c/s    Care Discussed with Consultants/Other Providers: neurosurgery PA

## 2018-01-29 NOTE — PHYSICAL THERAPY INITIAL EVALUATION ADULT - IMPAIRED TRANSFERS: SIT/STAND, REHAB EVAL
inattention to L side/decreased strength/impaired balance
decreased strength/impaired balance/inattention L side

## 2018-01-29 NOTE — PROGRESS NOTE ADULT - PROBLEM SELECTOR PLAN 7
- Likely due to steroids, no s/s of infection   - Monitor leukocytosis - Likely due to steroids, no s/s of infection   - Monitor leukocytosis(resolved)

## 2018-01-29 NOTE — PROGRESS NOTE ADULT - PROBLEM SELECTOR PLAN 4
- Recently diagnosed breast CA s/p recent L mastectomy 2 weeks ago  - Need to clarify with surgery when staples can be removed - Recently diagnosed breast CA s/p recent L mastectomy with LN dissection 2 weeks ago

## 2018-01-29 NOTE — PHYSICAL THERAPY INITIAL EVALUATION ADULT - GENERAL OBSERVATIONS, REHAB EVAL
received supine with head of bed elevated +L foot drop splint,  at bed side
received supine with head of bed elevated +iv, daughter at bed side

## 2018-01-29 NOTE — PHYSICAL THERAPY INITIAL EVALUATION ADULT - PHYSICAL ASSIST/NONPHYSICAL ASSIST: GAIT, REHAB EVAL
nonverbal cues (demo/gestures)/verbal cues/1 person assist
nonverbal cues (demo/gestures)/verbal cues/1 person assist

## 2018-01-29 NOTE — PHYSICAL THERAPY INITIAL EVALUATION ADULT - PHYSICAL ASSIST/NONPHYSICAL ASSIST: SIT/STAND, REHAB EVAL
nonverbal cues (demo/gestures)/verbal cues/1 person assist
nonverbal cues (demo/gestures)/1 person assist/verbal cues

## 2018-01-29 NOTE — PROGRESS NOTE ADULT - ASSESSMENT
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

## 2018-01-29 NOTE — PHYSICAL THERAPY INITIAL EVALUATION ADULT - LEVEL OF INDEPENDENCE: SUPINE/SIT, REHAB EVAL
via rolling to L/moderate assist (50% patients effort)
moderate assist (50% patients effort)/minimum assist (75% patients effort)

## 2018-01-29 NOTE — PHYSICAL THERAPY INITIAL EVALUATION ADULT - IMPAIRMENTS CONTRIBUTING IMPAIRED BED MOBILITY, REHAB EVAL
impaired balance/inattention to L side/decreased strength
decreased strength/inattention L side/impaired balance

## 2018-01-29 NOTE — PHYSICAL THERAPY INITIAL EVALUATION ADULT - MANUAL MUSCLE TESTING RESULTS, REHAB EVAL
RUE/RLE >/=3, LLE grossly 2 except ankle 0, LUE: hand 3+, elbow 3+, shoulder flexion 2-, shoulder extension >/=2+, shoulder abduction 2-/2, shoulder adduction 2-
RUE/RLE >/=3, LUE/LLE 3-, except ankle dorsiflexion 0, ankle PF 2-

## 2018-01-29 NOTE — PHYSICAL THERAPY INITIAL EVALUATION ADULT - PERTINENT HX OF CURRENT PROBLEM, REHAB EVAL
PMHx: Breast ca (s/p L breast rxn x2wk ago at [facility]), recent neg PET scan, BIBA to [facility] s/p 30min sz (L facial twitch), with associated L-sided facial droop on 1/16. found to have multiple brain lesions (not classic for breast CA mets, possible GBM). transferred to [facility] for bx vs resection. MRI brain 1/17 at [facility]: Multiple enhancing lesions posterior R frontal & parietal lobes which may represent metastases, but GBM could have similar appearance
PMHx Breast ca (s/p L breast rxn x2wk ago), BIBA to [facility] s/p 30min sz with associated L-sided facial droop 1/16. found to have multiple brain lesions. transferred to [facility]. 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 s/p R brachial art. line, was noted to have cool R hand with loss of pulses, dopplers 1/27: Occlusion of proximal & mid brachial arteries with reconstitution of flow in distal vessel, antegrade flow but low velocity waveforms in the radial & ulnar arteries

## 2018-01-29 NOTE — PROGRESS NOTE ADULT - SUBJECTIVE AND OBJECTIVE BOX
SUBJECTIVE: Pt seen, no c/os    Vital Signs Last 24 Hrs  T(C): 36.9 (29 Jan 2018 04:29), Max: 36.9 (28 Jan 2018 23:30)  T(F): 98.5 (29 Jan 2018 04:29), Max: 98.5 (28 Jan 2018 23:30)  HR: 90 (29 Jan 2018 04:29) (68 - 98)  BP: 140/49 (29 Jan 2018 04:29) (105/69 - 176/91)  BP(mean): --  RR: 18 (29 Jan 2018 04:29) (18 - 18)  SpO2: 97% (29 Jan 2018 04:29) (95% - 98%)        General Appearance: Appears well, NAD    Extremities: RUE ulnar and radial pulses palpable, motor- neurosensory intact, warm to touch good capillary refill        IO's Summary    27 Jan 2018 07:01  -  28 Jan 2018 07:00  --------------------------------------------------------  IN: 2130 mL / OUT: 570 mL / NET: 1560 mL    28 Jan 2018 07:01  -  29 Jan 2018 06:49  --------------------------------------------------------  IN: 120 mL / OUT: 200 mL / NET: -80 mL      I&O's Detail    27 Jan 2018 07:01  -  28 Jan 2018 07:00  --------------------------------------------------------  IN:    IV PiggyBack: 50 mL    Oral Fluid: 680 mL    sodium chloride 0.9%: 600 mL    sodium chloride 0.9%: 800 mL  Total IN: 2130 mL    OUT:    Indwelling Catheter - Urethral: 570 mL  Total OUT: 570 mL    Total NET: 1560 mL      28 Jan 2018 07:01  -  29 Jan 2018 06:49  --------------------------------------------------------  IN:    Oral Fluid: 120 mL  Total IN: 120 mL    OUT:    Voided: 200 mL  Total OUT: 200 mL    Total NET: -80 mL          MEDICATIONS  (STANDING):  acetaminophen    Suspension. 650 milliGRAM(s) Oral every 6 hours  dexamethasone     Tablet 3 milliGRAM(s) Oral every 8 hours  dextrose 50% Injectable 12.5 Gram(s) IV Push once  dextrose 50% Injectable 25 Gram(s) IV Push once  dextrose 50% Injectable 25 Gram(s) IV Push once  docusate sodium 100 milliGRAM(s) Oral three times a day  enoxaparin Injectable 40 milliGRAM(s) SubCutaneous at bedtime  insulin lispro (HumaLOG) corrective regimen sliding scale   SubCutaneous three times a day before meals  insulin lispro (HumaLOG) corrective regimen sliding scale   SubCutaneous at bedtime  levETIRAcetam 500 milliGRAM(s) Oral two times a day  lisinopril 40 milliGRAM(s) Oral daily  pantoprazole    Tablet 40 milliGRAM(s) Oral daily  phenytoin   Capsule 100 milliGRAM(s) Oral three times a day  simvastatin 10 milliGRAM(s) Oral at bedtime    MEDICATIONS  (PRN):  acetaminophen   Tablet. 650 milliGRAM(s) Oral every 6 hours PRN Mild Pain (1 - 3)  glucagon  Injectable 1 milliGRAM(s) IntraMuscular once PRN Glucose LESS THAN 70 milligrams/deciliter  ondansetron Injectable 8 milliGRAM(s) IV Push every 8 hours PRN Nausea and/or Vomiting  oxyCODONE    IR 5 milliGRAM(s) Oral every 4 hours PRN Moderate Pain (4 - 6)  oxyCODONE    IR 10 milliGRAM(s) Oral every 4 hours PRN Severe Pain (7 - 10)  senna 2 Tablet(s) Oral at bedtime PRN Constipation      LABS:                        10.9   15.74 )-----------( 410      ( 28 Jan 2018 08:25 )             34.4     01-28    141  |  101  |  10  ----------------------------<  83  3.8   |  24  |  0.46<L>    Ca    9.0      28 Jan 2018 08:13

## 2018-01-29 NOTE — CONSULT NOTE ADULT - SUBJECTIVE AND OBJECTIVE BOX
CC- Seizure found to have brain lesions (mets vs primary)     HPI:  78 y/o RHD F with PMH as below including breast CA diagnosed in 11/2017 (s/p resection of L breast x2 week ago at [facility]) with recent negative PET scan presented to [facility] with seizures (left facial twitch) with subsequent left sided facial droop on 1/16, MRI (1/17) showed Multiple enhancing lesions in the posterior right frontal and parietal lobe, unsure of brain mets vs GBM and transferred to [facility] for further evaluation on 1/23.   Case discussed at tumor board and initially planned for fractionated stereotactic radiosurgery vs gamma knife for multiple mets to brain, however after further imaging review and discussion with family s/p craniotomy for excision of neoplasm on 1/26 (Dr. Yin), frozen section: high grade glioma, final pathology pending. Post-op MRI (1/27)  showed s/p partial resection of dominant mass in the right high posterior frontal parietal parasagittal region with residual neoplasm along the periphery and some hemorrhage; the 2 smaller satellite nodules that enhance are noted one posterior and one   inferolateral to the primary lesion site.     Initially post-op left side weakness and cool right hand with loss of pulses -- right UE duplex showed Occlusion of the proximal and mid brachial arteries with reconstitution of flow in the distal vessel -- evaluated by vascular surgery and recommended full dose AC once cleared by ns currently on neurovascular checks.  Notable left focal seizure on 1/25 (dilantin level subtherapeutic) on keppra and dilantin . On decadron for cerebral edema    REVIEW OF SYSTEMS  Constitutional - No fever, No weight loss, No fatigue  HEENT - No eye pain, No visual disturbances, No difficulty hearing, No tinnitus, No vertigo, No neck pain  Respiratory - No cough, No wheezing, No shortness of breath  Cardiovascular - No chest pain, No palpitations  Gastrointestinal - No abdominal pain, No nausea, No vomiting, No diarrhea, No constipation  Genitourinary - No dysuria, No frequency, No hematuria, No incontinence  Neurological - No headaches, No memory loss, No loss of strength, No numbness, No tremors  Skin - No itching, No rashes, No lesions   Endocrine - No temperature intolerance  Musculoskeletal - No joint pain, No joint swelling, No muscle pain  Psychiatric - No depression, No anxiety   PAST MEDICAL & SURGICAL HISTORY  Breast cancer  left mastectomy     SOCIAL HISTORY  Smoking - Denied  EtOH - Denied   Drugs - Denied   FUNCTIONAL HISTORY  Lives with family in private house with 4 ELLIOTT (HR)  and 1 flight inside (1HR)  Independent   CURRENT FUNCTIONAL STATUS  1/26  Sit-stand - mod assist  Ambulation max assist 1step   1/29  Bed mobiltiy - mod assist  Sit-stand - mod assist  LE dressing Min assist  FAMILY HISTORY   No pertinent family history in first degree relatives     RECENT LABS/IMAGING                        10.9   15.74 )-----------( 410      ( 28 Jan 2018 08:25 )             34.4     VITALS  Vital Signs Last 24 Hrs  T(C): 36.3 (29 Jan 2018 08:01), Max: 36.9 (28 Jan 2018 23:30)  T(F): 97.4 (29 Jan 2018 08:01), Max: 98.5 (28 Jan 2018 23:30)  HR: 87 (29 Jan 2018 08:01) (68 - 98)  BP: 151/87 (29 Jan 2018 08:01) (105/69 - 157/73)  BP(mean): --  RR: 18 (29 Jan 2018 08:01) (18 - 18)  SpO2: 96% (29 Jan 2018 08:01) (95% - 97%)    ALLERGIES  No Known Allergies     MEDICATIONS   acetaminophen Tablet 650 milliGRAM(s) Oral every 6 hours PRN  acetaminophen Tablet. 650 milliGRAM(s) Oral every 6 hours PRN  dexamethasone Tablet 4 milliGRAM(s) Oral every 6 hours  dextrose 5%. 1000 milliLiter(s) IV Continuous <Continuous>  dextrose 50% Injectable 12.5 Gram(s) IV Push once  dextrose 50% Injectable 25 Gram(s) IV Push once  dextrose 50% Injectable 25 Gram(s) IV Push once  dextrose Gel 1 Dose(s) Oral once PRN  docusate sodium 100 milliGRAM(s) Oral three times a day  glucagon Injectable 1 milliGRAM(s) IntraMuscular once PRN  insulin lispro (HumaLOG) corrective regimen sliding scale SubCutaneous three times a day before meals  insulin lispro (HumaLOG) corrective regimen sliding scale SubCutaneous at bedtime  lisinopril 40 milliGRAM(s) Oral daily  ondansetron Disintegrating Tablet 4 milliGRAM(s) Oral every 6 hours PRN  pantoprazole Tablet 40 milliGRAM(s) Oral daily  phenytoin Capsule 100 milliGRAM(s) Oral <User Schedule>  phenytoin Capsule 130 milliGRAM(s) Oral at bedtime  senna 2 Tablet(s) Oral at bedtime PRN  simvastatin 10 milliGRAM(s) Oral at bedtime  sodium chloride 0.9%. 1000 milliLiter(s) IV Continuous <Continuous>     ----------------------------------------------------------------------------------------  PHYSICAL EXAM  Constitutional - NAD, Comfortable  HEENT - NCAT, EOMI  Neck - Supple, No limited ROM  Chest - CTA bilaterally, No wheeze, No rhonchi, No crackles  Cardiovascular - RRR, S1S2, No murmurs  Abdomen - BS+, Soft, NTND  Extremities - No C/C/E, No calf tenderness   Neurologic Exam -   Cognitive - Awake, Alert, AAO to self, place, date, year, situation  Communication - Fluent, No dysarthria  Cranial Nerves - CN 2-12 intact  Motor - No focal deficits  LEFT UE - ShAB 5/5, EF 5/5, EE 5/5, WE 5/5,  5/5  RIGHT UE - ShAB 5/5, EF 5/5, EE 5/5, WE 5/5,  5/5  LEFT LE - HF 5/5, KE 5/5, DF 5/5, PF 5/5  RIGHT LE - HF 5/5, KE 5/5, DF 5/5, PF 5/5   Sensory - Intact to LT  Reflexes - DTR Intact, No primitive reflexive  Coordination - FTN intact  OculoVestibular - No saccades, No nystagmus, VOR   Balance - WNL Static  Psychiatric - Mood stable, Affect WNL  ----------------------------------------------------------------------------------------  ASSESSMENT/PLAN - 77F with history of breast CA admitted with focal seizures, found to have brain lesions s/p craniotomy for resection (prelim high grade glioma) with functional, ADL and gait impairments.      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  Cerebral edema - decadron  Seizures - Dilantin, keppra   Pain - Tylenol, ocycodone prn  PT&OT - ROM, strengthening, ADL/gait/balance training with AD  Precautions - Fall, seizure  Skin - turn Q2  Dispo- CC- Seizure found to have brain lesions (mets vs primary)     HPI:  76 y/o RHD F with PMH as below including breast CA diagnosed in 11/2017 (s/p resection of L breast x2 week ago at [facility]) with recent negative PET scan presented to [facility] with seizures (left facial twitch) with subsequent left sided facial droop on 1/16, MRI (1/17) showed Multiple enhancing lesions in the posterior right frontal and parietal lobe, unsure of brain mets vs GBM and transferred to [facility] for further evaluation on 1/23.   Case discussed at tumor board and initially planned for fractionated stereotactic radiosurgery vs gamma knife for multiple mets to brain, however after further imaging review and discussion with family s/p craniotomy for excision of neoplasm on 1/26 (Dr. Yin), frozen section: high grade glioma, final pathology pending. Post-op MRI (1/27)  showed s/p partial resection of dominant mass in the right high posterior frontal parietal parasagittal region with residual neoplasm along the periphery and some hemorrhage; the 2 smaller satellite nodules that enhance are noted one posterior and one   inferolateral to the primary lesion site.     Initially post-op left side weakness and cool right hand with loss of pulses -- right UE duplex showed Occlusion of the proximal and mid brachial arteries with reconstitution of flow in the distal vessel -- evaluated by vascular surgery and recommended full dose AC once cleared by ns currently on neurovascular checks.  Notable left focal seizure on 1/25 (dilantin level subtherapeutic) on keppra and dilantin . On decadron for cerebral edema    REVIEW OF SYSTEMS  Constitutional - +fatigue  HEENT - No eye pain, No visual disturbances  Respiratory - No cough, No shortness of breath  Cardiovascular - No chest pain, No palpitations  Gastrointestinal - No abdominal pain, No nausea, No vomiting  Genitourinary - No dysuria  Neurological - No headaches, Left sided weakness (worsening since onset of seizures), left sided decreased sensation   Skin - No itching, No rashes, No lesions   Musculoskeletal - No joint pain, No joint swelling, No muscle pain  Psychiatric - No depression, No anxiety     PAST MEDICAL & SURGICAL HISTORY  Breast cancer  left mastectomy     SOCIAL HISTORY  Smoking - Denied  EtOH - Denied   Drugs - Denied   FUNCTIONAL HISTORY  Lives with family in private house with 4 ELLIOTT (2HR)  and 1 flight inside (1HR); planned to go to daughter's home on discharge (2 ELLIOTT with columns and none inside).  Independent prior to admission, primary caretaker for  and 58yo son with Intellectual disability.   CURRENT FUNCTIONAL STATUS  1/26  Sit-stand - mod assist  Ambulation max assist 1step   1/29  Bed mobiltiy - mod assist  Sit-stand - mod assist  LE dressing Min assist    FAMILY HISTORY   No pertinent family history in first degree relatives     RECENT LABS/IMAGING                        10.9   15.74 )-----------( 410      ( 28 Jan 2018 08:25 )             34.4     VITALS  Vital Signs Last 24 Hrs  T(C): 36.3 (29 Jan 2018 08:01), Max: 36.9 (28 Jan 2018 23:30)  T(F): 97.4 (29 Jan 2018 08:01), Max: 98.5 (28 Jan 2018 23:30)  HR: 87 (29 Jan 2018 08:01) (68 - 98)  BP: 151/87 (29 Jan 2018 08:01) (105/69 - 157/73)  BP(mean): --  RR: 18 (29 Jan 2018 08:01) (18 - 18)  SpO2: 96% (29 Jan 2018 08:01) (95% - 97%)    ALLERGIES  No Known Allergies     MEDICATIONS   acetaminophen Tablet 650 milliGRAM(s) Oral every 6 hours PRN  acetaminophen Tablet. 650 milliGRAM(s) Oral every 6 hours PRN  dexamethasone Tablet 4 milliGRAM(s) Oral every 6 hours  dextrose 5%. 1000 milliLiter(s) IV Continuous <Continuous>  dextrose 50% Injectable 12.5 Gram(s) IV Push once  dextrose 50% Injectable 25 Gram(s) IV Push once  dextrose 50% Injectable 25 Gram(s) IV Push once  dextrose Gel 1 Dose(s) Oral once PRN  docusate sodium 100 milliGRAM(s) Oral three times a day  glucagon Injectable 1 milliGRAM(s) IntraMuscular once PRN  insulin lispro (HumaLOG) corrective regimen sliding scale SubCutaneous three times a day before meals  insulin lispro (HumaLOG) corrective regimen sliding scale SubCutaneous at bedtime  lisinopril 40 milliGRAM(s) Oral daily  ondansetron Disintegrating Tablet 4 milliGRAM(s) Oral every 6 hours PRN  pantoprazole Tablet 40 milliGRAM(s) Oral daily  phenytoin Capsule 100 milliGRAM(s) Oral <User Schedule>  phenytoin Capsule 130 milliGRAM(s) Oral at bedtime  senna 2 Tablet(s) Oral at bedtime PRN  simvastatin 10 milliGRAM(s) Oral at bedtime  sodium chloride 0.9%. 1000 milliLiter(s) IV Continuous <Continuous>     ----------------------------------------------------------------------------------------  PHYSICAL EXAM  Constitutional - NAD, Comfortable  HEENT - NCAT, EOMI  Chest - No distress, symmetrical chest expansion  Cardiovascular - RRR, S1S2  Abdomen - Soft, NTND  Extremities - No C/C/E, No calf tenderness   Neurologic Exam -   Cognitive - Awake, Alert, AAO to self, [facility], date, year, situation  Communication - Fluent, No dysarthria  Cranial Nerves - loss left nasolabial fold  Motor -        LEFT UE - ShAB 2/5, EF 2/5, EE 2/5, WE 1/5,  3/5        RIGHT UE - ShAB 5/5, EF 5/5, EE 5/5, WE 5/5,  5/5        LEFT LE - HF 0/5, KE 0/5, DF 0/5, PF 0/5        RIGHT LE - HF 5/5, KE 5/5, DF 5/5, PF 5/5   Sensory - Intact to LT  Coordination - FTN intact on right  Memory - immediate and delayed recall intact   Psychiatric - Mood stable, Affect WNL  ----------------------------------------------------------------------------------------  ASSESSMENT/PLAN - 77F with history of breast CA admitted with focal seizures, found to have brain lesions s/p craniotomy for resection (prelim high grade glioma) with left hemiparesis causing functional, ADL and gait impairments.    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  Cerebral edema - decadron  Seizures - Dilantin, keppra   Pain - Tylenol, ocycodone prn  PT&OT - ROM, strengthening, ADL/gait/balance training with AD  Precautions - Fall, seizure  Skin - turn Q2  Dispo- CC- left sided weakness    HPI:  78 y/o RHD F with PMH as below including breast CA diagnosed in 11/2017 (s/p resection of L breast x2 week ago at [facility]) with recent negative PET scan presented to [facility] with seizures (left facial twitch) with subsequent left sided facial droop on 1/16, MRI (1/17) showed Multiple enhancing lesions in the posterior right frontal and parietal lobe, unsure of brain mets vs GBM and transferred to [facility] for further evaluation on 1/23.   Case discussed at tumor board and initially planned for fractionated stereotactic radiosurgery vs gamma knife for multiple mets to brain, however after further imaging review and discussion with family s/p craniotomy for excision of neoplasm on 1/26 (Dr. Yin), frozen section: high grade glioma, final pathology pending. Post-op MRI (1/27)  showed s/p partial resection of dominant mass in the right high posterior frontal parietal parasagittal region with residual neoplasm along the periphery and some hemorrhage; the 2 smaller satellite nodules that enhance are noted one posterior and one inferolateral to the primary lesion site.     Initially post-op left side weakness and cool right hand with loss of pulses -- right UE duplex showed Occlusion of the proximal and mid brachial arteries with reconstitution of flow in the distal vessel -- evaluated by vascular surgery and recommended full dose AC once cleared by ns currently on neurovascular checks.  Notable left focal seizure on 1/25 (dilantin level subtherapeutic) on keppra and dilantin . On decadron for cerebral edema    REVIEW OF SYSTEMS  Constitutional - +fatigue  HEENT - No eye pain, No visual disturbances  Respiratory - No cough, No shortness of breath  Cardiovascular - No chest pain, No palpitations  Gastrointestinal - No abdominal pain, No nausea, No vomiting  Genitourinary - No dysuria  Neurological - No headaches, Left sided weakness (worsening since onset of seizures), left sided decreased sensation   Skin - No itching, No rashes, No lesions   Musculoskeletal - No joint pain, No joint swelling, No muscle pain  Psychiatric - No depression, No anxiety     PAST MEDICAL & SURGICAL HISTORY  Breast cancer  left mastectomy     SOCIAL HISTORY  Smoking - Denied  EtOH - Denied   Drugs - Denied   FUNCTIONAL HISTORY  Lives with family in private house with 4 ELLIOTT (2HR)  and 1 flight inside (1HR); planned to go to daughter's home on discharge (2 ELLIOTT with columns and none inside).  Independent prior to admission, primary caretaker for  and 58yo son with Intellectual disability.   CURRENT FUNCTIONAL STATUS  1/29  Sit-stand - mod assist  Ambulation max assist   1/29  Bed mobiltiy - mod assist  Sit-stand - mod assist  LE dressing Min assist    FAMILY HISTORY   No pertinent family history in first degree relatives     RECENT LABS/IMAGING                        10.9   15.74 )-----------( 410      ( 28 Jan 2018 08:25 )             34.4     VITALS  Vital Signs Last 24 Hrs  T(C): 36.3 (29 Jan 2018 08:01), Max: 36.9 (28 Jan 2018 23:30)  T(F): 97.4 (29 Jan 2018 08:01), Max: 98.5 (28 Jan 2018 23:30)  HR: 87 (29 Jan 2018 08:01) (68 - 98)  BP: 151/87 (29 Jan 2018 08:01) (105/69 - 157/73)  BP(mean): --  RR: 18 (29 Jan 2018 08:01) (18 - 18)  SpO2: 96% (29 Jan 2018 08:01) (95% - 97%)    ALLERGIES  No Known Allergies     MEDICATIONS   acetaminophen Tablet 650 milliGRAM(s) Oral every 6 hours PRN  acetaminophen Tablet. 650 milliGRAM(s) Oral every 6 hours PRN  dexamethasone Tablet 4 milliGRAM(s) Oral every 6 hours  dextrose 5%. 1000 milliLiter(s) IV Continuous <Continuous>  dextrose 50% Injectable 12.5 Gram(s) IV Push once  dextrose 50% Injectable 25 Gram(s) IV Push once  dextrose 50% Injectable 25 Gram(s) IV Push once  dextrose Gel 1 Dose(s) Oral once PRN  docusate sodium 100 milliGRAM(s) Oral three times a day  glucagon Injectable 1 milliGRAM(s) IntraMuscular once PRN  insulin lispro (HumaLOG) corrective regimen sliding scale SubCutaneous three times a day before meals  insulin lispro (HumaLOG) corrective regimen sliding scale SubCutaneous at bedtime  lisinopril 40 milliGRAM(s) Oral daily  ondansetron Disintegrating Tablet 4 milliGRAM(s) Oral every 6 hours PRN  pantoprazole Tablet 40 milliGRAM(s) Oral daily  phenytoin Capsule 100 milliGRAM(s) Oral <User Schedule>  phenytoin Capsule 130 milliGRAM(s) Oral at bedtime  senna 2 Tablet(s) Oral at bedtime PRN  simvastatin 10 milliGRAM(s) Oral at bedtime  sodium chloride 0.9%. 1000 milliLiter(s) IV Continuous <Continuous>     ----------------------------------------------------------------------------------------  PHYSICAL EXAM  Constitutional - NAD, Comfortable  HEENT - NCAT, EOMI  Chest - No distress, symmetrical chest expansion  Cardiovascular - RRR, S1S2  Abdomen - Soft, NTND  Extremities - No C/C/E, No calf tenderness   Neurologic Exam -   Cognitive - Awake, Alert, AAO to self, [facility], date, year, situation  Communication - Fluent, No dysarthria  Cranial Nerves - loss left nasolabial fold  Motor -        LEFT UE - ShAB 2/5, EF 2/5, EE 2/5, WE 1/5,  3/5        RIGHT UE - ShAB 5/5, EF 5/5, EE 5/5, WE 5/5,  5/5        LEFT LE - HF 1/5, KE 1/5, DF 0/5, PF 0/5        RIGHT LE - HF 5/5, KE 5/5, DF 5/5, PF 5/5   Sensory - Intact to LT  Coordination - FTN intact on right  Memory - immediate and delayed recall intact   Psychiatric - Mood stable, Affect WNL  ----------------------------------------------------------------------------------------  ASSESSMENT/PLAN - 77F with history of breast CA admitted with focal seizures, found to have brain lesions s/p craniotomy for resection (prelim high grade glioma) with left hemiparesis causing functional, ADL and gait impairments.    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  Cerebral edema - decadron  Seizures - Dilantin, keppra   Pain - Tylenol, ocycodone prn  PT&OT - ROM, strengthening, ADL/gait/balance training with AD  Precautions - Fall, seizure  Skin - turn Q2  Dispo- Once medically stable and decision on anticoagulation for Right brachial artery thrombosis confirmed - Recommend ACUTE inpatient rehabilitation for the functional deficits consisting of 3 hours of therapy/day & 24 hour RN/daily PMR physician for comorbid medical management.

## 2018-01-29 NOTE — PHYSICAL THERAPY INITIAL EVALUATION ADULT - RANGE OF MOTION EXAMINATION, REHAB EVAL
bilateral lower extremity ROM was WFL (within functional limits)/except L shoulder flexion to 90deg (pt s/p recent L breat reconstruction so shoulder flexion limited to 90deg)/bilateral upper extremity ROM was WFL (within functional limits)
bilateral upper extremity ROM was WFL (within functional limits)/bilateral lower extremity ROM was WFL (within functional limits)/without L. shoulder flexion to 90deg due to maintaining precautions s/p L breast reconstruction surgery 2 wks ago

## 2018-01-29 NOTE — PHYSICAL THERAPY INITIAL EVALUATION ADULT - IMPAIRMENTS FOUND, PT EVAL
gait, locomotion, and balance/muscle strength
gait, locomotion, and balance/muscle strength/inattention to L side

## 2018-01-29 NOTE — PROGRESS NOTE ADULT - PROBLEM SELECTOR PLAN 3
- Likely due to central pathology  - Continue Keppra 500mg PO BID, phenytoin 100mg PO TID  - Follow up phenytoin levels - Likely due to central pathology  - Continue dilantin  - Follow up phenytoin levels

## 2018-01-29 NOTE — CONSULT NOTE ADULT - ATTENDING COMMENTS
seen ex'ed dw'ed res/fellow agree wabove  R brach occ  hand/fingers viable  High risk for vasc intervention/AC at this time.  Rec: Cont observation, keep hand warm, serial exams.  AC when safe  Will need to avoid punctures in R UE, and precautions w future wounds/infections precautions
Seen and examined with resident. Agree with note.   Patient with left hemiparesis and gait dysfunction.  Patient will need acute rehabilitation when stable.

## 2018-01-29 NOTE — PHYSICAL THERAPY INITIAL EVALUATION ADULT - ADDITIONAL COMMENTS
lives in private house with 2 steps without rail and then 4 steps with bilateral rails (far apart) to enter, 1 flight inside with 1 rail, right hand dominant lives in private house with 2 steps without rail and then 4 steps with bilateral rails (far apart) to enter, 1 flight inside with 1 rail, right hand dominant    +inattention to L side

## 2018-01-29 NOTE — PROGRESS NOTE ADULT - ASSESSMENT
HPI:  Patient is a 76 y/o F pt with PMHx of Breast CA Dx Nov 2017 (s/p resection of L breast x2 week ago at [facility]) with recent negative PET scan and BIB EMS to [facility] ED s/p 30 min seizure (L facial twitch) at home while seated, witnessed by , with associated L-sided facial droop since 5pm on 1/16. She was found to have multiple brain lesions. Heme onc consulted, reported the lesions were not classic for breast CA mets, possible GBM. The patient was transferred to [facility] for biopsy versus resection. Patient denies fever, chills, SOB, palpitations, chest pain, nausea, vomiting, diarrhea or constipation. (23 Jan 2018 18:40)        s/p right craniotomy and resection of tumor-1/26

## 2018-01-30 ENCOUNTER — TRANSCRIPTION ENCOUNTER (OUTPATIENT)
Age: 78
End: 2018-01-30

## 2018-01-30 DIAGNOSIS — R00.0 TACHYCARDIA, UNSPECIFIED: ICD-10-CM

## 2018-01-30 LAB
GLUCOSE BLDC GLUCOMTR-MCNC: 101 MG/DL — HIGH (ref 70–99)
GLUCOSE BLDC GLUCOMTR-MCNC: 109 MG/DL — HIGH (ref 70–99)
GLUCOSE BLDC GLUCOMTR-MCNC: 115 MG/DL — HIGH (ref 70–99)
GLUCOSE BLDC GLUCOMTR-MCNC: 83 MG/DL — SIGNIFICANT CHANGE UP (ref 70–99)
GLUCOSE BLDC GLUCOMTR-MCNC: 85 MG/DL — SIGNIFICANT CHANGE UP (ref 70–99)
PHENYTOIN FREE SERPL-MCNC: 1.9 MG/L — SIGNIFICANT CHANGE UP (ref 1–2)

## 2018-01-30 PROCEDURE — 93970 EXTREMITY STUDY: CPT | Mod: 26

## 2018-01-30 PROCEDURE — 99232 SBSQ HOSP IP/OBS MODERATE 35: CPT

## 2018-01-30 PROCEDURE — 71275 CT ANGIOGRAPHY CHEST: CPT | Mod: 26

## 2018-01-30 PROCEDURE — 99233 SBSQ HOSP IP/OBS HIGH 50: CPT

## 2018-01-30 PROCEDURE — 93010 ELECTROCARDIOGRAM REPORT: CPT

## 2018-01-30 RX ORDER — PANTOPRAZOLE SODIUM 20 MG/1
1 TABLET, DELAYED RELEASE ORAL
Qty: 0 | Refills: 0 | COMMUNITY
Start: 2018-01-30

## 2018-01-30 RX ORDER — TRAZODONE HCL 50 MG
50 TABLET ORAL
Qty: 0 | Refills: 0 | Status: DISCONTINUED | OUTPATIENT
Start: 2018-01-30 | End: 2018-01-31

## 2018-01-30 RX ORDER — SIMVASTATIN 20 MG/1
1 TABLET, FILM COATED ORAL
Qty: 0 | Refills: 0 | COMMUNITY
Start: 2018-01-30

## 2018-01-30 RX ORDER — LISINOPRIL 2.5 MG/1
1 TABLET ORAL
Qty: 0 | Refills: 0 | COMMUNITY
Start: 2018-01-30

## 2018-01-30 RX ORDER — SODIUM CHLORIDE 9 MG/ML
1000 INJECTION INTRAMUSCULAR; INTRAVENOUS; SUBCUTANEOUS
Qty: 0 | Refills: 0 | Status: DISCONTINUED | OUTPATIENT
Start: 2018-01-30 | End: 2018-01-31

## 2018-01-30 RX ORDER — DEXAMETHASONE 0.5 MG/5ML
1 ELIXIR ORAL
Qty: 0 | Refills: 0 | COMMUNITY
Start: 2018-01-30

## 2018-01-30 RX ORDER — OXYCODONE HYDROCHLORIDE 5 MG/1
1 TABLET ORAL
Qty: 0 | Refills: 0 | COMMUNITY
Start: 2018-01-30

## 2018-01-30 RX ORDER — TRAZODONE HCL 50 MG
1 TABLET ORAL
Qty: 0 | Refills: 0 | COMMUNITY
Start: 2018-01-30

## 2018-01-30 RX ORDER — ENOXAPARIN SODIUM 100 MG/ML
40 INJECTION SUBCUTANEOUS
Qty: 0 | Refills: 0 | COMMUNITY
Start: 2018-01-30

## 2018-01-30 RX ORDER — ACETAMINOPHEN 500 MG
2 TABLET ORAL
Qty: 0 | Refills: 0 | COMMUNITY
Start: 2018-01-30

## 2018-01-30 RX ORDER — DOCUSATE SODIUM 100 MG
1 CAPSULE ORAL
Qty: 0 | Refills: 0 | COMMUNITY
Start: 2018-01-30

## 2018-01-30 RX ORDER — SENNA PLUS 8.6 MG/1
2 TABLET ORAL
Qty: 0 | Refills: 0 | COMMUNITY
Start: 2018-01-30

## 2018-01-30 RX ADMIN — OXYCODONE HYDROCHLORIDE 5 MILLIGRAM(S): 5 TABLET ORAL at 04:14

## 2018-01-30 RX ADMIN — SIMVASTATIN 10 MILLIGRAM(S): 20 TABLET, FILM COATED ORAL at 21:45

## 2018-01-30 RX ADMIN — Medication 50 MILLIGRAM(S): at 00:13

## 2018-01-30 RX ADMIN — Medication 650 MILLIGRAM(S): at 00:14

## 2018-01-30 RX ADMIN — SODIUM CHLORIDE 100 MILLILITER(S): 9 INJECTION INTRAMUSCULAR; INTRAVENOUS; SUBCUTANEOUS at 21:21

## 2018-01-30 RX ADMIN — Medication 2 MILLIGRAM(S): at 05:02

## 2018-01-30 RX ADMIN — PANTOPRAZOLE SODIUM 40 MILLIGRAM(S): 20 TABLET, DELAYED RELEASE ORAL at 12:22

## 2018-01-30 RX ADMIN — ENOXAPARIN SODIUM 40 MILLIGRAM(S): 100 INJECTION SUBCUTANEOUS at 21:45

## 2018-01-30 RX ADMIN — Medication 2 MILLIGRAM(S): at 12:23

## 2018-01-30 RX ADMIN — LISINOPRIL 40 MILLIGRAM(S): 2.5 TABLET ORAL at 05:02

## 2018-01-30 RX ADMIN — Medication 2 MILLIGRAM(S): at 21:45

## 2018-01-30 RX ADMIN — Medication 100 MILLIGRAM(S): at 12:24

## 2018-01-30 RX ADMIN — Medication 50 MILLIGRAM(S): at 21:45

## 2018-01-30 RX ADMIN — Medication 100 MILLIGRAM(S): at 15:13

## 2018-01-30 RX ADMIN — Medication 100 MILLIGRAM(S): at 05:02

## 2018-01-30 RX ADMIN — Medication 100 MILLIGRAM(S): at 21:45

## 2018-01-30 NOTE — DISCHARGE NOTE ADULT - MEDICATION SUMMARY - MEDICATIONS TO TAKE
I will START or STAY ON the medications listed below when I get home from the hospital:    dexamethasone 2 mg oral tablet  -- 1 tab(s) by mouth every 8 hours for 3 days then 1 tab every 12hrs and taper further per MD.    -- Indication: For Post op edema    acetaminophen 325 mg oral tablet  -- 2 tab(s) by mouth every 6 hours, As needed, Mild Pain (1 - 3)  -- Indication: For Pain    oxyCODONE 5 mg oral tablet  -- 1 tab(s) by mouth every 4 hours, As needed, Moderate Pain (4 - 6)  -- Indication: For Pain    oxyCODONE 10 mg oral tablet  -- 1 tab(s) by mouth every 4 hours, As needed, Severe Pain (7 - 10)  -- Indication: For Pain    ramipril 10 mg oral capsule  -- 1 cap(s) by mouth once a day  -- Indication: For Blood pressure    phenytoin 100 mg oral capsule, extended release  -- 1 cap(s) by mouth 3 times a day for 2 days, then 1 cap 2 times daily for 2 days, then 1 cap by mouth daily for 2 days and stop.   -- Indication: For Seizure prophylaxis, tapering off    enoxaparin  -- 40 milligram(s) subcutaneous once a day (at bedtime)  -- Indication: For DVT prophylaxis    Keppra 500 mg oral tablet  -- 1 tab(s) by mouth 2 times a day  -- Indication: For Seizure prophylaxis    traZODone 50 mg oral tablet  -- 1 tab(s) by mouth   -- Indication: For antidepression    simvastatin 10 mg oral tablet  -- 1 tab(s) by mouth once a day (at bedtime)  -- Indication: For cholesterol    ALPRAZolam 0.25 mg oral tablet  -- 1 tab(s) by mouth 2 times a day, As Needed anxiety  -- Indication: For anxiety    docusate sodium 100 mg oral capsule  -- 1 cap(s) by mouth 3 times a day  -- Indication: For constipation    senna oral tablet  -- 2 tab(s) by mouth once a day (at bedtime), As needed, Constipation  -- Indication: For constipation    pantoprazole 40 mg oral delayed release tablet  -- 1 tab(s) by mouth once a day  -- Indication: For GI prophylaxis

## 2018-01-30 NOTE — PROGRESS NOTE ADULT - ASSESSMENT
7yF w/ R brachial artery occlusion with dopperable signal to right radial and ulnar artery    - Continue vascular texts  - therapeutic AC when safe  - Cont to avoid punctures in R UE  - Will cont to follow    GAVINO De Anda MD PGY 2   [id number]

## 2018-01-30 NOTE — DISCHARGE NOTE ADULT - PLAN OF CARE
Increase activity Follow up with Dr. Yin after rehab-Please call office to confirm appointment-Neurosurgeon   Follow up with vascular -   Follow up with PMD as outpatient   Follow up with personal oncologist as outpatient   Follow up with Radiation medicine Raji Lucio as outpatient after rehab. 1/26/18 s/p right craniotomy for tumor resection, final pathology pending at time of discharge Follow up with Dr. Yin- neurosurgeon-  after rehab please call office for appointment.   primary care physician upon discharge from rehab.  private medical oncologist upon discharge from rehab.   Neuro oncologist- Dr La- please call office for appointment upon discharge from rehab.  Radiation oncologist- Dr Morris- please call office for appointment upon discharge from rehab.   [phone] ([facility])   please notify physician if fevers, bleeding, swelling, pain not relieved by medication, increased sluggishness or irritability, increased nausea or vomiting, inability to tolerate foods or liquids.   please do not engage in strenuous activity, heavy lifting, drive, or return to work or school until cleared by surgeon.   please keep incision clean and dry, do not submerge wound in water for prolonged periods of time, pat dry after showering, and do not use any creams or ointments to incision. please follow up with your private medical oncologist upon discharge from rehab. 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 Please follow up with primary care physician and Dr Jackman - vascular- upon discharge from rehab.

## 2018-01-30 NOTE — PROGRESS NOTE ADULT - PROBLEM SELECTOR PLAN 2
? due to previous a- line  - Vascular surgery following  - High risk for anticoagulation given recent brain surgery. Per neurosurgery, no plan for AC at this time.  - avoid puncture in RUE per vascular

## 2018-01-30 NOTE — DISCHARGE NOTE ADULT - PATIENT PORTAL LINK FT
“You can access the FollowHealth Patient Portal, offered by [facility], by registering with the following website: [email or web address]”

## 2018-01-30 NOTE — DISCHARGE NOTE ADULT - CARE PROVIDERS DIRECT ADDRESSES
,[email or web address]

## 2018-01-30 NOTE — DISCHARGE NOTE ADULT - CARE PROVIDER_API CALL
Seb Yin), Neurological Surgery  [address] [place] [address]  Phone: [phone]  Fax: [phone]    Raji Morris), Radiation Oncology  [address] [address]  Phone: [phone]  Fax: [phone] Seb Yin), Neurological Surgery  [address] [place] [address]  Phone: [phone]  Fax: [phone]    Raji Morris), Radiation Oncology  [address] [address]  Phone: [phone]  Fax: [phone]    Margarita La), Neurology  [facility]  [address] [place] [address]  Phone: [phone]  Fax: [phone]    Rafi Jackman), Vascular Surgery  [address] [facility]  [address] [place] [address]  Phone: [phone]  Fax: [phone]

## 2018-01-30 NOTE — DISCHARGE NOTE ADULT - NS AS ACTIVITY OBS
Do not make important decisions/Do not drive or operate machinery/Showering allowed/Walking-Outdoors allowed/Stairs allowed/Walking-Indoors allowed/No Heavy lifting/straining

## 2018-01-30 NOTE — PROGRESS NOTE ADULT - PROBLEM SELECTOR PLAN 9
- continue with low dose trazodone at night for insomnia(to be given at 9pm) - continue with low dose trazodone at night for insomnia(to be given at 9pm)  - would avoid benadryl for insomnia

## 2018-01-30 NOTE — PROGRESS NOTE ADULT - PROBLEM SELECTOR PLAN 1
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   - f/u path result  - Post-op care per neurosurgery team.  - CT C/A/P negative for malignancy but reported nonspecific nodular pleural based opacities noted on CT chest. will need close outpatient follow up.  - patient will need neuro-onc follow up as outpatient  - On dexamethasone and phenytoin   - PT and OT follow up.

## 2018-01-30 NOTE — PROGRESS NOTE ADULT - SUBJECTIVE AND OBJECTIVE BOX
CC- left sided weakness  Patient tolerating therapies.  For doppler to r/o DVT.    MEDICATIONS  (STANDING):  acetaminophen    Suspension. 650 milliGRAM(s) Oral every 6 hours  dexamethasone     Tablet 2 milliGRAM(s) Oral every 8 hours  dextrose 50% Injectable 12.5 Gram(s) IV Push once  dextrose 50% Injectable 25 Gram(s) IV Push once  dextrose 50% Injectable 25 Gram(s) IV Push once  docusate sodium 100 milliGRAM(s) Oral three times a day  enoxaparin Injectable 40 milliGRAM(s) SubCutaneous at bedtime  insulin lispro (HumaLOG) corrective regimen sliding scale   SubCutaneous three times a day before meals  insulin lispro (HumaLOG) corrective regimen sliding scale   SubCutaneous at bedtime  lisinopril 40 milliGRAM(s) Oral daily  pantoprazole    Tablet 40 milliGRAM(s) Oral daily  phenytoin   Capsule 100 milliGRAM(s) Oral three times a day  simvastatin 10 milliGRAM(s) Oral at bedtime  traZODone 50 milliGRAM(s) Oral at bedtime    MEDICATIONS  (PRN):  acetaminophen   Tablet. 650 milliGRAM(s) Oral every 6 hours PRN Mild Pain (1 - 3)  glucagon  Injectable 1 milliGRAM(s) IntraMuscular once PRN Glucose LESS THAN 70 milligrams/deciliter  ondansetron Injectable 8 milliGRAM(s) IV Push every 8 hours PRN Nausea and/or Vomiting  oxyCODONE    IR 5 milliGRAM(s) Oral every 4 hours PRN Moderate Pain (4 - 6)  oxyCODONE    IR 10 milliGRAM(s) Oral every 4 hours PRN Severe Pain (7 - 10)  senna 2 Tablet(s) Oral at bedtime PRN Constipation    Vital Signs Last 24 Hrs  T(C): 36.8 (30 Jan 2018 07:31), Max: 36.9 (29 Jan 2018 15:06)  T(F): 98.2 (30 Jan 2018 07:31), Max: 98.4 (29 Jan 2018 15:06)  HR: 107 (30 Jan 2018 07:31) (102 - 116)  BP: 155/92 (30 Jan 2018 07:31) (132/83 - 172/96)  BP(mean): --  RR: 18 (30 Jan 2018 07:31) (18 - 18)  SpO2: 97% (30 Jan 2018 07:31) (95% - 98%)    PHYSICAL EXAM  Constitutional - NAD, Comfortable  Extremities - No C/C/E, No calf tenderness   Neurologic Exam -   Cognitive - Awake, Alert, AAO to self, [facility], date, year, situation  Communication - Fluent, No dysarthria  Cranial Nerves - loss left nasolabial fold  Motor -        LEFT UE - ShAB 2/5, EF 2/5, EE 2/5, WE 1/5,  3/5        RIGHT UE - ShAB 5/5, EF 5/5, EE 5/5, WE 5/5,  5/5        LEFT LE - HF 1/5, KE 1/5, DF 0/5, PF 0/5        RIGHT LE - HF 5/5, KE 5/5, DF 5/5, PF 5/5   Sensory - Intact to LT  Coordination - FTN intact on right  Memory - immediate and delayed recall intact   Psychiatric - Mood stable, Affect WNL  ----------------------------------------------------------------------------------------  ASSESSMENT/PLAN - 77F with history of breast CA admitted with focal seizures, found to have brain lesions s/p craniotomy for resection (prelim high grade glioma) with left hemiparesis causing functional, ADL and gait impairments.    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  Cerebral edema - decadron  Seizures - Dilantin, keppra   Pain - Tylenol, ocycodone prn  PT&OT - ROM, strengthening, ADL/gait/balance training with AD  Precautions - Fall, seizure  Skin - turn Q2  Acute Rehab- can tolerate 3h/d PT/OT/SLP and requires daily physician visits  Discussed with patient's family.

## 2018-01-30 NOTE — DISCHARGE NOTE ADULT - ADDITIONAL INSTRUCTIONS
Please remove head staples on 2/6/2018   May take shower-Let water run over the surgical site and pat dry after shower   If notices any new weakness, numbness, tingling, fever, severe headache, nausea vomiting then please come back to the hospital. Please remove head staples on 2/6/2018   taper off dilantin, keppra started   coordinate Dr Yin, RT, and Dr La appointments for same day please.  Please avoid BP and arterial sticks on right arm

## 2018-01-30 NOTE — PROGRESS NOTE ADULT - SUBJECTIVE AND OBJECTIVE BOX
SUBJECTIVE: Patient was seen and evaluated at bedside. Patient is resting comfortably in bed and is in no new acute distress.     OVERNIGHT EVENTS: None     Vital Signs Last 24 Hrs  T(C): 36.8 (30 Jan 2018 07:31), Max: 36.9 (29 Jan 2018 15:06)  T(F): 98.2 (30 Jan 2018 07:31), Max: 98.4 (29 Jan 2018 15:06)  HR: 107 (30 Jan 2018 07:31) (102 - 116)  BP: 155/92 (30 Jan 2018 07:31) (132/83 - 172/96)  BP(mean): --  RR: 18 (30 Jan 2018 07:31) (18 - 18)  SpO2: 97% (30 Jan 2018 07:31) (95% - 98%)    PHYSICAL EXAM:    General: No Acute Distress     Neurological: Awake, alert oriented to person, place and time, Following Commands, PERRL, EOMI, Face Symmetrical, Speech Fluent, Moving all extremities, Muscle Strength Motor- lue-bicept/tricept/deltoid- 3/5 , distal  3/5 , lle proximal-3/5 , distal df/pf 1/5 , righ side wnl     Pulmonary: Clear to Auscultation, No Rales, No Rhonchi, No Wheezes     Cardiovascular: S1, S2, Regular Rate and Rhythm     Gastrointestinal: Soft, Nontender, Nondistended     Extremities: No calf tenderness     Incision: clean and dry     LABS:                        11.5   10.3  )-----------( 463      ( 29 Jan 2018 10:50 )             33.0    01-29    140  |  102  |  12  ----------------------------<  129<H>  3.8   |  26  |  0.37<L>    Ca    9.3      29 Jan 2018 10:50      Hemoglobin A1C, Whole Blood: 5.7 % (01-17 @ 10:02)      01-29 @ 07:01  -  01-30 @ 07:00  --------------------------------------------------------  IN: 1000 mL / OUT: 300 mL / NET: 700 mL      DRAINS:     MEDICATIONS:  Antibiotics:    Neuro:  acetaminophen    Suspension. 650 milliGRAM(s) Oral every 6 hours  acetaminophen   Tablet. 650 milliGRAM(s) Oral every 6 hours PRN Mild Pain (1 - 3)  diphenhydrAMINE   Capsule 25 milliGRAM(s) Oral daily PRN Insomnia  ondansetron Injectable 8 milliGRAM(s) IV Push every 8 hours PRN Nausea and/or Vomiting  oxyCODONE    IR 5 milliGRAM(s) Oral every 4 hours PRN Moderate Pain (4 - 6)  oxyCODONE    IR 10 milliGRAM(s) Oral every 4 hours PRN Severe Pain (7 - 10)  phenytoin   Capsule 100 milliGRAM(s) Oral three times a day  traZODone 50 milliGRAM(s) Oral at bedtime    Cardiac:  lisinopril 40 milliGRAM(s) Oral daily    Pulm:    GI/:  docusate sodium 100 milliGRAM(s) Oral three times a day  pantoprazole    Tablet 40 milliGRAM(s) Oral daily  senna 2 Tablet(s) Oral at bedtime PRN Constipation    Other:   dexamethasone     Tablet 2 milliGRAM(s) Oral every 8 hours  dextrose 50% Injectable 12.5 Gram(s) IV Push once  dextrose 50% Injectable 25 Gram(s) IV Push once  dextrose 50% Injectable 25 Gram(s) IV Push once  enoxaparin Injectable 40 milliGRAM(s) SubCutaneous at bedtime  glucagon  Injectable 1 milliGRAM(s) IntraMuscular once PRN Glucose LESS THAN 70 milligrams/deciliter  insulin lispro (HumaLOG) corrective regimen sliding scale   SubCutaneous three times a day before meals  insulin lispro (HumaLOG) corrective regimen sliding scale   SubCutaneous at bedtime  simvastatin 10 milliGRAM(s) Oral at bedtime    DIET: [] Regular [] CCD [] Renal [] Puree [] Dysphagia [] Tube Feeds:  regular     IMAGING: no new imaging

## 2018-01-30 NOTE — DISCHARGE NOTE ADULT - PROVIDER TOKENS
TOKRODRICK:'[id number]',DONA:'[id number]' TOKEN:'[id number]',TOKEN:'[id number]'

## 2018-01-30 NOTE — PROGRESS NOTE ADULT - SUBJECTIVE AND OBJECTIVE BOX
Vascular Surgery    Patient seen and examined at bedside. No events overnight. Feeling well this AM. No new complaints. No pain in R hand    Vital Signs Last 24 Hrs  T(C): 36.8 (01-30-18 @ 07:31), Max: 36.9 (01-29-18 @ 15:06)  T(F): 98.2 (01-30-18 @ 07:31), Max: 98.4 (01-29-18 @ 15:06)  HR: 107 (01-30-18 @ 07:31) (102 - 116)  BP: 155/92 (01-30-18 @ 07:31) (132/83 - 172/96)  BP(mean): --  RR: 18 (01-30-18 @ 07:31) (18 - 18)  SpO2: 97% (01-30-18 @ 07:31) (95% - 98%)  I&O's Detail    29 Jan 2018 07:01  -  30 Jan 2018 07:00  --------------------------------------------------------  IN:    Oral Fluid: 1000 mL  Total IN: 1000 mL    OUT:    Voided: 300 mL  Total OUT: 300 mL    Total NET: 700 mL      Pe  Gen: NAD  R hand: palp radial pulse, warm, well perfused with good capillary refill. Motor and sensation intact                          11.5   10.3  )-----------( 463      ( 29 Jan 2018 10:50 )             33.0     01-29    140  |  102  |  12  ----------------------------<  129<H>  3.8   |  26  |  0.37<L>    Ca    9.3      29 Jan 2018 10:50        CAPILLARY BLOOD GLUCOSE      POCT Blood Glucose.: 83 mg/dL (30 Jan 2018 09:01)  POCT Blood Glucose.: 101 mg/dL (30 Jan 2018 04:56)  POCT Blood Glucose.: 124 mg/dL (29 Jan 2018 21:36)  POCT Blood Glucose.: 135 mg/dL (29 Jan 2018 17:52)  POCT Blood Glucose.: 155 mg/dL (29 Jan 2018 12:26)      MEDICATIONS  (STANDING):  acetaminophen    Suspension. 650 milliGRAM(s) Oral every 6 hours  dexamethasone     Tablet 2 milliGRAM(s) Oral every 8 hours  dextrose 50% Injectable 12.5 Gram(s) IV Push once  dextrose 50% Injectable 25 Gram(s) IV Push once  dextrose 50% Injectable 25 Gram(s) IV Push once  docusate sodium 100 milliGRAM(s) Oral three times a day  enoxaparin Injectable 40 milliGRAM(s) SubCutaneous at bedtime  insulin lispro (HumaLOG) corrective regimen sliding scale   SubCutaneous three times a day before meals  insulin lispro (HumaLOG) corrective regimen sliding scale   SubCutaneous at bedtime  lisinopril 40 milliGRAM(s) Oral daily  pantoprazole    Tablet 40 milliGRAM(s) Oral daily  phenytoin   Capsule 100 milliGRAM(s) Oral three times a day  simvastatin 10 milliGRAM(s) Oral at bedtime  traZODone 50 milliGRAM(s) Oral at bedtime    MEDICATIONS  (PRN):  acetaminophen   Tablet. 650 milliGRAM(s) Oral every 6 hours PRN Mild Pain (1 - 3)  diphenhydrAMINE   Capsule 25 milliGRAM(s) Oral daily PRN Insomnia  glucagon  Injectable 1 milliGRAM(s) IntraMuscular once PRN Glucose LESS THAN 70 milligrams/deciliter  ondansetron Injectable 8 milliGRAM(s) IV Push every 8 hours PRN Nausea and/or Vomiting  oxyCODONE    IR 5 milliGRAM(s) Oral every 4 hours PRN Moderate Pain (4 - 6)  oxyCODONE    IR 10 milliGRAM(s) Oral every 4 hours PRN Severe Pain (7 - 10)  senna 2 Tablet(s) Oral at bedtime PRN Constipation

## 2018-01-30 NOTE — PROGRESS NOTE ADULT - PROBLEM SELECTOR PLAN 6
- Continue lisinopril 40 mg PO daily(patient at home takes ramipril)  - periodic episodes of elevated BP suspect due to anxiety and insomnia

## 2018-01-30 NOTE — PROGRESS NOTE ADULT - ASSESSMENT
HPI:  Patient is a 76 y/o F pt with PMHx of Breast CA Dx Nov 2017 (s/p resection of L breast x2 week ago at [facility]) with recent negative PET scan and BIB EMS to [facility] ED s/p 30 min seizure (L facial twitch) at home while seated, witnessed by , with associated L-sided facial droop since 5pm on 1/16. She was found to have multiple brain lesions. Heme onc consulted, reported the lesions were not classic for breast CA mets, possible GBM. The patient was transferred to [facility] for biopsy versus resection. Patient denies fever, chills, SOB, palpitations, chest pain, nausea, vomiting, diarrhea or constipation. (23 Jan 2018 18:40)                    s/p right craniotomy and resection of tumor-1/26

## 2018-01-30 NOTE — PROGRESS NOTE ADULT - ASSESSMENT
77F h/o HTN, HLD, breast CA diagnosed Nov 2017 s/p L mastectomy with LN dissection 2 weeks ago at OSH admitted to [facility] 1/16/18 after seizure, found to have multiple brain lesions on imaging s/p right craniotomy with resection of tumor on 1/26. Course c/b right brachial artery occlusion.    PMD: Dr. Connie Dumont in [place]

## 2018-01-30 NOTE — DISCHARGE NOTE ADULT - CARE PLAN
Principal Discharge DX:	Brain mass  Goal:	Increase activity  Assessment and plan of treatment:	Follow up with Dr. Yin after rehab-Please call office to confirm appointment-Neurosurgeon   Follow up with vascular -   Follow up with PMD as outpatient   Follow up with personal oncologist as outpatient   Follow up with Radiation medicine Raji Lucio as outpatient after rehab.  Secondary Diagnosis:	Breast cancer Principal Discharge DX:	Brain mass  Goal:	1/26/18 s/p right craniotomy for tumor resection, final pathology pending at time of discharge  Assessment and plan of treatment:	Follow up with Dr. Yin- neurosurgeon-  after rehab please call office for appointment.   primary care physician upon discharge from rehab.  private medical oncologist upon discharge from rehab.   Neuro oncologist- Dr La- please call office for appointment upon discharge from rehab.  Radiation oncologist- Dr Morris- please call office for appointment upon discharge from rehab.   [phone] (CFAM)   please notify physician if fevers, bleeding, swelling, pain not relieved by medication, increased sluggishness or irritability, increased nausea or vomiting, inability to tolerate foods or liquids.   please do not engage in strenuous activity, heavy lifting, drive, or return to work or school until cleared by surgeon.   please keep incision clean and dry, do not submerge wound in water for prolonged periods of time, pat dry after showering, and do not use any creams or ointments to incision.  Secondary Diagnosis:	Breast cancer  Assessment and plan of treatment:	please follow up with your private medical oncologist upon discharge from rehab.  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  Assessment and plan of treatment:	Please follow up with primary care physician and Dr Jackman - vascular- upon discharge from rehab.

## 2018-01-30 NOTE — DISCHARGE NOTE ADULT - HOSPITAL COURSE
Patient had right craniotomy and resection of brain tumor on 1/26. Post surgery patient was admitted to neuro icu and was monitored. Patient was given pain meds, ivf, steroids and seizure medication. Patient had follow up ct scan and mri scan of brain which was stable. Patient was transferred to floor. Patient was found to have right brachial artery thrombosis and vascular surgery was consulted. Patient was seen by physical therapy on floor and was recommended for rehab Patient did not have any complications. Patient was discharged to rehab with follow up instructions. Patient is a 78 y/o F pt with PMHx of Breast CA Dx Nov 2017 (s/p resection of L breast x2 week ago at [facility]) with recent negative PET scan and BIB EMS to [facility] ED s/p 30 min seizure (L facial twitch) at home while seated, witnessed by , with associated L-sided facial droop since 5pm on 1/16. She was found to have multiple brain lesions. Heme onc consulted, reported the lesions were not classic for breast CA mets, possible GBM. The patient was transferred to [facility] for biopsy versus resection. Patient denies fever, chills, SOB, palpitations, chest pain, nausea, vomiting, diarrhea or constipation. (23 Jan 2018 18:40)    Patient had right craniotomy and resection of brain tumor on 1/26. Post surgery patient was admitted to neuro icu and was monitored. Patient was given pain meds, ivf, steroids and seizure medication. Patient had follow up ct scan and mri scan of brain which was stable. Patient was transferred to floor. Patient was found to have right brachial artery thrombosis and vascular surgery was consulted. No surgical intervention or anticoagulation at present. Vascular followed closely in consultation. Patient was seen by PT/OT/PMR and recommended to acute rehab. On 1/31/18 she was medically and neurologically stable for discharge to rehab. She will need future chemo and RT as outpatient.

## 2018-01-30 NOTE — PROGRESS NOTE ADULT - PROBLEM SELECTOR PLAN 1
s/p resection   1 Out of bed   2 continue pt   3 decadron 2q8  4 continue dilantin   5 dvt ppx sql scds   6 continue lisinopril   7 ot splint -lle   8 dispo :p AR   09 regular diet   10 stool softeners  11 dopplers today-lower extremity   12 ekg: p  **No chemo or RT planned while patient is at rehab.

## 2018-01-30 NOTE — DISCHARGE NOTE ADULT - MEDICATION SUMMARY - MEDICATIONS TO CHANGE
I will SWITCH the dose or number of times a day I take the medications listed below when I get home from the hospital:    dexamethasone 4 mg oral tablet  -- 1 tab(s) by mouth every 6 hours    phenytoin 100 mg oral capsule, extended release  -- 1 cap(s) by mouth 3 times a day

## 2018-01-30 NOTE — PROGRESS NOTE ADULT - SUBJECTIVE AND OBJECTIVE BOX
Patient is a 77y old  Female who presents with a chief complaint of s/p seizure, newly diagnosed brain mets, h/o breast CA (23 Jan 2018 18:40)      SUBJECTIVE / OVERNIGHT EVENTS: not sure if she slept better last night. feels anxious to go to rehab. no right arm pain.    MEDICATIONS  (STANDING):  acetaminophen    Suspension. 650 milliGRAM(s) Oral every 6 hours  dexamethasone     Tablet 2 milliGRAM(s) Oral every 8 hours  dextrose 50% Injectable 12.5 Gram(s) IV Push once  dextrose 50% Injectable 25 Gram(s) IV Push once  dextrose 50% Injectable 25 Gram(s) IV Push once  docusate sodium 100 milliGRAM(s) Oral three times a day  enoxaparin Injectable 40 milliGRAM(s) SubCutaneous at bedtime  insulin lispro (HumaLOG) corrective regimen sliding scale   SubCutaneous three times a day before meals  insulin lispro (HumaLOG) corrective regimen sliding scale   SubCutaneous at bedtime  lisinopril 40 milliGRAM(s) Oral daily  pantoprazole    Tablet 40 milliGRAM(s) Oral daily  phenytoin   Capsule 100 milliGRAM(s) Oral three times a day  simvastatin 10 milliGRAM(s) Oral at bedtime  traZODone 50 milliGRAM(s) Oral <User Schedule>    MEDICATIONS  (PRN):  acetaminophen   Tablet. 650 milliGRAM(s) Oral every 6 hours PRN Mild Pain (1 - 3)  glucagon  Injectable 1 milliGRAM(s) IntraMuscular once PRN Glucose LESS THAN 70 milligrams/deciliter  ondansetron Injectable 8 milliGRAM(s) IV Push every 8 hours PRN Nausea and/or Vomiting  oxyCODONE    IR 5 milliGRAM(s) Oral every 4 hours PRN Moderate Pain (4 - 6)  oxyCODONE    IR 10 milliGRAM(s) Oral every 4 hours PRN Severe Pain (7 - 10)  senna 2 Tablet(s) Oral at bedtime PRN Constipation      Vital Signs Last 24 Hrs  T(C): 36.8 (30 Jan 2018 07:31), Max: 36.9 (29 Jan 2018 15:06)  T(F): 98.2 (30 Jan 2018 07:31), Max: 98.4 (29 Jan 2018 15:06)  HR: 107 (30 Jan 2018 07:31) (102 - 116)  BP: 155/92 (30 Jan 2018 07:31) (132/83 - 172/96)  BP(mean): --  RR: 18 (30 Jan 2018 07:31) (18 - 18)  SpO2: 97% (30 Jan 2018 07:31) (95% - 98%)  CAPILLARY BLOOD GLUCOSE      POCT Blood Glucose.: 83 mg/dL (30 Jan 2018 09:01)  POCT Blood Glucose.: 101 mg/dL (30 Jan 2018 04:56)  POCT Blood Glucose.: 124 mg/dL (29 Jan 2018 21:36)  POCT Blood Glucose.: 135 mg/dL (29 Jan 2018 17:52)  POCT Blood Glucose.: 155 mg/dL (29 Jan 2018 12:26)    I&O's Summary    29 Jan 2018 07:01  -  30 Jan 2018 07:00  --------------------------------------------------------  IN: 1000 mL / OUT: 300 mL / NET: 700 mL        PHYSICAL EXAM:  GENERAL: NAD  HEENT: neck supple  LUNG: Clear to auscultation bilaterally; No wheeze  HEART: S1, S2  ABDOMEN: Soft, Nontender, Nondistended; Bowel sounds present  EXTREMITIES: No leg edema, + palpable right radial pulse  PSYCH: normal affect, calm  NEUROLOGY: AAO x3, LUE/LLE weakness    SKIN: No rashes      LABS:                        11.5   10.3  )-----------( 463      ( 29 Jan 2018 10:50 )             33.0     01-29    140  |  102  |  12  ----------------------------<  129<H>  3.8   |  26  |  0.37<L>    Ca    9.3      29 Jan 2018 10:50                RADIOLOGY & ADDITIONAL TESTS:    Imaging Personally Reviewed:    Consultant(s) Notes Reviewed:  vascular    Care Discussed with Consultants/Other Providers: neurosurgery PA

## 2018-01-31 ENCOUNTER — INPATIENT (INPATIENT)
Facility: HOSPITAL | Age: 78
LOS: 14 days | Discharge: HOME | DRG: 949 | End: 2018-02-15
Attending: PHYSICAL MEDICINE & REHABILITATION | Admitting: PHYSICAL MEDICINE & REHABILITATION
Payer: MEDICARE

## 2018-01-31 ENCOUNTER — MOBILE ON CALL (OUTPATIENT)
Age: 78
End: 2018-01-31

## 2018-01-31 VITALS
RESPIRATION RATE: 18 BRPM | DIASTOLIC BLOOD PRESSURE: 78 MMHG | HEART RATE: 108 BPM | OXYGEN SATURATION: 97 % | SYSTOLIC BLOOD PRESSURE: 151 MMHG | TEMPERATURE: 99 F

## 2018-01-31 DIAGNOSIS — I10 ESSENTIAL (PRIMARY) HYPERTENSION: ICD-10-CM

## 2018-01-31 DIAGNOSIS — C71.9 MALIGNANT NEOPLASM OF BRAIN, UNSPECIFIED: ICD-10-CM

## 2018-01-31 DIAGNOSIS — G47.00 INSOMNIA, UNSPECIFIED: ICD-10-CM

## 2018-01-31 DIAGNOSIS — M21.372 FOOT DROP, LEFT FOOT: ICD-10-CM

## 2018-01-31 DIAGNOSIS — Z98.890 OTHER SPECIFIED POSTPROCEDURAL STATES: Chronic | ICD-10-CM

## 2018-01-31 DIAGNOSIS — F41.9 ANXIETY DISORDER, UNSPECIFIED: ICD-10-CM

## 2018-01-31 DIAGNOSIS — G40.909 EPILEPSY, UNSPECIFIED, NOT INTRACTABLE, WITHOUT STATUS EPILEPTICUS: ICD-10-CM

## 2018-01-31 DIAGNOSIS — D68.69 OTHER THROMBOPHILIA: ICD-10-CM

## 2018-01-31 DIAGNOSIS — I74.2 EMBOLISM AND THROMBOSIS OF ARTERIES OF THE UPPER EXTREMITIES: ICD-10-CM

## 2018-01-31 DIAGNOSIS — Z51.89 ENCOUNTER FOR OTHER SPECIFIED AFTERCARE: ICD-10-CM

## 2018-01-31 DIAGNOSIS — R94.5 ABNORMAL RESULTS OF LIVER FUNCTION STUDIES: ICD-10-CM

## 2018-01-31 DIAGNOSIS — E78.5 HYPERLIPIDEMIA, UNSPECIFIED: ICD-10-CM

## 2018-01-31 DIAGNOSIS — Z48.3 AFTERCARE FOLLOWING SURGERY FOR NEOPLASM: ICD-10-CM

## 2018-01-31 DIAGNOSIS — Z90.12 ACQUIRED ABSENCE OF LEFT BREAST AND NIPPLE: ICD-10-CM

## 2018-01-31 DIAGNOSIS — G81.94 HEMIPLEGIA, UNSPECIFIED AFFECTING LEFT NONDOMINANT SIDE: ICD-10-CM

## 2018-01-31 DIAGNOSIS — F43.20 ADJUSTMENT DISORDER, UNSPECIFIED: ICD-10-CM

## 2018-01-31 DIAGNOSIS — Z85.3 PERSONAL HISTORY OF MALIGNANT NEOPLASM OF BREAST: ICD-10-CM

## 2018-01-31 LAB
GLUCOSE BLDC GLUCOMTR-MCNC: 92 MG/DL — SIGNIFICANT CHANGE UP (ref 70–99)
GLUCOSE BLDC GLUCOMTR-MCNC: 93 MG/DL — SIGNIFICANT CHANGE UP (ref 70–99)
SURGICAL PATHOLOGY STUDY: SIGNIFICANT CHANGE UP

## 2018-01-31 PROCEDURE — 81276 KRAS GENE ADDL VARIANTS: CPT

## 2018-01-31 PROCEDURE — 81275 KRAS GENE VARIANTS EXON 2: CPT

## 2018-01-31 PROCEDURE — 70450 CT HEAD/BRAIN W/O DYE: CPT

## 2018-01-31 PROCEDURE — C1713: CPT

## 2018-01-31 PROCEDURE — 88333 PATH CONSLTJ SURG CYTO XM 1: CPT

## 2018-01-31 PROCEDURE — 88360 TUMOR IMMUNOHISTOCHEM/MANUAL: CPT

## 2018-01-31 PROCEDURE — 88342 IMHCHEM/IMCYTCHM 1ST ANTB: CPT

## 2018-01-31 PROCEDURE — 85730 THROMBOPLASTIN TIME PARTIAL: CPT

## 2018-01-31 PROCEDURE — 80048 BASIC METABOLIC PNL TOTAL CA: CPT

## 2018-01-31 PROCEDURE — 97760 ORTHOTIC MGMT&TRAING 1ST ENC: CPT

## 2018-01-31 PROCEDURE — 93005 ELECTROCARDIOGRAM TRACING: CPT

## 2018-01-31 PROCEDURE — 80185 ASSAY OF PHENYTOIN TOTAL: CPT

## 2018-01-31 PROCEDURE — 81272 KIT GENE TARGETED SEQ ANALYS: CPT

## 2018-01-31 PROCEDURE — 99233 SBSQ HOSP IP/OBS HIGH 50: CPT

## 2018-01-31 PROCEDURE — 93970 EXTREMITY STUDY: CPT

## 2018-01-31 PROCEDURE — 85610 PROTHROMBIN TIME: CPT

## 2018-01-31 PROCEDURE — 83735 ASSAY OF MAGNESIUM: CPT

## 2018-01-31 PROCEDURE — 86850 RBC ANTIBODY SCREEN: CPT

## 2018-01-31 PROCEDURE — 88307 TISSUE EXAM BY PATHOLOGIST: CPT

## 2018-01-31 PROCEDURE — 81403 MOPATH PROCEDURE LEVEL 4: CPT

## 2018-01-31 PROCEDURE — 70553 MRI BRAIN STEM W/O & W/DYE: CPT

## 2018-01-31 PROCEDURE — 99222 1ST HOSP IP/OBS MODERATE 55: CPT

## 2018-01-31 PROCEDURE — 80053 COMPREHEN METABOLIC PANEL: CPT

## 2018-01-31 PROCEDURE — 86900 BLOOD TYPING SEROLOGIC ABO: CPT

## 2018-01-31 PROCEDURE — 81121 IDH2 COMMON VARIANTS: CPT

## 2018-01-31 PROCEDURE — 80076 HEPATIC FUNCTION PANEL: CPT

## 2018-01-31 PROCEDURE — 81311 NRAS GENE VARIANTS EXON 2&3: CPT

## 2018-01-31 PROCEDURE — 81210 BRAF GENE: CPT

## 2018-01-31 PROCEDURE — 84132 ASSAY OF SERUM POTASSIUM: CPT

## 2018-01-31 PROCEDURE — 70551 MRI BRAIN STEM W/O DYE: CPT

## 2018-01-31 PROCEDURE — 97161 PT EVAL LOW COMPLEX 20 MIN: CPT

## 2018-01-31 PROCEDURE — 97164 PT RE-EVAL EST PLAN CARE: CPT

## 2018-01-31 PROCEDURE — 81235 EGFR GENE COM VARIANTS: CPT

## 2018-01-31 PROCEDURE — 99223 1ST HOSP IP/OBS HIGH 75: CPT

## 2018-01-31 PROCEDURE — 88331 PATH CONSLTJ SURG 1 BLK 1SPC: CPT

## 2018-01-31 PROCEDURE — 97165 OT EVAL LOW COMPLEX 30 MIN: CPT

## 2018-01-31 PROCEDURE — C1889: CPT

## 2018-01-31 PROCEDURE — 71275 CT ANGIOGRAPHY CHEST: CPT

## 2018-01-31 PROCEDURE — 99232 SBSQ HOSP IP/OBS MODERATE 35: CPT

## 2018-01-31 PROCEDURE — 86901 BLOOD TYPING SEROLOGIC RH(D): CPT

## 2018-01-31 PROCEDURE — 82962 GLUCOSE BLOOD TEST: CPT

## 2018-01-31 PROCEDURE — 81120 IDH1 COMMON VARIANTS: CPT

## 2018-01-31 PROCEDURE — 81314 PDGFRA GENE: CPT

## 2018-01-31 PROCEDURE — 93931 UPPER EXTREMITY STUDY: CPT

## 2018-01-31 PROCEDURE — 80186 ASSAY OF PHENYTOIN FREE: CPT

## 2018-01-31 PROCEDURE — 88341 IMHCHEM/IMCYTCHM EA ADD ANTB: CPT

## 2018-01-31 PROCEDURE — 81404 MOPATH PROCEDURE LEVEL 5: CPT

## 2018-01-31 PROCEDURE — 84100 ASSAY OF PHOSPHORUS: CPT

## 2018-01-31 PROCEDURE — 85027 COMPLETE CBC AUTOMATED: CPT

## 2018-01-31 PROCEDURE — 81405 MOPATH PROCEDURE LEVEL 6: CPT

## 2018-01-31 RX ORDER — ALPRAZOLAM 0.25 MG
0.5 TABLET ORAL ONCE
Qty: 0 | Refills: 0 | Status: DISCONTINUED | OUTPATIENT
Start: 2018-01-31 | End: 2018-01-31

## 2018-01-31 RX ORDER — ALPRAZOLAM 0.25 MG
0.25 TABLET ORAL DAILY
Qty: 0 | Refills: 0 | Status: DISCONTINUED | OUTPATIENT
Start: 2018-01-31 | End: 2018-01-31

## 2018-01-31 RX ORDER — ALPRAZOLAM 0.25 MG
1 TABLET ORAL
Qty: 0 | Refills: 0 | COMMUNITY
Start: 2018-01-31

## 2018-01-31 RX ADMIN — Medication 0.5 MILLIGRAM(S): at 03:28

## 2018-01-31 RX ADMIN — Medication 650 MILLIGRAM(S): at 01:16

## 2018-01-31 RX ADMIN — Medication 100 MILLIGRAM(S): at 05:55

## 2018-01-31 RX ADMIN — Medication 100 MILLIGRAM(S): at 13:39

## 2018-01-31 RX ADMIN — Medication 2 MILLIGRAM(S): at 05:55

## 2018-01-31 RX ADMIN — Medication 650 MILLIGRAM(S): at 02:05

## 2018-01-31 RX ADMIN — LISINOPRIL 40 MILLIGRAM(S): 2.5 TABLET ORAL at 05:55

## 2018-01-31 RX ADMIN — PANTOPRAZOLE SODIUM 40 MILLIGRAM(S): 20 TABLET, DELAYED RELEASE ORAL at 11:50

## 2018-01-31 RX ADMIN — Medication 2 MILLIGRAM(S): at 13:39

## 2018-01-31 RX ADMIN — Medication 0.25 MILLIGRAM(S): at 01:25

## 2018-01-31 NOTE — PROGRESS NOTE ADULT - SUBJECTIVE AND OBJECTIVE BOX
CHIEF COMPLAINT: patient in bed without complaints, anxious about rehab    Vital Signs Last 24 Hrs  T(C): 37 (2018 11:57), Max: 37 (2018 11:57)  T(F): 98.6 (2018 11:57), Max: 98.6 (2018 11:57)  HR: 108 (2018 11:57) (92 - 115)  BP: 151/78 (2018 11:57) (125/68 - 160/80)  BP(mean): --  RR: 18 (2018 11:57) (18 - 18)  SpO2: 97% (2018 11:57) (95% - 98%)    PHYSICAL EXAM:    General: No Acute Distress     Neurological: Awake, alert oriented to person, place and time, Following Commands, PERRL, EOMI, Face Symmetrical, Speech Fluent, right side 5/5, left hemiparesis leg>arm weakness, (at least antigravity)   Sensation to Light Touch Intact    Pulmonary: Clear to Auscultation, No Rales, No Rhonchi, No Wheezes     Cardiovascular: S1, S2, Regular Rate and Rhythm     Gastrointestinal: Soft, Nontender, Nondistended     Extremities: No calf tenderness    right upper extremity warm to touch, good cap refill + Pulse     Incision: +staples c/d/i    LABS:         Hemoglobin A1C, Whole Blood: 5.7 % ( @ 10:02)       @ 07: @ 07:00  --------------------------------------------------------  IN: 850 mL / OUT: 800 mL / NET: 50 mL     @ 07: @ 12:59  --------------------------------------------------------  IN: 240 mL / OUT: 350 mL / NET: -110 mL        MEDICATIONS:  Anticoagulation:  enoxaparin Injectable 40 milliGRAM(s) SubCutaneous at bedtime    Endo:  dexamethasone     Tablet 2 milliGRAM(s) Oral every 8 hours  dextrose 50% Injectable 12.5 Gram(s) IV Push once  dextrose 50% Injectable 25 Gram(s) IV Push once  dextrose 50% Injectable 25 Gram(s) IV Push once  glucagon  Injectable 1 milliGRAM(s) IntraMuscular once PRN  insulin lispro (HumaLOG) corrective regimen sliding scale   SubCutaneous three times a day before meals  insulin lispro (HumaLOG) corrective regimen sliding scale   SubCutaneous at bedtime  simvastatin 10 milliGRAM(s) Oral at bedtime    Neuro:  acetaminophen    Suspension. 650 milliGRAM(s) Oral every 6 hours  acetaminophen   Tablet. 650 milliGRAM(s) Oral every 6 hours PRN Mild Pain (1 - 3)  ALPRAZolam 0.25 milliGRAM(s) Oral daily PRN ANXIETY  ondansetron Injectable 8 milliGRAM(s) IV Push every 8 hours PRN Nausea and/or Vomiting  oxyCODONE    IR 5 milliGRAM(s) Oral every 4 hours PRN Moderate Pain (4 - 6)  oxyCODONE    IR 10 milliGRAM(s) Oral every 4 hours PRN Severe Pain (7 - 10)  phenytoin   Capsule 100 milliGRAM(s) Oral three times a day    Cardiac:  lisinopril 40 milliGRAM(s) Oral daily    GI/:  docusate sodium 100 milliGRAM(s) Oral three times a day  pantoprazole    Tablet 40 milliGRAM(s) Oral daily  senna 2 Tablet(s) Oral at bedtime PRN Constipation    Other:   sodium chloride 0.9%. 1000 milliLiter(s) IV Continuous <Continuous>    DIET: [x] Regular [] CCD [] Renal [] Puree [] Dysphagia [] Tube Feeds:     IMAGIN< from: CT Angio Chest w/ IV Cont (18 @ 16:24) >  IMPRESSION:   No pulmonary embolism. Limited evaluation of the subsegmental pulmonary   arteries secondary to motion artifact.  Postsurgical changes of left breast as well.  < end of copied text >    2< from: [facility] Duplex Lower Ext Vein Scan, Bilat (18 @ 10:24) >  IMPRESSION:   No evidence of bilateral lower extremity deep venous thrombosis.  < end of copied text >    3< from: MR Brain Stereotactic w/wo IV Cont (18 @ 19:57) >  IMPRESSION: Patient is status post partial resection of dominant mass in   the right high posterior frontal parietal parasagittal region. There is   residual neoplasm suggested along the periphery of the initial lesion in   the superior and anterolateral aspects of the originally identified   neoplasm. Some hemorrhage is seen at this level as well. The 2 smaller   satellite nodules that enhance are again recognized one posterior and one   inferolateral to the primary lesion site. Patient is status post a right   parietal craniotomy. Residual edema and mass effect is noted in   association with the lesion that has been partially resected. Right   frontal pneumocephalus      < end of copied text >

## 2018-01-31 NOTE — PROGRESS NOTE ADULT - PROBLEM SELECTOR PLAN 2
? due to previous a- line  - Vascular surgery following  - High risk for anticoagulation given recent brain surgery. Per neurosurgery, no plan for AC at this time.  - avoid arterial puncture in RUE per vascular but OK with venous puncture per my discussion with vascular team yesterday

## 2018-01-31 NOTE — PROGRESS NOTE ADULT - ASSESSMENT
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

## 2018-01-31 NOTE — PROGRESS NOTE ADULT - SUBJECTIVE AND OBJECTIVE BOX
Patient is a 77y old  Female who presents with a chief complaint of Patient was admitted on 1/23 with seizure and was found to have a left frontal parietal brain lesion. (30 Jan 2018 11:22)      SUBJECTIVE / OVERNIGHT EVENTS: doing well, no SOB, chest pain. didn't sleep well at night and doesn't feel trazodone is helping. anxious to leave the hospital.    MEDICATIONS  (STANDING):  acetaminophen    Suspension. 650 milliGRAM(s) Oral every 6 hours  dexamethasone     Tablet 2 milliGRAM(s) Oral every 8 hours  dextrose 50% Injectable 12.5 Gram(s) IV Push once  dextrose 50% Injectable 25 Gram(s) IV Push once  dextrose 50% Injectable 25 Gram(s) IV Push once  docusate sodium 100 milliGRAM(s) Oral three times a day  enoxaparin Injectable 40 milliGRAM(s) SubCutaneous at bedtime  insulin lispro (HumaLOG) corrective regimen sliding scale   SubCutaneous three times a day before meals  insulin lispro (HumaLOG) corrective regimen sliding scale   SubCutaneous at bedtime  lisinopril 40 milliGRAM(s) Oral daily  pantoprazole    Tablet 40 milliGRAM(s) Oral daily  phenytoin   Capsule 100 milliGRAM(s) Oral three times a day  simvastatin 10 milliGRAM(s) Oral at bedtime  sodium chloride 0.9%. 1000 milliLiter(s) (100 mL/Hr) IV Continuous <Continuous>    MEDICATIONS  (PRN):  acetaminophen   Tablet. 650 milliGRAM(s) Oral every 6 hours PRN Mild Pain (1 - 3)  ALPRAZolam 0.25 milliGRAM(s) Oral daily PRN ANXIETY  glucagon  Injectable 1 milliGRAM(s) IntraMuscular once PRN Glucose LESS THAN 70 milligrams/deciliter  ondansetron Injectable 8 milliGRAM(s) IV Push every 8 hours PRN Nausea and/or Vomiting  oxyCODONE    IR 5 milliGRAM(s) Oral every 4 hours PRN Moderate Pain (4 - 6)  oxyCODONE    IR 10 milliGRAM(s) Oral every 4 hours PRN Severe Pain (7 - 10)  senna 2 Tablet(s) Oral at bedtime PRN Constipation      Vital Signs Last 24 Hrs  T(C): 36.8 (31 Jan 2018 07:59), Max: 36.8 (30 Jan 2018 11:56)  T(F): 98.3 (31 Jan 2018 07:59), Max: 98.3 (30 Jan 2018 11:56)  HR: 92 (31 Jan 2018 07:59) (92 - 115)  BP: 133/68 (31 Jan 2018 07:59) (125/68 - 160/80)  BP(mean): --  RR: 18 (31 Jan 2018 07:59) (18 - 18)  SpO2: 95% (31 Jan 2018 07:59) (95% - 98%)  CAPILLARY BLOOD GLUCOSE      POCT Blood Glucose.: 93 mg/dL (31 Jan 2018 08:23)  POCT Blood Glucose.: 115 mg/dL (30 Jan 2018 21:39)  POCT Blood Glucose.: 85 mg/dL (30 Jan 2018 17:05)  POCT Blood Glucose.: 109 mg/dL (30 Jan 2018 12:19)    I&O's Summary    30 Jan 2018 07:01  -  31 Jan 2018 07:00  --------------------------------------------------------  IN: 850 mL / OUT: 800 mL / NET: 50 mL        PHYSICAL EXAM:  GENERAL: NAD  HEENT: neck supple  LUNG: Clear to auscultation bilaterally; No wheeze  HEART: S1, S2  ABDOMEN: Soft, Nontender, Nondistended; Bowel sounds present  EXTREMITIES: No leg edema, + palpable right radial pulse  PSYCH: normal affect, calm  NEUROLOGY: AAO x3, LUE/LLE weakness    SKIN: No rashes        LABS:                    RADIOLOGY & ADDITIONAL TESTS:    EKG tracing reviewed: sinus tach    Imaging Personally Reviewed:    < from: CT Angio Chest w/ IV Cont (01.30.18 @ 16:24) >  IMPRESSION:     No pulmonary embolism. Limited evaluation of the subsegmental pulmonary   arteries secondary to motion artifact.    Postsurgical changes of left breast as well.    < end of copied text >      Consultant(s) Notes Reviewed:  neuro-oncology    Care Discussed with Consultants/Other Providers: neurosurgery PA

## 2018-01-31 NOTE — PROGRESS NOTE ADULT - PROBLEM SELECTOR PLAN 5
- Recently diagnosed breast CA s/p recent L mastectomy with LN dissection 2 weeks ago  - Outpatient follow up with [facility]

## 2018-01-31 NOTE — PROGRESS NOTE ADULT - SUBJECTIVE AND OBJECTIVE BOX
Vascular Surgery    Patient seen and examined at bedside. No events overnight. Feeling well this AM.     Vital Signs Last 24 Hrs  T(C): 36.8 (01-31-18 @ 07:59), Max: 36.8 (01-30-18 @ 11:56)  T(F): 98.3 (01-31-18 @ 07:59), Max: 98.3 (01-30-18 @ 11:56)  HR: 92 (01-31-18 @ 07:59) (92 - 115)  BP: 133/68 (01-31-18 @ 07:59) (125/68 - 160/80)  BP(mean): --  RR: 18 (01-31-18 @ 07:59) (18 - 18)  SpO2: 95% (01-31-18 @ 07:59) (95% - 98%)  I&O's Detail    30 Jan 2018 07:01  -  31 Jan 2018 07:00  --------------------------------------------------------  IN:    Oral Fluid: 850 mL  Total IN: 850 mL    OUT:    Voided: 800 mL  Total OUT: 800 mL    Total NET: 50 mL    PE  Gen: NAD  R hand: motor and sensation intact, R palpable radial                          11.5   10.3  )-----------( 463      ( 29 Jan 2018 10:50 )             33.0     01-29    140  |  102  |  12  ----------------------------<  129<H>  3.8   |  26  |  0.37<L>    Ca    9.3      29 Jan 2018 10:50        CAPILLARY BLOOD GLUCOSE      POCT Blood Glucose.: 93 mg/dL (31 Jan 2018 08:23)  POCT Blood Glucose.: 115 mg/dL (30 Jan 2018 21:39)  POCT Blood Glucose.: 85 mg/dL (30 Jan 2018 17:05)  POCT Blood Glucose.: 109 mg/dL (30 Jan 2018 12:19)  POCT Blood Glucose.: 83 mg/dL (30 Jan 2018 09:01)      MEDICATIONS  (STANDING):  acetaminophen    Suspension. 650 milliGRAM(s) Oral every 6 hours  dexamethasone     Tablet 2 milliGRAM(s) Oral every 8 hours  dextrose 50% Injectable 12.5 Gram(s) IV Push once  dextrose 50% Injectable 25 Gram(s) IV Push once  dextrose 50% Injectable 25 Gram(s) IV Push once  docusate sodium 100 milliGRAM(s) Oral three times a day  enoxaparin Injectable 40 milliGRAM(s) SubCutaneous at bedtime  insulin lispro (HumaLOG) corrective regimen sliding scale   SubCutaneous three times a day before meals  insulin lispro (HumaLOG) corrective regimen sliding scale   SubCutaneous at bedtime  lisinopril 40 milliGRAM(s) Oral daily  pantoprazole    Tablet 40 milliGRAM(s) Oral daily  phenytoin   Capsule 100 milliGRAM(s) Oral three times a day  simvastatin 10 milliGRAM(s) Oral at bedtime  sodium chloride 0.9%. 1000 milliLiter(s) (100 mL/Hr) IV Continuous <Continuous>  traZODone 50 milliGRAM(s) Oral <User Schedule>    MEDICATIONS  (PRN):  acetaminophen   Tablet. 650 milliGRAM(s) Oral every 6 hours PRN Mild Pain (1 - 3)  ALPRAZolam 0.25 milliGRAM(s) Oral daily PRN ANXIETY  glucagon  Injectable 1 milliGRAM(s) IntraMuscular once PRN Glucose LESS THAN 70 milligrams/deciliter  ondansetron Injectable 8 milliGRAM(s) IV Push every 8 hours PRN Nausea and/or Vomiting  oxyCODONE    IR 5 milliGRAM(s) Oral every 4 hours PRN Moderate Pain (4 - 6)  oxyCODONE    IR 10 milliGRAM(s) Oral every 4 hours PRN Severe Pain (7 - 10)  senna 2 Tablet(s) Oral at bedtime PRN Constipation

## 2018-01-31 NOTE — PROGRESS NOTE ADULT - ASSESSMENT
77F h/o HTN, HLD, breast CA diagnosed Nov 2017 s/p L mastectomy with LN dissection 2 weeks ago at [facility] admitted to [facility] 1/16/18 after seizure, found to have multiple brain lesions on imaging s/p right craniotomy with resection of tumor on 1/26. Course c/b right brachial artery occlusion, sinus tachycardia.    PMD: Dr. Connie Dumont in [place]

## 2018-01-31 NOTE — PROGRESS NOTE ADULT - SUBJECTIVE AND OBJECTIVE BOX
Case discussed today with TREV Anders.  Ms. Rivas plans to be discharged this afternoon to [facility].  From there, we can arrange outpatient SIM and treatment [facility] [address]  [phone].  Logistics prohibited CT SIM at [facility] today [facility]

## 2018-01-31 NOTE — PROGRESS NOTE ADULT - PROVIDER SPECIALTY LIST ADULT
Hospitalist
Internal Medicine
NSICU
NSICU
Neurosurgery
Rad Onc
Rehab Medicine
Rehab Medicine
Vascular Surgery
Neurosurgery
Neurosurgery
Internal Medicine
Internal Medicine

## 2018-01-31 NOTE — PROGRESS NOTE ADULT - ASSESSMENT
Patient is a 76 y/o F pt with PMHx of Breast CA Dx Nov 2017 (s/p resection of L breast x2 week ago at [facility]) with recent negative PET scan and BIB EMS to [facility] ED s/p 30 min seizure (L facial twitch) at home while seated, witnessed by , with associated L-sided facial droop since 5pm on 1/16. She was found to have multiple brain lesions. Heme onc consulted, reported the lesions were not classic for breast CA mets, possible GBM. The patient was transferred to [facility] for biopsy versus resection. Patient denies fever, chills, SOB, palpitations, chest pain, nausea, vomiting, diarrhea or constipation. (23 Jan 2018 18:40)    PAST MEDICAL & SURGICAL HISTORY:  Breast cancer  H/O breast surgery       Patient was admitted on 1/23/18 with seizure and was found to have a left frontal parietal brain lesion. 1/26/18 s/p right craniotomy for tumor resection, final pathology pending at time of discharge.  Will need further adjuvant RT (radiation) and chemotherapy arranged as outpatient. 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 Please consult Dr Yin prior to starting an anticoagulation in future.	         PLAN:  Neuro: taper off dilantin and wswitch to keppra, taper decadron to 2mg Q12h in rehab.   Respiratory: patient instructed on incentive spirometer  Endocrine: will stop finger sticks as patient not required sliding scale coverage and decadron tapering  Heme/Onc:  Will need further adjuvant RT (radiation) and chemotherapy arranged as outpatient- Dr La neuro onc, Dr Husain Radiation oncologist  will not sim patient prior to rehab as discussed with Dr Yin, to be arranged as outpatient         DVT ppx: [] SQH [x] SQL and venodynes bilaterally  GI: bowel regimen   PT/OT/PMR- acute rehab upon discharge today  daughter updated with plan, questions answered    discussed with Dr Yin    [id number]

## 2018-01-31 NOTE — PROGRESS NOTE ADULT - PROBLEM SELECTOR PLAN 4
- intermittent sinus tachycardia suspect in part due to anxiety  - LE doppler and CTA chest negative

## 2018-01-31 NOTE — PROGRESS NOTE ADULT - PROBLEM SELECTOR PLAN 9
- d/c trazodone as patient reports it is not helping  - continue with xanax prn anxiety  - would avoid benadryl for insomnia

## 2018-01-31 NOTE — PROGRESS NOTE ADULT - PROBLEM SELECTOR PLAN 1
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   - f/u path result  - Post-op care per neurosurgery team.  - CT C/A/P negative for malignancy but reported nonspecific nodular pleural based opacities noted on CT chest. will need close outpatient follow up- d/w patient.  - neuro-oncology input appreciated. patient will need radiation therapy and Temodar chemotherapy, to follow up as outpatient.  - On decadron  - neuro recommending changing dilantin to keppra  - PT and OT

## 2018-01-31 NOTE — CONSULT NOTE ADULT - SUBJECTIVE AND OBJECTIVE BOX
Mrs. Michelle Rivas is a 76 yo right handed woman who was admitted to [facility] on 1/23/18 after a left focal seizure. She was found to have a right fronto-parietal mass - there was a dominant heterogeneously enhancing mass measuring 3 x 2.1 x 3.4 cm with surrounding T2/flair abnormality - within that flair there were a few small areas of enhancement. She underwent biopsy on 1/26.  Preliminary pathology consistent with GBM - immunostains are pending. Post-operative MRI scan shows known residual disease.    Past medical history significant for recent diagnosis of breast cancer - pathology and evaluation currently unavailable - daughter will provide. Had left sided mastectomy 2 weeks prior to admission. on 1/17 had CT C/A/P without evidence of malignancy.    Currently, she is awake and alert - oriented and fluent.  PERRL, EOMI, VFF  Slight flattening of the left NLF  Tongue midline  Left hemiparesis - LE worse than UE - 2/5 proximally UE   LE very weak with left foot drop.    I discussed with the patient and her  that this was a tumor that appears unrelated to the breast cancer. That she will need a combination of radiation therapy and Temodar chemotherapy. She will go to [facility] tomorrow for CT for planning purposes (primary team to arrange).    I will speak to her daughter and obtain records related to her recent breast cancer diagnosis. Would have medical oncology involved to coordinate treatment of both disease.    She will need to follow up with me in about 10 days.  Please switch Dilantin to Keppra.    Thank you for consult.

## 2018-01-31 NOTE — PROGRESS NOTE ADULT - PROBLEM SELECTOR PLAN 8
- Likely due to steroids, no s/s of infection, resolved
- Likely due to steroids, no s/s of infection, resolved
- can consider low dose trazodone at night for insomnia

## 2018-01-31 NOTE — PROGRESS NOTE ADULT - SUBJECTIVE AND OBJECTIVE BOX
CC- left sided weakness  Patient tolerating therapies. No new c/os  MEDICATIONS  (STANDING):  acetaminophen    Suspension. 650 milliGRAM(s) Oral every 6 hours  dexamethasone     Tablet 2 milliGRAM(s) Oral every 8 hours  dextrose 50% Injectable 12.5 Gram(s) IV Push once  dextrose 50% Injectable 25 Gram(s) IV Push once  dextrose 50% Injectable 25 Gram(s) IV Push once  docusate sodium 100 milliGRAM(s) Oral three times a day  enoxaparin Injectable 40 milliGRAM(s) SubCutaneous at bedtime  insulin lispro (HumaLOG) corrective regimen sliding scale   SubCutaneous three times a day before meals  insulin lispro (HumaLOG) corrective regimen sliding scale   SubCutaneous at bedtime  lisinopril 40 milliGRAM(s) Oral daily  pantoprazole    Tablet 40 milliGRAM(s) Oral daily  phenytoin   Capsule 100 milliGRAM(s) Oral three times a day  simvastatin 10 milliGRAM(s) Oral at bedtime  sodium chloride 0.9%. 1000 milliLiter(s) (100 mL/Hr) IV Continuous <Continuous>    MEDICATIONS  (PRN):  acetaminophen   Tablet. 650 milliGRAM(s) Oral every 6 hours PRN Mild Pain (1 - 3)  ALPRAZolam 0.25 milliGRAM(s) Oral daily PRN ANXIETY  glucagon  Injectable 1 milliGRAM(s) IntraMuscular once PRN Glucose LESS THAN 70 milligrams/deciliter  ondansetron Injectable 8 milliGRAM(s) IV Push every 8 hours PRN Nausea and/or Vomiting  oxyCODONE    IR 5 milliGRAM(s) Oral every 4 hours PRN Moderate Pain (4 - 6)  oxyCODONE    IR 10 milliGRAM(s) Oral every 4 hours PRN Severe Pain (7 - 10)  senna 2 Tablet(s) Oral at bedtime PRN Constipation    Vital Signs Last 24 Hrs  T(C): 37 (31 Jan 2018 11:57), Max: 37 (31 Jan 2018 11:57)  T(F): 98.6 (31 Jan 2018 11:57), Max: 98.6 (31 Jan 2018 11:57)  HR: 108 (31 Jan 2018 11:57) (92 - 115)  BP: 151/78 (31 Jan 2018 11:57) (125/68 - 160/80)  BP(mean): --  RR: 18 (31 Jan 2018 11:57) (18 - 18)  SpO2: 97% (31 Jan 2018 11:57) (95% - 98%)    PHYSICAL EXAM  Constitutional - NAD, Comfortable  Extremities - No C/C/E, No calf tenderness   Neurologic Exam -   Cognitive - Awake, Alert, AAO to self, [facility], date, year, situation  Communication - Fluent, No dysarthria  Cranial Nerves - loss left nasolabial fold  Motor -        LEFT UE - ShAB 2/5, EF 2/5, EE 2/5, WE 1/5,  3/5        RIGHT UE - ShAB 5/5, EF 5/5, EE 5/5, WE 5/5,  5/5        LEFT LE - HF 1/5, KE 1/5, DF 0/5, PF 0/5        RIGHT LE - HF 5/5, KE 5/5, DF 5/5, PF 5/5   Memory - immediate and delayed recall intact   Psychiatric - Mood stable, Affect WNL  ----------------------------------------------------------------------------------------  ASSESSMENT/PLAN - 77F with history of breast CA admitted with focal seizures, found to have brain lesions s/p craniotomy for resection (prelim high grade glioma) with left hemiparesis causing functional, ADL and gait impairments.      PT&OT - ROM, strengthening, ADL/gait/balance training with AD  Precautions - Fall, seizure  Skin - turn Q2  Acute Rehab- can tolerate 3h/d PT/OT/SLP and requires daily physician visits  Discussed with patient's family.

## 2018-02-01 PROCEDURE — 99223 1ST HOSP IP/OBS HIGH 75: CPT

## 2018-02-01 PROCEDURE — 96116 NUBHVL XM PHYS/QHP 1ST HR: CPT

## 2018-02-01 PROCEDURE — 99221 1ST HOSP IP/OBS SF/LOW 40: CPT

## 2018-02-01 PROCEDURE — 90792 PSYCH DIAG EVAL W/MED SRVCS: CPT

## 2018-02-01 PROCEDURE — 99233 SBSQ HOSP IP/OBS HIGH 50: CPT

## 2018-02-02 PROCEDURE — 99232 SBSQ HOSP IP/OBS MODERATE 35: CPT

## 2018-02-03 PROCEDURE — 99232 SBSQ HOSP IP/OBS MODERATE 35: CPT

## 2018-02-04 PROCEDURE — 99232 SBSQ HOSP IP/OBS MODERATE 35: CPT

## 2018-02-05 PROCEDURE — 99232 SBSQ HOSP IP/OBS MODERATE 35: CPT

## 2018-02-06 PROCEDURE — 99232 SBSQ HOSP IP/OBS MODERATE 35: CPT

## 2018-02-07 PROCEDURE — 99223 1ST HOSP IP/OBS HIGH 75: CPT

## 2018-02-07 PROCEDURE — 99233 SBSQ HOSP IP/OBS HIGH 50: CPT

## 2018-02-07 PROCEDURE — 99232 SBSQ HOSP IP/OBS MODERATE 35: CPT

## 2018-02-08 PROCEDURE — 99231 SBSQ HOSP IP/OBS SF/LOW 25: CPT

## 2018-02-08 PROCEDURE — 99232 SBSQ HOSP IP/OBS MODERATE 35: CPT

## 2018-02-08 PROCEDURE — 70450 CT HEAD/BRAIN W/O DYE: CPT | Mod: 26

## 2018-02-09 PROCEDURE — 99232 SBSQ HOSP IP/OBS MODERATE 35: CPT

## 2018-02-10 PROCEDURE — 99232 SBSQ HOSP IP/OBS MODERATE 35: CPT

## 2018-02-10 PROCEDURE — 99232 SBSQ HOSP IP/OBS MODERATE 35: CPT | Mod: GC

## 2018-02-11 PROCEDURE — 99232 SBSQ HOSP IP/OBS MODERATE 35: CPT | Mod: GC

## 2018-02-11 PROCEDURE — 99232 SBSQ HOSP IP/OBS MODERATE 35: CPT

## 2018-02-12 PROCEDURE — 99233 SBSQ HOSP IP/OBS HIGH 50: CPT

## 2018-02-12 PROCEDURE — 99232 SBSQ HOSP IP/OBS MODERATE 35: CPT

## 2018-02-13 PROCEDURE — 99233 SBSQ HOSP IP/OBS HIGH 50: CPT

## 2018-02-13 PROCEDURE — 99232 SBSQ HOSP IP/OBS MODERATE 35: CPT

## 2018-02-14 PROCEDURE — 99232 SBSQ HOSP IP/OBS MODERATE 35: CPT

## 2018-02-15 PROCEDURE — 97116 GAIT TRAINING THERAPY: CPT

## 2018-02-15 PROCEDURE — 99239 HOSP IP/OBS DSCHRG MGMT >30: CPT

## 2018-02-15 PROCEDURE — 84100 ASSAY OF PHOSPHORUS: CPT

## 2018-02-15 PROCEDURE — 80053 COMPREHEN METABOLIC PANEL: CPT

## 2018-02-15 PROCEDURE — 97535 SELF CARE MNGMENT TRAINING: CPT

## 2018-02-15 PROCEDURE — 70450 CT HEAD/BRAIN W/O DYE: CPT

## 2018-02-15 PROCEDURE — 80048 BASIC METABOLIC PNL TOTAL CA: CPT

## 2018-02-15 PROCEDURE — 92507 TX SP LANG VOICE COMM INDIV: CPT

## 2018-02-15 PROCEDURE — 97167 OT EVAL HIGH COMPLEX 60 MIN: CPT

## 2018-02-15 PROCEDURE — 97760 ORTHOTIC MGMT&TRAING 1ST ENC: CPT

## 2018-02-15 PROCEDURE — 83735 ASSAY OF MAGNESIUM: CPT

## 2018-02-15 PROCEDURE — 80076 HEPATIC FUNCTION PANEL: CPT

## 2018-02-15 PROCEDURE — 99232 SBSQ HOSP IP/OBS MODERATE 35: CPT

## 2018-02-15 PROCEDURE — 82248 BILIRUBIN DIRECT: CPT

## 2018-02-15 PROCEDURE — 97112 NEUROMUSCULAR REEDUCATION: CPT

## 2018-02-15 PROCEDURE — 97530 THERAPEUTIC ACTIVITIES: CPT

## 2018-02-15 PROCEDURE — 97110 THERAPEUTIC EXERCISES: CPT

## 2018-02-15 PROCEDURE — 80185 ASSAY OF PHENYTOIN TOTAL: CPT

## 2018-02-15 PROCEDURE — 85025 COMPLETE CBC W/AUTO DIFF WBC: CPT

## 2018-02-21 ENCOUNTER — APPOINTMENT (OUTPATIENT)
Dept: NEUROSURGERY | Facility: CLINIC | Age: 78
End: 2018-02-21
Payer: MEDICARE

## 2018-02-21 PROCEDURE — 99024 POSTOP FOLLOW-UP VISIT: CPT

## 2018-02-21 RX ORDER — PANTOPRAZOLE SODIUM 40 MG/1
40 TABLET, DELAYED RELEASE ORAL
Refills: 0 | Status: ACTIVE | COMMUNITY

## 2018-02-21 RX ORDER — ALPRAZOLAM 0.25 MG/1
0.25 TABLET ORAL
Refills: 0 | Status: ACTIVE | COMMUNITY

## 2018-02-28 ENCOUNTER — APPOINTMENT (OUTPATIENT)
Dept: NEUROLOGY | Facility: CLINIC | Age: 78
End: 2018-02-28
Payer: MEDICARE

## 2018-02-28 ENCOUNTER — OUTPATIENT (OUTPATIENT)
Dept: OUTPATIENT SERVICES | Facility: HOSPITAL | Age: 78
LOS: 1 days | Discharge: HOME | End: 2018-02-28
Payer: MEDICARE

## 2018-02-28 ENCOUNTER — RESULT REVIEW (OUTPATIENT)
Age: 78
End: 2018-02-28

## 2018-02-28 ENCOUNTER — OUTPATIENT (OUTPATIENT)
Dept: OUTPATIENT SERVICES | Facility: HOSPITAL | Age: 78
LOS: 1 days | Discharge: HOME | End: 2018-02-28

## 2018-02-28 ENCOUNTER — APPOINTMENT (OUTPATIENT)
Dept: RADIATION ONCOLOGY | Facility: CLINIC | Age: 78
End: 2018-02-28
Payer: MEDICARE

## 2018-02-28 ENCOUNTER — APPOINTMENT (OUTPATIENT)
Dept: HEMATOLOGY ONCOLOGY | Facility: CLINIC | Age: 78
End: 2018-02-28

## 2018-02-28 VITALS
HEART RATE: 94 BPM | OXYGEN SATURATION: 97 % | DIASTOLIC BLOOD PRESSURE: 73 MMHG | RESPIRATION RATE: 16 BRPM | SYSTOLIC BLOOD PRESSURE: 110 MMHG | TEMPERATURE: 98 F

## 2018-02-28 DIAGNOSIS — Z98.890 OTHER SPECIFIED POSTPROCEDURAL STATES: Chronic | ICD-10-CM

## 2018-02-28 DIAGNOSIS — C71.9 MALIGNANT NEOPLASM OF BRAIN, UNSPECIFIED: ICD-10-CM

## 2018-02-28 LAB
HCT VFR BLD CALC: 37.9 % — SIGNIFICANT CHANGE UP (ref 34.5–45)
HGB BLD-MCNC: 13 G/DL — SIGNIFICANT CHANGE UP (ref 11.5–15.5)
MCHC RBC-ENTMCNC: 30.3 PG — SIGNIFICANT CHANGE UP (ref 27–34)
MCHC RBC-ENTMCNC: 34.3 G/DL — SIGNIFICANT CHANGE UP (ref 32–36)
MCV RBC AUTO: 88.4 FL — SIGNIFICANT CHANGE UP (ref 80–100)
PLATELET # BLD AUTO: 383 K/UL — SIGNIFICANT CHANGE UP (ref 150–400)
RBC # BLD: 4.28 M/UL — SIGNIFICANT CHANGE UP (ref 3.8–5.2)
RBC # FLD: 12.6 % — SIGNIFICANT CHANGE UP (ref 10.3–14.5)
WBC # BLD: 15 K/UL — HIGH (ref 3.8–10.5)
WBC # FLD AUTO: 15 K/UL — HIGH (ref 3.8–10.5)

## 2018-02-28 PROCEDURE — 77263 THER RADIOLOGY TX PLNG CPLX: CPT

## 2018-02-28 PROCEDURE — 99214 OFFICE O/P EST MOD 30 MIN: CPT

## 2018-02-28 PROCEDURE — 99215 OFFICE O/P EST HI 40 MIN: CPT | Mod: 25,GC

## 2018-02-28 RX ORDER — SIMVASTATIN 10 MG/1
10 TABLET, FILM COATED ORAL
Qty: 30 | Refills: 0 | Status: ACTIVE | COMMUNITY

## 2018-03-01 ENCOUNTER — CHART COPY (OUTPATIENT)
Age: 78
End: 2018-03-01

## 2018-03-01 LAB
ALBUMIN SERPL ELPH-MCNC: 4.1 G/DL
ALP BLD-CCNC: 69 U/L
ALT SERPL-CCNC: 28 U/L
ANION GAP SERPL CALC-SCNC: 15 MMOL/L
AST SERPL-CCNC: 21 U/L
BASOPHILS # BLD AUTO: 0.2 K/UL — SIGNIFICANT CHANGE UP (ref 0–0.2)
BASOPHILS NFR BLD AUTO: 1.2 % — SIGNIFICANT CHANGE UP (ref 0–2)
BILIRUB SERPL-MCNC: 0.2 MG/DL
BUN SERPL-MCNC: 25 MG/DL
CALCIUM SERPL-MCNC: 9.8 MG/DL
CHLORIDE SERPL-SCNC: 100 MMOL/L
CO2 SERPL-SCNC: 23 MMOL/L
CREAT SERPL-MCNC: 0.52 MG/DL
EOSINOPHIL # BLD AUTO: 0 K/UL — SIGNIFICANT CHANGE UP (ref 0–0.5)
EOSINOPHIL NFR BLD AUTO: 0.1 % — SIGNIFICANT CHANGE UP (ref 0–6)
GLUCOSE SERPL-MCNC: 96 MG/DL
LYMPHOCYTES # BLD AUTO: 1 K/UL — SIGNIFICANT CHANGE UP (ref 1–3.3)
LYMPHOCYTES # BLD AUTO: 6.4 % — LOW (ref 13–44)
MONOCYTES # BLD AUTO: 0.9 K/UL — SIGNIFICANT CHANGE UP (ref 0–0.9)
MONOCYTES NFR BLD AUTO: 6.1 % — SIGNIFICANT CHANGE UP (ref 2–14)
NEUTROPHILS # BLD AUTO: 12.9 K/UL — HIGH (ref 1.8–7.4)
NEUTROPHILS NFR BLD AUTO: 86.2 % — HIGH (ref 43–77)
POTASSIUM SERPL-SCNC: 4.5 MMOL/L
PROT SERPL-MCNC: 7 G/DL
SODIUM SERPL-SCNC: 138 MMOL/L

## 2018-03-06 ENCOUNTER — RX RENEWAL (OUTPATIENT)
Age: 78
End: 2018-03-06

## 2018-03-06 DIAGNOSIS — G40.109 LOCALIZATION-RELATED (FOCAL) (PARTIAL) SYMPTOMATIC EPILEPSY AND EPILEPTIC SYNDROMES WITH SIMPLE PARTIAL SEIZURES, NOT INTRACTABLE, W/OUT STATUS EPILEPTICUS: ICD-10-CM

## 2018-03-08 PROCEDURE — 77300 RADIATION THERAPY DOSE PLAN: CPT | Mod: 26

## 2018-03-08 PROCEDURE — 77301 RADIOTHERAPY DOSE PLAN IMRT: CPT | Mod: 26

## 2018-03-08 PROCEDURE — 77338 DESIGN MLC DEVICE FOR IMRT: CPT | Mod: 26

## 2018-03-09 VITALS
WEIGHT: 105 LBS | SYSTOLIC BLOOD PRESSURE: 114 MMHG | HEIGHT: 61 IN | BODY MASS INDEX: 19.83 KG/M2 | OXYGEN SATURATION: 97 % | HEART RATE: 108 BPM | DIASTOLIC BLOOD PRESSURE: 70 MMHG

## 2018-03-11 ENCOUNTER — EMERGENCY (EMERGENCY)
Facility: HOSPITAL | Age: 78
LOS: 1 days | Discharge: HOME | End: 2018-03-11
Attending: EMERGENCY MEDICINE | Admitting: EMERGENCY MEDICINE
Payer: MEDICARE

## 2018-03-11 VITALS
OXYGEN SATURATION: 95 % | HEART RATE: 113 BPM | DIASTOLIC BLOOD PRESSURE: 74 MMHG | RESPIRATION RATE: 18 BRPM | TEMPERATURE: 98 F | SYSTOLIC BLOOD PRESSURE: 109 MMHG

## 2018-03-11 VITALS
RESPIRATION RATE: 18 BRPM | HEART RATE: 97 BPM | TEMPERATURE: 98 F | OXYGEN SATURATION: 96 % | DIASTOLIC BLOOD PRESSURE: 88 MMHG | SYSTOLIC BLOOD PRESSURE: 126 MMHG

## 2018-03-11 DIAGNOSIS — Z98.890 OTHER SPECIFIED POSTPROCEDURAL STATES: Chronic | ICD-10-CM

## 2018-03-11 LAB
ALBUMIN SERPL ELPH-MCNC: 3.6 G/DL — SIGNIFICANT CHANGE UP (ref 3.3–5)
ALP SERPL-CCNC: 58 U/L — SIGNIFICANT CHANGE UP (ref 40–120)
ALT FLD-CCNC: 29 U/L RC — SIGNIFICANT CHANGE UP (ref 10–45)
ANION GAP SERPL CALC-SCNC: 14 MMOL/L — SIGNIFICANT CHANGE UP (ref 5–17)
AST SERPL-CCNC: 31 U/L — SIGNIFICANT CHANGE UP (ref 10–40)
BASE EXCESS BLDV CALC-SCNC: 3.9 MMOL/L — HIGH (ref -2–2)
BASE EXCESS BLDV CALC-SCNC: 4 MMOL/L — HIGH (ref -2–2)
BASOPHILS # BLD AUTO: 0 K/UL — SIGNIFICANT CHANGE UP (ref 0–0.2)
BASOPHILS NFR BLD AUTO: 0.3 % — SIGNIFICANT CHANGE UP (ref 0–2)
BILIRUB SERPL-MCNC: 0.2 MG/DL — SIGNIFICANT CHANGE UP (ref 0.2–1.2)
BUN SERPL-MCNC: 20 MG/DL — SIGNIFICANT CHANGE UP (ref 7–23)
CA-I SERPL-SCNC: 1.1 MMOL/L — LOW (ref 1.12–1.3)
CA-I SERPL-SCNC: 1.13 MMOL/L — SIGNIFICANT CHANGE UP (ref 1.12–1.3)
CALCIUM SERPL-MCNC: 9.1 MG/DL — SIGNIFICANT CHANGE UP (ref 8.4–10.5)
CHLORIDE BLDV-SCNC: 105 MMOL/L — SIGNIFICANT CHANGE UP (ref 96–108)
CHLORIDE BLDV-SCNC: 108 MMOL/L — SIGNIFICANT CHANGE UP (ref 96–108)
CHLORIDE SERPL-SCNC: 100 MMOL/L — SIGNIFICANT CHANGE UP (ref 96–108)
CO2 BLDV-SCNC: 30 MMOL/L — SIGNIFICANT CHANGE UP (ref 22–30)
CO2 BLDV-SCNC: 31 MMOL/L — HIGH (ref 22–30)
CO2 SERPL-SCNC: 25 MMOL/L — SIGNIFICANT CHANGE UP (ref 22–31)
CREAT SERPL-MCNC: 0.65 MG/DL — SIGNIFICANT CHANGE UP (ref 0.5–1.3)
EOSINOPHIL # BLD AUTO: 0.1 K/UL — SIGNIFICANT CHANGE UP (ref 0–0.5)
EOSINOPHIL NFR BLD AUTO: 0.8 % — SIGNIFICANT CHANGE UP (ref 0–6)
GAS PNL BLDV: 135 MMOL/L — LOW (ref 136–145)
GAS PNL BLDV: 136 MMOL/L — SIGNIFICANT CHANGE UP (ref 136–145)
GAS PNL BLDV: SIGNIFICANT CHANGE UP
GLUCOSE BLDV-MCNC: 114 MG/DL — HIGH (ref 70–99)
GLUCOSE BLDV-MCNC: 93 MG/DL — SIGNIFICANT CHANGE UP (ref 70–99)
GLUCOSE SERPL-MCNC: 97 MG/DL — SIGNIFICANT CHANGE UP (ref 70–99)
HCO3 BLDV-SCNC: 28 MMOL/L — SIGNIFICANT CHANGE UP (ref 21–29)
HCO3 BLDV-SCNC: 29 MMOL/L — SIGNIFICANT CHANGE UP (ref 21–29)
HCT VFR BLD CALC: 36.6 % — SIGNIFICANT CHANGE UP (ref 34.5–45)
HCT VFR BLDA CALC: 33 % — LOW (ref 39–50)
HCT VFR BLDA CALC: 38 % — LOW (ref 39–50)
HGB BLD CALC-MCNC: 10.8 G/DL — LOW (ref 11.5–15.5)
HGB BLD CALC-MCNC: 12.5 G/DL — SIGNIFICANT CHANGE UP (ref 11.5–15.5)
HGB BLD-MCNC: 12.4 G/DL — SIGNIFICANT CHANGE UP (ref 11.5–15.5)
HOROWITZ INDEX BLDV+IHG-RTO: SIGNIFICANT CHANGE UP
LACTATE BLDV-MCNC: 1.7 MMOL/L — SIGNIFICANT CHANGE UP (ref 0.7–2)
LACTATE BLDV-MCNC: 2.2 MMOL/L — HIGH (ref 0.7–2)
LYMPHOCYTES # BLD AUTO: 2 K/UL — SIGNIFICANT CHANGE UP (ref 1–3.3)
LYMPHOCYTES # BLD AUTO: 21.7 % — SIGNIFICANT CHANGE UP (ref 13–44)
MCHC RBC-ENTMCNC: 30.6 PG — SIGNIFICANT CHANGE UP (ref 27–34)
MCHC RBC-ENTMCNC: 33.9 GM/DL — SIGNIFICANT CHANGE UP (ref 32–36)
MCV RBC AUTO: 90.5 FL — SIGNIFICANT CHANGE UP (ref 80–100)
MONOCYTES # BLD AUTO: 0.7 K/UL — SIGNIFICANT CHANGE UP (ref 0–0.9)
MONOCYTES NFR BLD AUTO: 7.4 % — SIGNIFICANT CHANGE UP (ref 2–14)
NEUTROPHILS # BLD AUTO: 6.5 K/UL — SIGNIFICANT CHANGE UP (ref 1.8–7.4)
NEUTROPHILS NFR BLD AUTO: 69.8 % — SIGNIFICANT CHANGE UP (ref 43–77)
PCO2 BLDV: 45 MMHG — SIGNIFICANT CHANGE UP (ref 35–50)
PCO2 BLDV: 50 MMHG — SIGNIFICANT CHANGE UP (ref 35–50)
PH BLDV: 7.39 — SIGNIFICANT CHANGE UP (ref 7.35–7.45)
PH BLDV: 7.42 — SIGNIFICANT CHANGE UP (ref 7.35–7.45)
PLATELET # BLD AUTO: 350 K/UL — SIGNIFICANT CHANGE UP (ref 150–400)
PO2 BLDV: 23 MMHG — LOW (ref 25–45)
PO2 BLDV: 39 MMHG — SIGNIFICANT CHANGE UP (ref 25–45)
POTASSIUM BLDV-SCNC: 4.1 MMOL/L — SIGNIFICANT CHANGE UP (ref 3.5–5)
POTASSIUM BLDV-SCNC: 4.1 MMOL/L — SIGNIFICANT CHANGE UP (ref 3.5–5)
POTASSIUM SERPL-MCNC: 4.3 MMOL/L — SIGNIFICANT CHANGE UP (ref 3.5–5.3)
POTASSIUM SERPL-SCNC: 4.3 MMOL/L — SIGNIFICANT CHANGE UP (ref 3.5–5.3)
PROT SERPL-MCNC: 7.2 G/DL — SIGNIFICANT CHANGE UP (ref 6–8.3)
RBC # BLD: 4.05 M/UL — SIGNIFICANT CHANGE UP (ref 3.8–5.2)
RBC # FLD: 12.6 % — SIGNIFICANT CHANGE UP (ref 10.3–14.5)
SAO2 % BLDV: 34 % — LOW (ref 67–88)
SAO2 % BLDV: 70 % — SIGNIFICANT CHANGE UP (ref 67–88)
SODIUM SERPL-SCNC: 139 MMOL/L — SIGNIFICANT CHANGE UP (ref 135–145)
WBC # BLD: 9.4 K/UL — SIGNIFICANT CHANGE UP (ref 3.8–10.5)
WBC # FLD AUTO: 9.4 K/UL — SIGNIFICANT CHANGE UP (ref 3.8–10.5)

## 2018-03-11 PROCEDURE — 80177 DRUG SCRN QUAN LEVETIRACETAM: CPT

## 2018-03-11 PROCEDURE — 84132 ASSAY OF SERUM POTASSIUM: CPT

## 2018-03-11 PROCEDURE — 83605 ASSAY OF LACTIC ACID: CPT

## 2018-03-11 PROCEDURE — 70450 CT HEAD/BRAIN W/O DYE: CPT | Mod: 26

## 2018-03-11 PROCEDURE — 82330 ASSAY OF CALCIUM: CPT

## 2018-03-11 PROCEDURE — 85014 HEMATOCRIT: CPT

## 2018-03-11 PROCEDURE — 84295 ASSAY OF SERUM SODIUM: CPT

## 2018-03-11 PROCEDURE — 85027 COMPLETE CBC AUTOMATED: CPT

## 2018-03-11 PROCEDURE — 80053 COMPREHEN METABOLIC PANEL: CPT

## 2018-03-11 PROCEDURE — 70450 CT HEAD/BRAIN W/O DYE: CPT

## 2018-03-11 PROCEDURE — 99284 EMERGENCY DEPT VISIT MOD MDM: CPT | Mod: 25

## 2018-03-11 PROCEDURE — 99284 EMERGENCY DEPT VISIT MOD MDM: CPT

## 2018-03-11 PROCEDURE — 82947 ASSAY GLUCOSE BLOOD QUANT: CPT

## 2018-03-11 PROCEDURE — 82803 BLOOD GASES ANY COMBINATION: CPT

## 2018-03-11 PROCEDURE — 82435 ASSAY OF BLOOD CHLORIDE: CPT

## 2018-03-11 RX ORDER — SODIUM CHLORIDE 9 MG/ML
500 INJECTION INTRAMUSCULAR; INTRAVENOUS; SUBCUTANEOUS ONCE
Qty: 0 | Refills: 0 | Status: COMPLETED | OUTPATIENT
Start: 2018-03-11 | End: 2018-03-11

## 2018-03-11 RX ADMIN — SODIUM CHLORIDE 500 MILLILITER(S): 9 INJECTION INTRAMUSCULAR; INTRAVENOUS; SUBCUTANEOUS at 21:14

## 2018-03-11 NOTE — CONSULT NOTE ADULT - SUBJECTIVE AND OBJECTIVE BOX
Pager: [phone]  CHIEF COMPLAINT/ REASON FOR CONSULTATION:    s/p LUE and LLE partial seizure    HPI:  77 F w Breast Ca, and GBM s/p resection taking decadron and keppra daily. She was BIBA s/p seizure which included left sided leg and arm involuntary movement. She reports that she had just taken keppra prior to the episode, and typically her seizure activity occurs only in the left leg only, but this time it included the left arm. The keppra dose has been 750mg twice daily for the past 2 weeks which was increased to 1g bid 2 days ago. She is scheduled to start brain radiation in 2 days for the next 3 weeks. In the ED the L sided shaking resolved. At the time of interview she says she feels well, still has some residual worse weakness after the seizure. Denies any LOC or shaking of the contralateral side.     PAST MEDICAL HISTORY   Breast cancer  GBM     PAST SURGICAL HISTORY   H/O breast surgery  H/O craniotomy       MEDICATIONS:  2q8 dexamethasone, 1g BID keppra       SOCIAL HISTORY:   Lives at home with Daughter     FAMILY HISTORY:  No pertinent family history in first degree relatives      REVIEW OF SYSTEMS:  Check here if all are normal other than Neurological []  Negative except as above.     PHYSICAL EXAMINATION:   T(C): 36.6 (03-11-18 @ 20:30), Max: 36.6 (03-11-18 @ 20:30)  HR: 106 (03-11-18 @ 20:30) (106 - 113)  BP: 109/66 (03-11-18 @ 20:30) (109/66 - 109/74)  RR: 18 (03-11-18 @ 20:30) (18 - 18)  SpO2: 96% (03-11-18 @ 20:30) (95% - 96%)  Wt(kg): --    Exam:  Awake, alert, AOX3  PERRL, EOMI, Face equal, tongue m/l  RUE 5/5   LUE Tricepts/Bicepts 3/5, HG 2/5  RLE 5/5   LLE 2/5 throughout   SILT     LABS:                        12.4   9.4   )-----------( 350      ( 11 Mar 2018 20:46 )             36.6     03-11    139  |  100  |  20  ----------------------------<  97  4.3   |  25  |  0.65    Ca    9.1      11 Mar 2018 20:46    TPro  7.2  /  Alb  3.6  /  TBili  0.2  /  DBili  x   /  AST  31  /  ALT  29  /  AlkPhos  58  03-11          RADIOLOGY & ADDITIONAL STUDIES:    CTH: Improved edema from February scan

## 2018-03-11 NOTE — ED PROVIDER NOTE - MEDICAL DECISION MAKING DETAILS
SP resection of brain mass pw left focal seizure without generalized symptoms. Check ct keppra level, consult neuro surg  Jose Manuel Ennis MD, Facep

## 2018-03-11 NOTE — CONSULT NOTE ADULT - ASSESSMENT
77F s/p GBM resection 1/26, now with partial seizures, scheduled for radiation in 2 days.    -CTH looks improved, no acute neurosurgical intervention  - Increase decadron to 4q8  - Neurology to evaluate for possibly adding a second AED agent   - May be discharged from a neurosurgical standpoint   - F/u outpatient with Dr. Yin

## 2018-03-11 NOTE — ED PROVIDER NOTE - CARE PLAN
Principal Discharge DX:	Seizure  Assessment and plan of treatment:	1. Follow up ASAP with Dr Vasquez Neurosurgery  2. Return if you have any problems like more seizures, fever chills, you feel sicker or for any concerns  3. Increase Keppra to 1250mg twice daily

## 2018-03-11 NOTE — CONSULT NOTE ADULT - ASSESSMENT
Pt is a 76 yo F with a PMH of Breast CA (s/p Mastectomy) and GBM recently diagnosed, with planned radiation in 2 days who presents after a witnessed seizure episode. Seizure described as partial originating in the left foot and spreading to the left arm w/o LOC, lasting 15 minutes. Exam is significant for chronic left sided weakness. Head CT shows no acute findings. Likely partial seizure exacerbated by tumor 2/2 decrease in decadron dose.    Pt follows up with Dr. La and Dr. Yin for her GBM    Recommendations:  - Increase Keppra to 1250mg BID  - f/u w/ Dr. aL and Dr. Yin on Tuesday, will also start radiation then

## 2018-03-11 NOTE — ED PROVIDER NOTE - ATTENDING CONTRIBUTION TO CARE
PMD Pankajshyam floyd,   77y female pmh, Pt sp resection brain mass last month Seizure do, on keppra, Breast ca sp resectoin. HTN,HLD,No habits. Pt has hx of prior "shaking of left leg" Episodic roughly 2/wk,lasted few seconds. Today also had trembeling of left arm. No fever chills nvdc. abd pain.Has residuial left weakness past two months No gen seizure. PE WDWN female awake alert speech fluent. NCAT chest clear ap abd soft neuro left hemiplegia sensation intact. cv no rgm abd soft.   Jose Manuel Ennis MD, Facep

## 2018-03-11 NOTE — ED PROVIDER NOTE - PLAN OF CARE
1. Follow up ASAP with Dr Vasquez Neurosurgery  2. Return if you have any problems like more seizures, fever chills, you feel sicker or for any concerns  3. Increase Keppra to 1250mg twice daily

## 2018-03-11 NOTE — ED ADULT NURSE NOTE - CHPI ED SYMPTOMS NEG
no vomiting/no nausea/no confusion/no dizziness/no blurred vision/no change in level of consciousness/no fever/no weakness/no loss of consciousness/no numbness

## 2018-03-11 NOTE — ED ADULT NURSE NOTE - OBJECTIVE STATEMENT
76 y/o female, a&o x3, brought in by [facility] c/o focal motor seizure lasting approx 15 min. Patient has hx of breast ca and brain ca. Will begin radiation therapy on tuesday. Has some tremors of left leg at baseline, but today tremors increased in intensity and spread to left arm. EMS placed 22 gauge in right hand and terminated seizure activity with Valium 5mg IV. Denies LOC, headache, weakness, dizziness, nausea, vomiting, SOB, fevers, chills, cough, chest pain, or abdominal pain. Breathing even, full, unlabored. Abdomen soft, nontender, nondistended. Patient is resting on stretcher. Advised of plan of care. Safety in place.

## 2018-03-12 RX ORDER — LEVETIRACETAM 250 MG/1
1250 TABLET, FILM COATED ORAL
Qty: 0 | Refills: 0 | Status: DISCONTINUED | OUTPATIENT
Start: 2018-03-12 | End: 2018-03-12

## 2018-03-12 RX ORDER — LEVETIRACETAM 250 MG/1
5 TABLET, FILM COATED ORAL
Qty: 300 | Refills: 0 | OUTPATIENT
Start: 2018-03-12 | End: 2018-04-10

## 2018-03-12 NOTE — ED ADULT NURSE REASSESSMENT NOTE - NS ED NURSE REASSESS COMMENT FT1
Pt due for keppra at 6am. Only has 750mg and 1000mg tablets at home. Ok to take 1000mg at 6am and  the 1250mg tablets at 9am when pharmacy opens as per MD.

## 2018-03-13 ENCOUNTER — APPOINTMENT (OUTPATIENT)
Dept: NEUROLOGY | Facility: CLINIC | Age: 78
End: 2018-03-13
Payer: MEDICARE

## 2018-03-13 ENCOUNTER — APPOINTMENT (OUTPATIENT)
Dept: HEMATOLOGY ONCOLOGY | Facility: CLINIC | Age: 78
End: 2018-03-13
Payer: MEDICARE

## 2018-03-13 VITALS
RESPIRATION RATE: 17 BRPM | SYSTOLIC BLOOD PRESSURE: 104 MMHG | DIASTOLIC BLOOD PRESSURE: 67 MMHG | HEART RATE: 104 BPM | OXYGEN SATURATION: 96 % | TEMPERATURE: 98.2 F

## 2018-03-13 VITALS
DIASTOLIC BLOOD PRESSURE: 65 MMHG | HEIGHT: 61 IN | OXYGEN SATURATION: 96 % | TEMPERATURE: 98.2 F | BODY MASS INDEX: 19.83 KG/M2 | HEART RATE: 97 BPM | SYSTOLIC BLOOD PRESSURE: 92 MMHG | WEIGHT: 105 LBS

## 2018-03-13 VITALS
HEIGHT: 61 IN | WEIGHT: 105 LBS | DIASTOLIC BLOOD PRESSURE: 65 MMHG | BODY MASS INDEX: 19.83 KG/M2 | SYSTOLIC BLOOD PRESSURE: 92 MMHG

## 2018-03-13 DIAGNOSIS — E78.00 PURE HYPERCHOLESTEROLEMIA, UNSPECIFIED: ICD-10-CM

## 2018-03-13 DIAGNOSIS — I10 ESSENTIAL (PRIMARY) HYPERTENSION: ICD-10-CM

## 2018-03-13 DIAGNOSIS — Z78.9 OTHER SPECIFIED HEALTH STATUS: ICD-10-CM

## 2018-03-13 LAB — LEVETIRACETAM SERPL-MCNC: 17.6 MCG/ML — SIGNIFICANT CHANGE UP (ref 12–46)

## 2018-03-13 PROCEDURE — 99213 OFFICE O/P EST LOW 20 MIN: CPT

## 2018-03-13 PROCEDURE — 99215 OFFICE O/P EST HI 40 MIN: CPT

## 2018-03-13 RX ORDER — ANASTROZOLE TABLETS 1 MG/1
1 TABLET ORAL
Qty: 30 | Refills: 11 | Status: ACTIVE | COMMUNITY
Start: 2018-03-13 | End: 1900-01-01

## 2018-03-13 RX ORDER — LEVETIRACETAM 750 MG/1
750 TABLET, FILM COATED ORAL TWICE DAILY
Qty: 60 | Refills: 3 | Status: DISCONTINUED | COMMUNITY
End: 2018-03-13

## 2018-03-14 ENCOUNTER — RESULT REVIEW (OUTPATIENT)
Age: 78
End: 2018-03-14

## 2018-03-14 PROCEDURE — 88321 CONSLTJ&REPRT SLD PREP ELSWR: CPT

## 2018-03-14 PROCEDURE — 77387B: CUSTOM | Mod: 26

## 2018-03-15 PROCEDURE — 77387B: CUSTOM | Mod: 26

## 2018-03-16 PROCEDURE — 77387B: CUSTOM | Mod: 26

## 2018-03-19 ENCOUNTER — RESULT REVIEW (OUTPATIENT)
Age: 78
End: 2018-03-19

## 2018-03-19 ENCOUNTER — APPOINTMENT (OUTPATIENT)
Dept: HEMATOLOGY ONCOLOGY | Facility: CLINIC | Age: 78
End: 2018-03-19

## 2018-03-19 LAB
BASOPHILS # BLD AUTO: 0.2 K/UL — SIGNIFICANT CHANGE UP (ref 0–0.2)
BASOPHILS NFR BLD AUTO: 2.1 % — HIGH (ref 0–2)
EOSINOPHIL # BLD AUTO: 0.1 K/UL — SIGNIFICANT CHANGE UP (ref 0–0.5)
EOSINOPHIL NFR BLD AUTO: 0.6 % — SIGNIFICANT CHANGE UP (ref 0–6)
HCT VFR BLD CALC: 35.8 % — SIGNIFICANT CHANGE UP (ref 34.5–45)
HGB BLD-MCNC: 12.7 G/DL — SIGNIFICANT CHANGE UP (ref 11.5–15.5)
LYMPHOCYTES # BLD AUTO: 1.4 K/UL — SIGNIFICANT CHANGE UP (ref 1–3.3)
LYMPHOCYTES # BLD AUTO: 14.4 % — SIGNIFICANT CHANGE UP (ref 13–44)
MCHC RBC-ENTMCNC: 30.6 PG — SIGNIFICANT CHANGE UP (ref 27–34)
MCHC RBC-ENTMCNC: 35.4 G/DL — SIGNIFICANT CHANGE UP (ref 32–36)
MCV RBC AUTO: 86.5 FL — SIGNIFICANT CHANGE UP (ref 80–100)
MONOCYTES # BLD AUTO: 0.6 K/UL — SIGNIFICANT CHANGE UP (ref 0–0.9)
MONOCYTES NFR BLD AUTO: 6.7 % — SIGNIFICANT CHANGE UP (ref 2–14)
NEUTROPHILS # BLD AUTO: 7.3 K/UL — SIGNIFICANT CHANGE UP (ref 1.8–7.4)
NEUTROPHILS NFR BLD AUTO: 76.3 % — SIGNIFICANT CHANGE UP (ref 43–77)
PLATELET # BLD AUTO: 419 K/UL — HIGH (ref 150–400)
RBC # BLD: 4.14 M/UL — SIGNIFICANT CHANGE UP (ref 3.8–5.2)
RBC # FLD: 12.6 % — SIGNIFICANT CHANGE UP (ref 10.3–14.5)
WBC # BLD: 9.6 K/UL — SIGNIFICANT CHANGE UP (ref 3.8–10.5)
WBC # FLD AUTO: 9.6 K/UL — SIGNIFICANT CHANGE UP (ref 3.8–10.5)

## 2018-03-19 PROCEDURE — 77387B: CUSTOM | Mod: 26

## 2018-03-20 ENCOUNTER — APPOINTMENT (OUTPATIENT)
Dept: NEUROLOGY | Facility: CLINIC | Age: 78
End: 2018-03-20

## 2018-03-20 ENCOUNTER — APPOINTMENT (OUTPATIENT)
Dept: NEUROLOGY | Facility: CLINIC | Age: 78
End: 2018-03-20
Payer: MEDICARE

## 2018-03-20 VITALS
HEART RATE: 93 BPM | HEIGHT: 61 IN | OXYGEN SATURATION: 97 % | SYSTOLIC BLOOD PRESSURE: 105 MMHG | DIASTOLIC BLOOD PRESSURE: 69 MMHG | TEMPERATURE: 97.7 F | RESPIRATION RATE: 16 BRPM

## 2018-03-20 PROCEDURE — 77387B: CUSTOM | Mod: 26

## 2018-03-20 PROCEDURE — 77427 RADIATION TX MANAGEMENT X5: CPT

## 2018-03-20 PROCEDURE — 99214 OFFICE O/P EST MOD 30 MIN: CPT

## 2018-03-21 LAB — SURGICAL PATHOLOGY STUDY: SIGNIFICANT CHANGE UP

## 2018-03-22 PROCEDURE — 77387B: CUSTOM | Mod: 26

## 2018-03-23 PROCEDURE — 77387B: CUSTOM | Mod: 26

## 2018-03-26 ENCOUNTER — RESULT REVIEW (OUTPATIENT)
Age: 78
End: 2018-03-26

## 2018-03-26 ENCOUNTER — APPOINTMENT (OUTPATIENT)
Dept: HEMATOLOGY ONCOLOGY | Facility: CLINIC | Age: 78
End: 2018-03-26

## 2018-03-26 LAB
BASOPHILS # BLD AUTO: 0 K/UL — SIGNIFICANT CHANGE UP (ref 0–0.2)
BASOPHILS NFR BLD AUTO: 0.1 % — SIGNIFICANT CHANGE UP (ref 0–2)
EOSINOPHIL # BLD AUTO: 0.1 K/UL — SIGNIFICANT CHANGE UP (ref 0–0.5)
EOSINOPHIL NFR BLD AUTO: 0.6 % — SIGNIFICANT CHANGE UP (ref 0–6)
HCT VFR BLD CALC: 36.5 % — SIGNIFICANT CHANGE UP (ref 34.5–45)
HGB BLD-MCNC: 12.6 G/DL — SIGNIFICANT CHANGE UP (ref 11.5–15.5)
LYMPHOCYTES # BLD AUTO: 1.5 K/UL — SIGNIFICANT CHANGE UP (ref 1–3.3)
LYMPHOCYTES # BLD AUTO: 12.1 % — LOW (ref 13–44)
MCHC RBC-ENTMCNC: 30.1 PG — SIGNIFICANT CHANGE UP (ref 27–34)
MCHC RBC-ENTMCNC: 34.7 G/DL — SIGNIFICANT CHANGE UP (ref 32–36)
MCV RBC AUTO: 86.8 FL — SIGNIFICANT CHANGE UP (ref 80–100)
MONOCYTES # BLD AUTO: 0.9 K/UL — SIGNIFICANT CHANGE UP (ref 0–0.9)
MONOCYTES NFR BLD AUTO: 7.3 % — SIGNIFICANT CHANGE UP (ref 2–14)
NEUTROPHILS # BLD AUTO: 10.1 K/UL — HIGH (ref 1.8–7.4)
NEUTROPHILS NFR BLD AUTO: 80 % — HIGH (ref 43–77)
PLATELET # BLD AUTO: 424 K/UL — HIGH (ref 150–400)
RBC # BLD: 4.2 M/UL — SIGNIFICANT CHANGE UP (ref 3.8–5.2)
RBC # FLD: 13 % — SIGNIFICANT CHANGE UP (ref 10.3–14.5)
WBC # BLD: 12.6 K/UL — HIGH (ref 3.8–10.5)
WBC # FLD AUTO: 12.6 K/UL — HIGH (ref 3.8–10.5)

## 2018-03-26 PROCEDURE — 77387B: CUSTOM | Mod: 26

## 2018-03-27 VITALS
DIASTOLIC BLOOD PRESSURE: 74 MMHG | RESPIRATION RATE: 16 BRPM | BODY MASS INDEX: 23.22 KG/M2 | OXYGEN SATURATION: 100 % | TEMPERATURE: 98.2 F | WEIGHT: 123 LBS | HEIGHT: 61 IN | HEART RATE: 104 BPM | SYSTOLIC BLOOD PRESSURE: 111 MMHG

## 2018-03-27 PROCEDURE — 77387B: CUSTOM | Mod: 26

## 2018-03-28 ENCOUNTER — APPOINTMENT (OUTPATIENT)
Dept: NEUROSURGERY | Facility: CLINIC | Age: 78
End: 2018-03-28
Payer: MEDICARE

## 2018-03-28 ENCOUNTER — OTHER (OUTPATIENT)
Age: 78
End: 2018-03-28

## 2018-03-28 PROCEDURE — 77427 RADIATION TX MANAGEMENT X5: CPT

## 2018-03-28 PROCEDURE — 99024 POSTOP FOLLOW-UP VISIT: CPT

## 2018-03-28 PROCEDURE — 77387B: CUSTOM | Mod: 26

## 2018-03-29 ENCOUNTER — OUTPATIENT (OUTPATIENT)
Dept: OUTPATIENT SERVICES | Facility: HOSPITAL | Age: 78
LOS: 1 days | Discharge: HOME | End: 2018-03-29

## 2018-03-29 DIAGNOSIS — C71.9 MALIGNANT NEOPLASM OF BRAIN, UNSPECIFIED: ICD-10-CM

## 2018-03-29 DIAGNOSIS — Z98.890 OTHER SPECIFIED POSTPROCEDURAL STATES: Chronic | ICD-10-CM

## 2018-03-29 PROCEDURE — 77387B: CUSTOM | Mod: 26

## 2018-03-30 PROCEDURE — 77387B: CUSTOM | Mod: 26

## 2018-04-02 ENCOUNTER — APPOINTMENT (OUTPATIENT)
Dept: HEMATOLOGY ONCOLOGY | Facility: CLINIC | Age: 78
End: 2018-04-02

## 2018-04-02 ENCOUNTER — RESULT REVIEW (OUTPATIENT)
Age: 78
End: 2018-04-02

## 2018-04-02 LAB
HCT VFR BLD CALC: 37.9 % — SIGNIFICANT CHANGE UP (ref 34.5–45)
HGB BLD-MCNC: 13 G/DL — SIGNIFICANT CHANGE UP (ref 11.5–15.5)
MCHC RBC-ENTMCNC: 29.9 PG — SIGNIFICANT CHANGE UP (ref 27–34)
MCHC RBC-ENTMCNC: 34.4 G/DL — SIGNIFICANT CHANGE UP (ref 32–36)
MCV RBC AUTO: 86.9 FL — SIGNIFICANT CHANGE UP (ref 80–100)
PLATELET # BLD AUTO: 393 K/UL — SIGNIFICANT CHANGE UP (ref 150–400)
RBC # BLD: 4.36 M/UL — SIGNIFICANT CHANGE UP (ref 3.8–5.2)
RBC # FLD: 13.5 % — SIGNIFICANT CHANGE UP (ref 10.3–14.5)
WBC # BLD: 13.5 K/UL — HIGH (ref 3.8–10.5)
WBC # FLD AUTO: 13.5 K/UL — HIGH (ref 3.8–10.5)

## 2018-04-02 PROCEDURE — 77387B: CUSTOM | Mod: 26

## 2018-04-03 PROCEDURE — 77387B: CUSTOM | Mod: 26

## 2018-04-04 ENCOUNTER — APPOINTMENT (OUTPATIENT)
Dept: NEUROLOGY | Facility: CLINIC | Age: 78
End: 2018-04-04
Payer: MEDICARE

## 2018-04-04 VITALS
DIASTOLIC BLOOD PRESSURE: 74 MMHG | OXYGEN SATURATION: 97 % | HEART RATE: 108 BPM | TEMPERATURE: 97.8 F | SYSTOLIC BLOOD PRESSURE: 115 MMHG

## 2018-04-04 PROCEDURE — 77387B: CUSTOM | Mod: 26

## 2018-04-04 PROCEDURE — 99214 OFFICE O/P EST MOD 30 MIN: CPT

## 2018-04-04 RX ORDER — TRAZODONE HYDROCHLORIDE 50 MG/1
50 TABLET ORAL
Qty: 30 | Refills: 0 | Status: DISCONTINUED | COMMUNITY
End: 2018-04-04

## 2018-04-04 RX ORDER — TEMOZOLOMIDE 5 MG/1
5 CAPSULE ORAL AT BEDTIME
Qty: 21 | Refills: 0 | Status: DISCONTINUED | COMMUNITY
Start: 2018-03-09 | End: 2018-04-04

## 2018-04-04 RX ORDER — TEMOZOLOMIDE 100 MG/1
100 CAPSULE ORAL AT BEDTIME
Qty: 21 | Refills: 0 | Status: DISCONTINUED | COMMUNITY
Start: 2018-03-09 | End: 2018-04-04

## 2018-04-04 RX ORDER — METOPROLOL TARTRATE 25 MG/1
25 TABLET, FILM COATED ORAL TWICE DAILY
Qty: 30 | Refills: 0 | Status: ACTIVE | COMMUNITY

## 2018-04-04 RX ORDER — ONDANSETRON 8 MG/1
8 TABLET ORAL
Qty: 30 | Refills: 1 | Status: DISCONTINUED | COMMUNITY
Start: 2018-03-09 | End: 2018-04-04

## 2018-04-05 ENCOUNTER — CHART COPY (OUTPATIENT)
Age: 78
End: 2018-04-05

## 2018-04-10 ENCOUNTER — APPOINTMENT (OUTPATIENT)
Dept: HEMATOLOGY ONCOLOGY | Facility: CLINIC | Age: 78
End: 2018-04-10
Payer: MEDICARE

## 2018-04-10 VITALS
BODY MASS INDEX: 21.24 KG/M2 | OXYGEN SATURATION: 96 % | SYSTOLIC BLOOD PRESSURE: 114 MMHG | WEIGHT: 112.41 LBS | RESPIRATION RATE: 16 BRPM | DIASTOLIC BLOOD PRESSURE: 73 MMHG | HEART RATE: 100 BPM | TEMPERATURE: 97.8 F

## 2018-04-10 DIAGNOSIS — C50.912 MALIGNANT NEOPLASM OF UNSPECIFIED SITE OF LEFT FEMALE BREAST: ICD-10-CM

## 2018-04-10 DIAGNOSIS — I74.9 EMBOLISM AND THROMBOSIS OF UNSPECIFIED ARTERY: ICD-10-CM

## 2018-04-10 PROCEDURE — 99214 OFFICE O/P EST MOD 30 MIN: CPT

## 2018-04-18 ENCOUNTER — APPOINTMENT (OUTPATIENT)
Dept: ULTRASOUND IMAGING | Facility: IMAGING CENTER | Age: 78
End: 2018-04-18
Payer: MEDICARE

## 2018-04-18 ENCOUNTER — OUTPATIENT (OUTPATIENT)
Dept: OUTPATIENT SERVICES | Facility: HOSPITAL | Age: 78
LOS: 1 days | End: 2018-04-18
Payer: MEDICARE

## 2018-04-18 DIAGNOSIS — Z00.8 ENCOUNTER FOR OTHER GENERAL EXAMINATION: ICD-10-CM

## 2018-04-18 DIAGNOSIS — Z98.890 OTHER SPECIFIED POSTPROCEDURAL STATES: Chronic | ICD-10-CM

## 2018-04-18 PROCEDURE — 93970 EXTREMITY STUDY: CPT | Mod: 26

## 2018-04-18 PROCEDURE — 93970 EXTREMITY STUDY: CPT

## 2018-04-20 ENCOUNTER — RX RENEWAL (OUTPATIENT)
Age: 78
End: 2018-04-20

## 2018-05-02 ENCOUNTER — OUTPATIENT (OUTPATIENT)
Dept: OUTPATIENT SERVICES | Facility: HOSPITAL | Age: 78
LOS: 1 days | End: 2018-05-02
Payer: MEDICARE

## 2018-05-02 ENCOUNTER — APPOINTMENT (OUTPATIENT)
Dept: MRI IMAGING | Facility: IMAGING CENTER | Age: 78
End: 2018-05-02
Payer: MEDICARE

## 2018-05-02 DIAGNOSIS — Z00.8 ENCOUNTER FOR OTHER GENERAL EXAMINATION: ICD-10-CM

## 2018-05-02 DIAGNOSIS — Z98.890 OTHER SPECIFIED POSTPROCEDURAL STATES: Chronic | ICD-10-CM

## 2018-05-02 PROCEDURE — 70553 MRI BRAIN STEM W/O & W/DYE: CPT

## 2018-05-02 PROCEDURE — A9585: CPT

## 2018-05-02 PROCEDURE — 82565 ASSAY OF CREATININE: CPT

## 2018-05-02 PROCEDURE — 70553 MRI BRAIN STEM W/O & W/DYE: CPT | Mod: 26

## 2018-05-03 ENCOUNTER — OUTPATIENT (OUTPATIENT)
Dept: OUTPATIENT SERVICES | Facility: HOSPITAL | Age: 78
LOS: 1 days | Discharge: HOME | End: 2018-05-03

## 2018-05-03 ENCOUNTER — APPOINTMENT (OUTPATIENT)
Dept: NEUROLOGY | Facility: CLINIC | Age: 78
End: 2018-05-03
Payer: MEDICARE

## 2018-05-03 ENCOUNTER — APPOINTMENT (OUTPATIENT)
Dept: NEUROLOGY | Facility: CLINIC | Age: 78
End: 2018-05-03

## 2018-05-03 ENCOUNTER — RESULT REVIEW (OUTPATIENT)
Age: 78
End: 2018-05-03

## 2018-05-03 ENCOUNTER — APPOINTMENT (OUTPATIENT)
Dept: NEUROSURGERY | Facility: CLINIC | Age: 78
End: 2018-05-03

## 2018-05-03 ENCOUNTER — APPOINTMENT (OUTPATIENT)
Dept: HEMATOLOGY ONCOLOGY | Facility: CLINIC | Age: 78
End: 2018-05-03

## 2018-05-03 VITALS
RESPIRATION RATE: 16 BRPM | TEMPERATURE: 98 F | DIASTOLIC BLOOD PRESSURE: 74 MMHG | WEIGHT: 112 LBS | OXYGEN SATURATION: 96 % | SYSTOLIC BLOOD PRESSURE: 110 MMHG | HEIGHT: 61 IN | BODY MASS INDEX: 21.14 KG/M2 | HEART RATE: 98 BPM

## 2018-05-03 DIAGNOSIS — Z98.890 OTHER SPECIFIED POSTPROCEDURAL STATES: Chronic | ICD-10-CM

## 2018-05-03 DIAGNOSIS — C71.9 MALIGNANT NEOPLASM OF BRAIN, UNSPECIFIED: ICD-10-CM

## 2018-05-03 LAB
BASOPHILS # BLD AUTO: 0.1 K/UL — SIGNIFICANT CHANGE UP (ref 0–0.2)
BASOPHILS NFR BLD AUTO: 0.8 % — SIGNIFICANT CHANGE UP (ref 0–2)
EOSINOPHIL # BLD AUTO: 0.2 K/UL — SIGNIFICANT CHANGE UP (ref 0–0.5)
EOSINOPHIL NFR BLD AUTO: 1.2 % — SIGNIFICANT CHANGE UP (ref 0–6)
HCT VFR BLD CALC: 36.3 % — SIGNIFICANT CHANGE UP (ref 34.5–45)
HGB BLD-MCNC: 13 G/DL — SIGNIFICANT CHANGE UP (ref 11.5–15.5)
LYMPHOCYTES # BLD AUTO: 0.6 K/UL — LOW (ref 1–3.3)
LYMPHOCYTES # BLD AUTO: 4.3 % — LOW (ref 13–44)
MCHC RBC-ENTMCNC: 30.7 PG — SIGNIFICANT CHANGE UP (ref 27–34)
MCHC RBC-ENTMCNC: 35.8 G/DL — SIGNIFICANT CHANGE UP (ref 32–36)
MCV RBC AUTO: 85.6 FL — SIGNIFICANT CHANGE UP (ref 80–100)
MONOCYTES # BLD AUTO: 0.8 K/UL — SIGNIFICANT CHANGE UP (ref 0–0.9)
MONOCYTES NFR BLD AUTO: 6.1 % — SIGNIFICANT CHANGE UP (ref 2–14)
NEUTROPHILS # BLD AUTO: 12 K/UL — HIGH (ref 1.8–7.4)
NEUTROPHILS NFR BLD AUTO: 87.5 % — HIGH (ref 43–77)
PLATELET # BLD AUTO: 241 K/UL — SIGNIFICANT CHANGE UP (ref 150–400)
RBC # BLD: 4.24 M/UL — SIGNIFICANT CHANGE UP (ref 3.8–5.2)
RBC # FLD: 14.2 % — SIGNIFICANT CHANGE UP (ref 10.3–14.5)
WBC # BLD: 13.7 K/UL — HIGH (ref 3.8–10.5)
WBC # FLD AUTO: 13.7 K/UL — HIGH (ref 3.8–10.5)

## 2018-05-03 PROCEDURE — 99214 OFFICE O/P EST MOD 30 MIN: CPT

## 2018-05-03 RX ORDER — ONDANSETRON 8 MG/1
8 TABLET ORAL
Qty: 30 | Refills: 0 | Status: ACTIVE | COMMUNITY
Start: 2018-05-03 | End: 1900-01-01

## 2018-05-04 LAB
ALBUMIN SERPL ELPH-MCNC: 3.9 G/DL
ALP BLD-CCNC: 48 U/L
ALT SERPL-CCNC: 32 U/L
ANION GAP SERPL CALC-SCNC: 18 MMOL/L
AST SERPL-CCNC: 23 U/L
BILIRUB SERPL-MCNC: 0.3 MG/DL
BUN SERPL-MCNC: 19 MG/DL
CALCIUM SERPL-MCNC: 9.5 MG/DL
CHLORIDE SERPL-SCNC: 96 MMOL/L
CO2 SERPL-SCNC: 21 MMOL/L
CREAT SERPL-MCNC: 0.48 MG/DL
GLUCOSE SERPL-MCNC: 94 MG/DL
POTASSIUM SERPL-SCNC: 4.8 MMOL/L
PROT SERPL-MCNC: 6.6 G/DL
SODIUM SERPL-SCNC: 135 MMOL/L

## 2018-05-08 ENCOUNTER — OTHER (OUTPATIENT)
Age: 78
End: 2018-05-08

## 2018-05-08 ENCOUNTER — LABORATORY RESULT (OUTPATIENT)
Age: 78
End: 2018-05-08

## 2018-05-08 NOTE — PROGRESS NOTE ADULT - SUBJECTIVE AND OBJECTIVE BOX
Thank you for choosing the Pulmonary Services Department, at [facility], as your Pulmonary Specialists.  We actively use feedback to constantly improve and deliver the best care possible. To provide the best experience, we are collecting feedback from you on how we performed.  You may receive a survey in the mail or through your e-mail to evaluate how we did. Please take a moment and share your thoughts.      If for any reason you feel that we did not meet your expectations or you want to share a positive experience, please give us a call. Your feedback helps us know how we are doing and what we can be doing better.    Office hours: 8:00 am to 4:30 pm, Monday - Friday  Phone: [phone].      YOUR TEST RESULTS    If you have any concerns regarding any testing ordered with your visit, please contact our office at the above number.    Otherwise, your test results will be communicated to you in one of the following ways:    - Follow-up office visit  - Phone call  - Through [facility]  - Mailed letter           Patient is a 77y old  Female who presents with a chief complaint of seziure (18 Jan 2018 20:20)      INTERVAL HPI/OVERNIGHT EVENTS: Pt had worsening weakness on her left leg and arm, noted that 2mg ativan was given by RN due to pt agitate.  Urgent CT head show stable vasogenic edema related to the right parafalcine mass lesion. There is no acute hemorrhage, cortical edema or hydrocephalus.    MEDICATIONS  (STANDING):  dextrose 5%. 1000 milliLiter(s) (50 mL/Hr) IV Continuous <Continuous>  dextrose 50% Injectable 12.5 Gram(s) IV Push once  dextrose 50% Injectable 25 Gram(s) IV Push once  dextrose 50% Injectable 25 Gram(s) IV Push once  enoxaparin Injectable 40 milliGRAM(s) SubCutaneous daily  insulin lispro (HumaLOG) corrective regimen sliding scale   SubCutaneous every 6 hours  phenytoin   Capsule 100 milliGRAM(s) Oral three times a day    MEDICATIONS  (PRN):  dextrose Gel 1 Dose(s) Oral once PRN Blood Glucose LESS THAN 70 milliGRAM(s)/deciliter  glucagon  Injectable 1 milliGRAM(s) IntraMuscular once PRN Glucose LESS THAN 70 milligrams/deciliter  LORazepam   Injectable 2 milliGRAM(s) IV Push every 5 minutes PRN seizure      Allergies    No Known Allergies    Intolerances    Patient is feeling a little better. She is eager to be transferred.     REVIEW OF SYSTEMS:  CONSTITUTIONAL: No fever, weight loss, or fatigue  RESPIRATORY: No cough, wheezing, chills or hemoptysis; No shortness of breath  CARDIOVASCULAR: No chest pain, palpitations, dizziness, or leg swelling  GASTROINTESTINAL: No abdominal or epigastric pain. No nausea, vomiting, or hematemesis; No diarrhea or constipation. No melena or hematochezia.  NEUROLOGICAL: No headaches, memory loss,  worsening weakness on left arm and leg compared to yesterday       Vital Signs Last 24 Hrs  T(C): 36.6 (20 Jan 2018 20:07), Max: 36.6 (20 Jan 2018 05:10)  T(F): 97.9 (20 Jan 2018 20:07), Max: 97.9 (20 Jan 2018 05:10)  HR: 104 (20 Jan 2018 20:07) (89 - 107)  BP: 124/66 (20 Jan 2018 20:07) (111/61 - 131/79)  BP(mean): --  RR: 17 (20 Jan 2018 20:07) (16 - 17)  SpO2: 97% (20 Jan 2018 20:07) (97% - 98%)    PHYSICAL EXAM:  GENERAL: NAD, well-groomed, well-developed  CHEST/LUNG: Clear to percussion bilaterally; No rales, rhonchi, wheezing, or rubs  HEART: Regular rate and rhythm; No murmurs, rubs, or gallops  ABDOMEN: Soft, Nontender, Nondistended; Bowel sounds present  NERVOUS SYSTEM: Alert & Oriented X3, muscle strengthen 3/5 on left arm and leg  EXTREMITIES:  2+ Peripheral Pulses, No clubbing, cyanosis, or edema    LABS:                                   11.7   10.9  )-----------( 407      ( 20 Jan 2018 07:44 )             35.4     01-20    137  |  101  |  19<H>  ----------------------------<  94  3.6   |  29  |  0.46<L>    Ca    8.9      20 Jan 2018 07:44  Phos  3.8     01-20  Mg     2.3     01-20

## 2018-05-16 LAB
BASOPHILS # BLD AUTO: 0.02 K/UL
BASOPHILS NFR BLD AUTO: 0.2 %
EOSINOPHIL # BLD AUTO: 0.02 K/UL
EOSINOPHIL NFR BLD AUTO: 0.2 %
HCT VFR BLD CALC: 36 %
HGB BLD-MCNC: 11.4 G/DL
IMM GRANULOCYTES NFR BLD AUTO: 3.2 %
LYMPHOCYTES # BLD AUTO: 1.03 K/UL
LYMPHOCYTES NFR BLD AUTO: 11.5 %
MAN DIFF?: NORMAL
MCHC RBC-ENTMCNC: 28.4 PG
MCHC RBC-ENTMCNC: 31.7 GM/DL
MCV RBC AUTO: 89.8 FL
MONOCYTES # BLD AUTO: 0.39 K/UL
MONOCYTES NFR BLD AUTO: 4.3 %
NEUTROPHILS # BLD AUTO: 7.22 K/UL
NEUTROPHILS NFR BLD AUTO: 80.6 %
PLATELET # BLD AUTO: 310 K/UL
RBC # BLD: 4.01 M/UL
RBC # FLD: 16.6 %
WBC # FLD AUTO: 8.97 K/UL

## 2018-05-17 ENCOUNTER — APPOINTMENT (OUTPATIENT)
Dept: RADIATION ONCOLOGY | Facility: CLINIC | Age: 78
End: 2018-05-17
Payer: MEDICARE

## 2018-05-17 DIAGNOSIS — C71.9 MALIGNANT NEOPLASM OF BRAIN, UNSPECIFIED: ICD-10-CM

## 2018-05-17 PROCEDURE — 99024 POSTOP FOLLOW-UP VISIT: CPT | Mod: GC

## 2018-05-17 RX ORDER — LISINOPRIL 20 MG/1
20 TABLET ORAL DAILY
Qty: 30 | Refills: 0 | Status: DISCONTINUED | COMMUNITY
End: 2018-05-17

## 2018-05-17 RX ORDER — LISINOPRIL 10 MG/1
10 TABLET ORAL
Refills: 0 | Status: ACTIVE | COMMUNITY

## 2018-05-20 ENCOUNTER — INPATIENT (INPATIENT)
Facility: HOSPITAL | Age: 78
LOS: 1 days | Discharge: HOME HEALTH | End: 2018-05-22
Attending: HOSPITALIST | Admitting: HOSPITALIST
Payer: MEDICARE

## 2018-05-20 VITALS
SYSTOLIC BLOOD PRESSURE: 111 MMHG | RESPIRATION RATE: 18 BRPM | DIASTOLIC BLOOD PRESSURE: 76 MMHG | OXYGEN SATURATION: 96 % | TEMPERATURE: 99 F | HEART RATE: 94 BPM

## 2018-05-20 DIAGNOSIS — R56.9 UNSPECIFIED CONVULSIONS: ICD-10-CM

## 2018-05-20 DIAGNOSIS — E78.5 HYPERLIPIDEMIA, UNSPECIFIED: ICD-10-CM

## 2018-05-20 DIAGNOSIS — Z98.890 OTHER SPECIFIED POSTPROCEDURAL STATES: Chronic | ICD-10-CM

## 2018-05-20 DIAGNOSIS — I10 ESSENTIAL (PRIMARY) HYPERTENSION: ICD-10-CM

## 2018-05-20 DIAGNOSIS — C71.9 MALIGNANT NEOPLASM OF BRAIN, UNSPECIFIED: ICD-10-CM

## 2018-05-20 DIAGNOSIS — C50.919 MALIGNANT NEOPLASM OF UNSPECIFIED SITE OF UNSPECIFIED FEMALE BREAST: ICD-10-CM

## 2018-05-20 DIAGNOSIS — Z29.9 ENCOUNTER FOR PROPHYLACTIC MEASURES, UNSPECIFIED: ICD-10-CM

## 2018-05-20 LAB
ALBUMIN SERPL ELPH-MCNC: 3.6 G/DL — SIGNIFICANT CHANGE UP (ref 3.3–5)
ALP SERPL-CCNC: 53 U/L — SIGNIFICANT CHANGE UP (ref 40–120)
ALT FLD-CCNC: 33 U/L — SIGNIFICANT CHANGE UP (ref 4–33)
AST SERPL-CCNC: 23 U/L — SIGNIFICANT CHANGE UP (ref 4–32)
BASE EXCESS BLDV CALC-SCNC: 2.9 MMOL/L — SIGNIFICANT CHANGE UP
BASOPHILS # BLD AUTO: 0.03 K/UL — SIGNIFICANT CHANGE UP (ref 0–0.2)
BASOPHILS NFR BLD AUTO: 0.4 % — SIGNIFICANT CHANGE UP (ref 0–2)
BASOPHILS NFR SPEC: 0 % — SIGNIFICANT CHANGE UP (ref 0–2)
BILIRUB SERPL-MCNC: 0.5 MG/DL — SIGNIFICANT CHANGE UP (ref 0.2–1.2)
BLOOD GAS VENOUS - CREATININE: < 0.36 MG/DL — LOW (ref 0.5–1.3)
BUN SERPL-MCNC: 13 MG/DL — SIGNIFICANT CHANGE UP (ref 7–23)
CALCIUM SERPL-MCNC: 8.9 MG/DL — SIGNIFICANT CHANGE UP (ref 8.4–10.5)
CHLORIDE BLDV-SCNC: 105 MMOL/L — SIGNIFICANT CHANGE UP (ref 96–108)
CHLORIDE SERPL-SCNC: 98 MMOL/L — SIGNIFICANT CHANGE UP (ref 98–107)
CO2 SERPL-SCNC: 25 MMOL/L — SIGNIFICANT CHANGE UP (ref 22–31)
CREAT SERPL-MCNC: 0.4 MG/DL — LOW (ref 0.5–1.3)
EOSINOPHIL # BLD AUTO: 0.01 K/UL — SIGNIFICANT CHANGE UP (ref 0–0.5)
EOSINOPHIL NFR BLD AUTO: 0.1 % — SIGNIFICANT CHANGE UP (ref 0–6)
EOSINOPHIL NFR FLD: 0 % — SIGNIFICANT CHANGE UP (ref 0–6)
GAS PNL BLDV: 131 MMOL/L — LOW (ref 136–146)
GLUCOSE BLDV-MCNC: 94 — SIGNIFICANT CHANGE UP (ref 70–99)
GLUCOSE SERPL-MCNC: 95 MG/DL — SIGNIFICANT CHANGE UP (ref 70–99)
HCO3 BLDV-SCNC: 27 MMOL/L — SIGNIFICANT CHANGE UP (ref 20–27)
HCT VFR BLD CALC: 33.3 % — LOW (ref 34.5–45)
HCT VFR BLDV CALC: 36.1 % — SIGNIFICANT CHANGE UP (ref 34.5–45)
HGB BLD-MCNC: 11.3 G/DL — LOW (ref 11.5–15.5)
HGB BLDV-MCNC: 11.7 G/DL — SIGNIFICANT CHANGE UP (ref 11.5–15.5)
IMM GRANULOCYTES # BLD AUTO: 0.37 # — SIGNIFICANT CHANGE UP
IMM GRANULOCYTES NFR BLD AUTO: 4.4 % — HIGH (ref 0–1.5)
LACTATE BLDV-MCNC: 1.3 MMOL/L — SIGNIFICANT CHANGE UP (ref 0.5–2)
LYMPHOCYTES # BLD AUTO: 0.67 K/UL — LOW (ref 1–3.3)
LYMPHOCYTES # BLD AUTO: 8 % — LOW (ref 13–44)
LYMPHOCYTES NFR SPEC AUTO: 3.4 % — LOW (ref 13–44)
MCHC RBC-ENTMCNC: 28.8 PG — SIGNIFICANT CHANGE UP (ref 27–34)
MCHC RBC-ENTMCNC: 33.9 % — SIGNIFICANT CHANGE UP (ref 32–36)
MCV RBC AUTO: 84.9 FL — SIGNIFICANT CHANGE UP (ref 80–100)
METAMYELOCYTES # FLD: 0.9 % — SIGNIFICANT CHANGE UP (ref 0–1)
MONOCYTES # BLD AUTO: 0.33 K/UL — SIGNIFICANT CHANGE UP (ref 0–0.9)
MONOCYTES NFR BLD AUTO: 4 % — SIGNIFICANT CHANGE UP (ref 2–14)
MONOCYTES NFR BLD: 2.6 % — SIGNIFICANT CHANGE UP (ref 2–9)
MORPHOLOGY BLD-IMP: NORMAL — SIGNIFICANT CHANGE UP
NEUTROPHIL AB SER-ACNC: 90.5 % — HIGH (ref 43–77)
NEUTROPHILS # BLD AUTO: 6.94 K/UL — SIGNIFICANT CHANGE UP (ref 1.8–7.4)
NEUTROPHILS NFR BLD AUTO: 83.1 % — HIGH (ref 43–77)
NEUTS BAND # BLD: 0.9 % — SIGNIFICANT CHANGE UP (ref 0–6)
NRBC # FLD: 0 — SIGNIFICANT CHANGE UP
PCO2 BLDV: 37 MMHG — LOW (ref 41–51)
PH BLDV: 7.46 PH — HIGH (ref 7.32–7.43)
PLATELET # BLD AUTO: 318 K/UL — SIGNIFICANT CHANGE UP (ref 150–400)
PLATELET COUNT - ESTIMATE: NORMAL — SIGNIFICANT CHANGE UP
PMV BLD: 8.7 FL — SIGNIFICANT CHANGE UP (ref 7–13)
PO2 BLDV: 53 MMHG — HIGH (ref 35–40)
POTASSIUM BLDV-SCNC: 4.3 MMOL/L — SIGNIFICANT CHANGE UP (ref 3.4–4.5)
POTASSIUM SERPL-MCNC: 3.8 MMOL/L — SIGNIFICANT CHANGE UP (ref 3.5–5.3)
POTASSIUM SERPL-SCNC: 3.8 MMOL/L — SIGNIFICANT CHANGE UP (ref 3.5–5.3)
PROT SERPL-MCNC: 6.5 G/DL — SIGNIFICANT CHANGE UP (ref 6–8.3)
RBC # BLD: 3.92 M/UL — SIGNIFICANT CHANGE UP (ref 3.8–5.2)
RBC # FLD: 15.5 % — HIGH (ref 10.3–14.5)
SAO2 % BLDV: 85.8 % — HIGH (ref 60–85)
SODIUM SERPL-SCNC: 134 MMOL/L — LOW (ref 135–145)
VARIANT LYMPHS # BLD: 1.7 % — SIGNIFICANT CHANGE UP
WBC # BLD: 8.35 K/UL — SIGNIFICANT CHANGE UP (ref 3.8–10.5)
WBC # FLD AUTO: 8.35 K/UL — SIGNIFICANT CHANGE UP (ref 3.8–10.5)

## 2018-05-20 PROCEDURE — 70450 CT HEAD/BRAIN W/O DYE: CPT | Mod: 26

## 2018-05-20 PROCEDURE — 99221 1ST HOSP IP/OBS SF/LOW 40: CPT

## 2018-05-20 RX ORDER — LEVETIRACETAM 250 MG/1
1 TABLET, FILM COATED ORAL
Qty: 0 | Refills: 0 | COMMUNITY

## 2018-05-20 RX ORDER — LEVETIRACETAM 250 MG/1
2000 TABLET, FILM COATED ORAL
Qty: 0 | Refills: 0 | Status: DISCONTINUED | OUTPATIENT
Start: 2018-05-21 | End: 2018-05-22

## 2018-05-20 RX ORDER — DEXAMETHASONE 0.5 MG/5ML
2 ELIXIR ORAL DAILY
Qty: 0 | Refills: 0 | Status: DISCONTINUED | OUTPATIENT
Start: 2018-05-20 | End: 2018-05-21

## 2018-05-20 RX ORDER — LEVETIRACETAM 250 MG/1
2000 TABLET, FILM COATED ORAL ONCE
Qty: 0 | Refills: 0 | Status: COMPLETED | OUTPATIENT
Start: 2018-05-20 | End: 2018-05-20

## 2018-05-20 RX ORDER — LEVETIRACETAM 250 MG/1
250 TABLET, FILM COATED ORAL ONCE
Qty: 0 | Refills: 0 | Status: COMPLETED | OUTPATIENT
Start: 2018-05-20 | End: 2018-05-20

## 2018-05-20 RX ORDER — ANASTROZOLE 1 MG/1
1 TABLET ORAL
Qty: 0 | Refills: 0 | COMMUNITY

## 2018-05-20 RX ORDER — ALPRAZOLAM 0.25 MG
0.25 TABLET ORAL ONCE
Qty: 0 | Refills: 0 | Status: DISCONTINUED | OUTPATIENT
Start: 2018-05-20 | End: 2018-05-20

## 2018-05-20 RX ORDER — PANTOPRAZOLE SODIUM 20 MG/1
40 TABLET, DELAYED RELEASE ORAL
Qty: 0 | Refills: 0 | Status: DISCONTINUED | OUTPATIENT
Start: 2018-05-20 | End: 2018-05-22

## 2018-05-20 RX ORDER — ASPIRIN/CALCIUM CARB/MAGNESIUM 324 MG
81 TABLET ORAL DAILY
Qty: 0 | Refills: 0 | Status: DISCONTINUED | OUTPATIENT
Start: 2018-05-20 | End: 2018-05-22

## 2018-05-20 RX ORDER — METOPROLOL TARTRATE 50 MG
12.5 TABLET ORAL
Qty: 0 | Refills: 0 | Status: DISCONTINUED | OUTPATIENT
Start: 2018-05-20 | End: 2018-05-22

## 2018-05-20 RX ORDER — RAMIPRIL 5 MG
1 CAPSULE ORAL
Qty: 0 | Refills: 0 | COMMUNITY

## 2018-05-20 RX ORDER — LISINOPRIL 2.5 MG/1
10 TABLET ORAL DAILY
Qty: 0 | Refills: 0 | Status: DISCONTINUED | OUTPATIENT
Start: 2018-05-20 | End: 2018-05-22

## 2018-05-20 RX ORDER — ANASTROZOLE 1 MG/1
1 TABLET ORAL AT BEDTIME
Qty: 0 | Refills: 0 | Status: DISCONTINUED | OUTPATIENT
Start: 2018-05-20 | End: 2018-05-22

## 2018-05-20 RX ORDER — SIMVASTATIN 20 MG/1
10 TABLET, FILM COATED ORAL AT BEDTIME
Qty: 0 | Refills: 0 | Status: DISCONTINUED | OUTPATIENT
Start: 2018-05-20 | End: 2018-05-22

## 2018-05-20 RX ORDER — HEPARIN SODIUM 5000 [USP'U]/ML
5000 INJECTION INTRAVENOUS; SUBCUTANEOUS EVERY 8 HOURS
Qty: 0 | Refills: 0 | Status: DISCONTINUED | OUTPATIENT
Start: 2018-05-20 | End: 2018-05-22

## 2018-05-20 RX ADMIN — HEPARIN SODIUM 5000 UNIT(S): 5000 INJECTION INTRAVENOUS; SUBCUTANEOUS at 23:32

## 2018-05-20 RX ADMIN — Medication 1 MILLIGRAM(S): at 13:03

## 2018-05-20 RX ADMIN — LEVETIRACETAM 250 MILLIGRAM(S): 250 TABLET, FILM COATED ORAL at 13:13

## 2018-05-20 RX ADMIN — LEVETIRACETAM 2000 MILLIGRAM(S): 250 TABLET, FILM COATED ORAL at 19:23

## 2018-05-20 RX ADMIN — Medication 0.25 MILLIGRAM(S): at 17:40

## 2018-05-20 RX ADMIN — ANASTROZOLE 1 MILLIGRAM(S): 1 TABLET ORAL at 23:57

## 2018-05-20 NOTE — H&P ADULT - HISTORY OF PRESENT ILLNESS
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 La's office (neurologist) for partial seizures with L hand twitching that was been going on since 8 am. Pt states she gets this intermittently but normally stops after a few minutes. Patient takes Keppra 1750 BID and 2mg Dexamethasone that was decreased from 4 mg on Wednesday. Denies fevers, chills, chest pain, SOB, cough, n/v/d, abdominal pain, numbness, tingling, weakness, urinary symptoms.    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 La's office (neurologist) for partial seizures with L hand twitching that was been going on since 8 am. Pt states she gets this intermittently but normally stops after a few minutes. Patient takes Keppra 1750 BID and 2mg Dexamethasone that was decreased from 4 mg on Wednesday. Denies fevers, chills, chest pain, SOB, cough, n/v/d, abdominal pain, numbness, tingling, weakness, urinary symptoms.    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 La's office (neurologist) for partial seizures with left thigh twitching. Patient states that she usually gets tremors in her left thigh and sometimes left hand about every 3-4 days. Each episode usually only lasts a few minutes. Today, the episode started at 8 AM and ended at 8:30 which is longer than usual. Patient has been asymptomatic since her arrival in the hospital. No headaches, dizziness, fevers, chills, cough, cardiovascular symptoms, abdominal, dysuria or diarrhea. She has left upper and lower extremity weakness from her mastectomy and GBM. She has a very good appetite, no nausea or vomiting.     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 La's office (neurologist) for partial seizures with left thigh twitching. Patient states that she usually gets tremors in her left thigh and sometimes left hand about every 3-4 days. Each episode usually only lasts a few minutes. Today, the episode started at 8 AM and ended at 8:30 AM which is longer than usual. Patient has been asymptomatic since her arrival in the hospital. No headaches, dizziness, fevers, chills, cough, cardiovascular symptoms, abdominal pain, dysuria or diarrhea. She has left upper and lower extremity weakness from her mastectomy and GBM. She has a very good appetite, no nausea or vomiting.     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 La's office (neurologist) for partial seizures with left thigh twitching. Patient states that she usually gets tremors in her left thigh and sometimes left hand about every 3-4 days. Each episode usually only lasts a few minutes. Today, the episode started at 8 AM and ended at 8:30 AM which is longer than usual. Patient has been asymptomatic since her arrival in the hospital. No headaches, dizziness, fevers, chills, cough, cardiovascular symptoms, abdominal pain, dysuria or diarrhea. She has left upper and lower extremity weakness from her mastectomy and GBM. She has a very good appetite, no nausea or vomiting.     In the ER, patient's vitals were temp 98.4, HR , /72, RR 16-22, satting 96 on room air  In the Er, patient received Xanax, Keppra 2 grams x1, Keppra 250mg x1, Ativan 1mg x1

## 2018-05-20 NOTE — H&P ADULT - PMH
Breast cancer    Glioblastoma    Hyperlipidemia    Hypertension Breast cancer  s/p L radical mastectomy on anastrazole  Glioblastoma  s/p Right parietal craniotomy receiving radiation with concurrent and adjuvant chemo (temozolamide)  Hyperlipidemia    Hypertension

## 2018-05-20 NOTE — ED PROVIDER NOTE - OBJECTIVE STATEMENT
77 y.o female pmhx of breast ca s/p L mastectomy, GBM and partial seizures presents with L hand twitching that was been going on since 8 am. Pt states she gets this intermittently but normally stops after a few minutes. Called Neurologist Dr. Chambers office who advised her to come to the ED. Spoke to daughter on her cell who states patient takes Keppra 1750 BID and 2mg Dexamethasone that was decreased from 4 mg on Wednesday. Denies fevers, chills, chest pain, SOB, cough, n/v/d, abdominal pain, numbness, tingling, weakness, urinary symptoms. 77 y.o female pmhx of breast ca s/p L mastectomy, GBM and partial seizures presents with L hand twitching that was been going on since 8 am. Pt states she gets this intermittently but normally stops after a few minutes. Called Neurologist Dr. Chambers office who advised her to come to the ED. Spoke to daughter on her cell who states patient takes Keppra 1750 BID and 2mg Dexamethasone that was decreased from 4 mg on Wednesday. Denies fevers, chills, chest pain, SOB, cough, n/v/d, abdominal pain, numbness, tingling, weakness, urinary symptoms.    Medication List:  Metoprolol 12.5mg BID  Simvastatin 10 mg qd  Anastrozole 1 mg qhs  Lisinopril 10mg   Dexamethasone 2mg 77 y.o female pmhx of breast ca s/p L mastectomy, GBM and partial seizures presents with L hand twitching that was been going on since 8 am. Pt states she gets this intermittently but normally stops after a few minutes. Called Neurologist Dr. Chambers office who advised her to come to the ED. Spoke to daughter on her cell who states patient takes Keppra 1750 BID and 2mg Dexamethasone that was decreased from 4 mg on Wednesday. Denies fevers, chills, chest pain, SOB, cough, n/v/d, abdominal pain, numbness, tingling, weakness, urinary symptoms.    Medication List:  Metoprolol 12.5mg BID  Simvastatin 10 mg qd  Anastrozole 1 mg qhs  Lisinopril 10mg   Dexamethasone 2mg    Daughter Rashida: [phone]

## 2018-05-20 NOTE — CONSULT NOTE ADULT - ASSESSMENT
76 yo woman who presents to [facility] on referral by patient's private neurologist, Dr. La, w/ persistent left hand twitching since the morning. Patient has a known history of seizures s/p GBM resection w/ similar symptoms, however usually resolve after a few minutes. Patient takes 1750mg BID Keppra daily, as well as 2mg dexamethasone, decreased from 4mg just 4 days ago. ROS otherwise unremarkable for fevers, chills, chest pain, SOB, cough, n/v/d, abdominal pain, numbness, tingling, weakness, urinary symptoms. 76 yo woman who presents to [facility] on referral by patient's private neurologist, Dr. La, w/ persistent left hand twitching since the morning. Patient has a known history of seizures s/p GBM resection w/ similar symptoms, however usually resolve after a few minutes. Patient has been undergoing radiation treatment, last treatment being earlier this week. Patient's last MRI was 3 weeks ago as per patient. Patient takes 1750mg BID Keppra daily, as well as 2mg dexamethasone, decreased from 4mg just 4 days ago. ROS otherwise unremarkable for fevers, chills, chest pain, SOB, cough, n/v/d, abdominal pain, numbness, tingling, weakness, urinary symptoms.

## 2018-05-20 NOTE — CONSULT NOTE ADULT - PROBLEM SELECTOR RECOMMENDATION 9
Agree with admission to medicine, increase in Keppa and decadron doses  No neurosurgical intervention

## 2018-05-20 NOTE — H&P ADULT - NSHPSOCIALHISTORY_GEN_ALL_CORE
Patient lives with her  and son who is mentally challenged at home. No history of smoking, alcohol use or illicit drug use. Walks with a half-walker and wheelchair.

## 2018-05-20 NOTE — CONSULT NOTE ADULT - SUBJECTIVE AND OBJECTIVE BOX
Neurology Consult    Name: MAGALI HUGHES    HPI: 78 yo woman who presents to [facility] on referral by patient's private neurologist, Dr. La, w/ persistent left hand twitching since the morning. Patient has a known history of seizures s/p GBM resection w/ similar symptoms, however usually resolve after a few minutes. Patient takes 1750mg BID Keppra daily, as well as 2mg dexamethasone, decreased from 4mg just 4 days ago. ROS otherwise unremarkable for fevers, chills, chest pain, SOB, cough, n/v/d, abdominal pain, numbness, tingling, weakness, urinary symptoms.    PMH/PSH  Breast Ca s/p left mastectomy  GBM s/p resection   seizures (left hand twitching) on Keppra 1750mg BID     MEDICATIONS  (home):   * Patient Currently Takes Medications as of 11-Mar-2018 23:38 documented in Structured Notes  · 	Keppra 250 mg oral tablet: 5 tab(s) orally 2 times a day   · 	ALPRAZolam 0.25 mg oral tablet: 1 tab(s) orally 2 times a day, As Needed anxiety  · 	pantoprazole 40 mg oral delayed release tablet: 1 tab(s) orally once a day  · 	senna oral tablet: 2 tab(s) orally once a day (at bedtime), As needed, Constipation  · 	docusate sodium 100 mg oral capsule: 1 cap(s) orally 3 times a day  · 	simvastatin 10 mg oral tablet: 1 tab(s) orally once a day (at bedtime)  · 	traZODone 50 mg oral tablet: 1 tab(s) orally   · 	phenytoin 100 mg oral capsule, extended release: 1 cap(s) orally 3 times a day for 2 days, then 1 cap 2 times daily for 2 days, then 1 cap orally daily for 2 days and stop.   · 	enoxaparin: 40 milligram(s) subcutaneous once a day (at bedtime)  · 	oxyCODONE 10 mg oral tablet: 1 tab(s) orally every 4 hours, As needed, Severe Pain (7 - 10)  · 	oxyCODONE 5 mg oral tablet: 1 tab(s) orally every 4 hours, As needed, Moderate Pain (4 - 6)  · 	acetaminophen 325 mg oral tablet: 2 tab(s) orally every 6 hours, As needed, Mild Pain (1 - 3)  · 	dexamethasone 2 mg oral tablet: 1 tab(s) orally every 8 hours for 3 days then 1 tab every 12hrs and taper further per MD.    · 	ramipril 10 mg oral capsule: 1 cap(s) orally once a day  · 	Keppra 500 mg oral tablet: 1 tab(s) orally 2 times a day      Allergies  No Known Allergies    Objective:   Vital Signs Last 24 Hrs  T(C): 36.9 (20 May 2018 10:38), Max: 37.3 (20 May 2018 10:12)  T(F): 98.5 (20 May 2018 10:38), Max: 99.1 (20 May 2018 10:12)  HR: 97 (20 May 2018 10:38) (94 - 97)  BP: 121/79 (20 May 2018 10:38) (111/76 - 121/79)  RR: 16 (20 May 2018 10:38) (16 - 18)  SpO2: 98% (20 May 2018 10:38) (96% - 98%)    General Exam:   General appearance: No acute distress                   Neurological Exam:  Mental Status: AAOx3, fluent speech, follows commands    Cranial Nerves: EOMI, PERRL, V1-V3 intact, facial symmetry intact, no dysarthria, tongue midline, VFF    Motor: 5/5 throughout. No drift x4    Sensation: Intact to LT throughout    Coordination: FTN intact b/l    Reflexes: 1+ bilateral biceps, brachioradialis, patellar and ankle    Gait: normal and stable.      Labs:    05-20    134<L>  |  98  |  13  ----------------------------<  95  3.8   |  25  |  0.40<L>    Ca    8.9      20 May 2018 11:00    TPro  6.5  /  Alb  3.6  /  TBili  0.5  /  DBili  x   /  AST  23  /  ALT  33  /  AlkPhos  53  05-20    LIVER FUNCTIONS - ( 20 May 2018 11:00 )  Alb: 3.6 g/dL / Pro: 6.5 g/dL / ALK PHOS: 53 u/L / ALT: 33 u/L / AST: 23 u/L / GGT: x           CBC Full  -  ( 20 May 2018 11:00 )  WBC Count : 8.35 K/uL  Hemoglobin : 11.3 g/dL  Hematocrit : 33.3 %  Platelet Count - Automated : 318 K/uL  Mean Cell Volume : 84.9 fL  Mean Cell Hemoglobin : 28.8 pg  Mean Cell Hemoglobin Concentration : 33.9 %  Auto Neutrophil # : 6.94 K/uL  Auto Lymphocyte # : 0.67 K/uL  Auto Monocyte # : 0.33 K/uL  Auto Eosinophil # : 0.01 K/uL  Auto Basophil # : 0.03 K/uL  Auto Neutrophil % : 83.1 %  Auto Lymphocyte % : 8.0 %  Auto Monocyte % : 4.0 %  Auto Eosinophil % : 0.1 %  Auto Basophil % : 0.4 %    Radiology Neurology Consult    Name: MAGALI HUGHES    HPI: 78 yo woman who presents to [facility] on referral by patient's private neurologist, Dr. La, w/ persistent left hand twitching since the morning. Patient has a known history of seizures s/p GBM resection w/ similar symptoms, however usually resolve after a few minutes. Patient has been undergoing radiation treatment, last treatment being earlier this week. Patient's last MRI was 3 weeks ago as per patient. Patient takes 1750mg BID Keppra daily, as well as 2mg dexamethasone, decreased from 4mg just 4 days ago. ROS otherwise unremarkable for fevers, chills, chest pain, SOB, cough, n/v/d, abdominal pain, numbness, tingling, weakness, urinary symptoms.    PMH/PSH  Breast Ca s/p left mastectomy  GBM s/p resection   seizures (left hand twitching) on Keppra 1750mg BID     MEDICATIONS  (home):   * Patient Currently Takes Medications as of 11-Mar-2018 23:38 documented in Structured Notes  · 	Keppra 250 mg oral tablet: 5 tab(s) orally 2 times a day   · 	ALPRAZolam 0.25 mg oral tablet: 1 tab(s) orally 2 times a day, As Needed anxiety  · 	pantoprazole 40 mg oral delayed release tablet: 1 tab(s) orally once a day  · 	senna oral tablet: 2 tab(s) orally once a day (at bedtime), As needed, Constipation  · 	docusate sodium 100 mg oral capsule: 1 cap(s) orally 3 times a day  · 	simvastatin 10 mg oral tablet: 1 tab(s) orally once a day (at bedtime)  · 	traZODone 50 mg oral tablet: 1 tab(s) orally   · 	phenytoin 100 mg oral capsule, extended release: 1 cap(s) orally 3 times a day for 2 days, then 1 cap 2 times daily for 2 days, then 1 cap orally daily for 2 days and stop.   · 	enoxaparin: 40 milligram(s) subcutaneous once a day (at bedtime)  · 	oxyCODONE 10 mg oral tablet: 1 tab(s) orally every 4 hours, As needed, Severe Pain (7 - 10)  · 	oxyCODONE 5 mg oral tablet: 1 tab(s) orally every 4 hours, As needed, Moderate Pain (4 - 6)  · 	acetaminophen 325 mg oral tablet: 2 tab(s) orally every 6 hours, As needed, Mild Pain (1 - 3)  · 	dexamethasone 2 mg oral tablet: 1 tab(s) orally every 8 hours for 3 days then 1 tab every 12hrs and taper further per MD.    · 	ramipril 10 mg oral capsule: 1 cap(s) orally once a day  · 	Keppra 500 mg oral tablet: 1 tab(s) orally 2 times a day      Allergies  No Known Allergies    Objective:   Vital Signs Last 24 Hrs  T(C): 36.9 (20 May 2018 10:38), Max: 37.3 (20 May 2018 10:12)  T(F): 98.5 (20 May 2018 10:38), Max: 99.1 (20 May 2018 10:12)  HR: 97 (20 May 2018 10:38) (94 - 97)  BP: 121/79 (20 May 2018 10:38) (111/76 - 121/79)  RR: 16 (20 May 2018 10:38) (16 - 18)  SpO2: 98% (20 May 2018 10:38) (96% - 98%)    General Exam:   General appearance: No acute distress                   Neurological Exam:  Mental Status: AAOx3, fluent speech, names objects, follows commands    Cranial Nerves: EOMI no nystagmus, PERRL, V1-V3 intact, facial symmetry intact, no dysarthria, tongue midline, VFF    Motor: 0/5 left UE/LE (chronic) vs. 5/5 right UE/LE. No drift in right side     Sensation: Intact to LT throughout    Coordination: FTN intact on right, left unable to perform     Reflexes: 1+ right biceps, brachioradialis, patellar and ankle, diminished on left.     tone: flacid on left UE/LE. normal right.      Labs:    05-20    134<L>  |  98  |  13  ----------------------------<  95  3.8   |  25  |  0.40<L>    Ca    8.9      20 May 2018 11:00    TPro  6.5  /  Alb  3.6  /  TBili  0.5  /  DBili  x   /  AST  23  /  ALT  33  /  AlkPhos  53  05-20    LIVER FUNCTIONS - ( 20 May 2018 11:00 )  Alb: 3.6 g/dL / Pro: 6.5 g/dL / ALK PHOS: 53 u/L / ALT: 33 u/L / AST: 23 u/L / GGT: x           CBC Full  -  ( 20 May 2018 11:00 )  WBC Count : 8.35 K/uL  Hemoglobin : 11.3 g/dL  Hematocrit : 33.3 %  Platelet Count - Automated : 318 K/uL  Mean Cell Volume : 84.9 fL  Mean Cell Hemoglobin : 28.8 pg  Mean Cell Hemoglobin Concentration : 33.9 %  Auto Neutrophil # : 6.94 K/uL  Auto Lymphocyte # : 0.67 K/uL  Auto Monocyte # : 0.33 K/uL  Auto Eosinophil # : 0.01 K/uL  Auto Basophil # : 0.03 K/uL  Auto Neutrophil % : 83.1 %  Auto Lymphocyte % : 8.0 %  Auto Monocyte % : 4.0 %  Auto Eosinophil % : 0.1 %  Auto Basophil % : 0.4 %    Radiology

## 2018-05-20 NOTE — ED PROVIDER NOTE - MEDICAL DECISION MAKING DETAILS
77 y.o female pmhx of breast ca s/p L mastectomy, GBM and partial seizures presents with L hand twitching since 8 am consistent with prior partial seizures. Pt otherwise feeling well. will obtain labs, keppra level, ativan and contact Dr. La.

## 2018-05-20 NOTE — H&P ADULT - PROBLEM SELECTOR PLAN 3
S/p mastectomy, no acute issues  - Continue daily anastrazole  Continue followup with outpatient heme/onc

## 2018-05-20 NOTE — ED ADULT NURSE NOTE - CHIEF COMPLAINT QUOTE
Presents via EMS with HX of tremors to left arm.  s/p left mastectomy, 1/18 with DX of brain cancer.   Pt on Keppra, however denies Hx of seizures.

## 2018-05-20 NOTE — H&P ADULT - NSHPPHYSICALEXAM_GEN_ALL_CORE
PHYSICAL EXAM:  GENERAL: NAD, eating dinner   HEAD:  Atraumatic, Normocephalic  EYES: EOMI, PERRLA, conjunctiva and sclera clear  ENMT: No tonsillar erythema, exudates, or enlargement; Moist mucous membranes, Good dentition, No lesions  NECK: Supple, No JVD, Normal thyroid  CHEST/LUNG: Left mastectomy, Clear to auscultation bilaterally; No rales, rhonchi, wheezing, or rubs  HEART: Regular rate and rhythm; No murmurs, rubs, or gallops  ABDOMEN: Soft, Nontender, Nondistended; Bowel sounds present  VASCULAR:  no periphermal edema  NERVOUS SYSTEM:  Alert & Oriented X3, Motor Strength 5/5 right upper and lower extremities; 0/5 LUE and LLE, with flaccid tone on left PHYSICAL EXAM:  GENERAL: NAD, eating dinner   HEAD:  Atraumatic, Normocephalic  EYES: EOMI, PERRLA, conjunctiva and sclera clear  ENMT: No tonsillar erythema, exudates, or enlargement; Moist mucous membranes, Good dentition, No lesions  NECK: Supple, No JVD, Normal thyroid  CHEST/LUNG: Left mastectomy, Clear to auscultation bilaterally; No rales, rhonchi, wheezing, or rubs  HEART: Regular rate and rhythm; No murmurs, rubs, or gallops  ABDOMEN: Soft, Nontender, Nondistended; Bowel sounds present  VASCULAR:  no peripheral edema  NERVOUS SYSTEM:  Alert & Oriented X3, Motor Strength 5/5 right upper and lower extremities; 0/5 LUE and LLE, with flaccid tone on left Vital Signs Last 24 Hrs  T(C): 36.4 (20 May 2018 23:27), Max: 37.3 (20 May 2018 10:12)  T(F): 97.6 (20 May 2018 23:27), Max: 99.1 (20 May 2018 10:12)  HR: 87 (20 May 2018 23:27) (87 - 114)  BP: 118/77 (20 May 2018 23:27) (104/70 - 121/79)  BP(mean): --  RR: 18 (20 May 2018 23:27) (16 - 22)  SpO2: 98% (20 May 2018 23:27) (96% - 98%)    PHYSICAL EXAM:  GENERAL: NAD, eating dinner   HEAD:  Atraumatic, Normocephalic  EYES: EOMI, PERRLA, conjunctiva and sclera clear  ENMT: No tonsillar erythema, exudates, or enlargement; Moist mucous membranes, Good dentition, No lesions  NECK: Supple, No JVD, Normal thyroid  CHEST/LUNG: Left mastectomy, Clear to auscultation bilaterally; No rales, rhonchi, wheezing, or rubs  HEART: Regular rate and rhythm; No murmurs, rubs, or gallops  ABDOMEN: Soft, Nontender, Nondistended; Bowel sounds present  MSK:  no peripheral edema  NERVOUS SYSTEM:  Alert & Oriented X3, Motor Strength 5/5 right upper and lower extremities; 0/5 LUE and LLE, with flaccid tone on left

## 2018-05-20 NOTE — H&P ADULT - NSHPREVIEWOFSYSTEMS_GEN_ALL_CORE
REVIEW OF SYSTEMS:    CONSTITUTIONAL: No fevers or chills  EYES/ENT: No visual changes;  No vertigo or throat pain   NECK: No pain or stiffness  RESPIRATORY: No cough, wheezing, hemoptysis; No shortness of breath  CARDIOVASCULAR: No chest pain or palpitations  GASTROINTESTINAL: No abdominal or epigastric pain. No nausea, vomiting, or hematemesis; No diarrhea or constipation. No melena or hematochezia.  GENITOURINARY: No dysuria, frequency or hematuria  NEUROLOGICAL: Left thigh tremors   SKIN: No itching, burning, rashes, or lesions   All other review of systems is negative unless indicated above. REVIEW OF SYSTEMS:    CONSTITUTIONAL: No fevers or chills  EYES/ENT: No visual changes;  No vertigo or throat pain   NECK: No pain or stiffness  RESPIRATORY: No cough, wheezing, hemoptysis; No shortness of breath  CARDIOVASCULAR: No chest pain or palpitations  GASTROINTESTINAL: No abdominal or epigastric pain. No nausea, vomiting, or hematemesis; No diarrhea or constipation. No melena or hematochezia.  GENITOURINARY: No dysuria, frequency or hematuria  NEUROLOGICAL: Left thigh tremors   MSK: L sided weakness (chronic since her GBM surgery)  SKIN: No itching, burning, rashes, or lesions   All other review of systems is negative unless indicated above.

## 2018-05-20 NOTE — CONSULT NOTE ADULT - PROBLEM SELECTOR RECOMMENDATION 9
in the setting of GBM resection   Plan:   -increase keppra to 2000mg BID daily  -VEEG  -seizure precautions w/ ativan for episodes greater than 3mins   -r/o other triggers (metabolic/infectious)   -c/w treatment as prescribed by Dr. La for GBM management   -obtain [facility] for now. in the setting of GBM resection   Plan:   -increase keppra to 2000mg BID daily  -VEEG  -seizure precautions w/ ativan for episodes greater than 3mins   -r/o other triggers (metabolic/infectious)   -c/w treatment as prescribed by Dr. La for GBM management  -obtain [facility] for now.

## 2018-05-20 NOTE — H&P ADULT - PROBLEM SELECTOR PLAN 1
Pt presenting with increased duration of partial seizures involving left upper thigh, now resolved. Recent MRI on 5/2 and CT head today showing extensive vasogenic edema within right frontoparietal lobe, and abnormal lesions increased considerably in size.  Pt seen by neurology team, appreciate recommendations.   - Will increase daily keppra to 2000mg BID  - VEEG ordered  - Seizure precautions  - Will administer Ativan as needed for episodes lasting greater than 3 min   - Continue Decadron  - Will rule out infectious causes of increased seizure activity with UA and CXR  - Electrolytes WNL, replete PRN  - Seen by neurosurgery team, no additional recommendations/interventions at this time.   - continue outpatient treatment per Rad/Onc, Heme/Onc

## 2018-05-20 NOTE — CONSULT NOTE ADULT - ASSESSMENT
76 YO female with increased seizure activity s/p resection of GBM and recent decrease in decadron dosing

## 2018-05-20 NOTE — H&P ADULT - ASSESSMENT
76 y/o female PMH of HTN, HLD, breast ca diagnosed in 11/2017 s/p L radical mastectomy with left arm weakness, recent diagnosis of WHO Grade IV GBM (multiple enhancing lesions in the posterior right frontal/parietal lobes) s/p Right parietal craniotomy receiving radiation (last week on Tuesday, Wednesday, Thursday, Friday) with concurrent and adjuvant chemo (temozolamide), last chemo 2 days ago with Abraxane, sent in from Dr. La's office (neurologist) for increased duration of partial seizures admitted for further workup.

## 2018-05-20 NOTE — ED PROVIDER NOTE - PROGRESS NOTE DETAILS
spoke with dr. stover, requesting neuro see here in the ED. Arm twitching has stopped at this time. will continue to monitor. awaiting neuro call back maggi garvey: discussed with neuro who recommends admission at this time to further manage her medications and to be seen by the team in the morning with , epilepsy specialist , eeg. discussed with patient and family who agree with plan. pt admits to feeling anxious as she was waiting before for CT scan but better after xanax. EKG wnl, afebrile. will admit to medicine, text paged MAR.

## 2018-05-20 NOTE — ED PROVIDER NOTE - ATTENDING CONTRIBUTION TO CARE
I agree with the above H&P.  Briefly this is a 77 year old female pmh gbm with seizures presenting with left arm tremor. similar to past seizure, but lasting longer.  more than 2 hours.  decadron decreased from 4 to 2 mg 3 days ago.  patient has no other complaints. concern for partial seizure.  Well apearing moving right side normal. heart, lung abd exam wnl.  will give bzd, contact outside neuro.

## 2018-05-20 NOTE — CONSULT NOTE ADULT - SUBJECTIVE AND OBJECTIVE BOX
77 y.o female pmhx of breast ca s/p L mastectomy, GBM resected 1/2018 by Dr Yin and partial seizures presents with L hand twitching that was been going on since 8 am. Pt states she gets this intermittently but normally stops after a few minutes. Called Neurologist Dr. Chambers office who advised her to come to the ED.     WDWN female in NAD  Vital Signs Last 24 Hrs  T(C): 36.9 (20 May 2018 18:00), Max: 37.3 (20 May 2018 10:12)  T(F): 98.4 (20 May 2018 18:00), Max: 99.1 (20 May 2018 10:12)  HR: 108 (20 May 2018 18:00) (94 - 114)  BP: 110/72 (20 May 2018 18:00) (104/70 - 121/79)  BP(mean): --  RR: 22 (20 May 2018 18:00) (16 - 22)  SpO2: 96% (20 May 2018 18:00) (96% - 98%)    AAO X 2, unable to name day/date, correctly names month and year  PERRLA, EOMI  Trace left facial weakness  CN 2-12 otherwise intact  CAVANAUGH, LUE/LLE 1/5, RUE/RLE 5/5    < from: CT Head No Cont (05.20.18 @ 17:35) >  FINDINGS:   Redemonstration of extensive vasogenic edema within the right frontal   parietal lobe, with a few well-circumscribed hypodense lesions within   this region, sample measurements include: 1.4 x 1.4 cm in the high right   frontal lobe, and 1.4 x 1.1 cm in the right parietal lobe.    No midline shift or herniation.  The ventricles, and sulci are unremarkable.  The basal cisterns are   patent.  No intraventricular hemorrhage.    The paranasal sinuses and mastoid air cells are clear.  Status post right   right parietal craniotomy with associated post surgical changes.    IMPRESSION:   Extensive vasogenic edema within the right frontoparietal lobe, with a   few well-circumscribed hypodense lesions as described above. Further   evaluation with MRI is recommended.     No acute hemorrhage, midline shift or herniation.    < end of copied text >

## 2018-05-20 NOTE — H&P ADULT - NSHPLABSRESULTS_GEN_ALL_CORE
Labs and imaging personally reviewed. hemoglobin 11 (baseline 12-13), BMP WNL, CT head showing extensive vasogenic edema within the right frontoparietal lobe with well circumscribed hypodense lesions. Further MRI is recommended, however patient had an MRI on 5/2 which corroborates the CT head findings, showing new areas of enhancement in th right frontoparietal region, that are increased considerably in size compared to prior scan.     EKG showed sinus tachycardia to 119, intervals WNL, unchanged from prior EKG in Jan 2018                              11.3   8.35  )-----------( 318      ( 20 May 2018 11:00 )             33.3     05-20    134<L>  |  98  |  13  ----------------------------<  95  3.8   |  25  |  0.40<L>    Ca    8.9      20 May 2018 11:00    TPro  6.5  /  Alb  3.6  /  TBili  0.5  /  DBili  x   /  AST  23  /  ALT  33  /  AlkPhos  53  05-20    CAPILLARY BLOOD GLUCOSE          < from: CT Head No Cont (05.20.18 @ 17:35) >      EXAM:  CT BRAIN        PROCEDURE DATE:  May 20 2018         INTERPRETATION:  CLINICAL INFORMATION: History of GBM, presenting with   seizures. Evaluate    TECHNIQUE:  Serial axial images were obtained from the skull base to the   vertex without intravenous contrast. Coronal and sagittal reformatted   images were obtained.    COMPARISON: MR brain from May 2, 2018. CT brain from May 11, 2018.     FINDINGS:   Redemonstration of extensive vasogenic edema within the right frontal   parietal lobe, with a few well-circumscribed hypodense lesions within   this region, sample measurements include: 1.4 x 1.4 cm in the high right   frontal lobe, and 1.4 x 1.1 cm in the right parietal lobe.    No midline shift or herniation.  The ventricles, and sulci are unremarkable.  The basal cisterns are   patent.  No intraventricular hemorrhage.    The paranasal sinuses and mastoid air cells are clear.  Status post right   right parietal craniotomy with associated post surgical changes.    IMPRESSION:   Extensive vasogenic edema within the right frontoparietal lobe, with a   few well-circumscribed hypodense lesions as described above. Further   evaluation with MRI is recommended.     No acute hemorrhage, midline shift or herniation.    < end of copied text > Labs and imaging personally reviewed. hemoglobin 11 (baseline 12-13), BMP WNL, CT head showing extensive vasogenic edema within the right frontoparietal lobe with well circumscribed hypodense lesions. Further MRI is recommended, however patient had an MRI on 5/2 which corroborates the CT head findings, showing new areas of enhancement in th right frontoparietal region, that are increased considerably in size compared to prior scan.     EKG showed sinus tachycardia to 119, intervals WNL, unchanged from prior EKG in Jan 2018               11.3   8.35  )-----------( 318      ( 20 May 2018 11:00 )             33.3     05-20    134<L>  |  98  |  13  ----------------------------<  95  3.8   |  25  |  0.40<L>    Ca    8.9      20 May 2018 11:00    TPro  6.5  /  Alb  3.6  /  TBili  0.5  /  DBili  x   /  AST  23  /  ALT  33  /  AlkPhos  53  05-20    CAPILLARY BLOOD GLUCOSE          < from: CT Head No Cont (05.20.18 @ 17:35) >      EXAM:  CT BRAIN        PROCEDURE DATE:  May 20 2018     IMPRESSION:   Extensive vasogenic edema within the right frontoparietal lobe, with a   few well-circumscribed hypodense lesions as described above. Further   evaluation with MRI is recommended.     No acute hemorrhage, midline shift or herniation.    < end of copied text >    < from: MR Head w/wo IV Cont (05.02.18 @ 20:37) >    Impression: Abnormal lesions again seen in the postop region to which   have considerably increased in size. There is also new areas of abnormal   enhancement seen involving the right posterior frontal subcortical region   which could be compatible new infiltrative area of tumor. Clinical   correlation and continued close interval follow-up is recommended.    < end of copied text >

## 2018-05-20 NOTE — H&P ADULT - PROBLEM SELECTOR PLAN 2
S/p right parietal craniotomy, complicated by partial seizures in left hand and thigh.   Continue with seizure management as outlined above  Continue outpatient treatment with Heme/onc, Rad/onc, neurology

## 2018-05-20 NOTE — H&P ADULT - PROBLEM SELECTOR PLAN 6
HSQ - Patient had a R brachial arterial thrombus in January and was treated with subq lovenox as an outpatient, said that she was transitioned off of lovenox ~1 month ago, will check a [facility] to assess for resolution.

## 2018-05-20 NOTE — H&P ADULT - ATTENDING COMMENTS
Patient seen and examined on 5/21/18, case discussed with Dr. Luna, agree with assessment and plan as above.

## 2018-05-20 NOTE — ED ADULT NURSE NOTE - OBJECTIVE STATEMENT
Pt received a&ox3, c/o tremor to left arm x 2.5 hours, hx left sided mastectomy 1/18/18 w tremors occurring intermittently since, pt states tremor usually subsides after .5 hours, pt w brain CA, last radiation approx 2 weeks ago, last chemo 1 week ago, pt denies any other complaints, vss as reported, pt appears comfortable and to be in NAD, will continue to monitor.

## 2018-05-21 LAB
ALBUMIN SERPL ELPH-MCNC: 3.6 G/DL — SIGNIFICANT CHANGE UP (ref 3.3–5)
ALP SERPL-CCNC: 54 U/L — SIGNIFICANT CHANGE UP (ref 40–120)
ALT FLD-CCNC: 31 U/L — SIGNIFICANT CHANGE UP (ref 4–33)
AMORPH CRY # UR COMP ASSIST: SIGNIFICANT CHANGE UP (ref 0–0)
APPEARANCE UR: CLEAR — SIGNIFICANT CHANGE UP
AST SERPL-CCNC: 20 U/L — SIGNIFICANT CHANGE UP (ref 4–32)
BACTERIA # UR AUTO: SIGNIFICANT CHANGE UP
BASOPHILS # BLD AUTO: 0.04 K/UL — SIGNIFICANT CHANGE UP (ref 0–0.2)
BASOPHILS NFR BLD AUTO: 0.5 % — SIGNIFICANT CHANGE UP (ref 0–2)
BILIRUB SERPL-MCNC: 0.4 MG/DL — SIGNIFICANT CHANGE UP (ref 0.2–1.2)
BILIRUB UR-MCNC: NEGATIVE — SIGNIFICANT CHANGE UP
BLOOD UR QL VISUAL: HIGH
BUN SERPL-MCNC: 14 MG/DL — SIGNIFICANT CHANGE UP (ref 7–23)
CALCIUM SERPL-MCNC: 9 MG/DL — SIGNIFICANT CHANGE UP (ref 8.4–10.5)
CHLORIDE SERPL-SCNC: 100 MMOL/L — SIGNIFICANT CHANGE UP (ref 98–107)
CO2 SERPL-SCNC: 26 MMOL/L — SIGNIFICANT CHANGE UP (ref 22–31)
COLOR SPEC: YELLOW — SIGNIFICANT CHANGE UP
CREAT SERPL-MCNC: 0.38 MG/DL — LOW (ref 0.5–1.3)
EOSINOPHIL # BLD AUTO: 0.02 K/UL — SIGNIFICANT CHANGE UP (ref 0–0.5)
EOSINOPHIL NFR BLD AUTO: 0.3 % — SIGNIFICANT CHANGE UP (ref 0–6)
GLUCOSE SERPL-MCNC: 68 MG/DL — LOW (ref 70–99)
GLUCOSE UR-MCNC: NEGATIVE — SIGNIFICANT CHANGE UP
HCT VFR BLD CALC: 36.3 % — SIGNIFICANT CHANGE UP (ref 34.5–45)
HGB BLD-MCNC: 11.9 G/DL — SIGNIFICANT CHANGE UP (ref 11.5–15.5)
IMM GRANULOCYTES # BLD AUTO: 0.3 # — SIGNIFICANT CHANGE UP
IMM GRANULOCYTES NFR BLD AUTO: 4 % — HIGH (ref 0–1.5)
KETONES UR-MCNC: NEGATIVE — SIGNIFICANT CHANGE UP
LEUKOCYTE ESTERASE UR-ACNC: NEGATIVE — SIGNIFICANT CHANGE UP
LYMPHOCYTES # BLD AUTO: 1.35 K/UL — SIGNIFICANT CHANGE UP (ref 1–3.3)
LYMPHOCYTES # BLD AUTO: 17.9 % — SIGNIFICANT CHANGE UP (ref 13–44)
MAGNESIUM SERPL-MCNC: 2.1 MG/DL — SIGNIFICANT CHANGE UP (ref 1.6–2.6)
MCHC RBC-ENTMCNC: 28.5 PG — SIGNIFICANT CHANGE UP (ref 27–34)
MCHC RBC-ENTMCNC: 32.8 % — SIGNIFICANT CHANGE UP (ref 32–36)
MCV RBC AUTO: 87.1 FL — SIGNIFICANT CHANGE UP (ref 80–100)
MONOCYTES # BLD AUTO: 0.6 K/UL — SIGNIFICANT CHANGE UP (ref 0–0.9)
MONOCYTES NFR BLD AUTO: 8 % — SIGNIFICANT CHANGE UP (ref 2–14)
MUCOUS THREADS # UR AUTO: SIGNIFICANT CHANGE UP
NEUTROPHILS # BLD AUTO: 5.23 K/UL — SIGNIFICANT CHANGE UP (ref 1.8–7.4)
NEUTROPHILS NFR BLD AUTO: 69.3 % — SIGNIFICANT CHANGE UP (ref 43–77)
NITRITE UR-MCNC: POSITIVE — HIGH
NRBC # FLD: 0 — SIGNIFICANT CHANGE UP
PH UR: 7 — SIGNIFICANT CHANGE UP (ref 5–8)
PHOSPHATE SERPL-MCNC: 3.3 MG/DL — SIGNIFICANT CHANGE UP (ref 2.5–4.5)
PLATELET # BLD AUTO: 316 K/UL — SIGNIFICANT CHANGE UP (ref 150–400)
PMV BLD: 9.2 FL — SIGNIFICANT CHANGE UP (ref 7–13)
POTASSIUM SERPL-MCNC: 3.9 MMOL/L — SIGNIFICANT CHANGE UP (ref 3.5–5.3)
POTASSIUM SERPL-SCNC: 3.9 MMOL/L — SIGNIFICANT CHANGE UP (ref 3.5–5.3)
PROT SERPL-MCNC: 6.8 G/DL — SIGNIFICANT CHANGE UP (ref 6–8.3)
PROT UR-MCNC: NEGATIVE MG/DL — SIGNIFICANT CHANGE UP
RBC # BLD: 4.17 M/UL — SIGNIFICANT CHANGE UP (ref 3.8–5.2)
RBC # FLD: 15.5 % — HIGH (ref 10.3–14.5)
SODIUM SERPL-SCNC: 141 MMOL/L — SIGNIFICANT CHANGE UP (ref 135–145)
SP GR SPEC: 1.01 — SIGNIFICANT CHANGE UP (ref 1–1.04)
SQUAMOUS # UR AUTO: SIGNIFICANT CHANGE UP
UROBILINOGEN FLD QL: 0.2 MG/DL — SIGNIFICANT CHANGE UP
WBC # BLD: 7.54 K/UL — SIGNIFICANT CHANGE UP (ref 3.8–10.5)
WBC # FLD AUTO: 7.54 K/UL — SIGNIFICANT CHANGE UP (ref 3.8–10.5)
WBC UR QL: SIGNIFICANT CHANGE UP (ref 0–?)

## 2018-05-21 PROCEDURE — 95819 EEG AWAKE AND ASLEEP: CPT | Mod: 26

## 2018-05-21 PROCEDURE — 99223 1ST HOSP IP/OBS HIGH 75: CPT | Mod: GC

## 2018-05-21 PROCEDURE — 93931 UPPER EXTREMITY STUDY: CPT | Mod: 26,RT

## 2018-05-21 RX ORDER — DEXAMETHASONE 0.5 MG/5ML
4 ELIXIR ORAL DAILY
Qty: 0 | Refills: 0 | Status: DISCONTINUED | OUTPATIENT
Start: 2018-05-22 | End: 2018-05-22

## 2018-05-21 RX ORDER — DEXAMETHASONE 0.5 MG/5ML
2 ELIXIR ORAL ONCE
Qty: 0 | Refills: 0 | Status: COMPLETED | OUTPATIENT
Start: 2018-05-21 | End: 2018-05-21

## 2018-05-21 RX ORDER — DEXAMETHASONE 0.5 MG/5ML
1 ELIXIR ORAL ONCE
Qty: 0 | Refills: 0 | Status: DISCONTINUED | OUTPATIENT
Start: 2018-05-21 | End: 2018-05-21

## 2018-05-21 RX ADMIN — HEPARIN SODIUM 5000 UNIT(S): 5000 INJECTION INTRAVENOUS; SUBCUTANEOUS at 14:33

## 2018-05-21 RX ADMIN — HEPARIN SODIUM 5000 UNIT(S): 5000 INJECTION INTRAVENOUS; SUBCUTANEOUS at 06:10

## 2018-05-21 RX ADMIN — PANTOPRAZOLE SODIUM 40 MILLIGRAM(S): 20 TABLET, DELAYED RELEASE ORAL at 06:09

## 2018-05-21 RX ADMIN — Medication 12.5 MILLIGRAM(S): at 19:15

## 2018-05-21 RX ADMIN — Medication 81 MILLIGRAM(S): at 13:07

## 2018-05-21 RX ADMIN — Medication 12.5 MILLIGRAM(S): at 06:09

## 2018-05-21 RX ADMIN — LISINOPRIL 10 MILLIGRAM(S): 2.5 TABLET ORAL at 06:09

## 2018-05-21 RX ADMIN — LEVETIRACETAM 2000 MILLIGRAM(S): 250 TABLET, FILM COATED ORAL at 06:45

## 2018-05-21 RX ADMIN — Medication 2 MILLIGRAM(S): at 06:45

## 2018-05-21 RX ADMIN — LEVETIRACETAM 2000 MILLIGRAM(S): 250 TABLET, FILM COATED ORAL at 20:14

## 2018-05-21 RX ADMIN — Medication 2 MILLIGRAM(S): at 14:32

## 2018-05-21 NOTE — PROGRESS NOTE ADULT - SUBJECTIVE AND OBJECTIVE BOX
Manuel Alcantara MD PGY1   Contact/Pager - [phone]      Patient is a 77y old  Female who presents with a chief complaint of Sent in by neurologist (20 May 2018 23:49)        SUBJECTIVE / OVERNIGHT EVENTS: No acute events ON. Pt w/ mild headache self resolved. No tremors or seizure like activity ON. Denies: CP, SOB, abd pain, fevers.      MEDICATIONS  (STANDING):  anastrozole 1 milliGRAM(s) Oral at bedtime  aspirin enteric coated 81 milliGRAM(s) Oral daily  dexamethasone     Tablet 2 milliGRAM(s) Oral daily  heparin  Injectable 5000 Unit(s) SubCutaneous every 8 hours  levETIRAcetam 2000 milliGRAM(s) Oral two times a day  lisinopril 10 milliGRAM(s) Oral daily  metoprolol tartrate 12.5 milliGRAM(s) Oral two times a day  pantoprazole    Tablet 40 milliGRAM(s) Oral before breakfast  simvastatin 10 milliGRAM(s) Oral at bedtime    MEDICATIONS  (PRN):      Allergies    No Known Allergies    Intolerances          Vital Signs Last 24 Hrs  T(C): 36.6 (21 May 2018 06:01), Max: 37.3 (20 May 2018 10:12)  T(F): 97.8 (21 May 2018 06:01), Max: 99.1 (20 May 2018 10:12)  HR: 88 (21 May 2018 06:01) (87 - 114)  BP: 136/75 (21 May 2018 06:01) (104/70 - 136/75)  BP(mean): --  RR: 18 (21 May 2018 06:01) (16 - 22)  SpO2: 100% (21 May 2018 06:01) (96% - 100%)  CAPILLARY BLOOD GLUCOSE      POCT Blood Glucose.: 85 mg/dL (21 May 2018 08:43)    I&O's Summary        PHYSICAL EXAM:  GENERAL: NAD, well-developed  HEAD:  AT, NC  EYES: EOMI, PERRLA, conjunctiva and sclera clear  ENMT: Airway patent. MMM. Good dentition, no lesions.  NECK: Supple, No JVD  CHEST/LUNG: CTABL; No wheezing, rhonci, or rales  HEART: RRR; Normal S1, S2. No murmurs, rubs, or gallops  ABDOMEN: Soft, NT, ND; Bowel sounds present. No organomegaly  EXTREMITIES:  2+ Peripheral Pulses, No clubbing, cyanosis, or edema  PSYCH: AAOx3  NEUROLOGY: non-focal  SKIN: Warm, dry, intact; No rashes or lesions      LABS:                        11.9   7.54  )-----------( 316      ( 21 May 2018 05:05 )             36.3     05-21    141  |  100  |  14  ----------------------------<  68<L>  3.9   |  26  |  0.38<L>    Ca    9.0      21 May 2018 05:05  Phos  3.3     05-21  Mg     2.1     05-21    TPro  6.8  /  Alb  3.6  /  TBili  0.4  /  DBili  x   /  AST  20  /  ALT  31  /  AlkPhos  54  05-21    LIVER FUNCTIONS - ( 21 May 2018 05:05 )  Alb: 3.6 g/dL / Pro: 6.8 g/dL / ALK PHOS: 54 u/L / ALT: 31 u/L / AST: 20 u/L / GGT: x                           RADIOLOGY & ADDITIONAL TESTS:    Imaging Personally Reviewed:     Consultant(s) Notes Reviewed:     Care Discussed with Consultants/Other Providers: Manuel Alcantara MD PGY1   Contact/Pager - [phone]      Patient is a 77y old  Female who presents with a chief complaint of Sent in by neurologist (20 May 2018 23:49)        SUBJECTIVE / OVERNIGHT EVENTS: No acute events ON. Pt w/ mild headache self resolved. No tremors or seizure like activity ON. Denies: CP, SOB, abd pain, fevers.      MEDICATIONS  (STANDING):  anastrozole 1 milliGRAM(s) Oral at bedtime  aspirin enteric coated 81 milliGRAM(s) Oral daily  dexamethasone     Tablet 2 milliGRAM(s) Oral daily  heparin  Injectable 5000 Unit(s) SubCutaneous every 8 hours  levETIRAcetam 2000 milliGRAM(s) Oral two times a day  lisinopril 10 milliGRAM(s) Oral daily  metoprolol tartrate 12.5 milliGRAM(s) Oral two times a day  pantoprazole    Tablet 40 milliGRAM(s) Oral before breakfast  simvastatin 10 milliGRAM(s) Oral at bedtime    MEDICATIONS  (PRN):      Allergies    No Known Allergies    Intolerances          Vital Signs Last 24 Hrs  T(C): 36.6 (21 May 2018 06:01), Max: 37.3 (20 May 2018 10:12)  T(F): 97.8 (21 May 2018 06:01), Max: 99.1 (20 May 2018 10:12)  HR: 88 (21 May 2018 06:01) (87 - 114)  BP: 136/75 (21 May 2018 06:01) (104/70 - 136/75)  BP(mean): --  RR: 18 (21 May 2018 06:01) (16 - 22)  SpO2: 100% (21 May 2018 06:01) (96% - 100%)  CAPILLARY BLOOD GLUCOSE      POCT Blood Glucose.: 85 mg/dL (21 May 2018 08:43)    I&O's Summary        PHYSICAL EXAM:  GENERAL: NAD, well-developed  HEAD:  AT, NC  EYES: EOMI, PERRLA, conjunctiva and sclera clear  ENMT: Airway patent. MMM. Good dentition, no lesions.  NECK: Supple, No JVD  CHEST/LUNG: CTABL; No wheezing, rhonci, or rales  HEART: RRR; Normal S1, S2. No murmurs, rubs, or gallops  ABDOMEN: Soft, NT, ND; Bowel sounds present. No organomegaly  EXTREMITIES:  2+ Peripheral Pulses, No clubbing, cyanosis, or edema  PSYCH: AAOx3  NEUROLOGY: Alert & Oriented X3, Motor Strength 5/5 right upper and lower extremities; 0/5 LUE and LLE, with flaccid tone on left  SKIN: Warm, dry, intact; No rashes or lesions      LABS:                        11.9   7.54  )-----------( 316      ( 21 May 2018 05:05 )             36.3     05-21    141  |  100  |  14  ----------------------------<  68<L>  3.9   |  26  |  0.38<L>    Ca    9.0      21 May 2018 05:05  Phos  3.3     05-21  Mg     2.1     05-21    TPro  6.8  /  Alb  3.6  /  TBili  0.4  /  DBili  x   /  AST  20  /  ALT  31  /  AlkPhos  54  05-21    LIVER FUNCTIONS - ( 21 May 2018 05:05 )  Alb: 3.6 g/dL / Pro: 6.8 g/dL / ALK PHOS: 54 u/L / ALT: 31 u/L / AST: 20 u/L / GGT: x                           RADIOLOGY & ADDITIONAL TESTS:    Imaging Personally Reviewed:     Consultant(s) Notes Reviewed:     Care Discussed with Consultants/Other Providers:

## 2018-05-21 NOTE — PROGRESS NOTE ADULT - PROBLEM SELECTOR PLAN 1
Pt presenting with increased duration of partial seizures involving left upper thigh, now resolved. Recent MRI on 5/2 and CT head today showing extensive vasogenic edema within right frontoparietal lobe, and abnormal lesions increased considerably in size.  Pt seen by neurology team, appreciate recommendations.   - Will increase daily keppra to 2000mg BID  - VEEG ordered  - Seizure precautions  - Will administer Ativan as needed for episodes lasting greater than 3 min   - Continue Decadron  - Will rule out infectious causes of increased seizure activity with UA and CXR  - Electrolytes WNL, replete PRN  - Seen by neurosurgery team, no additional recommendations/interventions at this time.   - continue outpatient treatment per Rad/Onc, Heme/Onc P/w increased duration of partial seizures involving left upper thigh, now resolved. Recent MRI on 5/2 and CT head today showing extensive vasogenic edema within right frontoparietal lobe, and abnormal lesions increased considerably in size.  Pt seen by neurology team, appreciate recommendations.   - c/w daily keppra to 2000mg BID  - VEEG ordered  - Seizure precautions  - Will administer Ativan as needed for episodes lasting greater than 3 min   - Increasing Decadron back to 4 mg  - Electrolytes WNL, replete PRN  - Seen by neurosurgery team, no additional recommendations/interventions at this time.   - continue outpatient treatment per Rad/Onc, Heme/Onc

## 2018-05-21 NOTE — PROGRESS NOTE ADULT - PROBLEM SELECTOR PLAN 6
- Patient had a R brachial arterial thrombus in January and was treated with subq lovenox as an outpatient, said that she was transitioned off of lovenox ~1 month ago, will check a [facility] to assess for resolution. - Patient had a R brachial arterial thrombus in January and was treated with subq lovenox as an outpatient, said that she was transitioned off of lovenox ~1 month ago  -will check a [facility] duplex to assess for resolution.

## 2018-05-21 NOTE — EEG REPORT - NS EEG TEXT BOX
[facility]  Report of Routine EEG with Video    [facility]: [address], [address] [place] [address], Phone: [phone]  [facility]: [address] [place] [address], [place] [address], Phone: [phone]  Office: 1 [facility], [facility] [address], [place] [address], Phone: [phone]    Patient Name: MAGALI HUGHES    Age: 77 y  : 1940   [facility]  Referring Physician: DILIP BLAKE    EEG #: [id number]  Study Date: 2018		    Technical Information:					  On Instrument: -  Placement and Labeling of Electrodes:  The EEG was performed utilizing 20 channels referential EEG connections (coronal over temporal over parasagittal montage) using all standard 10-20 electrode placements with EKG.  Recording was at a sampling rate of 256 samples per second per channel.  Time synchronized digital video recording was done simultaneously with EEG recording.  A low light infrared camera was used for low light recording.  Reg and seizure detection algorithms were utilized.    History:  THIS IS A ROUTINE EEG  76 YO FEMALE  962-A  CONVULSIONS  PT AGITATED  PT TENSED  PT CRYING  CONCERN FOR SEIZURE    -    Medication	  Keppra	    Study Interpretation:    FINDINGS:  The background was continuous, spontaneously variable and reactive. Asymmetric PDR. A PDR of 9 Hz seen over the left hemisphere and 7Hz over the right hemisphere    Background Slowing:  No generalized background slowing was present.    Focal Slowing:   Continuous polymorphic delta and theta activities over the right posterior quadrant     Sleep Background:  Stage II sleep transients were not recorded.    Other Non-Epileptiform Findings:  There was increased amplitude and accentuation of higher frequency over the right posterior quadrant (breach effect)  Frequency sharply contoured high amplitude waveforms in the region of the breach occurring at 0.5 -1Hz without clear epileptiform character.     Interictal Epileptiform Activity:   None were present.    Events:  Clinical events: None recorded.  Seizures: None recorded.    Activation Procedures:   Hyperventilation was not performed.    Photic stimulation was not performed.    Artifacts:  Intermittent myogenic and movement artifacts were noted.    ECG:  The heart rate on single channel ECG was predominantly between  BPM.    EEG Summary / Classification:  Abnormal EEG in the awake state  -Continuous polymorphic delta and theta activities over the right posterior quadrant   -Breach effect right posterior quadrant   -Asymmetric PDRs    EEG Impression / Clinical Correlate:  Abnormal EEG in the awake state  1. Structural abnormality in the right posterior quadrant   2. Skull defect in the right posterior quadrant consistent with pt history of craniectomy   3.There were no seizures recorded.        Marisela Lobato MD  ________________________________________  Neurophysiology/Epilepsy Fellow, [facility]  Report of Routine EEG with Video    [facility]: [address], [address] [place] [address], Phone: [phone]  [facility]: [address] [place] [address], [place] [address], Phone: [phone]  Office: 1 [facility] [address], [place] [address], Phone: [phone]    Patient Name: MAGALI HUGHES    Age: 77 y  : 1940   Location: [facility]  Referring Physician: DILIP BLAKE    EEG #: [id number]  Study Date: 2018		    Technical Information:					  Placement and Labeling of Electrodes:  The EEG was performed utilizing 20 channels referential EEG connections (coronal over temporal over parasagittal montage) using all standard 10-20 electrode placements with EKG.  Recording was at a sampling rate of 256 samples per second per channel.  Time synchronized digital video recording was done simultaneously with EEG recording.  A low light infrared camera was used for low light recording.  Reg and seizure detection algorithms were utilized.    History:  76 yo female with agitation, concern for seizures  - h/o right craniotomy    Medication	  [facility]	    Study Interpretation:    FINDINGS:  The background was continuous, spontaneously variable and reactive. Asymmetric PDR. A PDR of 9 Hz seen over the left hemisphere and 7Hz over the right hemisphere    Background Slowing:  No generalized background slowing was present.    Focal Slowing:   Continuous polymorphic delta and theta activities over the right posterior quadrant     Sleep Background:  Stage II sleep transients were not recorded.    Other Non-Epileptiform Findings:  There was increased amplitude and accentuation of higher frequency over the right posterior quadrant (breach effect)  Frequently sharply contoured high amplitude waveforms in the region of the breach occurring at times up to 0.5 -1Hz without clear epileptiform morphology. However further recording will help to determine if they should be considered as lateralized periodic discharges.     Interictal Epileptiform Activity:   None were present.    Events:  Clinical events: None recorded.  Seizures: None recorded.    Activation Procedures:   Hyperventilation was not performed.    Photic stimulation was not performed.    Artifacts:  Intermittent myogenic and movement artifacts were noted.    ECG:  The heart rate on single channel ECG was predominantly between  BPM.    EEG Summary / Classification:  Abnormal EEG in the awake state  -Continuous polymorphic delta and theta activities over the right posterior quadrant   -Breach rhythm right posterior quadrant     EEG Impression / Clinical Correlate:  Abnormal EEG in the awake state  1. Structural abnormality in the right posterior quadrant   2. Breach rhythm is consistent with pt history of craniectomy   3.There were no seizures recorded.      There were sharply contoured high amplitude waveforms in the region of the breach. Although currently without clear epileptiform morphology, however suspicious. Further recording will help to determine if they should be considered as lateralized periodic discharges.       Marisela Lobato MD  ________________________________________  Neurophysiology/Epilepsy Fellow, [facility] [place]

## 2018-05-22 ENCOUNTER — TRANSCRIPTION ENCOUNTER (OUTPATIENT)
Age: 78
End: 2018-05-22

## 2018-05-22 ENCOUNTER — CHART COPY (OUTPATIENT)
Age: 78
End: 2018-05-22

## 2018-05-22 VITALS
OXYGEN SATURATION: 96 % | HEART RATE: 89 BPM | SYSTOLIC BLOOD PRESSURE: 106 MMHG | RESPIRATION RATE: 18 BRPM | TEMPERATURE: 97 F | DIASTOLIC BLOOD PRESSURE: 75 MMHG

## 2018-05-22 LAB
BUN SERPL-MCNC: 10 MG/DL — SIGNIFICANT CHANGE UP (ref 7–23)
CALCIUM SERPL-MCNC: 9.5 MG/DL — SIGNIFICANT CHANGE UP (ref 8.4–10.5)
CHLORIDE SERPL-SCNC: 93 MMOL/L — LOW (ref 98–107)
CO2 SERPL-SCNC: 26 MMOL/L — SIGNIFICANT CHANGE UP (ref 22–31)
CREAT SERPL-MCNC: 0.4 MG/DL — LOW (ref 0.5–1.3)
GLUCOSE SERPL-MCNC: 92 MG/DL — SIGNIFICANT CHANGE UP (ref 70–99)
HCT VFR BLD CALC: 36.2 % — SIGNIFICANT CHANGE UP (ref 34.5–45)
HGB BLD-MCNC: 12.4 G/DL — SIGNIFICANT CHANGE UP (ref 11.5–15.5)
MAGNESIUM SERPL-MCNC: 2.3 MG/DL — SIGNIFICANT CHANGE UP (ref 1.6–2.6)
MCHC RBC-ENTMCNC: 29 PG — SIGNIFICANT CHANGE UP (ref 27–34)
MCHC RBC-ENTMCNC: 34.3 % — SIGNIFICANT CHANGE UP (ref 32–36)
MCV RBC AUTO: 84.8 FL — SIGNIFICANT CHANGE UP (ref 80–100)
NRBC # FLD: 0 — SIGNIFICANT CHANGE UP
PHOSPHATE SERPL-MCNC: 3 MG/DL — SIGNIFICANT CHANGE UP (ref 2.5–4.5)
PLATELET # BLD AUTO: 378 K/UL — SIGNIFICANT CHANGE UP (ref 150–400)
PMV BLD: 8.7 FL — SIGNIFICANT CHANGE UP (ref 7–13)
POTASSIUM SERPL-MCNC: 3.5 MMOL/L — SIGNIFICANT CHANGE UP (ref 3.5–5.3)
POTASSIUM SERPL-SCNC: 3.5 MMOL/L — SIGNIFICANT CHANGE UP (ref 3.5–5.3)
RBC # BLD: 4.27 M/UL — SIGNIFICANT CHANGE UP (ref 3.8–5.2)
RBC # FLD: 15.2 % — HIGH (ref 10.3–14.5)
SODIUM SERPL-SCNC: 136 MMOL/L — SIGNIFICANT CHANGE UP (ref 135–145)
WBC # BLD: 8.8 K/UL — SIGNIFICANT CHANGE UP (ref 3.8–10.5)
WBC # FLD AUTO: 8.8 K/UL — SIGNIFICANT CHANGE UP (ref 3.8–10.5)

## 2018-05-22 PROCEDURE — 71045 X-RAY EXAM CHEST 1 VIEW: CPT | Mod: 26

## 2018-05-22 PROCEDURE — 95951: CPT | Mod: 26

## 2018-05-22 PROCEDURE — 99239 HOSP IP/OBS DSCHRG MGMT >30: CPT

## 2018-05-22 RX ORDER — LEVETIRACETAM 250 MG/1
1750 TABLET, FILM COATED ORAL ONCE
Qty: 0 | Refills: 0 | Status: COMPLETED | OUTPATIENT
Start: 2018-05-22 | End: 2018-05-22

## 2018-05-22 RX ORDER — LEVETIRACETAM 250 MG/1
7 TABLET, FILM COATED ORAL
Qty: 420 | Refills: 0 | OUTPATIENT
Start: 2018-05-22 | End: 2018-06-20

## 2018-05-22 RX ORDER — LEVETIRACETAM 250 MG/1
7 TABLET, FILM COATED ORAL
Qty: 0 | Refills: 0 | COMMUNITY

## 2018-05-22 RX ORDER — LEVETIRACETAM 250 MG/1
1750 TABLET, FILM COATED ORAL
Qty: 0 | Refills: 0 | Status: DISCONTINUED | OUTPATIENT
Start: 2018-05-23 | End: 2018-05-22

## 2018-05-22 RX ORDER — ALPRAZOLAM 0.25 MG
0.25 TABLET ORAL ONCE
Qty: 0 | Refills: 0 | Status: DISCONTINUED | OUTPATIENT
Start: 2018-05-22 | End: 2018-05-22

## 2018-05-22 RX ORDER — DEXAMETHASONE 0.5 MG/5ML
4 ELIXIR ORAL
Qty: 0 | Refills: 0 | Status: DISCONTINUED | OUTPATIENT
Start: 2018-05-23 | End: 2018-05-22

## 2018-05-22 RX ORDER — ALPRAZOLAM 0.25 MG
0.25 TABLET ORAL
Qty: 0 | Refills: 0 | Status: DISCONTINUED | OUTPATIENT
Start: 2018-05-22 | End: 2018-05-22

## 2018-05-22 RX ORDER — DEXAMETHASONE 0.5 MG/5ML
4 ELIXIR ORAL ONCE
Qty: 0 | Refills: 0 | Status: COMPLETED | OUTPATIENT
Start: 2018-05-22 | End: 2018-05-22

## 2018-05-22 RX ORDER — DEXAMETHASONE 0.5 MG/5ML
1 ELIXIR ORAL
Qty: 60 | Refills: 0 | OUTPATIENT
Start: 2018-05-22 | End: 2018-06-20

## 2018-05-22 RX ORDER — ALPRAZOLAM 0.25 MG
0.25 TABLET ORAL AT BEDTIME
Qty: 0 | Refills: 0 | Status: DISCONTINUED | OUTPATIENT
Start: 2018-05-22 | End: 2018-05-22

## 2018-05-22 RX ADMIN — Medication 81 MILLIGRAM(S): at 12:42

## 2018-05-22 RX ADMIN — ANASTROZOLE 1 MILLIGRAM(S): 1 TABLET ORAL at 00:04

## 2018-05-22 RX ADMIN — LISINOPRIL 10 MILLIGRAM(S): 2.5 TABLET ORAL at 05:01

## 2018-05-22 RX ADMIN — Medication 0.25 MILLIGRAM(S): at 09:29

## 2018-05-22 RX ADMIN — Medication 12.5 MILLIGRAM(S): at 05:00

## 2018-05-22 RX ADMIN — SIMVASTATIN 10 MILLIGRAM(S): 20 TABLET, FILM COATED ORAL at 00:04

## 2018-05-22 RX ADMIN — Medication 4 MILLIGRAM(S): at 05:01

## 2018-05-22 RX ADMIN — LEVETIRACETAM 1750 MILLIGRAM(S): 250 TABLET, FILM COATED ORAL at 14:50

## 2018-05-22 RX ADMIN — HEPARIN SODIUM 5000 UNIT(S): 5000 INJECTION INTRAVENOUS; SUBCUTANEOUS at 00:04

## 2018-05-22 RX ADMIN — PANTOPRAZOLE SODIUM 40 MILLIGRAM(S): 20 TABLET, DELAYED RELEASE ORAL at 05:02

## 2018-05-22 RX ADMIN — HEPARIN SODIUM 5000 UNIT(S): 5000 INJECTION INTRAVENOUS; SUBCUTANEOUS at 05:00

## 2018-05-22 RX ADMIN — Medication 0.25 MILLIGRAM(S): at 03:21

## 2018-05-22 RX ADMIN — HEPARIN SODIUM 5000 UNIT(S): 5000 INJECTION INTRAVENOUS; SUBCUTANEOUS at 14:05

## 2018-05-22 RX ADMIN — LEVETIRACETAM 2000 MILLIGRAM(S): 250 TABLET, FILM COATED ORAL at 05:00

## 2018-05-22 RX ADMIN — Medication 4 MILLIGRAM(S): at 14:49

## 2018-05-22 NOTE — PROGRESS NOTE ADULT - PROBLEM SELECTOR PLAN 1
resolved. Left body twitching likely provoked, possibly in setting of increased vasogenic edema evident on CTH, after home steroid dose decreased from 4mg to 2mg. Agitation may be in the setting of increased Keppra dose and acute increase in steroid dose.   Plan:   -taper keppra down: 2000/1750mg (5/22/18) then 1750mg BID there after   -increase steroid to 4mg BID (as per Dr. La, patient's neuro-oncologist)   -4mg BID dose of steroids should given in the morning and late afternoon (avoid giving in evening).   -PPI for GI prophylaxis  -glucose fingersticks   -neuro checks for acute agitation   -seizure precautions   -once stable, patient can be discharged, then to f/u w/ Dr. La within the week, family is aware.

## 2018-05-22 NOTE — DISCHARGE NOTE ADULT - PLAN OF CARE
Improved You were admitted were increased partial seizures and had your Keppra dose and steroid dose initially increased. As speaking to Dr. La and the neurology team, it was decided to decrease the Keppra dose back to its previous strength, and increasing the steroid dosing. Please follow up Dr. La after discharge Stable Please continue to follow up with Dr. Coleen Canales after discharge for further management of the glioblastoma.

## 2018-05-22 NOTE — PHYSICAL THERAPY INITIAL EVALUATION ADULT - IMPAIRMENTS CONTRIBUTING TO GAIT DEVIATIONS, PT EVAL
impaired motor control/abnormal muscle tone/impaired postural control/decreased strength/impaired balance

## 2018-05-22 NOTE — PHYSICAL THERAPY INITIAL EVALUATION ADULT - MANUAL MUSCLE TESTING RESULTS, REHAB EVAL
Right UE/LE at least 3/5 t/o;  Left elbow at least 2-/5 Left sh 1/5, Left wrist/hand 0/5 ; Left hip ~ 1+ to 2-/5 Left distal LE 0/5

## 2018-05-22 NOTE — DISCHARGE NOTE ADULT - CONDITIONS AT DISCHARGE
She is stable for discharge Home , has not had further Seizures since hospitalized, estelita signs are stable and Discharge Instructions are discussed with her mom and her and Provided.

## 2018-05-22 NOTE — EEG REPORT - NS EEG TEXT BOX
[facility]  Report of Continuous EEG with Video    [facility]: [address], [address] [place] [address], Phone: [phone]  [facility]: [address] [place] [address], [place] [address], Phone: [phone]  Office: [address] [place] [address], [facility] [address], [place] [address], Phone: [phone]    Patient Name: AMGALI HUGHES    Age: 77 y  : 1940   Location: [facility]  Referring Physician: DILIP BLAKE    EEG #: [id number]  Study Date: 2018	- 18	    History:  76 yo female with h/o focal seizure  - h/o right craniotomy    Medication	  Keppra	    Study Interpretation:    FINDINGS:  The background was continuous, spontaneously variable and reactive. Asymmetric PDR. A PDR of 9 Hz seen over the left hemisphere and 7Hz over the right hemisphere    Background Slowing:  No generalized background slowing was present.    Focal Slowing:   Continuous polymorphic delta and theta activities over the right posterior quadrant     Sleep Background:  Stage II sleep transients were not recorded.    Other Non-Epileptiform Findings:  There was increased amplitude and accentuation of higher frequency over the right posterior quadrant (breach effect)    Interictal Epileptiform Activity:   Frequent sharply contoured high amplitude lateralized periodic discharges in the region of the breach occurring at times up to 0.5 -1Hz, but not evolving into seizures. \    Events:  Clinical events: None recorded.  Seizures: None recorded.    Activation Procedures:   Hyperventilation was not performed.    Photic stimulation was not performed.    Artifacts:  Intermittent myogenic and movement artifacts were noted.  EEG was disconnected after 2hs 50min    ECG:  The heart rate on single channel ECG was predominantly between  BPM.    EEG Summary / Classification:  Brief study; EEG was disconnected after 2hs 50min  Abnormal EEG in the awake and drowsy states due to:  1. Continuous polymorphic delta and theta activities over the right posterior quadrant   2. Frequent lateralized periodic discharges in the same right posterior region  3. Breach rhythm right posterior quadrant     EEG Impression / Clinical Correlate:  Abnormal EEG in the awake state  1. Structural abnormality in the right posterior quadrant   2. Lateralized periodic discharges reflect cerebral hyperexcitably with an increased risk for focal seizures from the right posterior quadrant.    3. Breach rhythm is consistent with pt history of craniectomy     There were no seizures recorded.      Rk Jackson MD PhD  _______________________________________  Epilepsy Attending, [facility] [place]

## 2018-05-22 NOTE — PROGRESS NOTE ADULT - PROBLEM SELECTOR PLAN 1
P/w increased duration of partial seizures involving left upper thigh, now resolved. Recent MRI on 5/2 and CT head today showing extensive vasogenic edema within right frontoparietal lobe, and abnormal lesions increased considerably in size.  Pt seen by neurology team, appreciate recommendations.   - c/w daily keppra to 2000mg BID  - VEEG ordered, no seizures seen  - Seizure precautions  - Will administer Ativan as needed for episodes lasting greater than 3 min   - Increasing Decadron back to 4 mg  - Electrolytes WNL, replete PRN  - Seen by neurosurgery team, no additional recommendations/interventions at this time.   - continue outpatient treatment per Rad/Onc, Heme/Onc P/w increased duration of partial seizures involving left upper thigh, now resolved. Recent MRI on 5/2 and CT head today showing extensive vasogenic edema within right frontoparietal lobe, and abnormal lesions increased considerably in size.  Pt seen by neurology team, appreciate recommendations.   - c/w daily keppra to 2000mg BID  - VEEG ordered, no seizures seen.  - Seizure precautions  - Will administer Ativan as needed for episodes lasting greater than 3 min   - Given agitation and concern for steroid psychosis, will d/w neuro whether or not to increase steroids further. As per Dr. Faustin, would increase dexa to 4mg BID given increase in vasogenic edema in recent CT.  - Electrolytes WNL, replete PRN  - Seen by neurosurgery team, no additional recommendations/interventions at this time.   - continue outpatient treatment per Rad/Onc, Heme/Onc

## 2018-05-22 NOTE — PROGRESS NOTE ADULT - PROBLEM SELECTOR PLAN 6
- Patient had a R brachial arterial thrombus in January and was treated with subq lovenox as an outpatient, said that she was transitioned off of lovenox ~1 month ago  -will check a [facility] duplex to assess for resolution.

## 2018-05-22 NOTE — DISCHARGE NOTE ADULT - MEDICATION SUMMARY - MEDICATIONS TO CHANGE
I will SWITCH the dose or number of times a day I take the medications listed below when I get home from the hospital:    dexamethasone 2 mg oral tablet  -- 1 tab(s) by mouth once a day (in the morning)

## 2018-05-22 NOTE — DISCHARGE NOTE ADULT - ADDITIONAL INSTRUCTIONS
Continue taking the same dose of Keppra 1750 twice a day  Please take dexamethasone 4 mg twice a day, once in the morning and once in the early afternoon.  Please follow up with Dr. La within a week of discharge

## 2018-05-22 NOTE — DISCHARGE NOTE ADULT - PATIENT PORTAL LINK FT
You can access the [facility] Patient Portal, offered by [facility], by registering with the following website: [email or web address]

## 2018-05-22 NOTE — DISCHARGE NOTE ADULT - HOSPITAL COURSE
HPI:  76 y/o female PMH of HTN, HLD, breast ca diagnosed in 11/2017 s/p L radical mastectomy on anastrazole, R arterial thrombus s/p lovenox therapy, recent diagnosis of WHO Grade IV GBM (multiple enhancing lesions in the posterior right frontal/parietal lobes) s/p Right parietal craniotomy receiving radiation (last week on Tuesday, Wednesday, Thursday, Friday) with concurrent and adjuvant chemo (temozolamide), last chemo 2 days ago with Abraxane, sent in from Dr. La's office (neurologist) for partial seizures with left thigh twitching. Patient states that she usually gets tremors in her left thigh and sometimes left hand about every 3-4 days. Each episode usually only lasts a few minutes. Today, the episode started at 8 AM and ended at 8:30 AM which is longer than usual. Patient has been asymptomatic since her arrival in the hospital. No headaches, dizziness, fevers, chills, cough, cardiovascular symptoms, abdominal pain, dysuria or diarrhea. She has left upper and lower extremity weakness from her mastectomy and GBM. She has a very good appetite, no nausea or vomiting.     In the ER, patient's vitals were temp 98.4, HR , /72, RR 16-22, satting 96 on room air  In the Er, patient received Xanax, Keppra 2 grams x1, Keppra 250mg x1, Ativan 1mg x1     Hospital Course:   Pt was admitted to the floors in stable condition. Neurosurgery was c/s and based on CTH, recommended no acute surgical management. Neurology was c/s, recommended r/o infectious etiology, increasing Keppra to 2000 mg. EEG HPI:  78 y/o female PMH of HTN, HLD, breast ca diagnosed in 11/2017 s/p L radical mastectomy on anastrazole, R arterial thrombus s/p lovenox therapy, recent diagnosis of WHO Grade IV GBM (multiple enhancing lesions in the posterior right frontal/parietal lobes) s/p Right parietal craniotomy receiving radiation (last week on Tuesday, Wednesday, Thursday, Friday) with concurrent and adjuvant chemo (temozolamide), last chemo 2 days ago with Abraxane, sent in from Dr. La's office (neurologist) for partial seizures with left thigh twitching. Patient states that she usually gets tremors in her left thigh and sometimes left hand about every 3-4 days. Each episode usually only lasts a few minutes. Today, the episode started at 8 AM and ended at 8:30 AM which is longer than usual. Patient has been asymptomatic since her arrival in the hospital. No headaches, dizziness, fevers, chills, cough, cardiovascular symptoms, abdominal pain, dysuria or diarrhea. She has left upper and lower extremity weakness from her mastectomy and GBM. She has a very good appetite, no nausea or vomiting.     In the ER, patient's vitals were temp 98.4, HR , /72, RR 16-22, satting 96 on room air  In the Er, patient received Xanax, Keppra 2 grams x1, Keppra 250mg x1, Ativan 1mg x1     Hospital Course:   Pt was admitted to the floors in stable condition. Neurosurgery was c/s and based on CTH, recommended no acute surgical management. Neurology was c/s, recommended r/o infectious etiology, increasing Keppra to 2000 mg. EEG was negative for seizure activity, although pt was agitated and EEG leads were removed. Pt was agitated and it was thought that the increase in Keppra dosing may have caused that; after discu HPI:  78 y/o female PMH of HTN, HLD, breast ca diagnosed in 11/2017 s/p L radical mastectomy on anastrazole, R arterial thrombus s/p lovenox therapy, recent diagnosis of WHO Grade IV GBM (multiple enhancing lesions in the posterior right frontal/parietal lobes) s/p Right parietal craniotomy receiving radiation (last week on Tuesday, Wednesday, Thursday, Friday) with concurrent and adjuvant chemo (temozolamide), last chemo 2 days ago with Abraxane, sent in from Dr. La's office (neurologist) for partial seizures with left thigh twitching. Patient states that she usually gets tremors in her left thigh and sometimes left hand about every 3-4 days. Each episode usually only lasts a few minutes. Today, the episode started at 8 AM and ended at 8:30 AM which is longer than usual. Patient has been asymptomatic since her arrival in the hospital. No headaches, dizziness, fevers, chills, cough, cardiovascular symptoms, abdominal pain, dysuria or diarrhea. She has left upper and lower extremity weakness from her mastectomy and GBM. She has a very good appetite, no nausea or vomiting.     In the ER, patient's vitals were temp 98.4, HR , /72, RR 16-22, satting 96 on room air  In the Er, patient received Xanax, Keppra 2 grams x1, Keppra 250mg x1, Ativan 1mg x1     Hospital Course:   Pt was admitted to the floors in stable condition. Neurosurgery was c/s and based on CTH, recommended no acute surgical management. Neurology was c/s, recommended r/o infectious etiology, increasing Keppra to 2000 mg. EEG was negative for seizure activity, although pt was agitated and EEG leads were removed. Pt was agitated and it was thought that the increase in Keppra dosing may have caused that; after discussion with Dr. La and our neurology team, decision was made to decrease Keppra to the home dose of 1750 mg BID, and increase dexamethasone to 4mg BID as increased vasogenic edema was seen on CT. PT saw the patient and rec PT HPI:  78 y/o female PMH of HTN, HLD, breast ca diagnosed in 11/2017 s/p L radical mastectomy on anastrazole, R arterial thrombus s/p lovenox therapy, recent diagnosis of WHO Grade IV GBM (multiple enhancing lesions in the posterior right frontal/parietal lobes) s/p Right parietal craniotomy receiving radiation (last week on Tuesday, Wednesday, Thursday, Friday) with concurrent and adjuvant chemo (temozolamide), last chemo 2 days ago with Abraxane, sent in from Dr. La's office (neurologist) for partial seizures with left thigh twitching. Patient states that she usually gets tremors in her left thigh and sometimes left hand about every 3-4 days. Each episode usually only lasts a few minutes. Today, the episode started at 8 AM and ended at 8:30 AM which is longer than usual. Patient has been asymptomatic since her arrival in the hospital. No headaches, dizziness, fevers, chills, cough, cardiovascular symptoms, abdominal pain, dysuria or diarrhea. She has left upper and lower extremity weakness from her mastectomy and GBM. She has a very good appetite, no nausea or vomiting.     In the ER, patient's vitals were temp 98.4, HR , /72, RR 16-22, satting 96 on room air  In the Er, patient received Xanax, Keppra 2 grams x1, Keppra 250mg x1, Ativan 1mg x1     Hospital Course:   Pt was admitted to the floors in stable condition. Neurosurgery was c/s and based on CTH, recommended no acute surgical management. Neurology was c/s, recommended r/o infectious etiology, increasing Keppra to 2000 mg. EEG was negative for seizure activity, although pt was agitated and EEG leads were removed. Pt was agitated and it was thought that the increase in Keppra dosing may have caused that; after discussion with Dr. La and our neurology team, decision was made to decrease Keppra to the home dose of 1750 mg BID, and increase dexamethasone to 4mg BID as increased vasogenic edema was seen on CT. PT saw the patient and rec PT rehab, but pt's daughter wanted home w/ home PT which was set up. Pt stable and ready for d/c, she will f/u with Dr. La after discharge.

## 2018-05-22 NOTE — DISCHARGE NOTE ADULT - CARE PLAN
Principal Discharge DX:	Partial seizure  Goal:	Improved  Assessment and plan of treatment:	You were admitted were increased partial seizures and had your Keppra dose and steroid dose initially increased. As speaking to Dr. La and the neurology team, it was decided to decrease the Keppra dose back to its previous strength, and increasing the steroid dosing. Please follow up Dr. La after discharge  Secondary Diagnosis:	Glioblastoma  Goal:	Stable  Assessment and plan of treatment:	Please continue to follow up with Dr. Coleen Canales after discharge for further management of the glioblastoma.

## 2018-05-22 NOTE — DISCHARGE NOTE ADULT - MEDICATION SUMMARY - MEDICATIONS TO TAKE
I will START or STAY ON the medications listed below when I get home from the hospital:    dexamethasone 4 mg oral tablet  -- 1 tab(s) by mouth 2 times a day  -- Indication: For Brain edema     aspirin 81 mg oral delayed release tablet  -- 1 tab(s) by mouth once a day  -- Indication: For CV risk    lisinopril 10 mg oral tablet  -- 1 tab(s) by mouth once a day  -- Indication: For Hypertension    Keppra 250 mg oral tablet  -- 7 tab(s) by mouth 2 times a day   -- Indication: For Seizure    simvastatin 10 mg oral tablet  -- 1 tab(s) by mouth once a day (at bedtime)  -- Indication: For HLD    anastrozole 1 mg oral tablet  -- 1 tab(s) by mouth once a day (at bedtime)  -- Indication: For Breast cancer    ALPRAZolam 0.25 mg oral tablet  -- 1 tab(s) by mouth 2 times a day, As Needed anxiety  -- Indication: For Anxiety    metoprolol tartrate 25 mg oral tablet  -- 0.5 tab(s) (12.5mg) by mouth 2 times a day  -- Indication: For HTN    pantoprazole 40 mg oral delayed release tablet  -- 1 tab(s) by mouth once a day  -- Indication: For GI ppx while on steroids

## 2018-05-22 NOTE — PROGRESS NOTE ADULT - PROBLEM SELECTOR PLAN 3
S/p mastectomy, no acute issues  - Continue daily anastrazole  Continue followup with outpatient heme/onc
S/p mastectomy, no acute issues  - Continue daily anastrazole  Continue followup with outpatient heme/onc

## 2018-05-22 NOTE — PROGRESS NOTE ADULT - ASSESSMENT
78 y/o female PMH of HTN, HLD, breast ca diagnosed in 11/2017 s/p L radical mastectomy with left arm weakness, recent diagnosis of WHO Grade IV GBM (multiple enhancing lesions in the posterior right frontal/parietal lobes) s/p Right parietal craniotomy receiving radiation (last week on Tuesday, Wednesday, Thursday, Friday) with concurrent and adjuvant chemo (temozolamide), last chemo 2 days ago with Abraxane, sent in from Dr. La's office (neurologist) for increased duration of partial seizures admitted for further workup.
76 y/o female PMH of HTN, HLD, breast ca diagnosed in 11/2017 s/p L radical mastectomy with left arm weakness, recent diagnosis of WHO Grade IV GBM (multiple enhancing lesions in the posterior right frontal/parietal lobes) s/p Right parietal craniotomy receiving radiation (last week on Tuesday, Wednesday, Thursday, Friday) with concurrent and adjuvant chemo (temozolamide), last chemo 2 days ago with Abraxane, sent in from Dr. La's office (neurologist) for increased duration of partial seizures admitted for further workup.
76 yo woman who presents to [facility] on referral by patient's private neurologist, Dr. La, w/ persistent left hand twitching since the morning. Patient has a known history of seizures s/p GBM resection w/ similar symptoms, however usually resolve after a few minutes. Patient has been undergoing radiation treatment, last treatment being earlier this week. Patient's last MRI was 3 weeks ago as per patient. Patient takes 1750mg BID Keppra daily, as well as 2mg dexamethasone, decreased from 4mg just 4 days ago. ROS otherwise unremarkable for fevers, chills, chest pain, SOB, cough, n/v/d, abdominal pain, numbness, tingling, weakness, urinary symptoms.

## 2018-05-22 NOTE — PROGRESS NOTE ADULT - ATTENDING COMMENTS
Pt without complaints, appears at baseline clinically however had episodes of agitation overnight and in am c/f delirium. Pt without localizing symptoms, wbc wnl, cxr clear, afeb, insistent on d/c as is daughter.  Delirium likely multifactorial 2/2 increasing keppra/steroids/hospital induced. Pt has tolerated much higher doses of dexamethasone in the past and current symptoms have started after downtitration. Per d/w neuro, will increase dexamethasone to 4mg bid while downtitrating keppra and discharge pt from hospital so she can be in a more familiar space. Daughter aware of risks or continued delirium and said pt will be closely observed by family members.  - discharge time 31min
Pt without complaints, recently downtitrated steroids and fs low this am  - will increase decadron back to 4mg daily  - fu vEEG results  - fu neuro re dispo

## 2018-05-22 NOTE — PROGRESS NOTE ADULT - PROBLEM SELECTOR PLAN 2
S/p right parietal craniotomy, complicated by partial seizures in left hand and thigh.   Continue with seizure management as outlined above  Continue outpatient treatment with Heme/onc, Rad/onc, neurology
S/p right parietal craniotomy, complicated by partial seizures in left hand and thigh.   Continue with seizure management as outlined above  Continue outpatient treatment with Heme/onc, Rad/onc, neurology

## 2018-05-22 NOTE — PHYSICAL THERAPY INITIAL EVALUATION ADULT - PERTINENT HX OF CURRENT PROBLEM, REHAB EVAL
77 y.o. female with h/o GBM s/p Right parietal craniotomy sent in from neurologist office for increased duration of partial seizures, admitted for further workup.

## 2018-05-22 NOTE — DISCHARGE NOTE ADULT - CARE PROVIDER_API CALL
Coleen Canales), Medical Oncology  [address] [place] [address]  Phone: [phone]  Fax: [phone]    Margarita La), Neurology  [facility]  [address] [place] [address]  Phone: [phone]  Fax: [phone]

## 2018-05-22 NOTE — PROGRESS NOTE ADULT - SUBJECTIVE AND OBJECTIVE BOX
Manuel Alcantara MD PGY1   Contact/Pager - [phone]      Patient is a 77y old  Female who presents with a chief complaint of Sent in by neurologist (20 May 2018 23:49)        SUBJECTIVE / OVERNIGHT EVENTS: Pt agitated, pulled off EEG leads ON. Pt seen and examined today at bedside, denied any seizure like activity or tremors.        MEDICATIONS  (STANDING):  ALPRAZolam 0.25 milliGRAM(s) Oral <User Schedule>  anastrozole 1 milliGRAM(s) Oral at bedtime  aspirin enteric coated 81 milliGRAM(s) Oral daily  dexamethasone     Tablet 4 milliGRAM(s) Oral daily  heparin  Injectable 5000 Unit(s) SubCutaneous every 8 hours  levETIRAcetam 2000 milliGRAM(s) Oral two times a day  lisinopril 10 milliGRAM(s) Oral daily  metoprolol tartrate 12.5 milliGRAM(s) Oral two times a day  pantoprazole    Tablet 40 milliGRAM(s) Oral before breakfast  simvastatin 10 milliGRAM(s) Oral at bedtime    MEDICATIONS  (PRN):      Allergies    No Known Allergies    Intolerances          Vital Signs Last 24 Hrs  T(C): 36.7 (22 May 2018 04:56), Max: 36.8 (21 May 2018 21:09)  T(F): 98 (22 May 2018 04:56), Max: 98.3 (21 May 2018 21:09)  HR: 91 (22 May 2018 04:56) (91 - 105)  BP: 127/72 (22 May 2018 04:56) (120/76 - 131/89)  BP(mean): --  RR: 18 (22 May 2018 04:56) (18 - 18)  SpO2: 98% (22 May 2018 04:56) (97% - 100%)  CAPILLARY BLOOD GLUCOSE        I&O's Summary        PHYSICAL EXAM:  GENERAL: NAD, well-developed  HEAD:  AT, NC  EYES: EOMI, PERRLA, conjunctiva and sclera clear  ENMT: Airway patent. MMM. Good dentition, no lesions.  NECK: Supple, No JVD  CHEST/LUNG: CTABL; No wheezing, rhonci, or rales  HEART: RRR; Normal S1, S2. No murmurs, rubs, or gallops  ABDOMEN: Soft, NT, ND; Bowel sounds present. No organomegaly  EXTREMITIES:  2+ Peripheral Pulses, No clubbing, cyanosis, or edema  PSYCH: AAOx3  NEUROLOGY: Alert & Oriented X3, Motor Strength 5/5 right upper and lower extremities; 0/5 LUE and LLE, with flaccid tone on left  SKIN: Warm, dry, intact; No rashes or lesions      LABS:                        11.9   7.54  )-----------( 316      ( 21 May 2018 05:05 )             36.3     -    136  |  93<L>  |  10  ----------------------------<  92  3.5   |  26  |  0.40<L>    Ca    9.5      22 May 2018 07:20  Phos  3.0       Mg     2.3         TPro  6.8  /  Alb  3.6  /  TBili  0.4  /  DBili  x   /  AST  20  /  ALT  31  /  AlkPhos  54  05-    LIVER FUNCTIONS - ( 21 May 2018 05:05 )  Alb: 3.6 g/dL / Pro: 6.8 g/dL / ALK PHOS: 54 u/L / ALT: 31 u/L / AST: 20 u/L / GGT: x                 Urinalysis Basic - ( 21 May 2018 10:24 )    Color: YELLOW / Appearance: CLEAR / S.015 / pH: 7.0  Gluc: NEGATIVE / Ketone: NEGATIVE  / Bili: NEGATIVE / Urobili: 0.2 mg/dL   Blood: TRACE / Protein: NEGATIVE mg/dL / Nitrite: POSITIVE   Leuk Esterase: NEGATIVE / RBC: x / WBC 0-2   Sq Epi: FEW / Non Sq Epi: x / Bacteria: MOD              RADIOLOGY & ADDITIONAL TESTS:    Imaging Personally Reviewed:     Consultant(s) Notes Reviewed:     Care Discussed with Consultants/Other Providers: Manuel Alcantara MD PGY1   Contact/Pager - [phone]      Patient is a 77y old  Female who presents with a chief complaint of Sent in by neurologist (20 May 2018 23:49)        SUBJECTIVE / OVERNIGHT EVENTS: Pt agitated, pulled off EEG leads ON. Pt seen and examined today at bedside, denied any seizure like activity or tremors.       MEDICATIONS  (STANDING):  ALPRAZolam 0.25 milliGRAM(s) Oral <User Schedule>  anastrozole 1 milliGRAM(s) Oral at bedtime  aspirin enteric coated 81 milliGRAM(s) Oral daily  dexamethasone     Tablet 4 milliGRAM(s) Oral daily  heparin  Injectable 5000 Unit(s) SubCutaneous every 8 hours  levETIRAcetam 2000 milliGRAM(s) Oral two times a day  lisinopril 10 milliGRAM(s) Oral daily  metoprolol tartrate 12.5 milliGRAM(s) Oral two times a day  pantoprazole    Tablet 40 milliGRAM(s) Oral before breakfast  simvastatin 10 milliGRAM(s) Oral at bedtime    MEDICATIONS  (PRN):      Allergies    No Known Allergies    Intolerances          Vital Signs Last 24 Hrs  T(C): 36.7 (22 May 2018 04:56), Max: 36.8 (21 May 2018 21:09)  T(F): 98 (22 May 2018 04:56), Max: 98.3 (21 May 2018 21:09)  HR: 91 (22 May 2018 04:56) (91 - 105)  BP: 127/72 (22 May 2018 04:56) (120/76 - 131/89)  BP(mean): --  RR: 18 (22 May 2018 04:56) (18 - 18)  SpO2: 98% (22 May 2018 04:56) (97% - 100%)  CAPILLARY BLOOD GLUCOSE        I&O's Summary        PHYSICAL EXAM:  GENERAL: NAD, well-developed  HEAD:  AT, NC  EYES: EOMI, PERRLA, conjunctiva and sclera clear  ENMT: Airway patent. MMM. Good dentition, no lesions.  NECK: Supple, No JVD  CHEST/LUNG: CTABL; No wheezing, rhonci, or rales  HEART: RRR; Normal S1, S2. No murmurs, rubs, or gallops  ABDOMEN: Soft, NT, ND; Bowel sounds present. No organomegaly  EXTREMITIES:  2+ Peripheral Pulses, No clubbing, cyanosis, or edema  PSYCH: AAOx3  NEUROLOGY: Alert & Oriented X3, Motor Strength 5/5 right upper and lower extremities; 0/5 LUE and LLE, with flaccid tone on left  SKIN: Warm, dry, intact; No rashes or lesions      LABS:                        11.9   7.54  )-----------( 316      ( 21 May 2018 05:05 )             36.3     -    136  |  93<L>  |  10  ----------------------------<  92  3.5   |  26  |  0.40<L>    Ca    9.5      22 May 2018 07:20  Phos  3.0       Mg     2.3         TPro  6.8  /  Alb  3.6  /  TBili  0.4  /  DBili  x   /  AST  20  /  ALT  31  /  AlkPhos  54  05-    LIVER FUNCTIONS - ( 21 May 2018 05:05 )  Alb: 3.6 g/dL / Pro: 6.8 g/dL / ALK PHOS: 54 u/L / ALT: 31 u/L / AST: 20 u/L / GGT: x                 Urinalysis Basic - ( 21 May 2018 10:24 )    Color: YELLOW / Appearance: CLEAR / S.015 / pH: 7.0  Gluc: NEGATIVE / Ketone: NEGATIVE  / Bili: NEGATIVE / Urobili: 0.2 mg/dL   Blood: TRACE / Protein: NEGATIVE mg/dL / Nitrite: POSITIVE   Leuk Esterase: NEGATIVE / RBC: x / WBC 0-2   Sq Epi: FEW / Non Sq Epi: x / Bacteria: MOD              RADIOLOGY & ADDITIONAL TESTS:    Imaging Personally Reviewed:     Consultant(s) Notes Reviewed:     Care Discussed with Consultants/Other Providers:

## 2018-05-22 NOTE — PROGRESS NOTE ADULT - SUBJECTIVE AND OBJECTIVE BOX
draft Neurology Follow up note    Name: MAGALI HUGHES    Subjective: Overnight patient reportedly agitated, attempting to take EEG leads off, then earlier this morning was heard screaming from room as per primary team, who describe pattern of behavioral change waxing and waning. No other concerns as per patient. Daughter at bedside.     HPI: 78 yo woman who presents to [facility] on referral by patient's private neurologist, Dr. La, w/ persistent left hand twitching since the morning. Patient has a known history of seizures s/p GBM resection w/ similar symptoms, however usually resolve after a few minutes. Patient has been undergoing radiation treatment, last treatment being earlier this week. Patient's last MRI was 3 weeks ago as per patient. Patient takes 1750mg BID Keppra daily, as well as 2mg dexamethasone, decreased from 4mg just 4 days ago. ROS otherwise unremarkable for fevers, chills, chest pain, SOB, cough, n/v/d, abdominal pain, numbness, tingling, weakness, urinary symptoms.    PMH/PSH  Breast Ca s/p left mastectomy  GBM s/p resection   seizures (left body twitching) on Keppra 1750mg BID     MEDICATIONS  (STANDING):  ALPRAZolam 0.25 milliGRAM(s) Oral <User Schedule>  anastrozole 1 milliGRAM(s) Oral at bedtime  aspirin enteric coated 81 milliGRAM(s) Oral daily  heparin  Injectable 5000 Unit(s) SubCutaneous every 8 hours  levETIRAcetam 2000 milliGRAM(s) Oral two times a day  lisinopril 10 milliGRAM(s) Oral daily  metoprolol tartrate 12.5 milliGRAM(s) Oral two times a day  pantoprazole    Tablet 40 milliGRAM(s) Oral before breakfast  simvastatin 10 milliGRAM(s) Oral at bedtime    MEDICATIONS  (PRN):    Allergies  No Known Allergies    Objective:   Vital Signs Last 24 Hrs  T(C): 36.7 (22 May 2018 04:56), Max: 36.8 (21 May 2018 21:09)  T(F): 98 (22 May 2018 04:56), Max: 98.3 (21 May 2018 21:09)  HR: 91 (22 May 2018 04:56) (91 - 105)  BP: 127/72 (22 May 2018 04:56) (120/76 - 131/89)  RR: 18 (22 May 2018 04:56) (18 - 18)  SpO2: 98% (22 May 2018 04:56) (97% - 100%)    General Exam:   General appearance: No acute distress                   Neurological Exam:  Mental Status: AAOx3, fluent speech, names objects, follows commands    Cranial Nerves: EOMI no nystagmus, PERRL, V1-V3 intact, facial symmetry intact, no dysarthria, tongue midline, VFF    Motor: 0/5 left UE/LE (chronic) vs. 5/5 right UE/LE. No drift in right side     Sensation: Intact to LT throughout    Coordination: FTN intact on right, left unable to perform     Reflexes: 1+ right biceps, brachioradialis, patellar and ankle, diminished on left.     tone: flacid on left UE/LE. normal right.     05-22    136  |  93<L>  |  10  ----------------------------<  92  3.5   |  26  |  0.40<L>    Ca    9.5      22 May 2018 07:20  Phos  3.0     05-22  Mg     2.3     05-22    TPro  6.8  /  Alb  3.6  /  TBili  0.4  /  DBili  x   /  AST  20  /  ALT  31  /  AlkPhos  54  05-21    LIVER FUNCTIONS - ( 21 May 2018 05:05 )  Alb: 3.6 g/dL / Pro: 6.8 g/dL / ALK PHOS: 54 u/L / ALT: 31 u/L / AST: 20 u/L / GGT: x           Radiology     < from: CT Head No Cont (05.20.18 @ 17:35) >  COMPARISON: MR brain from May 2, 2018. CT brain from May 11, 2018.     FINDINGS:   Redemonstration of extensive vasogenic edema within the right frontal   parietal lobe, with a few well-circumscribed hypodense lesions within   this region, sample measurements include: 1.4 x 1.4 cm in the high right   frontal lobe, and 1.4 x 1.1 cm in the right parietal lobe.    No midline shift or herniation.  The ventricles, and sulci are unremarkable.  The basal cisterns are   patent.  No intraventricular hemorrhage.    The paranasal sinuses and mastoid air cells are clear.  Status post right   right parietal craniotomy with associated post surgical changes.    IMPRESSION:   Extensive vasogenic edema within the right frontoparietal lobe, with a   few well-circumscribed hypodense lesions as described above. Further   evaluation with MRI is recommended.     No acute hemorrhage, midline shift or herniation.    < end of copied text >

## 2018-05-22 NOTE — DISCHARGE NOTE ADULT - HOME CARE AGENCY
[facility] AT [place] [phone]. Nurse to visit day after discharge. Nurse to call prior to visit to arrange visit. Physical therapy to follow.

## 2018-05-29 LAB
BASOPHILS # BLD AUTO: 0.02 K/UL
BASOPHILS NFR BLD AUTO: 0.2 %
EOSINOPHIL # BLD AUTO: 0 K/UL
EOSINOPHIL NFR BLD AUTO: 0 %
HCT VFR BLD CALC: 37.7 %
HGB BLD-MCNC: 12.1 G/DL
IMM GRANULOCYTES NFR BLD AUTO: 4.2 %
LYMPHOCYTES # BLD AUTO: 0.91 K/UL
LYMPHOCYTES NFR BLD AUTO: 7.9 %
MAN DIFF?: NORMAL
MCHC RBC-ENTMCNC: 28.3 PG
MCHC RBC-ENTMCNC: 32.1 GM/DL
MCV RBC AUTO: 88.3 FL
MONOCYTES # BLD AUTO: 0.7 K/UL
MONOCYTES NFR BLD AUTO: 6.1 %
NEUTROPHILS # BLD AUTO: 9.44 K/UL
NEUTROPHILS NFR BLD AUTO: 81.6 %
PLATELET # BLD AUTO: 473 K/UL
RBC # BLD: 4.27 M/UL
RBC # FLD: 17.1 %
WBC # FLD AUTO: 11.55 K/UL

## 2018-05-30 ENCOUNTER — APPOINTMENT (OUTPATIENT)
Dept: NEUROLOGY | Facility: CLINIC | Age: 78
End: 2018-05-30
Payer: MEDICARE

## 2018-05-30 ENCOUNTER — APPOINTMENT (OUTPATIENT)
Age: 78
End: 2018-05-30

## 2018-05-30 ENCOUNTER — RESULT REVIEW (OUTPATIENT)
Age: 78
End: 2018-05-30

## 2018-05-30 VITALS
RESPIRATION RATE: 18 BRPM | TEMPERATURE: 98.2 F | HEART RATE: 106 BPM | OXYGEN SATURATION: 98 % | SYSTOLIC BLOOD PRESSURE: 112 MMHG | DIASTOLIC BLOOD PRESSURE: 76 MMHG

## 2018-05-30 LAB
ALBUMIN SERPL ELPH-MCNC: 4.6 G/DL
ALP BLD-CCNC: 59 U/L
ALT SERPL-CCNC: 34 U/L
ANION GAP SERPL CALC-SCNC: 17 MMOL/L
AST SERPL-CCNC: 19 U/L
BILIRUB SERPL-MCNC: 0.4 MG/DL
BUN SERPL-MCNC: 22 MG/DL
CALCIUM SERPL-MCNC: 10 MG/DL
CHLORIDE SERPL-SCNC: 97 MMOL/L
CO2 SERPL-SCNC: 21 MMOL/L
CREAT SERPL-MCNC: 0.51 MG/DL
GLUCOSE SERPL-MCNC: 95 MG/DL
HCT VFR BLD CALC: 39.2 % — SIGNIFICANT CHANGE UP (ref 34.5–45)
HGB BLD-MCNC: 14 G/DL — SIGNIFICANT CHANGE UP (ref 11.5–15.5)
MCHC RBC-ENTMCNC: 30.5 PG — SIGNIFICANT CHANGE UP (ref 27–34)
MCHC RBC-ENTMCNC: 35.6 G/DL — SIGNIFICANT CHANGE UP (ref 32–36)
MCV RBC AUTO: 85.6 FL — SIGNIFICANT CHANGE UP (ref 80–100)
PLATELET # BLD AUTO: 446 K/UL — HIGH (ref 150–400)
POTASSIUM SERPL-SCNC: 4.9 MMOL/L
PROT SERPL-MCNC: 7.4 G/DL
RBC # BLD: 4.58 M/UL — SIGNIFICANT CHANGE UP (ref 3.8–5.2)
RBC # FLD: 15.4 % — HIGH (ref 10.3–14.5)
SODIUM SERPL-SCNC: 135 MMOL/L
WBC # BLD: 12.8 K/UL — HIGH (ref 3.8–10.5)
WBC # FLD AUTO: 12.8 K/UL — HIGH (ref 3.8–10.5)

## 2018-05-30 PROCEDURE — 99213 OFFICE O/P EST LOW 20 MIN: CPT

## 2018-05-30 RX ORDER — SAW PALMETTO 250 MG
3-1 CAPSULE ORAL
Refills: 0 | Status: DISCONTINUED | COMMUNITY
End: 2018-05-30

## 2018-05-31 ENCOUNTER — LABORATORY RESULT (OUTPATIENT)
Age: 78
End: 2018-05-31

## 2018-05-31 LAB
HAV IGM SER QL: NONREACTIVE
HBV CORE IGM SER QL: NONREACTIVE
HBV SURFACE AG SER QL: NONREACTIVE
HCV AB SER QL: NONREACTIVE
HCV S/CO RATIO: 0.08 S/CO

## 2018-06-06 ENCOUNTER — APPOINTMENT (OUTPATIENT)
Dept: MRI IMAGING | Facility: IMAGING CENTER | Age: 78
End: 2018-06-06

## 2018-06-11 ENCOUNTER — OUTPATIENT (OUTPATIENT)
Dept: OUTPATIENT SERVICES | Facility: HOSPITAL | Age: 78
LOS: 1 days | Discharge: HOME | End: 2018-06-11

## 2018-06-11 DIAGNOSIS — Z98.890 OTHER SPECIFIED POSTPROCEDURAL STATES: Chronic | ICD-10-CM

## 2018-06-11 DIAGNOSIS — C71.9 MALIGNANT NEOPLASM OF BRAIN, UNSPECIFIED: ICD-10-CM

## 2018-06-12 ENCOUNTER — OUTPATIENT (OUTPATIENT)
Dept: OUTPATIENT SERVICES | Facility: HOSPITAL | Age: 78
LOS: 1 days | End: 2018-06-12
Payer: MEDICARE

## 2018-06-12 ENCOUNTER — APPOINTMENT (OUTPATIENT)
Dept: MRI IMAGING | Facility: CLINIC | Age: 78
End: 2018-06-12
Payer: MEDICARE

## 2018-06-12 ENCOUNTER — LABORATORY RESULT (OUTPATIENT)
Age: 78
End: 2018-06-12

## 2018-06-12 ENCOUNTER — APPOINTMENT (OUTPATIENT)
Dept: NEUROLOGY | Facility: CLINIC | Age: 78
End: 2018-06-12
Payer: MEDICARE

## 2018-06-12 ENCOUNTER — RESULT REVIEW (OUTPATIENT)
Age: 78
End: 2018-06-12

## 2018-06-12 ENCOUNTER — APPOINTMENT (OUTPATIENT)
Dept: INFUSION THERAPY | Facility: HOSPITAL | Age: 78
End: 2018-06-12

## 2018-06-12 VITALS
SYSTOLIC BLOOD PRESSURE: 111 MMHG | TEMPERATURE: 98 F | DIASTOLIC BLOOD PRESSURE: 75 MMHG | RESPIRATION RATE: 18 BRPM | HEART RATE: 111 BPM

## 2018-06-12 DIAGNOSIS — Z00.8 ENCOUNTER FOR OTHER GENERAL EXAMINATION: ICD-10-CM

## 2018-06-12 DIAGNOSIS — Z98.890 OTHER SPECIFIED POSTPROCEDURAL STATES: Chronic | ICD-10-CM

## 2018-06-12 LAB
HCT VFR BLD CALC: 35.6 % — SIGNIFICANT CHANGE UP (ref 34.5–45)
HGB BLD-MCNC: 12.4 G/DL — SIGNIFICANT CHANGE UP (ref 11.5–15.5)
MCHC RBC-ENTMCNC: 30.2 PG — SIGNIFICANT CHANGE UP (ref 27–34)
MCHC RBC-ENTMCNC: 34.9 G/DL — SIGNIFICANT CHANGE UP (ref 32–36)
MCV RBC AUTO: 86.5 FL — SIGNIFICANT CHANGE UP (ref 80–100)
PLATELET # BLD AUTO: 306 K/UL — SIGNIFICANT CHANGE UP (ref 150–400)
RBC # BLD: 4.11 M/UL — SIGNIFICANT CHANGE UP (ref 3.8–5.2)
RBC # FLD: 15.7 % — HIGH (ref 10.3–14.5)
WBC # BLD: 12.2 K/UL — HIGH (ref 3.8–10.5)
WBC # FLD AUTO: 12.2 K/UL — HIGH (ref 3.8–10.5)

## 2018-06-12 PROCEDURE — 70553 MRI BRAIN STEM W/O & W/DYE: CPT | Mod: 26

## 2018-06-12 PROCEDURE — 99214 OFFICE O/P EST MOD 30 MIN: CPT

## 2018-06-12 PROCEDURE — A9585: CPT

## 2018-06-12 PROCEDURE — 70553 MRI BRAIN STEM W/O & W/DYE: CPT

## 2018-06-13 DIAGNOSIS — Z51.11 ENCOUNTER FOR ANTINEOPLASTIC CHEMOTHERAPY: ICD-10-CM

## 2018-06-14 LAB — LEVETIRACETAM SERPL-MCNC: SIGNIFICANT CHANGE UP

## 2018-06-17 ENCOUNTER — EMERGENCY (EMERGENCY)
Facility: HOSPITAL | Age: 78
LOS: 1 days | Discharge: HOME | End: 2018-06-17
Attending: EMERGENCY MEDICINE
Payer: MEDICARE

## 2018-06-17 VITALS
SYSTOLIC BLOOD PRESSURE: 108 MMHG | HEART RATE: 100 BPM | OXYGEN SATURATION: 95 % | DIASTOLIC BLOOD PRESSURE: 74 MMHG | TEMPERATURE: 98 F | RESPIRATION RATE: 20 BRPM

## 2018-06-17 DIAGNOSIS — Z98.890 OTHER SPECIFIED POSTPROCEDURAL STATES: Chronic | ICD-10-CM

## 2018-06-17 LAB
ALBUMIN SERPL ELPH-MCNC: 3.6 G/DL — SIGNIFICANT CHANGE UP (ref 3.3–5)
ALP SERPL-CCNC: 52 U/L — SIGNIFICANT CHANGE UP (ref 40–120)
ALT FLD-CCNC: 35 U/L — SIGNIFICANT CHANGE UP (ref 10–45)
ANION GAP SERPL CALC-SCNC: 19 MMOL/L — HIGH (ref 5–17)
AST SERPL-CCNC: 22 U/L — SIGNIFICANT CHANGE UP (ref 10–40)
BASOPHILS # BLD AUTO: 0 K/UL — SIGNIFICANT CHANGE UP (ref 0–0.2)
BASOPHILS NFR BLD AUTO: 0.2 % — SIGNIFICANT CHANGE UP (ref 0–2)
BILIRUB SERPL-MCNC: 0.4 MG/DL — SIGNIFICANT CHANGE UP (ref 0.2–1.2)
BUN SERPL-MCNC: 16 MG/DL — SIGNIFICANT CHANGE UP (ref 7–23)
CALCIUM SERPL-MCNC: 8.7 MG/DL — SIGNIFICANT CHANGE UP (ref 8.4–10.5)
CHLORIDE SERPL-SCNC: 100 MMOL/L — SIGNIFICANT CHANGE UP (ref 96–108)
CO2 SERPL-SCNC: 19 MMOL/L — LOW (ref 22–31)
CREAT SERPL-MCNC: 0.47 MG/DL — LOW (ref 0.5–1.3)
EOSINOPHIL # BLD AUTO: 0 K/UL — SIGNIFICANT CHANGE UP (ref 0–0.5)
EOSINOPHIL NFR BLD AUTO: 0.2 % — SIGNIFICANT CHANGE UP (ref 0–6)
GLUCOSE SERPL-MCNC: 130 MG/DL — HIGH (ref 70–99)
HCT VFR BLD CALC: 35.4 % — SIGNIFICANT CHANGE UP (ref 34.5–45)
HGB BLD-MCNC: 12.2 G/DL — SIGNIFICANT CHANGE UP (ref 11.5–15.5)
LYMPHOCYTES # BLD AUTO: 0.8 K/UL — LOW (ref 1–3.3)
LYMPHOCYTES # BLD AUTO: 8 % — LOW (ref 13–44)
MCHC RBC-ENTMCNC: 30.5 PG — SIGNIFICANT CHANGE UP (ref 27–34)
MCHC RBC-ENTMCNC: 34.4 GM/DL — SIGNIFICANT CHANGE UP (ref 32–36)
MCV RBC AUTO: 88.7 FL — SIGNIFICANT CHANGE UP (ref 80–100)
MONOCYTES # BLD AUTO: 0.5 K/UL — SIGNIFICANT CHANGE UP (ref 0–0.9)
MONOCYTES NFR BLD AUTO: 5 % — SIGNIFICANT CHANGE UP (ref 2–14)
NEUTROPHILS # BLD AUTO: 8.5 K/UL — HIGH (ref 1.8–7.4)
NEUTROPHILS NFR BLD AUTO: 86.5 % — HIGH (ref 43–77)
PLATELET # BLD AUTO: 269 K/UL — SIGNIFICANT CHANGE UP (ref 150–400)
POTASSIUM SERPL-MCNC: 4.1 MMOL/L — SIGNIFICANT CHANGE UP (ref 3.5–5.3)
POTASSIUM SERPL-SCNC: 4.1 MMOL/L — SIGNIFICANT CHANGE UP (ref 3.5–5.3)
PROT SERPL-MCNC: 6.1 G/DL — SIGNIFICANT CHANGE UP (ref 6–8.3)
RBC # BLD: 3.99 M/UL — SIGNIFICANT CHANGE UP (ref 3.8–5.2)
RBC # FLD: 16 % — HIGH (ref 10.3–14.5)
SODIUM SERPL-SCNC: 138 MMOL/L — SIGNIFICANT CHANGE UP (ref 135–145)
WBC # BLD: 9.8 K/UL — SIGNIFICANT CHANGE UP (ref 3.8–10.5)
WBC # FLD AUTO: 9.8 K/UL — SIGNIFICANT CHANGE UP (ref 3.8–10.5)

## 2018-06-17 PROCEDURE — 99284 EMERGENCY DEPT VISIT MOD MDM: CPT

## 2018-06-17 RX ORDER — DEXAMETHASONE 0.5 MG/5ML
1 ELIXIR ORAL ONCE
Qty: 0 | Refills: 0 | Status: COMPLETED | OUTPATIENT
Start: 2018-06-17 | End: 2018-06-17

## 2018-06-17 NOTE — ED PROVIDER NOTE - PROGRESS NOTE DETAILS
Spoke with neuro resident who spoke with attending. Dr. Wills - recommends giving additional 1mg Dexamethasone po, continue keppra dosing, and d/c home on 4 pills .5mg ativan in case of recurrent szs. Patient just had MRI done last week and do not recommend additional imaging at this time. SZ likely 2/2 med noncompliance. Patient will follow up with neurologist on wednesday. Fanny - will HI Neuro f/u

## 2018-06-17 NOTE — ED PROVIDER NOTE - NS ED ROS FT
CONST: no fevers, no chills  EYES: no pain  ENT: no sore throat   CV: no chest pain  RESP: no shortness of breath  ABD: no abdominal pain   : no dysuria  MSK: no back pain  NEURO: +shaking of LUE for one hour. no headache or additional neurologic complaints  HEME: no easy bleeding  SKIN:  no rash

## 2018-06-17 NOTE — ED ADULT NURSE NOTE - OBJECTIVE STATEMENT
76y/o female with history of HTN, Breast CA (Left sided mastectomy), Glioblastoma with focal seizures, BIBEMS a&ox3 c/o focal seizure. Patient reports she has focal seizers in her left hand that occur randomly, usually lasting about 10 minutes. Patient alert and awake for the entirety of seizure. Today patient had seizure in left handed that lasted over an hour sos he called EMS. As per EMS, patient received 2mg Versed prior to arrival. Patient presents to ED with no complaints, states she is back to baseline and focal seizure is over. 76y/o female with history of HTN, Breast CA (Left sided mastectomy), Glioblastoma with focal seizures, BIBEMS a&ox3 c/o focal seizure. Patient reports she has seizers in her left hand that occur randomly, usually lasting about 10 minutes. States "My arm just shakes from the tip of my fingers to my elbow." Patient alert and awake for the entirety of seizure. Today, patient had seizure starting in left thigh which quickly resolved but then moved to left hand/arm that lasted over an hour so she called EMS. As per EMS, patient received 2mg Versed prior to arrival. Patient presents to ED with no complaints, states she is back to baseline and focal seizure is over. Denies HA, N/V, vision changes, dizziness, pain, SOB, CP. Lungs clear b/l. PERRL. Left upper and lower extremities weaker than right side at baseline, patient reports extremities has been this way since she started having seizures in January of this year. Equal sensation b/l. Patient is unable to walk due to weakness, has been learning to walk with cane more recently.

## 2018-06-17 NOTE — ED PROVIDER NOTE - OBJECTIVE STATEMENT
77F hx glioblastoma p/w seizures. Patient typically has simple partial seizures that involve shaking of her LUE. Typically lasts approximately 15 minutes without loss of consciousness/confusion/headache and stops spontaneously. Today, patient had an episode that lasted 1 hr and she became concerned and came in. Denies headache, loc, confusion, incontienence, tongue bite, fevers/chills, recent illnesses, diarrhea/vomiting. Patient states that she has not been compliant with her Keppra. Of notes, patient states that she is chronically weak on Lue and LLE possible ?cva vs szs vs mass. 77F hx glioblastoma p/w seizures. Patient typically has simple partial seizures that involve shaking of her LUE. Typically lasts approximately 15 minutes without loss of consciousness/confusion/headache and stops spontaneously. Today, patient had an episode that lasted 1 hr and she became concerned and came in. Denies headache, loc, confusion, incontienence, tongue bite, fevers/chills, recent illnesses, diarrhea/vomiting. Patient states that she has not been compliant with her Keppra. Of notes, patient states that she is chronically weak on Lue and LLE possible ?cva vs szs vs mass. patient states that she is unable to walk at baseline/

## 2018-06-17 NOTE — ED PROVIDER NOTE - MUSCULOSKELETAL, MLM
Spine appears normal, range of motion is not limited, no muscle or joint tenderness 4/5 strength in LUE and LLE (baseline per patient.

## 2018-06-17 NOTE — ED PROVIDER NOTE - ATTENDING CONTRIBUTION TO CARE
Nemes - 76yo F w GBM dg this year s/p rad/chemo tx, has partial seizures to L arm/leg. Today w repeated typical seizure, but lasted more than usual, 1 hr. No other stx, no change in her neuro stx. Had MRI 4 days ago, neuro aware. Skipped one dose of Dex. VS wnl, neuro at baseline, well appearing. Will d/w Neuro, get basic labs, consider CT.

## 2018-06-17 NOTE — ED ADULT NURSE NOTE - PMH
Breast cancer  s/p L radical mastectomy on anastrazole  Glioblastoma  s/p Right parietal craniotomy receiving radiation with concurrent and adjuvant chemo (temozolamide)  Hyperlipidemia    Hypertension

## 2018-06-17 NOTE — ED PROVIDER NOTE - CARDIAC, MLM
Normal rate, regular rhythm.  Heart sounds S1, S2.  No murmurs, rubs or gallops. 2+ pulses in distal extremiteis b/l

## 2018-06-17 NOTE — ED PROVIDER NOTE - MEDICAL DECISION MAKING DETAILS
77F hx glioblastoma, szs, p/w sz. Likely 2/2 med noncompliance. no hx recent infection/head trauma. Will hold head CT at this time given patient is at baseline. Will follow neuro recommendations and have her f/u with neurologist in office on wednesday.

## 2018-06-18 VITALS
RESPIRATION RATE: 17 BRPM | SYSTOLIC BLOOD PRESSURE: 118 MMHG | DIASTOLIC BLOOD PRESSURE: 90 MMHG | HEART RATE: 95 BPM | OXYGEN SATURATION: 98 %

## 2018-06-18 PROCEDURE — 99283 EMERGENCY DEPT VISIT LOW MDM: CPT

## 2018-06-18 PROCEDURE — 80177 DRUG SCRN QUAN LEVETIRACETAM: CPT

## 2018-06-18 PROCEDURE — 85027 COMPLETE CBC AUTOMATED: CPT

## 2018-06-18 PROCEDURE — 80053 COMPREHEN METABOLIC PANEL: CPT

## 2018-06-18 RX ADMIN — Medication 1 MILLIGRAM(S): at 00:23

## 2018-06-18 NOTE — CONSULT NOTE ADULT - NSHPATTENDINGPLANDISCUSS_GEN_ALL_CORE
neurology resident on telephone and I agree with assessment and plan as outlined. Outpatient follow up with Dr. La later this week.

## 2018-06-18 NOTE — CONSULT NOTE ADULT - SUBJECTIVE AND OBJECTIVE BOX
Neurology Consult    Reason for consult: Seizure  Neurologist: Dr. La    HPI: Patient is a 77 year old female presenting with seizure. Patient has PMH of HLD, HTN, GBM, breast cancer s/p resection. Patient's daughter states that patient has been forgetting to take her medications at times. Recently was switched from dexamethasone 4/3 mg to 4/2 mg yesterday. On keppra 1750 BID. Today patient noted to have focal LUE shaking which lasted about 1.5 hours until EMS gave patient ativan. Patient back to baseline. Denies any numbness, tingling, weakness.    REVIEW OF SYSTEMS:  Constitutional: No fever, chills, fatigue, weakness.  Eyes: No eye pain, visual disturbances, or discharge.  ENT:  No difficulty hearing, tinnitus, vertigo. No sinus or throat pain.  Neck: No pain or stiffness.  Respiratory: No cough, dyspnea, wheezing.  Cardiovascular: No chest pain, palpitations.  Gastrointestinal: No abdominal pain. No nausea, vomiting, diarrhea, or constipation.   Genitourinary: No dysuria, frequency, hematuria or incontinence.  Neurological: No headaches, lightheadedness, vertigo, numbness or tremors.  Psychiatric: No depression, anxiety, mood swings, or difficulty sleeping.  Musculoskeletal: No joint pain or swelling. No muscle, back, or extremity pain.  Skin: No itching, burning, rashes or lesions.   Lymph Nodes: No enlarged glands  Endocrine: No heat or cold intolerance, no hair loss.  Allergy and Immunologic: No hives or eczema.    MEDICATIONS  dexamethasone 4 mg, 3 mg  keppra 1750 BID    PMH: Hyperlipidemia  Hypertension  Glioblastoma  Breast cancer     PSH: History of craniotomy  H/O breast surgery    FAMILY HISTORY:  No pertinent family history in first degree relatives  No history of dementia, strokes    SOCIAL HISTORY:  No history of tobacco or alcohol use     Allergies  No Known Allergies    Vital Signs Last 24 Hrs  T(C): 36.5 (17 Jun 2018 21:02), Max: 36.5 (17 Jun 2018 21:02)  T(F): 97.7 (17 Jun 2018 21:02), Max: 97.7 (17 Jun 2018 21:02)  HR: 93 (17 Jun 2018 21:30) (93 - 100)  BP: 116/75 (17 Jun 2018 21:30) (108/74 - 116/75)  RR: 17 (17 Jun 2018 21:30) (17 - 20)  SpO2: 98% (17 Jun 2018 21:30) (95% - 98%)    Neurological Examination:    Mental Status: Patient is alert, awake, oriented X3. Patient is fluent, no dysarthria, no aphasia. Follows commands well and able to answer questions appropriately. Mood and affect normal.    Cranial Nerves: PERRL, EOMI, visual field intact, V1-V3 intact, no gross facial asymmetry, tongue/uvula midline    Motor Exam:   Right upper extremity: 5/5 No drift  Left upper extremity: no movement  Right lower extremity: 5/5 No drift  Left lower extremity: no movement    Normal bulk/tone    Sensory: Intact to light touch bilaterally. No extinction    Coordination: Finger to nose intact on right    GENERAL: No acute distress  HEENT:  Normocephalic, atraumatic  EXTREMITIES: No edema, clubbing, cyanosis  MUSCULOSKELETAL: Normal range of motion  SKIN: No rashes    LABS:  CBC Full  -  ( 17 Jun 2018 23:01 )  WBC Count : 9.8 K/uL  Hemoglobin : 12.2 g/dL  Hematocrit : 35.4 %  Platelet Count - Automated : 269 K/uL  Mean Cell Volume : 88.7 fl  Mean Cell Hemoglobin : 30.5 pg  Mean Cell Hemoglobin Concentration : 34.4 gm/dL  Auto Neutrophil # : 8.5 K/uL  Auto Lymphocyte # : 0.8 K/uL  Auto Monocyte # : 0.5 K/uL  Auto Eosinophil # : 0.0 K/uL  Auto Basophil # : 0.0 K/uL  Auto Neutrophil % : 86.5 %  Auto Lymphocyte % : 8.0 %  Auto Monocyte % : 5.0 %  Auto Eosinophil % : 0.2 %  Auto Basophil % : 0.2 %      06-17    138  |  100  |  16  ----------------------------<  130<H>  4.1   |  19<L>  |  0.47<L>    Ca    8.7      17 Jun 2018 23:01    TPro  6.1  /  Alb  3.6  /  TBili  0.4  /  DBili  x   /  AST  22  /  ALT  35  /  AlkPhos  52  06-17    LIVER FUNCTIONS - ( 17 Jun 2018 23:01 )  Alb: 3.6 g/dL / Pro: 6.1 g/dL / ALK PHOS: 52 U/L / ALT: 35 U/L / AST: 22 U/L / GGT: x

## 2018-06-18 NOTE — CONSULT NOTE ADULT - ASSESSMENT
77 year old female presenting with seizure. Patient has PMH of HLD, HTN, GBM, breast cancer s/p resection. Patient's daughter states that patient has been forgetting to take her medications at times. Recently was switched from dexamethasone 4/3 mg to 4/2 mg yesterday. On keppra 1750 BID. Today patient noted to have focal LUE shaking which lasted about 1.5 hours until EMS gave patient ativan. Patient back to baseline. Denies any numbness, tingling, weakness.  Neuro exam with chronic left sided weakness    Breakthrough seizure likely secondary to missing AED dose and recent steroid tapering    Recommend:  Give extra 1 mg dexamethasone now  Continue dexamethasone 4/3 mg  Continue keppra 1750 mg BID  Ativan 0.5 mg x 3 tabs to be discharged on in case patient has breakthrough focal seizures  Follow up with Dr. La outpatient  Discussed with Dr. Ga

## 2018-06-19 LAB — LEVETIRACETAM SERPL-MCNC: 62.4 MCG/ML — HIGH (ref 12–46)

## 2018-06-20 NOTE — ED POST DISCHARGE NOTE - DETAILS
LVM to sal back and encourage close follow up with her neurologist to discuss alternative/new therapies.  Trinh lvm to call back regarding results - Riri Wilhelm PA-C Spoke with patient and her daughter, Rashida Cortez, per patient request and informed them of abnormal result.  Patient has an appointment with her Neurologist on Tuesday, 6/26.  -Adelfo Lynn PA-C

## 2018-06-20 NOTE — ED POST DISCHARGE NOTE - RESULT SUMMARY
Pt is supratheraputic on her Keppra 62 where 46 is upper limit of theraputic) and still came in with seizures

## 2018-06-26 ENCOUNTER — APPOINTMENT (OUTPATIENT)
Dept: NEUROLOGY | Facility: CLINIC | Age: 78
End: 2018-06-26
Payer: MEDICARE

## 2018-06-26 ENCOUNTER — LABORATORY RESULT (OUTPATIENT)
Age: 78
End: 2018-06-26

## 2018-06-26 ENCOUNTER — RESULT REVIEW (OUTPATIENT)
Age: 78
End: 2018-06-26

## 2018-06-26 ENCOUNTER — APPOINTMENT (OUTPATIENT)
Dept: INFUSION THERAPY | Facility: HOSPITAL | Age: 78
End: 2018-06-26

## 2018-06-26 VITALS
SYSTOLIC BLOOD PRESSURE: 114 MMHG | HEIGHT: 61 IN | RESPIRATION RATE: 20 BRPM | HEART RATE: 102 BPM | DIASTOLIC BLOOD PRESSURE: 88 MMHG | TEMPERATURE: 97.7 F | OXYGEN SATURATION: 95 %

## 2018-06-26 LAB
BASOPHILS # BLD AUTO: 0 K/UL — SIGNIFICANT CHANGE UP (ref 0–0.2)
BASOPHILS NFR BLD AUTO: 0.1 % — SIGNIFICANT CHANGE UP (ref 0–2)
EOSINOPHIL # BLD AUTO: 0.1 K/UL — SIGNIFICANT CHANGE UP (ref 0–0.5)
EOSINOPHIL NFR BLD AUTO: 0.9 % — SIGNIFICANT CHANGE UP (ref 0–6)
HCT VFR BLD CALC: 37.6 % — SIGNIFICANT CHANGE UP (ref 34.5–45)
HGB BLD-MCNC: 13.2 G/DL — SIGNIFICANT CHANGE UP (ref 11.5–15.5)
LYMPHOCYTES # BLD AUTO: 0.7 K/UL — LOW (ref 1–3.3)
LYMPHOCYTES # BLD AUTO: 7.9 % — LOW (ref 13–44)
MCHC RBC-ENTMCNC: 30.5 PG — SIGNIFICANT CHANGE UP (ref 27–34)
MCHC RBC-ENTMCNC: 35.2 G/DL — SIGNIFICANT CHANGE UP (ref 32–36)
MCV RBC AUTO: 86.7 FL — SIGNIFICANT CHANGE UP (ref 80–100)
MONOCYTES # BLD AUTO: 0.5 K/UL — SIGNIFICANT CHANGE UP (ref 0–0.9)
MONOCYTES NFR BLD AUTO: 5.3 % — SIGNIFICANT CHANGE UP (ref 2–14)
NEUTROPHILS # BLD AUTO: 7.9 K/UL — HIGH (ref 1.8–7.4)
NEUTROPHILS NFR BLD AUTO: 85.8 % — HIGH (ref 43–77)
PLATELET # BLD AUTO: 262 K/UL — SIGNIFICANT CHANGE UP (ref 150–400)
RBC # BLD: 4.33 M/UL — SIGNIFICANT CHANGE UP (ref 3.8–5.2)
RBC # FLD: 15.8 % — HIGH (ref 10.3–14.5)
WBC # BLD: 9.2 K/UL — SIGNIFICANT CHANGE UP (ref 3.8–10.5)
WBC # FLD AUTO: 9.2 K/UL — SIGNIFICANT CHANGE UP (ref 3.8–10.5)

## 2018-06-26 PROCEDURE — 99214 OFFICE O/P EST MOD 30 MIN: CPT

## 2018-06-26 RX ORDER — TEMOZOLOMIDE 100 MG/1
100 CAPSULE ORAL AT BEDTIME
Qty: 10 | Refills: 0 | Status: DISCONTINUED | COMMUNITY
Start: 2018-05-03 | End: 2018-06-26

## 2018-06-26 RX ORDER — TEMOZOLOMIDE 20 MG/1
20 CAPSULE ORAL AT BEDTIME
Qty: 5 | Refills: 0 | Status: DISCONTINUED | COMMUNITY
Start: 2018-05-03 | End: 2018-06-26

## 2018-06-26 RX ORDER — DEXAMETHASONE 2 MG/1
2 TABLET ORAL
Qty: 60 | Refills: 0 | Status: ACTIVE | COMMUNITY
Start: 1900-01-01 | End: 1900-01-01

## 2018-06-26 RX ORDER — LEVETIRACETAM 1000 MG/1
1000 TABLET, FILM COATED ORAL TWICE DAILY
Qty: 60 | Refills: 3 | Status: DISCONTINUED | COMMUNITY
Start: 2018-03-06 | End: 2018-06-26

## 2018-06-28 ENCOUNTER — RX RENEWAL (OUTPATIENT)
Age: 78
End: 2018-06-28

## 2018-06-28 RX ORDER — LEVETIRACETAM 750 MG/1
750 TABLET, FILM COATED ORAL TWICE DAILY
Qty: 120 | Refills: 0 | Status: ACTIVE | COMMUNITY
Start: 2018-03-13 | End: 1900-01-01

## 2018-07-06 ENCOUNTER — OTHER (OUTPATIENT)
Age: 78
End: 2018-07-06

## 2018-07-06 ENCOUNTER — RX RENEWAL (OUTPATIENT)
Age: 78
End: 2018-07-06

## 2018-07-06 RX ORDER — DEXAMETHASONE 1 MG/1
1 TABLET ORAL
Qty: 30 | Refills: 1 | Status: ACTIVE | COMMUNITY
Start: 2018-05-30 | End: 1900-01-01

## 2018-07-10 ENCOUNTER — LABORATORY RESULT (OUTPATIENT)
Age: 78
End: 2018-07-10

## 2018-07-10 ENCOUNTER — APPOINTMENT (OUTPATIENT)
Dept: NEUROLOGY | Facility: CLINIC | Age: 78
End: 2018-07-10
Payer: MEDICARE

## 2018-07-10 ENCOUNTER — APPOINTMENT (OUTPATIENT)
Dept: INFUSION THERAPY | Facility: HOSPITAL | Age: 78
End: 2018-07-10

## 2018-07-10 ENCOUNTER — RESULT REVIEW (OUTPATIENT)
Age: 78
End: 2018-07-10

## 2018-07-10 VITALS
RESPIRATION RATE: 20 BRPM | SYSTOLIC BLOOD PRESSURE: 127 MMHG | HEART RATE: 118 BPM | DIASTOLIC BLOOD PRESSURE: 98 MMHG | HEIGHT: 61 IN | OXYGEN SATURATION: 98 %

## 2018-07-10 LAB
HCT VFR BLD CALC: 37.4 % — SIGNIFICANT CHANGE UP (ref 34.5–45)
HGB BLD-MCNC: 13 G/DL — SIGNIFICANT CHANGE UP (ref 11.5–15.5)
MCHC RBC-ENTMCNC: 30.6 PG — SIGNIFICANT CHANGE UP (ref 27–34)
MCHC RBC-ENTMCNC: 34.8 G/DL — SIGNIFICANT CHANGE UP (ref 32–36)
MCV RBC AUTO: 87.9 FL — SIGNIFICANT CHANGE UP (ref 80–100)
PLATELET # BLD AUTO: 278 K/UL — SIGNIFICANT CHANGE UP (ref 150–400)
RBC # BLD: 4.26 M/UL — SIGNIFICANT CHANGE UP (ref 3.8–5.2)
RBC # FLD: 15.7 % — HIGH (ref 10.3–14.5)
WBC # BLD: 10.8 K/UL — HIGH (ref 3.8–10.5)
WBC # FLD AUTO: 10.8 K/UL — HIGH (ref 3.8–10.5)

## 2018-07-10 PROCEDURE — 99214 OFFICE O/P EST MOD 30 MIN: CPT

## 2018-07-17 ENCOUNTER — OUTPATIENT (OUTPATIENT)
Dept: OUTPATIENT SERVICES | Facility: HOSPITAL | Age: 78
LOS: 1 days | Discharge: HOME | End: 2018-07-17

## 2018-07-17 DIAGNOSIS — Z98.890 OTHER SPECIFIED POSTPROCEDURAL STATES: Chronic | ICD-10-CM

## 2018-07-17 DIAGNOSIS — C71.9 MALIGNANT NEOPLASM OF BRAIN, UNSPECIFIED: ICD-10-CM

## 2018-07-17 PROBLEM — C50.919 MALIGNANT NEOPLASM OF UNSPECIFIED SITE OF UNSPECIFIED FEMALE BREAST: Chronic | Status: ACTIVE | Noted: 2018-01-16

## 2018-07-17 PROBLEM — I10 ESSENTIAL (PRIMARY) HYPERTENSION: Chronic | Status: ACTIVE | Noted: 2018-05-20

## 2018-07-17 PROBLEM — E78.5 HYPERLIPIDEMIA, UNSPECIFIED: Chronic | Status: ACTIVE | Noted: 2018-05-20

## 2018-07-24 ENCOUNTER — APPOINTMENT (OUTPATIENT)
Dept: NEUROLOGY | Facility: CLINIC | Age: 78
End: 2018-07-24
Payer: MEDICARE

## 2018-07-24 ENCOUNTER — APPOINTMENT (OUTPATIENT)
Dept: MRI IMAGING | Facility: CLINIC | Age: 78
End: 2018-07-24

## 2018-07-24 ENCOUNTER — APPOINTMENT (OUTPATIENT)
Dept: INFUSION THERAPY | Facility: HOSPITAL | Age: 78
End: 2018-07-24

## 2018-07-24 VITALS
HEART RATE: 120 BPM | DIASTOLIC BLOOD PRESSURE: 93 MMHG | RESPIRATION RATE: 20 BRPM | HEIGHT: 61 IN | OXYGEN SATURATION: 98 % | SYSTOLIC BLOOD PRESSURE: 127 MMHG | TEMPERATURE: 98.2 F | BODY MASS INDEX: 22.66 KG/M2 | WEIGHT: 120 LBS

## 2018-07-24 DIAGNOSIS — Z00.00 ENCOUNTER FOR GENERAL ADULT MEDICAL EXAMINATION W/OUT ABNORMAL FINDINGS: ICD-10-CM

## 2018-07-24 PROCEDURE — 99214 OFFICE O/P EST MOD 30 MIN: CPT

## 2018-07-24 RX ORDER — LORAZEPAM 0.5 MG/1
0.5 TABLET ORAL
Qty: 4 | Refills: 0 | Status: ACTIVE | COMMUNITY
Start: 2018-04-20 | End: 1900-01-01

## 2018-07-24 RX ORDER — NYSTATIN 100000 [USP'U]/ML
100000 SUSPENSION ORAL 4 TIMES DAILY
Qty: 1 | Refills: 1 | Status: ACTIVE | COMMUNITY
Start: 2018-07-24 | End: 1900-01-01

## 2018-07-25 ENCOUNTER — APPOINTMENT (OUTPATIENT)
Dept: MRI IMAGING | Facility: IMAGING CENTER | Age: 78
End: 2018-07-25

## 2018-07-26 ENCOUNTER — OTHER (OUTPATIENT)
Age: 78
End: 2018-07-26

## 2018-07-26 ENCOUNTER — CHART COPY (OUTPATIENT)
Age: 78
End: 2018-07-26

## 2018-07-31 ENCOUNTER — CHART COPY (OUTPATIENT)
Age: 78
End: 2018-07-31

## 2018-08-02 ENCOUNTER — CHART COPY (OUTPATIENT)
Age: 78
End: 2018-08-02

## 2018-08-02 ENCOUNTER — INPATIENT (INPATIENT)
Facility: HOSPITAL | Age: 78
LOS: 3 days | Discharge: HOME | DRG: 951 | End: 2018-08-06
Attending: STUDENT IN AN ORGANIZED HEALTH CARE EDUCATION/TRAINING PROGRAM | Admitting: HOSPITALIST
Payer: MEDICARE

## 2018-08-02 VITALS
HEART RATE: 112 BPM | OXYGEN SATURATION: 95 % | DIASTOLIC BLOOD PRESSURE: 90 MMHG | RESPIRATION RATE: 17 BRPM | TEMPERATURE: 98 F | SYSTOLIC BLOOD PRESSURE: 140 MMHG

## 2018-08-02 DIAGNOSIS — C50.919 MALIGNANT NEOPLASM OF UNSPECIFIED SITE OF UNSPECIFIED FEMALE BREAST: ICD-10-CM

## 2018-08-02 DIAGNOSIS — Z98.890 OTHER SPECIFIED POSTPROCEDURAL STATES: Chronic | ICD-10-CM

## 2018-08-02 DIAGNOSIS — C71.9 MALIGNANT NEOPLASM OF BRAIN, UNSPECIFIED: ICD-10-CM

## 2018-08-02 DIAGNOSIS — I10 ESSENTIAL (PRIMARY) HYPERTENSION: ICD-10-CM

## 2018-08-02 DIAGNOSIS — E78.5 HYPERLIPIDEMIA, UNSPECIFIED: ICD-10-CM

## 2018-08-02 DIAGNOSIS — R10.84 GENERALIZED ABDOMINAL PAIN: ICD-10-CM

## 2018-08-02 DIAGNOSIS — Z29.9 ENCOUNTER FOR PROPHYLACTIC MEASURES, UNSPECIFIED: ICD-10-CM

## 2018-08-02 DIAGNOSIS — E87.1 HYPO-OSMOLALITY AND HYPONATREMIA: ICD-10-CM

## 2018-08-02 DIAGNOSIS — K59.00 CONSTIPATION, UNSPECIFIED: ICD-10-CM

## 2018-08-02 DIAGNOSIS — R53.81 OTHER MALAISE: ICD-10-CM

## 2018-08-02 DIAGNOSIS — Z51.5 ENCOUNTER FOR PALLIATIVE CARE: ICD-10-CM

## 2018-08-02 DIAGNOSIS — R10.9 UNSPECIFIED ABDOMINAL PAIN: ICD-10-CM

## 2018-08-02 LAB
ALBUMIN SERPL ELPH-MCNC: 3.2 G/DL — LOW (ref 3.3–5)
ALP SERPL-CCNC: 94 U/L — SIGNIFICANT CHANGE UP (ref 40–120)
ALT FLD-CCNC: 56 U/L — HIGH (ref 10–45)
ANION GAP SERPL CALC-SCNC: 15 MMOL/L — SIGNIFICANT CHANGE UP (ref 5–17)
AST SERPL-CCNC: 29 U/L — SIGNIFICANT CHANGE UP (ref 10–40)
BASOPHILS # BLD AUTO: 0 K/UL — SIGNIFICANT CHANGE UP (ref 0–0.2)
BILIRUB SERPL-MCNC: 0.6 MG/DL — SIGNIFICANT CHANGE UP (ref 0.2–1.2)
BUN SERPL-MCNC: 12 MG/DL — SIGNIFICANT CHANGE UP (ref 7–23)
CALCIUM SERPL-MCNC: 8.8 MG/DL — SIGNIFICANT CHANGE UP (ref 8.4–10.5)
CHLORIDE SERPL-SCNC: 97 MMOL/L — SIGNIFICANT CHANGE UP (ref 96–108)
CO2 SERPL-SCNC: 21 MMOL/L — LOW (ref 22–31)
CREAT SERPL-MCNC: 0.33 MG/DL — LOW (ref 0.5–1.3)
EOSINOPHIL # BLD AUTO: 0 K/UL — SIGNIFICANT CHANGE UP (ref 0–0.5)
GLUCOSE SERPL-MCNC: 126 MG/DL — HIGH (ref 70–99)
HCT VFR BLD CALC: 35.5 % — SIGNIFICANT CHANGE UP (ref 34.5–45)
HGB BLD-MCNC: 11.9 G/DL — SIGNIFICANT CHANGE UP (ref 11.5–15.5)
LIDOCAIN IGE QN: 26 U/L — SIGNIFICANT CHANGE UP (ref 7–60)
LYMPHOCYTES # BLD AUTO: 1 K/UL — SIGNIFICANT CHANGE UP (ref 1–3.3)
LYMPHOCYTES # BLD AUTO: 11 % — LOW (ref 13–44)
MCHC RBC-ENTMCNC: 29.6 PG — SIGNIFICANT CHANGE UP (ref 27–34)
MCHC RBC-ENTMCNC: 33.4 GM/DL — SIGNIFICANT CHANGE UP (ref 32–36)
MCV RBC AUTO: 88.5 FL — SIGNIFICANT CHANGE UP (ref 80–100)
MONOCYTES # BLD AUTO: 0.4 K/UL — SIGNIFICANT CHANGE UP (ref 0–0.9)
MONOCYTES NFR BLD AUTO: 5 % — SIGNIFICANT CHANGE UP (ref 2–14)
NEUTROPHILS # BLD AUTO: 11.2 K/UL — HIGH (ref 1.8–7.4)
NEUTROPHILS NFR BLD AUTO: 76 % — SIGNIFICANT CHANGE UP (ref 43–77)
PLATELET # BLD AUTO: 387 K/UL — SIGNIFICANT CHANGE UP (ref 150–400)
POTASSIUM SERPL-MCNC: 3.9 MMOL/L — SIGNIFICANT CHANGE UP (ref 3.5–5.3)
POTASSIUM SERPL-SCNC: 3.9 MMOL/L — SIGNIFICANT CHANGE UP (ref 3.5–5.3)
PROT SERPL-MCNC: 6.8 G/DL — SIGNIFICANT CHANGE UP (ref 6–8.3)
RBC # BLD: 4.01 M/UL — SIGNIFICANT CHANGE UP (ref 3.8–5.2)
RBC # FLD: 15.3 % — HIGH (ref 10.3–14.5)
SODIUM SERPL-SCNC: 133 MMOL/L — LOW (ref 135–145)
WBC # BLD: 12.6 K/UL — HIGH (ref 3.8–10.5)
WBC # FLD AUTO: 12.6 K/UL — HIGH (ref 3.8–10.5)

## 2018-08-02 PROCEDURE — 74018 RADEX ABDOMEN 1 VIEW: CPT | Mod: 26

## 2018-08-02 PROCEDURE — 99285 EMERGENCY DEPT VISIT HI MDM: CPT | Mod: 25

## 2018-08-02 PROCEDURE — 93010 ELECTROCARDIOGRAM REPORT: CPT

## 2018-08-02 PROCEDURE — 99223 1ST HOSP IP/OBS HIGH 75: CPT

## 2018-08-02 PROCEDURE — 71045 X-RAY EXAM CHEST 1 VIEW: CPT | Mod: 26

## 2018-08-02 RX ORDER — ANASTROZOLE 1 MG/1
1 TABLET ORAL AT BEDTIME
Qty: 0 | Refills: 0 | Status: DISCONTINUED | OUTPATIENT
Start: 2018-08-02 | End: 2018-08-02

## 2018-08-02 RX ORDER — METOPROLOL TARTRATE 50 MG
12.5 TABLET ORAL
Qty: 0 | Refills: 0 | Status: DISCONTINUED | OUTPATIENT
Start: 2018-08-02 | End: 2018-08-06

## 2018-08-02 RX ORDER — ASPIRIN/CALCIUM CARB/MAGNESIUM 324 MG
81 TABLET ORAL DAILY
Qty: 0 | Refills: 0 | Status: DISCONTINUED | OUTPATIENT
Start: 2018-08-02 | End: 2018-08-06

## 2018-08-02 RX ORDER — DEXAMETHASONE 0.5 MG/5ML
4 ELIXIR ORAL DAILY
Qty: 0 | Refills: 0 | Status: DISCONTINUED | OUTPATIENT
Start: 2018-08-02 | End: 2018-08-06

## 2018-08-02 RX ORDER — LEVETIRACETAM 250 MG/1
750 TABLET, FILM COATED ORAL
Qty: 0 | Refills: 0 | Status: DISCONTINUED | OUTPATIENT
Start: 2018-08-02 | End: 2018-08-02

## 2018-08-02 RX ORDER — HEPARIN SODIUM 5000 [USP'U]/ML
5000 INJECTION INTRAVENOUS; SUBCUTANEOUS EVERY 8 HOURS
Qty: 0 | Refills: 0 | Status: DISCONTINUED | OUTPATIENT
Start: 2018-08-02 | End: 2018-08-06

## 2018-08-02 RX ORDER — LISINOPRIL 2.5 MG/1
10 TABLET ORAL DAILY
Qty: 0 | Refills: 0 | Status: DISCONTINUED | OUTPATIENT
Start: 2018-08-02 | End: 2018-08-06

## 2018-08-02 RX ORDER — ALPRAZOLAM 0.25 MG
0.25 TABLET ORAL ONCE
Qty: 0 | Refills: 0 | Status: DISCONTINUED | OUTPATIENT
Start: 2018-08-02 | End: 2018-08-02

## 2018-08-02 RX ORDER — LEVETIRACETAM 250 MG/1
1500 TABLET, FILM COATED ORAL ONCE
Qty: 0 | Refills: 0 | Status: COMPLETED | OUTPATIENT
Start: 2018-08-02 | End: 2018-08-02

## 2018-08-02 RX ORDER — PANTOPRAZOLE SODIUM 20 MG/1
40 TABLET, DELAYED RELEASE ORAL
Qty: 0 | Refills: 0 | Status: DISCONTINUED | OUTPATIENT
Start: 2018-08-02 | End: 2018-08-06

## 2018-08-02 RX ORDER — NYSTATIN 500MM UNIT
500000 POWDER (EA) MISCELLANEOUS
Qty: 0 | Refills: 0 | Status: DISCONTINUED | OUTPATIENT
Start: 2018-08-02 | End: 2018-08-06

## 2018-08-02 RX ORDER — SIMVASTATIN 20 MG/1
10 TABLET, FILM COATED ORAL AT BEDTIME
Qty: 0 | Refills: 0 | Status: DISCONTINUED | OUTPATIENT
Start: 2018-08-02 | End: 2018-08-06

## 2018-08-02 RX ADMIN — Medication 12.5 MILLIGRAM(S): at 21:54

## 2018-08-02 RX ADMIN — Medication 0.25 MILLIGRAM(S): at 21:54

## 2018-08-02 RX ADMIN — Medication 1 ENEMA: at 13:00

## 2018-08-02 RX ADMIN — ANASTROZOLE 1 MILLIGRAM(S): 1 TABLET ORAL at 23:27

## 2018-08-02 RX ADMIN — Medication 500000 UNIT(S): at 23:29

## 2018-08-02 RX ADMIN — HEPARIN SODIUM 5000 UNIT(S): 5000 INJECTION INTRAVENOUS; SUBCUTANEOUS at 21:57

## 2018-08-02 RX ADMIN — SIMVASTATIN 10 MILLIGRAM(S): 20 TABLET, FILM COATED ORAL at 21:56

## 2018-08-02 RX ADMIN — LISINOPRIL 10 MILLIGRAM(S): 2.5 TABLET ORAL at 21:55

## 2018-08-02 RX ADMIN — LEVETIRACETAM 1500 MILLIGRAM(S): 250 TABLET, FILM COATED ORAL at 21:54

## 2018-08-02 NOTE — ED ADULT NURSE NOTE - NSIMPLEMENTINTERV_GEN_ALL_ED
Implemented All Fall Risk Interventions:  [place] to call system. Call bell, personal items and telephone within reach. Instruct patient to call for assistance. Room bathroom lighting operational. Non-slip footwear when patient is off stretcher. Physically safe environment: no spills, clutter or unnecessary equipment. Stretcher in lowest position, wheels locked, appropriate side rails in place. Provide visual cue, wrist band, yellow gown, etc. Monitor gait and stability. Monitor for mental status changes and reorient to person, place, and time. Review medications for side effects contributing to fall risk. Reinforce activity limits and safety measures with patient and family.

## 2018-08-02 NOTE — H&P ADULT - PROBLEM SELECTOR PLAN 1
s/p Right parietal craniotomy, not on therapy (consistent with goals of care)  MOLST form and HCP form completed ( Dr. Rashida Cortez is the HCP)  worsening pain and mental status   continue ativan for seizures  family seeking comfort care and hospice evaluation   Palliative care consult appreciated  spoke with Daughter state she has a meeting with the hospice team.

## 2018-08-02 NOTE — H&P ADULT - PROBLEM SELECTOR PLAN 4
s/p L radical mastectomy on anastrazole,  Per daughter anastrazole was stopped by Patient doctor   will hold off on anastrazole for now

## 2018-08-02 NOTE — ED ADULT NURSE REASSESSMENT NOTE - NS ED NURSE REASSESS COMMENT FT1
Patient assisted with bedpan.  Patient aware urine sample is needed and patient had urinated which was mixed with some stool.  Bed in lowest position and red socks on and yellow fall band on.

## 2018-08-02 NOTE — ED ADULT NURSE REASSESSMENT NOTE - NS ED NURSE REASSESS COMMENT FT1
Patient's daughter said patient usually takes Xanax at night and she would prefer patient get it at night because she has a tendency to get very anxious at bedtime.

## 2018-08-02 NOTE — ED PROVIDER NOTE - OBJECTIVE STATEMENT
EM PGY1 Amy Menendez MD: Pt is a 77 yo  female with PMH of HTN, HLD, breast ca s/p L radical mastectomy on anastrazole, R arterial thrombus, GBM s/p Right parietal craniotomy who presents with abdominal pain for 4-6 weeks (RLQ, intermittent, lasts for a second, non radiating, sharp). Last BM was this morning-stool was hard but nonbloody. Denies nausea, vomiting, fever, chills, chest pain, SOB. Denies dysuria, hematuria, hematochezia, BRBPR, tarry stools, diarrhea, constipation.

## 2018-08-02 NOTE — CONSULT NOTE ADULT - SUBJECTIVE AND OBJECTIVE BOX
HPI: 77F with PMH of HTN, HLD, breast CA (11/2017 s/p L mastectomy, on anastrazole), R arterial thrombus, recent diagnosis of GBM stage IV s/p chemo and radation, here with ___.    Last admitted in May for partial seizures, which eventually abated and patient was discharged on her home keppra dose and steroids.      Patient lives with her  and son who is mentally challenged at home. No history of smoking, alcohol use or illicit drug use. Walks with a half-walker and wheelchair.        PERTINENT PM/SXH:   Hyperlipidemia  Hypertension  Glioblastoma  Breast cancer    History of craniotomy  H/O breast surgery    FAMILY HISTORY:  No pertinent family history in first degree relatives    ITEMS NOT CHECKED ARE NOT PRESENT    SOCIAL HISTORY:   Significant other/partner:  [ ]    Children:  [ ]    Lutheran/Spirituality:  Substance hx:  [ ]   Tobacco hx:  [ ]   Alcohol hx: [ ] Drug use hx: [ ]  Home Opioid hx:  [ ] (I-Stop Reference No: )  Living Situation: [ ]Home  [ ]Long term care  [ ]Rehab [ ]Other  Occupation:    ADVANCE DIRECTIVES:    DNR [ ]  MOLST  [ ]    Living Will  [ ]   DECISION MAKER(s):  [ ] Health Care Proxy(s)  [ ] Surrogate(s)  [ ] Guardian           Name(s) and Contact(s): Daughter Rashida [phone]      BASELINE (I)ADL(s) (prior to admission):  [place]: [ ]Total  [ ] Moderate [ ]Dependent    Allergies    No Known Allergies    Intolerances    MEDICATIONS  (STANDING):    MEDICATIONS  (PRN):    PRESENT SYMPTOMS:   Source if other than patient:  [ ]Family   [ ]Team     Pain (Impact on QOL):    Location -         Minimal acceptable level (0-10 scale):                    Aggravating factors -  Quality -  Radiation -  Severity (0-10 scale) -    Timing -    PAIN AD Score:     [email or web address] (press ctrl +  left click to view)    Dyspnea:                           [ ]Mild [ ]Moderate [ ]Severe  Anxiety:                             [ ]Mild [ ]Moderate [ ]Severe  Fatigue:                             [ ]Mild [ ]Moderate [ ]Severe  Nausea:                             [ ]Mild [ ]Moderate [ ]Severe  Loss of appetite:              [ ]Mild [ ]Moderate [ ]Severe  Constipation:                    [ ]Mild [ ]Moderate [ ]Severe    Other Symptoms:  [ ]All other review of systems negative   [ ]Unable to obtain due to poor mentation     Karnofsky Performance Score/Palliative Performance Status Version 2:         %    PHYSICAL EXAM:  Vital Signs Last 24 Hrs  T(C): 37.7 (02 Aug 2018 10:50), Max: 37.7 (02 Aug 2018 10:50)  T(F): 99.8 (02 Aug 2018 10:50), Max: 99.8 (02 Aug 2018 10:50)  HR: 112 (02 Aug 2018 10:40) (112 - 112)  BP: 141/107 (02 Aug 2018 10:40) (140/90 - 141/107)  BP(mean): --  RR: 16 (02 Aug 2018 10:40) (16 - 17)  SpO2: 95% (02 Aug 2018 10:40) (95% - 95%) I&O's Summary      GENERAL:  [ ]Alert  [ ]Oriented x   [ ]Lethargic  [ ]Cachexia  [ ]Unarousable  [ ]Verbal  [ ]Non-Verbal    Behavioral:   [ ] Anxiety  [ ] Delirium [ ] Agitation [ ] Other    HEENT:  [ ]Normal   [ ]Dry mouth   [ ]ET Tube/Trach  [ ]Oral lesions    PULMONARY:   [ ]Clear [ ]Tachypnea  [ ]Audible excessive secretions   [ ]Rhonchi        [ ]Right [ ]Left [ ]Bilateral  [ ]Crackles        [ ]Right [ ]Left [ ]Bilateral  [ ]Wheezing     [ ]Right [ ]Left [ ]Bilateral    CARDIOVASCULAR:    [ ]Regular [ ]Irregular [ ]Tachy  [ ]Jarred [ ]Murmur [ ]Other    GASTROINTESTINAL:  [ ]Soft  [ ]Distended   [ ]+BS  [ ]Non tender [ ]Tender  [ ]PEG [ ]OGT/ NGT  Last BM:     GENITOURINARY:  [ ]Normal [ ] Incontinent   [ ]Oliguria/Anuria   [ ]Bar    MUSCULOSKELETAL:   [ ]Normal   [ ]Weakness  [ ]Bed/Wheelchair bound [ ]Edema    NEUROLOGIC:   [ ]No focal deficits  [ ] Cognitive impairment  [ ] Dysphagia [ ]Dysarthria [ ] Paresis [ ]Other     SKIN:   [ ]Normal   [ ]Pressure ulcer(s)  [ ]Rash    CRITICAL CARE:  [ ] Shock Present  [ ]Septic [ ]Cardiogenic [ ]Neurologic [ ]Hypovolemic  [ ]  Vasopressors [ ]  Inotropes   [ ] Respiratory failure present  [ ] Acute  [ ] Chronic [ ] Hypoxic  [ ] Hypercarbic [ ] Other  [ ] Other organ failure     LABS:                        11.9   12.6  )-----------( 387      ( 02 Aug 2018 12:07 )             35.5   08-02    133<L>  |  97  |  12  ----------------------------<  126<H>  3.9   |  21<L>  |  0.33<L>    Ca    8.8      02 Aug 2018 12:07    TPro  6.8  /  Alb  3.2<L>  /  TBili  0.6  /  DBili  x   /  AST  29  /  ALT  56<H>  /  AlkPhos  94  08-02        RADIOLOGY & ADDITIONAL STUDIES:    PROTEIN CALORIE MALNUTRITION:   [ ] PPSV2 < or = to 30% [ ] significant weight loss  [ ] poor nutritional intake [ ] catabolic state [ ] anasarca     Albumin, Serum: 3.2 g/dL (08-02-18 @ 12:07)  Artificial Nutrition [ ]     REFERRALS:   [ ]Chaplaincy  [ ] Hospice  [ ]Child Life  [ ]Social Work  [ ]Case management [ ]Holistic Therapy     Goals of Care Discussion Document: HPI: 77F with PMH of HTN, HLD, breast CA (11/2017 s/p L mastectomy, on anastrazole), R arterial thrombus, recent diagnosis of GBM stage IV s/p chemo and radation, here with abdominal pain. Has been having decreased stool frequency as well, but not constipated; daughter was concerned and brought her to ED. Last admitted in May for partial seizures, which eventually abated and patient was discharged on her home keppra dose and steroids.  Patient lives with her  and son who is mentally challenged at home. No history of smoking, alcohol use or illicit drug use. Does need considerable assistance at home from , who is in his 80s and having a difficult time keeping up with her care needs. Palliative care called to help with resources. MOLST and HCP forms completed per daughter, who will bring them in. Chemo has not been effective for her cancer, and she was on avastin, but caused increasing fatigue, and is now on hold. Followed with Dr. La for neuro-onc.     PERTINENT PM/SXH:   Hyperlipidemia  Hypertension  Glioblastoma  Breast cancer    History of craniotomy  H/O breast surgery    FAMILY HISTORY:  No pertinent family history in first degree relatives    ITEMS NOT CHECKED ARE NOT PRESENT    SOCIAL HISTORY:   Significant other/partner:  [X ]    Children:  [X ]    Islam/Spirituality:  Substance hx:  [ ]   Tobacco hx:  [ ]   Alcohol hx: [ ] Drug use hx: [ ]  Home Opioid hx:  [ ] (I-Stop Reference No: [id number])  Living Situation: [ X]Home  [ ]Long term care  [ ]Rehab [ ]Other  Occupation:    ADVANCE DIRECTIVES:    DNR [ ]  MOLST  [X ]    Living Will  [ ]   DECISION MAKER(s):  [ X] Health Care Proxy(s)  [ ] Surrogate(s)  [ ] Guardian           Name(s) and Contact(s): Daughter Rashida [phone]      BASELINE (I)ADL(s) (prior to admission):  [place]: [ ]Total  [ X] Moderate [ ]Dependent    Allergies    No Known Allergies    Intolerances    MEDICATIONS  (STANDING):    MEDICATIONS  (PRN):    PRESENT SYMPTOMS:   Source if other than patient:  [ ]Family   [ ]Team     Pain (Impact on QOL): none presently   Location -         Minimal acceptable level (0-10 scale):                    Aggravating factors -  Quality -  Radiation -  Severity (0-10 scale) -    Timing -    PAIN AD Score:     [email or web address] (press ctrl +  left click to view)    Dyspnea:                           [ ]Mild [ ]Moderate [ ]Severe  Anxiety:                             [ ]Mild [ ]Moderate [ ]Severe  Fatigue:                             [ ]Mild [X ]Moderate [ ]Severe  Nausea:                             [ ]Mild [ ]Moderate [ ]Severe  Loss of appetite:              [ ]Mild [ ]Moderate [ ]Severe  Constipation:                    [ ]Mild [ ]Moderate [ ]Severe    Other Symptoms:  [X ]All other review of systems negative   [ ]Unable to obtain due to poor mentation     Karnofsky Performance Score/Palliative Performance Status Version 2:         %    PHYSICAL EXAM:  Vital Signs Last 24 Hrs  T(C): 37.7 (02 Aug 2018 10:50), Max: 37.7 (02 Aug 2018 10:50)  T(F): 99.8 (02 Aug 2018 10:50), Max: 99.8 (02 Aug 2018 10:50)  HR: 112 (02 Aug 2018 10:40) (112 - 112)  BP: 141/107 (02 Aug 2018 10:40) (140/90 - 141/107)  BP(mean): --  RR: 16 (02 Aug 2018 10:40) (16 - 17)  SpO2: 95% (02 Aug 2018 10:40) (95% - 95%) I&O's Summary      GENERAL:  [X ]Alert  [ ]Oriented x   [ ]Lethargic  [ ]Cachexia  [ ]Unarousable  [ ]Verbal  [ ]Non-Verbal    Behavioral:   [ ] Anxiety  [ ] Delirium [ ] Agitation [ ] Other    HEENT:  [ ]Normal   [ ]Dry mouth   [ ]ET Tube/Trach  [ ]Oral lesions    PULMONARY:   [ X]Clear [ ]Tachypnea  [ ]Audible excessive secretions   [ ]Rhonchi        [ ]Right [ ]Left [ ]Bilateral  [ ]Crackles        [ ]Right [ ]Left [ ]Bilateral  [ ]Wheezing     [ ]Right [ ]Left [ ]Bilateral    CARDIOVASCULAR:    [X ]Regular [ ]Irregular [ ]Tachy  [ ]Jarred [ ]Murmur [ ]Other    GASTROINTESTINAL:  [ X]Soft  [ ]Distended   [X ]+BS  [X ]Non tender [ ]Tender  [ ]PEG [ ]OGT/ NGT  Last BM:     GENITOURINARY:  [ ]Normal [ ] Incontinent   [ ]Oliguria/Anuria   [ X] No patterson    MUSCULOSKELETAL:   [ ]Normal   [ ]Weakness  [ ]Bed/Wheelchair bound [ ]Edema    NEUROLOGIC:   [X ]No focal deficits  [ ] Cognitive impairment  [ ] Dysphagia [ ]Dysarthria [ ] Paresis [ ]Other     SKIN:   [ ]Normal   [ ]Pressure ulcer(s)  [ ]Rash    CRITICAL CARE:  [ ] Shock Present  [ ]Septic [ ]Cardiogenic [ ]Neurologic [ ]Hypovolemic  [ ]  Vasopressors [ ]  Inotropes   [ ] Respiratory failure present  [ ] Acute  [ ] Chronic [ ] Hypoxic  [ ] Hypercarbic [ ] Other  [ ] Other organ failure     LABS: reviewed                        11.9   12.6  )-----------( 387      ( 02 Aug 2018 12:07 )             35.5   08-02    133<L>  |  97  |  12  ----------------------------<  126<H>  3.9   |  21<L>  |  0.33<L>    Ca    8.8      02 Aug 2018 12:07    TPro  6.8  /  Alb  3.2<L>  /  TBili  0.6  /  DBili  x   /  AST  29  /  ALT  56<H>  /  AlkPhos  94  08-02        RADIOLOGY & ADDITIONAL STUDIES: CXR: Clear lungs.    PROTEIN CALORIE MALNUTRITION:   [ ] PPSV2 < or = to 30% [ ] significant weight loss  [ ] poor nutritional intake [ ] catabolic state [ ] anasarca     Albumin, Serum: 3.2 g/dL (08-02-18 @ 12:07)  Artificial Nutrition [ ]     REFERRALS:   [ ]Chaplaincy  [ ] Hospice  [ ]Child Life  [ ]Social Work  [ ]Case management [ ]Holistic Therapy     Goals of Care Discussion Document:

## 2018-08-02 NOTE — ED PROVIDER NOTE - ATTENDING CONTRIBUTION TO CARE
Pt here with  complaining of constipation with rectal urgency, worse with time, no assoc fever, vomiting.  Appears well, nontender abdomen.  Will perform rectal exam, fleet enema, screening labs, reassess.

## 2018-08-02 NOTE — ED ADULT NURSE REASSESSMENT NOTE - NS ED NURSE REASSESS COMMENT FT1
Patient had small BM.  Patient turned, cleaned and repositioned.  Bed in lowest position, fall band on and red socks on.

## 2018-08-02 NOTE — CONSULT NOTE ADULT - PROBLEM SELECTOR RECOMMENDATION 4
Daughter and patient are interested in home hospice services, referral made. She also needs evening aide support, which hospice typically does not provide. Medicaid application pending. Daughter willing to self-pay in the interim, would defer to case management to find agencies for evening aides to assist daughter. No major symptoms currently, would not be a PCU candidate. Hospice to likely see family/patient tomorrow. Will sign off for now, please re-consult PRN.

## 2018-08-02 NOTE — ED PROVIDER NOTE - MEDICAL DECISION MAKING DETAILS
EM PGY1 Amy Menendez MD: Pt is a 77 yo female with PMH breast and brain CA, HTN, HLD presenting with chronic abdominal pain. Pain was not present on physical exam. No other GI symptoms. Will treat with enema for possible constipation since patient has hard stools today.

## 2018-08-02 NOTE — ED PROVIDER NOTE - CARE PLAN
Principal Discharge DX:	Constipation  Secondary Diagnosis:	Glioblastoma Principal Discharge DX:	Constipation  Secondary Diagnosis:	Glioblastoma  Secondary Diagnosis:	Failure to thrive in adult

## 2018-08-02 NOTE — ED ADULT NURSE NOTE - OBJECTIVE STATEMENT
78 year old female alert arrived by EMS accompanied by  c/o RLQ pain.  Patient reports pain beginning intermittently today and had a formed BM today which is consistent with her normal BMs and denies blood in stool.  Patient's  said pain has been intermittently there for a few weeks.  Patient at baseline has left arm and leg weakness s/p brain ca (not currently on treatment).  Patient has no abdominal tenderness or pain on exam.  Patient denies; chest pain, shortness of breath, N/V, fevers, diarrhea.  Patient has petechial rash noted on abdomen which she says she has had since her surgery several months ago and she was on Heparin, but no longer on heparin.  Patient repositioned and red socks on and fall band on and bed in lowest position.  Family at bedside.

## 2018-08-02 NOTE — CONSULT NOTE ADULT - ASSESSMENT
77F with PMH of HTN, HLD, breast CA (11/2017 s/p L mastectomy, on anastrazole), R arterial thrombus, recent diagnosis of GBM stage IV s/p chemo and radation, here with abdominal pain. Has been having decreased stool frequency as well, but not constipated; daughter was concerned and brought her to ED. Last admitted in May for partial seizures, which eventually abated and patient was discharged on her home keppra dose and steroids.  Patient lives with her  and son who is mentally challenged at home. No history of smoking, alcohol use or illicit drug use. Does need considerable assistance at home from , who is in his 80s and having a difficult time keeping up with her care needs. Palliative care called to help with resources. MOLST and HCP forms completed per daughter, who will bring them in.

## 2018-08-02 NOTE — ED PROVIDER NOTE - PHYSICAL EXAMINATION
EM PGY1 Amy Menendez MD:   CONSTITUTIONAL: Nontoxic elderly female, in no acute respiratory distress  HEAD: Normocephalic; atraumatic  EYES: PERRLA, EOMI  ENMT: External appears normal; dry oropharynx, desquamating of tongue  NECK: Supple; non-tender; no cervical lymphadenopathy  CARD: Normal Sl, S2; no audible murmurs, rubs, or gallops  RESP: Breathing comfortably on RA, normal wob, lungs ctab/l, crackles at bases b/l  ABD: Soft, non-distended; non-tender; no rebound or guarding  EXT: 2+pedal edema b/l, normal ROM in all four extremities; non-tender to palpation; distal pulses intact  SKIN: Warm, dry, diffuse petechiae over abdomen  NEURO: aaox3, weakness of left upper and lower ext  RECTAL: external hemorrhoids, no BRBPR EM PGY1 Amy Menendez MD:   CONSTITUTIONAL: Nontoxic elderly female, in no acute respiratory distress  HEAD: Normocephalic; atraumatic  EYES: PERRLA, EOMI  ENMT: External appears normal; dry oropharynx, desquamating of tongue  NECK: Supple; non-tender; no cervical lymphadenopathy  CARD: Normal Sl, S2; no audible murmurs, rubs, or gallops  RESP: Breathing comfortably on RA, normal wob, lungs ctab/l, crackles at bases b/l  ABD: Soft, non-distended; non-tender; no rebound or guarding  EXT: 2+pedal edema b/l, normal ROM in all four extremities; non-tender to palpation; distal pulses intact  SKIN: Warm, dry, diffuse petechiae over abdomen  NEURO: aaox1, weakness of left upper and lower ext  RECTAL: external hemorrhoids, no BRBPR

## 2018-08-02 NOTE — H&P ADULT - NSHPLABSRESULTS_GEN_ALL_CORE
Labs personally reviewed:                11.9   12.6  )-----------( 387      ( 02 Aug 2018 12:07 )             35.5       08-02    133<L>  |  97  |  12  ----------------------------<  126<H>  3.9   |  21<L>  |  0.33<L>    Ca    8.8      02 Aug 2018 12:07    TPro  6.8  /  Alb  3.2<L>  /  TBili  0.6  /  DBili  x   /  AST  29  /  ALT  56<H>  /  AlkPhos  94  08-02      Imaging personally reviewed:    EKG personally reviewed:

## 2018-08-02 NOTE — CONSULT NOTE ADULT - PROBLEM SELECTOR RECOMMENDATION 9
Being treated with enema in ED; currently appears comfortable. May be due to slow-transit constipation, although she is having BMs, albeit change in consistency and frequency.

## 2018-08-02 NOTE — H&P ADULT - HISTORY OF PRESENT ILLNESS
77 yo  female with PMH of HTN, HLD, breast ca s/p L radical mastectomy on anastrazole, R arterial thrombus, GBM s/p Right parietal craniotomy, not on therapy (consistent with goals of care) who presents with abdominal pain for 4-6 weeks (RLQ, intermittent, lasts for a second, non radiating, sharp). Last BM was this morning-stool was hard but nonbloody. Denies nausea, vomiting, fever, chills, chest pain, SOB. Denies dysuria, hematuria, hematochezia, BRBPR, tarry stools, diarrhea, constipation.    Spoke with Daughter Rashida over the phone, the patient was brought in due to significant abd pain, and seeking comfort care.

## 2018-08-02 NOTE — ED PROVIDER NOTE - PROGRESS NOTE DETAILS
EM PGY1 Amy Menendez MD: Pt has no pain, resting comfortably. Pt's daughter called asking to put pt in hospice care because the  has difficulty taking care of pt. States that pt has been more confused and bed bound recently. Attending will speak to daughter on the phone. Dr. Coughlin Note: attempted to call daughter, no answer, no VM.  Will try to elicit more information and call pt's PCP.  Unclear dispo currently. EM PGY1 Amy Menendez MD: Pt had a BM s/p enema. Still no abdominal pain. Dr. Coughlin Note: spoke at length with pt's daughter.  Pt was receiving medication for GBM but was having side effects so this was discontinued and daughter was trying to get home care.  Pt is now having worsening mental status with confusion, agitated at night, and  cannot care for patient anymore.  No services available for patient.  Goals of care discussed: wants comfort and quality over quantity of life, no aggressive measures, DNR and DNI, and request possible hospice care.  Called palliative, will consult but not emergently, cannot place patient today.  Will admit to medicine for placement and encephalopathy.  In meantime, pt's initial complaint of abdominal pain is resolved after enema, nontender, pt eating now, no further interventions for this.  Also will call pt's oncologist ([facility]). EM PGY1 Amy Menendez MD: Oncologist Resident paged. EM PGY1 Amy Menendez MD: Spoke to hospitalist on the phone. Admitted for encephalopathy and failure to thrive. Will speak to . Oncology has not returned page yet.

## 2018-08-02 NOTE — ED ADULT NURSE REASSESSMENT NOTE - NS ED NURSE REASSESS COMMENT FT1
MD Ely said patient can eat and patient given a sandwich.  Patient positioned upright and bed in lowest position, fall band on and red socks on.  Family at bedside.

## 2018-08-03 DIAGNOSIS — B37.0 CANDIDAL STOMATITIS: ICD-10-CM

## 2018-08-03 LAB
ANION GAP SERPL CALC-SCNC: 14 MMOL/L — SIGNIFICANT CHANGE UP (ref 5–17)
ANION GAP SERPL CALC-SCNC: 15 MMOL/L — SIGNIFICANT CHANGE UP (ref 5–17)
BASOPHILS # BLD AUTO: 0 K/UL — SIGNIFICANT CHANGE UP (ref 0–0.2)
BASOPHILS NFR BLD AUTO: 0.1 % — SIGNIFICANT CHANGE UP (ref 0–2)
BUN SERPL-MCNC: 13 MG/DL — SIGNIFICANT CHANGE UP (ref 7–23)
BUN SERPL-MCNC: 14 MG/DL — SIGNIFICANT CHANGE UP (ref 7–23)
CALCIUM SERPL-MCNC: 8.9 MG/DL — SIGNIFICANT CHANGE UP (ref 8.4–10.5)
CALCIUM SERPL-MCNC: 8.9 MG/DL — SIGNIFICANT CHANGE UP (ref 8.4–10.5)
CHLORIDE SERPL-SCNC: 100 MMOL/L — SIGNIFICANT CHANGE UP (ref 96–108)
CHLORIDE SERPL-SCNC: 99 MMOL/L — SIGNIFICANT CHANGE UP (ref 96–108)
CO2 SERPL-SCNC: 20 MMOL/L — LOW (ref 22–31)
CO2 SERPL-SCNC: 25 MMOL/L — SIGNIFICANT CHANGE UP (ref 22–31)
CREAT SERPL-MCNC: 0.3 MG/DL — LOW (ref 0.5–1.3)
CREAT SERPL-MCNC: <0.3 MG/DL — LOW (ref 0.5–1.3)
EOSINOPHIL # BLD AUTO: 0 K/UL — SIGNIFICANT CHANGE UP (ref 0–0.5)
EOSINOPHIL NFR BLD AUTO: 0.3 % — SIGNIFICANT CHANGE UP (ref 0–6)
GLUCOSE SERPL-MCNC: 221 MG/DL — HIGH (ref 70–99)
GLUCOSE SERPL-MCNC: 88 MG/DL — SIGNIFICANT CHANGE UP (ref 70–99)
HCT VFR BLD CALC: 33 % — LOW (ref 34.5–45)
HGB BLD-MCNC: 11.5 G/DL — SIGNIFICANT CHANGE UP (ref 11.5–15.5)
LYMPHOCYTES # BLD AUTO: 0.7 K/UL — LOW (ref 1–3.3)
LYMPHOCYTES # BLD AUTO: 6.6 % — LOW (ref 13–44)
MAGNESIUM SERPL-MCNC: 1.8 MG/DL — SIGNIFICANT CHANGE UP (ref 1.6–2.6)
MAGNESIUM SERPL-MCNC: 1.9 MG/DL — SIGNIFICANT CHANGE UP (ref 1.6–2.6)
MCHC RBC-ENTMCNC: 30.3 PG — SIGNIFICANT CHANGE UP (ref 27–34)
MCHC RBC-ENTMCNC: 34.8 GM/DL — SIGNIFICANT CHANGE UP (ref 32–36)
MCV RBC AUTO: 87.3 FL — SIGNIFICANT CHANGE UP (ref 80–100)
MONOCYTES # BLD AUTO: 0.3 K/UL — SIGNIFICANT CHANGE UP (ref 0–0.9)
MONOCYTES NFR BLD AUTO: 3 % — SIGNIFICANT CHANGE UP (ref 2–14)
NEUTROPHILS # BLD AUTO: 9 K/UL — HIGH (ref 1.8–7.4)
NEUTROPHILS NFR BLD AUTO: 89.9 % — HIGH (ref 43–77)
PHOSPHATE SERPL-MCNC: 3 MG/DL — SIGNIFICANT CHANGE UP (ref 2.5–4.5)
PLATELET # BLD AUTO: 368 K/UL — SIGNIFICANT CHANGE UP (ref 150–400)
POTASSIUM SERPL-MCNC: 3.1 MMOL/L — LOW (ref 3.5–5.3)
POTASSIUM SERPL-MCNC: 5.1 MMOL/L — SIGNIFICANT CHANGE UP (ref 3.5–5.3)
POTASSIUM SERPL-SCNC: 3.1 MMOL/L — LOW (ref 3.5–5.3)
POTASSIUM SERPL-SCNC: 5.1 MMOL/L — SIGNIFICANT CHANGE UP (ref 3.5–5.3)
RBC # BLD: 3.78 M/UL — LOW (ref 3.8–5.2)
RBC # FLD: 15 % — HIGH (ref 10.3–14.5)
SODIUM SERPL-SCNC: 135 MMOL/L — SIGNIFICANT CHANGE UP (ref 135–145)
SODIUM SERPL-SCNC: 138 MMOL/L — SIGNIFICANT CHANGE UP (ref 135–145)
WBC # BLD: 10 K/UL — SIGNIFICANT CHANGE UP (ref 3.8–10.5)
WBC # FLD AUTO: 10 K/UL — SIGNIFICANT CHANGE UP (ref 3.8–10.5)

## 2018-08-03 PROCEDURE — 99232 SBSQ HOSP IP/OBS MODERATE 35: CPT | Mod: GC

## 2018-08-03 RX ORDER — LEVETIRACETAM 250 MG/1
750 TABLET, FILM COATED ORAL
Qty: 0 | Refills: 0 | Status: DISCONTINUED | OUTPATIENT
Start: 2018-08-03 | End: 2018-08-03

## 2018-08-03 RX ORDER — ANASTROZOLE 1 MG/1
1 TABLET ORAL AT BEDTIME
Qty: 0 | Refills: 0 | Status: DISCONTINUED | OUTPATIENT
Start: 2018-08-03 | End: 2018-08-06

## 2018-08-03 RX ORDER — ALPRAZOLAM 0.25 MG
0.25 TABLET ORAL THREE TIMES A DAY
Qty: 0 | Refills: 0 | Status: DISCONTINUED | OUTPATIENT
Start: 2018-08-03 | End: 2018-08-04

## 2018-08-03 RX ORDER — POTASSIUM CHLORIDE 20 MEQ
20 PACKET (EA) ORAL
Qty: 0 | Refills: 0 | Status: COMPLETED | OUTPATIENT
Start: 2018-08-03 | End: 2018-08-03

## 2018-08-03 RX ORDER — ALPRAZOLAM 0.25 MG
1 TABLET ORAL
Qty: 0 | Refills: 0 | COMMUNITY

## 2018-08-03 RX ORDER — DEXAMETHASONE 0.5 MG/5ML
3 ELIXIR ORAL
Qty: 0 | Refills: 0 | COMMUNITY

## 2018-08-03 RX ORDER — DEXAMETHASONE 0.5 MG/5ML
1 ELIXIR ORAL
Qty: 0 | Refills: 0 | COMMUNITY

## 2018-08-03 RX ORDER — LEVETIRACETAM 250 MG/1
1500 TABLET, FILM COATED ORAL
Qty: 0 | Refills: 0 | Status: DISCONTINUED | OUTPATIENT
Start: 2018-08-03 | End: 2018-08-06

## 2018-08-03 RX ADMIN — HEPARIN SODIUM 5000 UNIT(S): 5000 INJECTION INTRAVENOUS; SUBCUTANEOUS at 06:01

## 2018-08-03 RX ADMIN — Medication 500000 UNIT(S): at 23:25

## 2018-08-03 RX ADMIN — SIMVASTATIN 10 MILLIGRAM(S): 20 TABLET, FILM COATED ORAL at 21:35

## 2018-08-03 RX ADMIN — Medication 20 MILLIEQUIVALENT(S): at 14:34

## 2018-08-03 RX ADMIN — Medication 500000 UNIT(S): at 11:32

## 2018-08-03 RX ADMIN — LEVETIRACETAM 1500 MILLIGRAM(S): 250 TABLET, FILM COATED ORAL at 17:11

## 2018-08-03 RX ADMIN — Medication 0.25 MILLIGRAM(S): at 16:18

## 2018-08-03 RX ADMIN — Medication 12.5 MILLIGRAM(S): at 06:01

## 2018-08-03 RX ADMIN — Medication 12.5 MILLIGRAM(S): at 17:11

## 2018-08-03 RX ADMIN — HEPARIN SODIUM 5000 UNIT(S): 5000 INJECTION INTRAVENOUS; SUBCUTANEOUS at 14:31

## 2018-08-03 RX ADMIN — ANASTROZOLE 1 MILLIGRAM(S): 1 TABLET ORAL at 21:36

## 2018-08-03 RX ADMIN — Medication 0.5 MILLIGRAM(S): at 14:03

## 2018-08-03 RX ADMIN — Medication 500000 UNIT(S): at 17:13

## 2018-08-03 RX ADMIN — Medication 4 MILLIGRAM(S): at 06:01

## 2018-08-03 RX ADMIN — Medication 0.5 MILLIGRAM(S): at 14:29

## 2018-08-03 RX ADMIN — HEPARIN SODIUM 5000 UNIT(S): 5000 INJECTION INTRAVENOUS; SUBCUTANEOUS at 21:35

## 2018-08-03 RX ADMIN — Medication 500000 UNIT(S): at 06:04

## 2018-08-03 RX ADMIN — Medication 20 MILLIEQUIVALENT(S): at 09:35

## 2018-08-03 RX ADMIN — LEVETIRACETAM 1500 MILLIGRAM(S): 250 TABLET, FILM COATED ORAL at 06:03

## 2018-08-03 RX ADMIN — Medication 81 MILLIGRAM(S): at 11:31

## 2018-08-03 RX ADMIN — LISINOPRIL 10 MILLIGRAM(S): 2.5 TABLET ORAL at 06:01

## 2018-08-03 RX ADMIN — Medication 0.25 MILLIGRAM(S): at 23:24

## 2018-08-03 RX ADMIN — Medication 20 MILLIEQUIVALENT(S): at 11:31

## 2018-08-03 RX ADMIN — PANTOPRAZOLE SODIUM 40 MILLIGRAM(S): 20 TABLET, DELAYED RELEASE ORAL at 06:01

## 2018-08-03 NOTE — PROGRESS NOTE ADULT - PROBLEM SELECTOR PLAN 5
c/w metoprolol 12.5 BID s/p L radical mastectomy s/p anastrazole  - Per daughter anastrazole was stopped by Patient doctor   - continue to hold anastrazole

## 2018-08-03 NOTE — PHYSICAL THERAPY INITIAL EVALUATION ADULT - ADDITIONAL COMMENTS
Pt is minimally ambulatory at home. has all necessary equipment and will have assistance at all times in the home when discharged.  as per CSM Garcia.  pt to start home hospice in 2 weeks will go home initially with private aides.

## 2018-08-03 NOTE — PROGRESS NOTE ADULT - ATTENDING COMMENTS
Agree.  Abdominal pain resolved, nontoxic appearing, tolerating PO well.  No more labs please. Await dispo. Appreciate hospice eval.

## 2018-08-03 NOTE — PROGRESS NOTE ADULT - SUBJECTIVE AND OBJECTIVE BOX
Dr. Ave Leroy, PGY1  [phone]    MAGALI HUGHES  78y  MRN: [id number]    Subjective:  Patient is a 78y old  Female who presents with a chief complaint of Sent in by neurologist (02 Aug 2018 19:18). Patient presented with abdominal pain in brief episodes for the last 4 weeks. Patient states she would like to go home soon but is content to rest here for now. Patient moved her bowels yesterday once and then again "just a cap-full" this morning. Patient denies N/V/D, denies CP/SOB, denies pain, endorses good appetite, endorses good sleep.       Overnight:   No acute events      ROS:  CONSTITUTIONAL: No fever, chills  EYES: No eye pain or discharge  ENMT: No sinus or throat pain  NECK: No neck pain or stiffness  RESPIRATORY: No cough, wheezing, chills, or hemoptysis; No shortness of breath  CARDIOVASCULAR: No chest pain, palpitations, dizziness, or leg swelling  GASTROINTESTINAL: No abdominal or epigastric pain. No nausea, vomiting, or hematemesis; No diarrhea; No melena or hematochezia; +constipation   NEUROLOGICAL: No headaches, no numbness; +loss of strength  SKIN: No rashes or pruritis  MUSCULOSKELETAL: No joint pain or swelling; No muscle, back, or extremity pain  HEME/LYMPH: No easy bruising or bleeding gums       MEDICATIONS  (STANDING):  aspirin enteric coated 81 milliGRAM(s) Oral daily  dexamethasone     Tablet 4 milliGRAM(s) Oral daily  heparin  Injectable 5000 Unit(s) SubCutaneous every 8 hours  levETIRAcetam 1500 milliGRAM(s) Oral two times a day  lisinopril 10 milliGRAM(s) Oral daily  metoprolol tartrate 12.5 milliGRAM(s) Oral two times a day  nystatin    Suspension [id number] Unit(s) Oral four times a day  pantoprazole    Tablet 40 milliGRAM(s) Oral before breakfast  simvastatin 10 milliGRAM(s) Oral at bedtime    MEDICATIONS  (PRN):        Objective:    Vitals: Vital Signs Last 24 Hrs  T(C): 36.4 (08-03-18 @ 04:25), Max: 37.7 (08-02-18 @ 10:50)  T(F): 97.6 (08-03-18 @ 04:25), Max: 99.8 (08-02-18 @ 10:50)  HR: 104 (08-03-18 @ 04:25) (104 - 127)  BP: 132/91 (08-03-18 @ 04:25) (109/83 - 141/107)  BP(mean): 94 (08-02-18 @ 18:51) (94 - 94)  RR: 17 (08-03-18 @ 04:25) (16 - 17)  SpO2: 95% (08-03-18 @ 04:25) (95% - 98%)                  PHYSICAL EXAM:  GENERAL: NAD, well-groomed, well-developed  HEAD: s/p right hemicraniotomy  EYES: EOMI, PERRLA, conjunctiva and sclera clear  ENMT: No tonsillar erythema, exudates, or enlargement; Moist mucous membranes, +thrush   NECK: Supple, full ROM  CHEST/LUNG: Clear to auscultation bilaterally; No rales, rhonchi, wheezing, or rubs  HEART: Regular rate and rhythm; No murmurs, rubs, or gallops  ABDOMEN: Soft, Nontender, Nondistended; Bowel sounds present  EXTREMITIES:  2+ Peripheral Pulses, No clubbing, cyanosis, or edema  LYMPH: No lymphadenopathy noted  SKIN: No rashes or lesions  NERVOUS SYSTEM:  Alert & Oriented X3, Good concentration; Motor Strength 4/5 B/L upper and lower extremities                                     LABS:  08-02    133<L>  |  97  |  12  ----------------------------<  126<H>  3.9   |  21<L>  |  0.33<L>    Ca    8.8      02 Aug 2018 12:07    TPro  6.8  /  Alb  3.2<L>  /  TBili  0.6  /  DBili  x   /  AST  29  /  ALT  56<H>  /  AlkPhos  94  08-02                                          11.5   10.0  )-----------( 368      ( 03 Aug 2018 08:17 )             33.0                         11.9   12.6  )-----------( 387      ( 02 Aug 2018 12:07 )             35.5     CAPILLARY BLOOD GLUCOSE  N/A      RADIOLOGY & ADDITIONAL TESTS:  Xray Kidney Ureter Bladder (08.02.18 @ 15:02)  FINDINGS:  Nonobstructive bowel gas pattern. Nondilated air-filled large bowel is   seen. Stool is seen in the ascending colon.  The visualized osseous structures demonstrate no acute pathology.   Costochondral calcifications are noted. There are degenerative changes of   the spine.    IMPRESSION:   Nonobstructive bowel gas pattern without evidence of free air.      Xray Chest 1 View AP/PA (08.02.18 @ 15:02)  IMPRESSION:   Clear lungs.      Imaging Personally Reviewed:  [ ] YES  [ ] NO      Consultants involved in case:   Palliative Care:   Daughter and patient are interested in home hospice services, referral made. She also needs evening aide support, which hospice typically does not provide. Medicaid application pending. Daughter willing to self-pay in the interim, would defer to case management to find agencies for evening aides to assist daughter. No major symptoms currently, would not be a PCU candidate. Hospice to likely see family/patient tomorrow. Will sign off for now, please re-consult PRN.       Consultant(s) Notes Reviewed:  [ ] YES  [ ] NO:   Care Discussed with Consultants/Other Providers [ ] YES  [ ]

## 2018-08-03 NOTE — PHYSICAL THERAPY INITIAL EVALUATION ADULT - BALANCE TRAINING, PT EVAL
GOAL: Pt will improve her balance during (static/dynamic) (sitting/standing) activites by atleast 1 balance grade within 3-4 weeks to assist with greater independence during functional mobility and ADL's.

## 2018-08-03 NOTE — PROGRESS NOTE ADULT - PROBLEM SELECTOR PLAN 2
resolved since ED   - s/p enema  - now with 2x bowel movements oral thrush noted on exam  - nystatin suspension swish and swallow

## 2018-08-03 NOTE — PROGRESS NOTE ADULT - PROBLEM SELECTOR PLAN 3
mild, likely due to poor PO intake   - encourage PO fluid hydration   - f/u BMP resolved since ED   - s/p enema  - now with 2x bowel movements

## 2018-08-03 NOTE — PHYSICAL THERAPY INITIAL EVALUATION ADULT - PERTINENT HX OF CURRENT PROBLEM, REHAB EVAL
79 y/o  F w/ PMH of HTN, HLD, breast ca s/p L radical mastectomy on anastrazole, R arterial thrombus, GBM s/p Right parietal craniotomy, not on therapy (consistent with goals of care), seizures who presents with abdominal pain for 4-6 weeks (RLQ, intermittent, lasts for a second, non radiating, sharp).  CXR: clear lungs, abd xray: nonobstructive bowel pattern no free air.

## 2018-08-03 NOTE — PROGRESS NOTE ADULT - PROBLEM SELECTOR PLAN 4
s/p L radical mastectomy s/p anastrazole  - Per daughter anastrazole was stopped by Patient doctor   - continue to hold anastrazole mild, likely due to poor PO intake   - encourage PO fluid hydration   - f/u BMP

## 2018-08-03 NOTE — GOALS OF CARE CONVERSATION - PERSONAL ADVANCE DIRECTIVE - CONVERSATION DETAILS
Patient is in process of applying for medicaid and is almost complete.  At this time, she and her daughter decided to wait until medicaid is fully active so that she can get increased aide hours and will call from community to discuss hospice referral when ready.  All information provided.  Please call [phone] with any questions or concerns.

## 2018-08-04 ENCOUNTER — TRANSCRIPTION ENCOUNTER (OUTPATIENT)
Age: 78
End: 2018-08-04

## 2018-08-04 PROCEDURE — 99232 SBSQ HOSP IP/OBS MODERATE 35: CPT | Mod: GC

## 2018-08-04 PROCEDURE — 93010 ELECTROCARDIOGRAM REPORT: CPT

## 2018-08-04 RX ORDER — ALPRAZOLAM 0.25 MG
0.5 TABLET ORAL THREE TIMES A DAY
Qty: 0 | Refills: 0 | Status: DISCONTINUED | OUTPATIENT
Start: 2018-08-04 | End: 2018-08-06

## 2018-08-04 RX ORDER — ALPRAZOLAM 0.25 MG
0.25 TABLET ORAL ONCE
Qty: 0 | Refills: 0 | Status: DISCONTINUED | OUTPATIENT
Start: 2018-08-04 | End: 2018-08-04

## 2018-08-04 RX ORDER — ALPRAZOLAM 0.25 MG
0.5 TABLET ORAL ONCE
Qty: 0 | Refills: 0 | Status: DISCONTINUED | OUTPATIENT
Start: 2018-08-04 | End: 2018-08-04

## 2018-08-04 RX ADMIN — LEVETIRACETAM 1500 MILLIGRAM(S): 250 TABLET, FILM COATED ORAL at 05:10

## 2018-08-04 RX ADMIN — Medication 12.5 MILLIGRAM(S): at 05:22

## 2018-08-04 RX ADMIN — Medication 4 MILLIGRAM(S): at 05:10

## 2018-08-04 RX ADMIN — Medication 0.25 MILLIGRAM(S): at 01:59

## 2018-08-04 RX ADMIN — Medication 500000 UNIT(S): at 22:30

## 2018-08-04 RX ADMIN — Medication 0.5 MILLIGRAM(S): at 19:40

## 2018-08-04 RX ADMIN — HEPARIN SODIUM 5000 UNIT(S): 5000 INJECTION INTRAVENOUS; SUBCUTANEOUS at 13:11

## 2018-08-04 RX ADMIN — HEPARIN SODIUM 5000 UNIT(S): 5000 INJECTION INTRAVENOUS; SUBCUTANEOUS at 22:30

## 2018-08-04 RX ADMIN — HEPARIN SODIUM 5000 UNIT(S): 5000 INJECTION INTRAVENOUS; SUBCUTANEOUS at 05:22

## 2018-08-04 RX ADMIN — Medication 81 MILLIGRAM(S): at 11:14

## 2018-08-04 RX ADMIN — PANTOPRAZOLE SODIUM 40 MILLIGRAM(S): 20 TABLET, DELAYED RELEASE ORAL at 05:24

## 2018-08-04 RX ADMIN — SIMVASTATIN 10 MILLIGRAM(S): 20 TABLET, FILM COATED ORAL at 22:30

## 2018-08-04 RX ADMIN — ANASTROZOLE 1 MILLIGRAM(S): 1 TABLET ORAL at 22:30

## 2018-08-04 RX ADMIN — Medication 500000 UNIT(S): at 11:14

## 2018-08-04 RX ADMIN — Medication 0.25 MILLIGRAM(S): at 11:14

## 2018-08-04 RX ADMIN — LISINOPRIL 10 MILLIGRAM(S): 2.5 TABLET ORAL at 05:22

## 2018-08-04 RX ADMIN — Medication 12.5 MILLIGRAM(S): at 17:22

## 2018-08-04 RX ADMIN — Medication 500000 UNIT(S): at 05:23

## 2018-08-04 RX ADMIN — Medication 0.25 MILLIGRAM(S): at 02:34

## 2018-08-04 RX ADMIN — Medication 500000 UNIT(S): at 17:22

## 2018-08-04 RX ADMIN — LEVETIRACETAM 1500 MILLIGRAM(S): 250 TABLET, FILM COATED ORAL at 17:22

## 2018-08-04 NOTE — DISCHARGE NOTE ADULT - MEDICATION SUMMARY - MEDICATIONS TO TAKE
I will START or STAY ON the medications listed below when I get home from the hospital:    dexamethasone 1 mg oral tablet  -- 4 tab(s) by mouth once a day in the morning  -- Indication: For Glioblastoma    aspirin 81 mg oral delayed release tablet  -- 1 tab(s) by mouth once a day  -- Indication: For Prophylactic measure    lisinopril 10 mg oral tablet  -- 1 tab(s) by mouth once a day  -- Indication: For Hypertension    levETIRAcetam 750 mg oral tablet  -- 2 tab(s) by mouth 2 times a day  -- Indication: For Seizures    nystatin 100,000 units/mL oral suspension  -- 5 milliliter(s) by mouth 4 times a day  -- Indication: For Thrush, oral    simvastatin 10 mg oral tablet  -- 1 tab(s) by mouth once a day (at bedtime)  -- Indication: For Hyperlipemia    anastrozole 1 mg oral tablet  -- 1 tab(s) by mouth once a day (at bedtime)  -- Indication: For Breast CA    ALPRAZolam 0.25 mg oral tablet  -- 1 tab(s) by mouth 3 times a day, As Needed  -- Indication: For Anxiety    metoprolol tartrate 25 mg oral tablet  -- 0.5 tab(s) (12.5mg) by mouth 2 times a day  -- Indication: For Hypertension    pantoprazole 40 mg oral delayed release tablet  -- 1 tab(s) by mouth once a day  -- Indication: For Gerd

## 2018-08-04 NOTE — DISCHARGE NOTE ADULT - CARE PROVIDER_API CALL
Idris Dumont), Internal Medicine  [address] [place] [address]  Phone: [phone]  Fax: [phone]

## 2018-08-04 NOTE — DISCHARGE NOTE ADULT - MEDICATION SUMMARY - MEDICATIONS TO STOP TAKING
I will STOP taking the medications listed below when I get home from the hospital:    Ativan 0.5 mg oral tablet  -- Take one tablet if you develop sz like activity of your left arm. MDD:1  -- Caution federal law prohibits the transfer of this drug to any person other  than the person for whom it was prescribed.  Do not take this drug if you are pregnant.  May cause drowsiness.  Alcohol may intensify this effect.  Use care when operating dangerous machinery. I will STOP taking the medications listed below when I get home from the hospital:    dexamethasone 4 mg oral tablet  -- 1 tab(s) by mouth once a day    ALPRAZolam 2 mg oral tablet  -- 1 tab(s) by mouth 3 times a day

## 2018-08-04 NOTE — DISCHARGE NOTE ADULT - CONDITION (STATED IN TERMS THAT PERMIT A SPECIFIC MEASURABLE COMPARISON WITH CONDITION ON ADMISSION):
On the day of discharge patient is hemodynamically and medically stable for discharge to home with services.

## 2018-08-04 NOTE — DISCHARGE NOTE ADULT - PLAN OF CARE
Initiation of home services You were admitted to the hospital to discuss goals of care and acquiring home services for the future. You, your daughter, the palliative care doctor, and the hospice team discussed a plan for safe discharge and how to set up home care services. Resolution of symptoms While you were in the hospital it was noted that you have a white film in your mouth called thrush, an oral infection. Please continue to use the Nystatin swish-and-swallow medication you were taking in the hospital as directed. Please follow up with your primary care physician within two weeks of discharge to discuss your recent hospitalization. Return to baseline You were admitted to the hospital with abdominal pain. An X-ray and CT scan of your abdomen showed that you have nothing obstructing your bowels and no acute issues to be addressed during this hospital stay. If you experience recurrence or worsening of your symptoms, please seek urgent medical attention. Please also follow up with your primary care physician within two weeks of discharge to discuss your recent hospitalization.

## 2018-08-04 NOTE — PROGRESS NOTE ADULT - SUBJECTIVE AND OBJECTIVE BOX
Dr. Ave Leroy, PGY1  [phone]    MAGALI HUGHES  78y  MRN: [id number]    Subjective:  Patient is a 78y old  Female who presents with a chief complaint of Sent in by neurologist (02 Aug 2018 19:18)      Overnight:   No acute events.      ROS:  CONSTITUTIONAL: No fever, chills  EYES: No eye pain or discharge  ENMT: No sinus or throat pain  NECK: No neck pain or stiffness  RESPIRATORY: No cough, wheezing, chills, or hemoptysis; No shortness of breath  CARDIOVASCULAR: No chest pain, palpitations, dizziness, or leg swelling  GASTROINTESTINAL: No abdominal or epigastric pain. No nausea, vomiting, or hematemesis; No diarrhea; No melena or hematochezia; +constipation   NEUROLOGICAL: No headaches, no numbness; +loss of strength  SKIN: No rashes or pruritis  MUSCULOSKELETAL: No joint pain or swelling; No muscle, back, or extremity pain  HEME/LYMPH: No easy bruising or bleeding gums       MEDICATIONS  (STANDING):  anastrozole 1 milliGRAM(s) Oral at bedtime  aspirin enteric coated 81 milliGRAM(s) Oral daily  dexamethasone     Tablet 4 milliGRAM(s) Oral daily  heparin  Injectable 5000 Unit(s) SubCutaneous every 8 hours  levETIRAcetam 1500 milliGRAM(s) Oral two times a day  lisinopril 10 milliGRAM(s) Oral daily  metoprolol tartrate 12.5 milliGRAM(s) Oral two times a day  nystatin    Suspension 294793 Unit(s) Oral four times a day  pantoprazole    Tablet 40 milliGRAM(s) Oral before breakfast  simvastatin 10 milliGRAM(s) Oral at bedtime    MEDICATIONS  (PRN):  ALPRAZolam 0.25 milliGRAM(s) Oral three times a day PRN anxiety        Objective:    Vitals: Vital Signs Last 24 Hrs  T(C): 36.7 (08-04-18 @ 04:11), Max: 36.7 (08-03-18 @ 17:05)  T(F): 98.1 (08-04-18 @ 04:11), Max: 98.1 (08-04-18 @ 04:11)  HR: 112 (08-04-18 @ 04:11) (106 - 130)  BP: 124/81 (08-04-18 @ 04:11) (113/80 - 132/79)  BP(mean): --  RR: 20 (08-04-18 @ 04:11) (16 - 20)  SpO2: 98% (08-04-18 @ 04:11) (95% - 98%)            I&O's Summary        PHYSICAL EXAM:  GENERAL: NAD, well-groomed, well-developed  HEAD: s/p right hemicraniotomy  EYES: EOMI, PERRLA, conjunctiva and sclera clear  ENMT: No tonsillar erythema, exudates, or enlargement; Moist mucous membranes, +thrush   NECK: Supple, full ROM  CHEST/LUNG: Clear to auscultation bilaterally; No rales, rhonchi, wheezing, or rubs  HEART: Regular rate and rhythm; No murmurs, rubs, or gallops  ABDOMEN: Soft, Nontender, Nondistended; Bowel sounds present  EXTREMITIES:  2+ Peripheral Pulses, No clubbing, cyanosis, or edema  LYMPH: No lymphadenopathy noted  SKIN: No rashes or lesions  NERVOUS SYSTEM:  Alert & Oriented X3, Good concentration; Motor Strength 4/5 B/L upper and lower extremities                                                LABS:  08-03    135  |  100  |  14  ----------------------------<  221<H>  5.1   |  20<L>  |  0.30<L>  08-03    138  |  99  |  13  ----------------------------<  88  3.1<L>   |  25  |  <0.30<L>  08-02    133<L>  |  97  |  12  ----------------------------<  126<H>  3.9   |  21<L>  |  0.33<L>    Ca    8.9      03 Aug 2018 16:43  Ca    8.9      03 Aug 2018 08:17  Ca    8.8      02 Aug 2018 12:07  Phos  3.0     08-03  Mg     1.8     08-03    TPro  6.6  /  Alb  3.1<L>  /  TBili  0.5  /  DBili  <0.1  /  AST  26  /  ALT  47<H>  /  AlkPhos  90  08-03  TPro  6.8  /  Alb  3.2<L>  /  TBili  0.6  /  DBili  x   /  AST  29  /  ALT  56<H>  /  AlkPhos  94  08-02                                        11.5   10.0  )-----------( 368      ( 03 Aug 2018 08:17 )             33.0                         11.9   12.6  )-----------( 387      ( 02 Aug 2018 12:07 )             35.5     CAPILLARY BLOOD GLUCOSE  N/A        RADIOLOGY & ADDITIONAL TESTS:  Xray Kidney Ureter Bladder (08.02.18 @ 15:02)  FINDINGS:  Nonobstructive bowel gas pattern. Nondilated air-filled large bowel is   seen. Stool is seen in the ascending colon.  The visualized osseous structures demonstrate no acute pathology.   Costochondral calcifications are noted. There are degenerative changes of   the spine.    IMPRESSION:   Nonobstructive bowel gas pattern without evidence of free air.    Xray Chest 1 View AP/PA (08.02.18 @ 15:02)  IMPRESSION:   Clear lungs.      Imaging Personally Reviewed:  [ ] YES  [ ] NO      Consultants involved in case:   Palliative:  Patient is in process of applying for medicaid and is almost complete.  At this time, she and her daughter decided to wait until medicaid is fully active so that she can get increased aide hours and will call from community to discuss hospice referral when ready.  All information provided.  Please call [facility] [phone] with any questions or concerns.    Physical Therapy   Discharge planning:  as per CSM Radha pt has all necessary equipment	  home w/ home PT	      Consultant(s) Notes Reviewed:  [x] YES  [ ] NO:   Care Discussed with Consultants/Other Providers [ ] YES  [ ] Dr. Ave Leroy, PGY1  [phone]    MAGALI HUGHES  78y  MRN: [id number]    Subjective:  Patient is a 78y old  Female who presents with a chief complaint of Sent in by neurologist (02 Aug 2018 19:18). Yesterday afternoon pt had some seizing/shaking activity of left arm which resolved with 2x Ativan 0.5 (so 1mg total). Patient occasionally shouts but is easily reoriented and pleasant.      Overnight:   No acute events.      ROS:  CONSTITUTIONAL: No fever, chills  EYES: No eye pain or discharge  ENMT: No sinus or throat pain  NECK: No neck pain or stiffness  RESPIRATORY: No cough, wheezing, chills, or hemoptysis; No shortness of breath  CARDIOVASCULAR: No chest pain, palpitations, dizziness, or leg swelling  GASTROINTESTINAL: No abdominal or epigastric pain. No nausea, vomiting, or hematemesis; No diarrhea; No melena or hematochezia; +constipation   NEUROLOGICAL: No headaches, no numbness; +loss of strength  SKIN: No rashes or pruritis  MUSCULOSKELETAL: No joint pain or swelling; No muscle, back, or extremity pain  HEME/LYMPH: No easy bruising or bleeding gums       MEDICATIONS  (STANDING):  anastrozole 1 milliGRAM(s) Oral at bedtime  aspirin enteric coated 81 milliGRAM(s) Oral daily  dexamethasone     Tablet 4 milliGRAM(s) Oral daily  heparin  Injectable 5000 Unit(s) SubCutaneous every 8 hours  levETIRAcetam 1500 milliGRAM(s) Oral two times a day  lisinopril 10 milliGRAM(s) Oral daily  metoprolol tartrate 12.5 milliGRAM(s) Oral two times a day  nystatin    Suspension 773025 Unit(s) Oral four times a day  pantoprazole    Tablet 40 milliGRAM(s) Oral before breakfast  simvastatin 10 milliGRAM(s) Oral at bedtime    MEDICATIONS  (PRN):  ALPRAZolam 0.25 milliGRAM(s) Oral three times a day PRN anxiety        Objective:    Vitals: Vital Signs Last 24 Hrs  T(C): 36.7 (08-04-18 @ 04:11), Max: 36.7 (08-03-18 @ 17:05)  T(F): 98.1 (08-04-18 @ 04:11), Max: 98.1 (08-04-18 @ 04:11)  HR: 112 (08-04-18 @ 04:11) (106 - 130)  BP: 124/81 (08-04-18 @ 04:11) (113/80 - 132/79)  BP(mean): --  RR: 20 (08-04-18 @ 04:11) (16 - 20)  SpO2: 98% (08-04-18 @ 04:11) (95% - 98%)            I&O's Summary        PHYSICAL EXAM:  GENERAL: NAD, well-groomed, well-developed, pleasant and cooperative  HEAD: s/p right hemicraniotomy  EYES: EOMI, PERRLA, conjunctiva and sclera clear  ENMT: No tonsillar erythema, exudates, or enlargement; Moist mucous membranes, +thrush   NECK: Supple, full ROM  CHEST/LUNG: Clear to auscultation bilaterally; No rales, rhonchi, wheezing, or rubs  HEART: Regular rate and rhythm; No murmurs, rubs, or gallops  ABDOMEN: Soft, Nontender, Nondistended; Bowel sounds present  EXTREMITIES:  2+ Peripheral Pulses, No clubbing, cyanosis, or edema  LYMPH: No lymphadenopathy noted  SKIN: No rashes or lesions  NERVOUS SYSTEM:  Alert & Oriented X3, Good concentration; Motor Strength 4/5 B/L upper and lower extremities                                                LABS:  08-03    135  |  100  |  14  ----------------------------<  221<H>  5.1   |  20<L>  |  0.30<L>  08-03    138  |  99  |  13  ----------------------------<  88  3.1<L>   |  25  |  <0.30<L>  08-02    133<L>  |  97  |  12  ----------------------------<  126<H>  3.9   |  21<L>  |  0.33<L>    Ca    8.9      03 Aug 2018 16:43  Ca    8.9      03 Aug 2018 08:17  Ca    8.8      02 Aug 2018 12:07  Phos  3.0     08-03  Mg     1.8     08-03    TPro  6.6  /  Alb  3.1<L>  /  TBili  0.5  /  DBili  <0.1  /  AST  26  /  ALT  47<H>  /  AlkPhos  90  08-03  TPro  6.8  /  Alb  3.2<L>  /  TBili  0.6  /  DBili  x   /  AST  29  /  ALT  56<H>  /  AlkPhos  94  08-02                                        11.5   10.0  )-----------( 368      ( 03 Aug 2018 08:17 )             33.0                         11.9   12.6  )-----------( 387      ( 02 Aug 2018 12:07 )             35.5     CAPILLARY BLOOD GLUCOSE  N/A        RADIOLOGY & ADDITIONAL TESTS:  Xray Kidney Ureter Bladder (08.02.18 @ 15:02)  FINDINGS:  Nonobstructive bowel gas pattern. Nondilated air-filled large bowel is   seen. Stool is seen in the ascending colon.  The visualized osseous structures demonstrate no acute pathology.   Costochondral calcifications are noted. There are degenerative changes of   the spine.    IMPRESSION:   Nonobstructive bowel gas pattern without evidence of free air.    Xray Chest 1 View AP/PA (08.02.18 @ 15:02)  IMPRESSION:   Clear lungs.      Imaging Personally Reviewed:  [ ] YES  [ ] NO      Consultants involved in case:   Palliative:  Patient is in process of applying for medicaid and is almost complete.  At this time, she and her daughter decided to wait until medicaid is fully active so that she can get increased aide hours and will call from community to discuss hospice referral when ready.  All information provided.  Please call N [phone] with any questions or concerns.    Physical Therapy   Discharge planning:  as per CSM Radha pt has all necessary equipment	  home w/ home PT	      Consultant(s) Notes Reviewed:  [x] YES  [ ] NO:   Care Discussed with Consultants/Other Providers [ ] YES  [ ] Dr. vAe Leroy, PGY1  [phone]    MAGALI HUGHES  78y  MRN: [id number]    Subjective:  Patient is a 78y old  Female who presents with a chief complaint of Sent in by neurologist (02 Aug 2018 19:18). Yesterday afternoon pt had some seizing/shaking activity of left arm which resolved with 2x Ativan 0.5 (so 1mg total). Patient appears anxious, occasionally shouts and is intermittently tearful, but is easily reoriented and pleasant. Patient denies pain, endorses good appetite, endorses good sleep. Patient denies CP/SOB, denies N/V/D.       Overnight:   No acute events.      ROS:  CONSTITUTIONAL: No fever, chills  EYES: No eye pain or discharge  ENMT: No sinus or throat pain  NECK: No neck pain or stiffness  RESPIRATORY: No cough, wheezing, chills, or hemoptysis; No shortness of breath  CARDIOVASCULAR: No chest pain, palpitations, dizziness, or leg swelling  GASTROINTESTINAL: No abdominal or epigastric pain. No nausea, vomiting, or hematemesis; No diarrhea; No melena or hematochezia; +constipation   NEUROLOGICAL: No headaches, no numbness; +loss of strength  SKIN: No rashes or pruritis  MUSCULOSKELETAL: No joint pain or swelling; No muscle, back, or extremity pain  HEME/LYMPH: No easy bruising or bleeding gums       MEDICATIONS  (STANDING):  anastrozole 1 milliGRAM(s) Oral at bedtime  aspirin enteric coated 81 milliGRAM(s) Oral daily  dexamethasone     Tablet 4 milliGRAM(s) Oral daily  heparin  Injectable 5000 Unit(s) SubCutaneous every 8 hours  levETIRAcetam 1500 milliGRAM(s) Oral two times a day  lisinopril 10 milliGRAM(s) Oral daily  metoprolol tartrate 12.5 milliGRAM(s) Oral two times a day  nystatin    Suspension 275204 Unit(s) Oral four times a day  pantoprazole    Tablet 40 milliGRAM(s) Oral before breakfast  simvastatin 10 milliGRAM(s) Oral at bedtime    MEDICATIONS  (PRN):  ALPRAZolam 0.25 milliGRAM(s) Oral three times a day PRN anxiety        Objective:    Vitals: Vital Signs Last 24 Hrs  T(C): 36.7 (08-04-18 @ 04:11), Max: 36.7 (08-03-18 @ 17:05)  T(F): 98.1 (08-04-18 @ 04:11), Max: 98.1 (08-04-18 @ 04:11)  HR: 112 (08-04-18 @ 04:11) (106 - 130)  BP: 124/81 (08-04-18 @ 04:11) (113/80 - 132/79)  BP(mean): --  RR: 20 (08-04-18 @ 04:11) (16 - 20)  SpO2: 98% (08-04-18 @ 04:11) (95% - 98%)            I&O's Summary        PHYSICAL EXAM:  GENERAL: NAD, well-groomed, well-developed, anxious-appearing  HEAD: s/p right hemicraniotomy  EYES: EOMI, PERRLA, conjunctiva and sclera clear  ENMT: No tonsillar erythema, exudates, or enlargement; Moist mucous membranes, +thrush   NECK: Supple, full ROM  CHEST/LUNG: Clear to auscultation bilaterally; No rales, rhonchi, wheezing, or rubs  HEART: Regular rate and rhythm; No murmurs, rubs, or gallops  ABDOMEN: Soft, Nontender, Nondistended; Bowel sounds present  EXTREMITIES:  2+ Peripheral Pulses, No clubbing, cyanosis, or edema  LYMPH: No lymphadenopathy noted  SKIN: No rashes or lesions  NERVOUS SYSTEM:  Alert & Oriented X2 (to self and hospital, not to date/year), Good concentration; Motor Strength 4/5 B/L upper and lower extremities                                                LABS:  08-03    135  |  100  |  14  ----------------------------<  221<H>  5.1   |  20<L>  |  0.30<L>  08-03    138  |  99  |  13  ----------------------------<  88  3.1<L>   |  25  |  <0.30<L>  08-02    133<L>  |  97  |  12  ----------------------------<  126<H>  3.9   |  21<L>  |  0.33<L>    Ca    8.9      03 Aug 2018 16:43  Ca    8.9      03 Aug 2018 08:17  Ca    8.8      02 Aug 2018 12:07  Phos  3.0     08-03  Mg     1.8     08-03    TPro  6.6  /  Alb  3.1<L>  /  TBili  0.5  /  DBili  <0.1  /  AST  26  /  ALT  47<H>  /  AlkPhos  90  08-03  TPro  6.8  /  Alb  3.2<L>  /  TBili  0.6  /  DBili  x   /  AST  29  /  ALT  56<H>  /  AlkPhos  94  08-02                                        11.5   10.0  )-----------( 368      ( 03 Aug 2018 08:17 )             33.0                         11.9   12.6  )-----------( 387      ( 02 Aug 2018 12:07 )             35.5     CAPILLARY BLOOD GLUCOSE  N/A        RADIOLOGY & ADDITIONAL TESTS:  Xray Kidney Ureter Bladder (08.02.18 @ 15:02)  FINDINGS:  Nonobstructive bowel gas pattern. Nondilated air-filled large bowel is   seen. Stool is seen in the ascending colon.  The visualized osseous structures demonstrate no acute pathology.   Costochondral calcifications are noted. There are degenerative changes of   the spine.    IMPRESSION:   Nonobstructive bowel gas pattern without evidence of free air.    Xray Chest 1 View AP/PA (08.02.18 @ 15:02)  IMPRESSION:   Clear lungs.      Imaging Personally Reviewed:  [ ] YES  [ ] NO      Consultants involved in case:   Palliative:  Patient is in process of applying for medicaid and is almost complete.  At this time, she and her daughter decided to wait until medicaid is fully active so that she can get increased aide hours and will call from community to discuss hospice referral when ready.  All information provided.  Please call [facility] [phone] with any questions or concerns.    Physical Therapy   Discharge planning:  as per CSM Radha pt has all necessary equipment	  home w/ home PT	      Consultant(s) Notes Reviewed:  [x] YES  [ ] NO:   Care Discussed with Consultants/Other Providers [ ] YES  [ ]

## 2018-08-04 NOTE — DISCHARGE NOTE ADULT - ADDITIONAL INSTRUCTIONS
Please follow up with your primary care physician within two weeks of discharge to discuss your recent hospitalization.

## 2018-08-04 NOTE — PROGRESS NOTE ADULT - ATTENDING COMMENTS
Agree.  Anxious, escalate benzos.   Of note HR 120s sinus, delta wave noted on EKG in V2. On metop 12.5 BID already, QRS is narrow so she's conducting primarily through AV node, escalating beta blocker therefore should be safe. Will hold on doing so now and try to tx anxiety to r/o this driving tachycardia, known hx of anxiety in medical facilities.   Afebrile no white count, non toxic appearing, less likely infection.  Possible dispo tmrw.

## 2018-08-04 NOTE — PROGRESS NOTE ADULT - PROBLEM SELECTOR PLAN 5
s/p L radical mastectomy on home anastrazole  - Per daughter anastrazole was ?stopped by Patient doctor but pt does take it regularly and should continue to be on anastrazole  - start anastrazole

## 2018-08-04 NOTE — DISCHARGE NOTE ADULT - PATIENT PORTAL LINK FT
You can access the [facility] Patient Portal, offered by [facility], by registering with the following website: [email or web address]

## 2018-08-04 NOTE — DISCHARGE NOTE ADULT - CARE PLAN
Principal Discharge DX:	Encounter for palliative care  Goal:	Initiation of home services  Assessment and plan of treatment:	You were admitted to the hospital to discuss goals of care and acquiring home services for the future. You, your daughter, the palliative care doctor, and the hospice team discussed a plan for safe discharge and how to set up home care services.  Secondary Diagnosis:	Thrush, oral  Goal:	Resolution of symptoms  Assessment and plan of treatment:	While you were in the hospital it was noted that you have a white film in your mouth called thrush, an oral infection. Please continue to use the Nystatin swish-and-swallow medication you were taking in the hospital as directed. Please follow up with your primary care physician within two weeks of discharge to discuss your recent hospitalization.  Secondary Diagnosis:	Abdominal pain  Goal:	Return to baseline  Assessment and plan of treatment:	You were admitted to the hospital with abdominal pain. An X-ray and CT scan of your abdomen showed that you have nothing obstructing your bowels and no acute issues to be addressed during this hospital stay. If you experience recurrence or worsening of your symptoms, please seek urgent medical attention. Please also follow up with your primary care physician within two weeks of discharge to discuss your recent hospitalization.

## 2018-08-04 NOTE — DISCHARGE NOTE ADULT - OTHER SIGNIFICANT FINDINGS
Xray Kidney Ureter Bladder (08.02.18 @ 15:02)  FINDINGS:  Nonobstructive bowel gas pattern. Nondilated air-filled large bowel is   seen. Stool is seen in the ascending colon.  The visualized osseous structures demonstrate no acute pathology.   Costochondral calcifications are noted. There are degenerative changes of   the spine.    IMPRESSION:   Nonobstructive bowel gas pattern without evidence of free air.

## 2018-08-04 NOTE — PROGRESS NOTE ADULT - PROBLEM SELECTOR PLAN 3
resolved since ED , KUB Xray with no obstruction or free air  - s/p enema  - now with 2x bowel movements

## 2018-08-04 NOTE — DISCHARGE NOTE ADULT - MEDICATION SUMMARY - MEDICATIONS TO CHANGE
I will SWITCH the dose or number of times a day I take the medications listed below when I get home from the hospital:  None I will SWITCH the dose or number of times a day I take the medications listed below when I get home from the hospital:    Ativan 0.5 mg oral tablet  -- Take one tablet if you develop sz like activity of your left arm. MDD:1  -- Caution federal law prohibits the transfer of this drug to any person other  than the person for whom it was prescribed.  Do not take this drug if you are pregnant.  May cause drowsiness.  Alcohol may intensify this effect.  Use care when operating dangerous machinery.

## 2018-08-04 NOTE — DISCHARGE NOTE ADULT - HOSPITAL COURSE
77 y/o  F w/ PMH of HTN, HLD, breast ca s/p L radical mastectomy on anastrazole, R arterial thrombus, GBM s/p Right parietal craniotomy, not on therapy (consistent with goals of care) who presents with abdominal pain for 4-6 weeks (RLQ, intermittent, lasts for a second, non radiating, sharp). A meeting was held with patient's daughter and hospice team, discussed goals of care and plan for safe discharge. Hospital course complicated by left arm shaking which was relieved with Ativan 0.5 79 y/o  F w/ PMH of HTN, HLD, breast ca s/p L radical mastectomy on anastrazole, R arterial thrombus, GBM s/p Right parietal craniotomy, not on therapy (consistent with goals of care) who presents with abdominal pain for 4-6 weeks (RLQ, intermittent, lasts for a second, non radiating, sharp). A meeting was held with patient's daughter and hospice team, discussed goals of care and plan for safe discharge. Hospital course complicated by left arm shaking which was relieved with Ativan 0.5. Patient has been intermittently confused but easily reoriented, and occasionally agitated/anxious for which she was given her home PRN Xanax. On the day of discharge patient is hemodynamically and medically stable.

## 2018-08-05 PROCEDURE — 99232 SBSQ HOSP IP/OBS MODERATE 35: CPT | Mod: GC

## 2018-08-05 RX ADMIN — Medication 81 MILLIGRAM(S): at 12:52

## 2018-08-05 RX ADMIN — Medication 12.5 MILLIGRAM(S): at 17:00

## 2018-08-05 RX ADMIN — Medication 0.5 MILLIGRAM(S): at 03:42

## 2018-08-05 RX ADMIN — PANTOPRAZOLE SODIUM 40 MILLIGRAM(S): 20 TABLET, DELAYED RELEASE ORAL at 06:39

## 2018-08-05 RX ADMIN — ANASTROZOLE 1 MILLIGRAM(S): 1 TABLET ORAL at 22:12

## 2018-08-05 RX ADMIN — Medication 500000 UNIT(S): at 12:52

## 2018-08-05 RX ADMIN — Medication 0.5 MILLIGRAM(S): at 20:03

## 2018-08-05 RX ADMIN — HEPARIN SODIUM 5000 UNIT(S): 5000 INJECTION INTRAVENOUS; SUBCUTANEOUS at 12:52

## 2018-08-05 RX ADMIN — LISINOPRIL 10 MILLIGRAM(S): 2.5 TABLET ORAL at 06:39

## 2018-08-05 RX ADMIN — Medication 4 MILLIGRAM(S): at 06:39

## 2018-08-05 RX ADMIN — Medication 500000 UNIT(S): at 06:40

## 2018-08-05 RX ADMIN — LEVETIRACETAM 1500 MILLIGRAM(S): 250 TABLET, FILM COATED ORAL at 17:00

## 2018-08-05 RX ADMIN — Medication 0.5 MILLIGRAM(S): at 09:32

## 2018-08-05 RX ADMIN — Medication 500000 UNIT(S): at 17:00

## 2018-08-05 RX ADMIN — HEPARIN SODIUM 5000 UNIT(S): 5000 INJECTION INTRAVENOUS; SUBCUTANEOUS at 22:13

## 2018-08-05 RX ADMIN — LEVETIRACETAM 1500 MILLIGRAM(S): 250 TABLET, FILM COATED ORAL at 06:39

## 2018-08-05 RX ADMIN — SIMVASTATIN 10 MILLIGRAM(S): 20 TABLET, FILM COATED ORAL at 22:13

## 2018-08-05 RX ADMIN — HEPARIN SODIUM 5000 UNIT(S): 5000 INJECTION INTRAVENOUS; SUBCUTANEOUS at 06:39

## 2018-08-05 RX ADMIN — Medication 12.5 MILLIGRAM(S): at 06:39

## 2018-08-05 NOTE — PROGRESS NOTE ADULT - PROBLEM SELECTOR PLAN 5
s/p L radical mastectomy on home anastrazole  - Per daughter anastrazole was ?stopped by Patient doctor but pt does take it regularly and should continue to be on anastrazole  - c/w anastrazole

## 2018-08-05 NOTE — PROGRESS NOTE ADULT - PROBLEM SELECTOR PLAN 8
HSQ HSQ  Dispo: Home to d/c. No home health aids set up currently. Will be available Monday evening.

## 2018-08-05 NOTE — PROGRESS NOTE ADULT - ATTENDING COMMENTS
Agree.  Seen and examined, likely d/c home tmrw once home health aids are set up. Agree.  Seen and examined, tachycardia persists. Chronic, on low dose metop. No hypoxia, wells for PE is 4 which is moderate pre test probability. D dime will be + w/ malignancy. GOC at this point is comfort, trend for now. Tachycardia persists back to January.   Likely d/c home tmrw once home health aids are set up.

## 2018-08-05 NOTE — PROGRESS NOTE ADULT - SUBJECTIVE AND OBJECTIVE BOX
MAGALI HUGHES  78y  Female      Patient is a 78y old  Female who presents with a chief complaint of Sent in by neurologist (04 Aug 2018 12:51)      INTERVAL HPI/OVERNIGHT EVENTS:      REVIEW OF SYSTEMS:  CONSTITUTIONAL: No fever, weight loss, or fatigue  EYES: No eye pain, visual disturbances, or discharge  ENMT:  No difficulty hearing, tinnitus, vertigo; No sinus or throat pain  NECK: No pain or stiffness  BREASTS: No pain, masses, or nipple discharge  RESPIRATORY: No cough, wheezing, chills or hemoptysis; No shortness of breath  CARDIOVASCULAR: No chest pain, palpitations, dizziness, or leg swelling  GASTROINTESTINAL: No abdominal or epigastric pain. No nausea, vomiting, or hematemesis; No diarrhea or constipation. No melena or hematochezia.  GENITOURINARY: No dysuria, frequency, hematuria, or incontinence  NEUROLOGICAL: No headaches, memory loss, loss of strength, numbness, or tremors  SKIN: No itching, burning, rashes, or lesions   LYMPH NODES: No enlarged glands  ENDOCRINE: No heat or cold intolerance; No hair loss  MUSCULOSKELETAL: No joint pain or swelling; No muscle, back, or extremity pain  PSYCHIATRIC: No depression, anxiety, mood swings, or difficulty sleeping  HEME/LYMPH: No easy bruising, or bleeding gums  ALLERY AND IMMUNOLOGIC: No hives or eczema  FAMILY HISTORY:  No pertinent family history in first degree relatives    T(C): 36.9 (08-05-18 @ 04:22), Max: 36.9 (08-05-18 @ 04:22)  HR: 120 (08-05-18 @ 06:49) (112 - 124)  BP: 135/86 (08-05-18 @ 06:49) (128/89 - 144/79)  RR: 20 (08-05-18 @ 04:22) (18 - 20)  SpO2: 97% (08-05-18 @ 04:22) (93% - 98%)  Wt(kg): --Vital Signs Last 24 Hrs  T(C): 36.9 (05 Aug 2018 04:22), Max: 36.9 (05 Aug 2018 04:22)  T(F): 98.5 (05 Aug 2018 04:22), Max: 98.5 (05 Aug 2018 04:22)  HR: 120 (05 Aug 2018 06:49) (112 - 124)  BP: 135/86 (05 Aug 2018 06:49) (128/89 - 144/79)  BP(mean): --  RR: 20 (05 Aug 2018 04:22) (18 - 20)  SpO2: 97% (05 Aug 2018 04:22) (93% - 98%)    PHYSICAL EXAM:  GENERAL: NAD, well-groomed, well-developed, anxious-appearing  HEAD: s/p right hemicraniotomy  EYES: EOMI, PERRLA, conjunctiva and sclera clear  ENT: No tonsillar erythema, exudates, or enlargement; Moist mucous membranes, +thrush   NECK: Supple, full ROM  CHEST/LUNG: Clear to auscultation bilaterally; No rales, rhonchi, wheezing, or rubs  HEART: Regular rate and rhythm; No murmurs, rubs, or gallops  ABDOMEN: Soft, Nontender, Nondistended; Bowel sounds present  EXTREMITIES:  2+ Peripheral Pulses, No clubbing, cyanosis, or edema  LYMPH: No lymphadenopathy noted  SKIN: No rashes or lesions  NERVOUS SYSTEM:  Alert & Oriented X2, poor concentration; Motor Strength 4/5 B/L upper and lower extremities          Consultant(s) Notes Reviewed:  [x ] YES  [ ] NO  Care Discussed with Consultants/Other Providers [ x] YES  [ ] NO  11.5  11.9    LABS:      The Labs were reviewed by me   The Radiology was reviewed by me    EKG tracing reviewed by me    08-03    135  |  100  |  14  ----------------------------<  221<H>  5.1   |  20<L>  |  0.30<L>  08-03    138  |  99  |  13  ----------------------------<  88  3.1<L>   |  25  |  <0.30<L>  08-02    133<L>  |  97  |  12  ----------------------------<  126<H>  3.9   |  21<L>  |  0.33<L>    Ca    8.9      03 Aug 2018 16:43  Ca    8.9      03 Aug 2018 08:17  Ca    8.8      02 Aug 2018 12:07  Mg     1.8     08-03    TPro  6.6  /  Alb  3.1<L>  /  TBili  0.5  /  DBili  <0.1  /  AST  26  /  ALT  47<H>  /  AlkPhos  90  08-03  TPro  6.8  /  Alb  3.2<L>  /  TBili  0.6  /  DBili  x   /  AST  29  /  ALT  56<H>  /  AlkPhos  94  08-02    Magnesium, Serum: 1.8 mg/dL (08-03-18 @ 16:43)  Magnesium, Serum: 1.9 mg/dL (08-03-18 @ 08:17)    Phosphorus Level, Serum: 3.0 mg/dL (08-03-18 @ 08:17)                                              11.5   10.0  )-----------( 368      ( 03 Aug 2018 08:17 )             33.0                         11.9   12.6  )-----------( 387      ( 02 Aug 2018 12:07 )             35.5     CAPILLARY BLOOD GLUCOSE              RADIOLOGY & ADDITIONAL TESTS:    Imaging Personally Reviewed:  [ ] YES  [ ] NO  ALPRAZolam 0.5 milliGRAM(s) Oral three times a day PRN  anastrozole 1 milliGRAM(s) Oral at bedtime  aspirin enteric coated 81 milliGRAM(s) Oral daily  dexamethasone     Tablet 4 milliGRAM(s) Oral daily  heparin  Injectable 5000 Unit(s) SubCutaneous every 8 hours  levETIRAcetam 1500 milliGRAM(s) Oral two times a day  lisinopril 10 milliGRAM(s) Oral daily  metoprolol tartrate 12.5 milliGRAM(s) Oral two times a day  nystatin    Suspension 255033 Unit(s) Oral four times a day  pantoprazole    Tablet 40 milliGRAM(s) Oral before breakfast  simvastatin 10 milliGRAM(s) Oral at bedtime      HEALTH ISSUES - PROBLEM Dx:  Thrush, oral: Thrush, oral  Prophylactic measure: Prophylactic measure  Hyperlipemia: Hyperlipemia  Essential hypertension: Essential hypertension  Breast CA: Breast CA  Hyponatremia: Hyponatremia  Abdominal pain: Abdominal pain  Encounter for palliative care: Encounter for palliative care  Debility: Debility  Glioblastoma: Glioblastoma  Generalized abdominal pain: Generalized abdominal pain

## 2018-08-06 VITALS
HEART RATE: 116 BPM | SYSTOLIC BLOOD PRESSURE: 118 MMHG | OXYGEN SATURATION: 94 % | RESPIRATION RATE: 18 BRPM | DIASTOLIC BLOOD PRESSURE: 84 MMHG | TEMPERATURE: 98 F

## 2018-08-06 PROCEDURE — 97163 PT EVAL HIGH COMPLEX 45 MIN: CPT

## 2018-08-06 PROCEDURE — 93005 ELECTROCARDIOGRAM TRACING: CPT

## 2018-08-06 PROCEDURE — 74018 RADEX ABDOMEN 1 VIEW: CPT

## 2018-08-06 PROCEDURE — 83690 ASSAY OF LIPASE: CPT

## 2018-08-06 PROCEDURE — 71045 X-RAY EXAM CHEST 1 VIEW: CPT

## 2018-08-06 PROCEDURE — 99239 HOSP IP/OBS DSCHRG MGMT >30: CPT

## 2018-08-06 PROCEDURE — 80076 HEPATIC FUNCTION PANEL: CPT

## 2018-08-06 PROCEDURE — 84100 ASSAY OF PHOSPHORUS: CPT

## 2018-08-06 PROCEDURE — 80048 BASIC METABOLIC PNL TOTAL CA: CPT

## 2018-08-06 PROCEDURE — 85027 COMPLETE CBC AUTOMATED: CPT

## 2018-08-06 PROCEDURE — 99285 EMERGENCY DEPT VISIT HI MDM: CPT

## 2018-08-06 PROCEDURE — 83735 ASSAY OF MAGNESIUM: CPT

## 2018-08-06 PROCEDURE — 80053 COMPREHEN METABOLIC PANEL: CPT

## 2018-08-06 RX ORDER — LISINOPRIL 2.5 MG/1
1 TABLET ORAL
Qty: 30 | Refills: 0 | OUTPATIENT
Start: 2018-08-06 | End: 2018-09-04

## 2018-08-06 RX ORDER — LISINOPRIL 2.5 MG/1
1 TABLET ORAL
Qty: 0 | Refills: 0 | COMMUNITY

## 2018-08-06 RX ADMIN — Medication 500000 UNIT(S): at 00:00

## 2018-08-06 RX ADMIN — Medication 500000 UNIT(S): at 12:00

## 2018-08-06 RX ADMIN — Medication 12.5 MILLIGRAM(S): at 05:34

## 2018-08-06 RX ADMIN — HEPARIN SODIUM 5000 UNIT(S): 5000 INJECTION INTRAVENOUS; SUBCUTANEOUS at 05:35

## 2018-08-06 RX ADMIN — PANTOPRAZOLE SODIUM 40 MILLIGRAM(S): 20 TABLET, DELAYED RELEASE ORAL at 05:35

## 2018-08-06 RX ADMIN — Medication 500000 UNIT(S): at 05:35

## 2018-08-06 RX ADMIN — HEPARIN SODIUM 5000 UNIT(S): 5000 INJECTION INTRAVENOUS; SUBCUTANEOUS at 12:02

## 2018-08-06 RX ADMIN — LISINOPRIL 10 MILLIGRAM(S): 2.5 TABLET ORAL at 05:34

## 2018-08-06 RX ADMIN — Medication 4 MILLIGRAM(S): at 05:34

## 2018-08-06 RX ADMIN — Medication 81 MILLIGRAM(S): at 12:01

## 2018-08-06 RX ADMIN — LEVETIRACETAM 1500 MILLIGRAM(S): 250 TABLET, FILM COATED ORAL at 05:34

## 2018-08-06 RX ADMIN — Medication 0.5 MILLIGRAM(S): at 12:02

## 2018-08-06 NOTE — PROGRESS NOTE ADULT - SUBJECTIVE AND OBJECTIVE BOX
Dr. Ave Leroy, PGY1  [phone]    MAGALI HUGHES  78y  MRN: [id number]    Subjective:  Patient is a 78y old  Female who presents with a chief complaint of Sent in by neurologist (04 Aug 2018 12:51). This morning patient is disoriented and anxious. Patient states she slept poorly and has no appetite but asks for snacks. Patient denies pain, denies N/V/D, denies CP/SOB.      Overnight:   No acute events       MEDICATIONS  (STANDING):  anastrozole 1 milliGRAM(s) Oral at bedtime  aspirin enteric coated 81 milliGRAM(s) Oral daily  dexamethasone     Tablet 4 milliGRAM(s) Oral daily  heparin  Injectable 5000 Unit(s) SubCutaneous every 8 hours  levETIRAcetam 1500 milliGRAM(s) Oral two times a day  lisinopril 10 milliGRAM(s) Oral daily  metoprolol tartrate 12.5 milliGRAM(s) Oral two times a day  nystatin    Suspension 222696 Unit(s) Oral four times a day  pantoprazole    Tablet 40 milliGRAM(s) Oral before breakfast  simvastatin 10 milliGRAM(s) Oral at bedtime    MEDICATIONS  (PRN):  ALPRAZolam 0.5 milliGRAM(s) Oral three times a day PRN anxiety        Objective:  Vitals: Vital Signs Last 24 Hrs  T(C): 36.5 (08-06-18 @ 04:31), Max: 36.5 (08-06-18 @ 04:31)  T(F): 97.7 (08-06-18 @ 04:31), Max: 97.7 (08-06-18 @ 04:31)  HR: 112 (08-06-18 @ 04:31) (112 - 120)  BP: 134/84 (08-06-18 @ 04:31) (117/73 - 136/81)  BP(mean): --  RR: 18 (08-06-18 @ 04:31) (18 - 20)  SpO2: 95% (08-06-18 @ 04:31) (94% - 95%)            I&O's Summary    05 Aug 2018 07:01  -  06 Aug 2018 07:00  --------------------------------------------------------  IN: 240 mL / OUT: 0 mL / NET: 240 mL          PHYSICAL EXAM:  GENERAL: NAD, well-groomed, well-developed, anxious-appearing  HEAD: s/p right hemicraniotomy  EYES: EOMI, PERRLA, conjunctiva and sclera clear  ENMT: No tonsillar erythema, exudates, or enlargement; Moist mucous membranes, +thrush   NECK: Supple, full ROM  CHEST/LUNG: Clear to auscultation bilaterally; No rales, rhonchi, wheezing, or rubs  HEART: Regular rate and rhythm; No murmurs, rubs, or gallops  ABDOMEN: Soft, Nontender, Nondistended; Bowel sounds present  EXTREMITIES:  2+ Peripheral Pulses, No clubbing, cyanosis, or edema  LYMPH: No lymphadenopathy noted  SKIN: No rashes or lesions  NERVOUS SYSTEM:  Alert & Oriented X2 (to self and hospital, not to date/year), Good concentration; Motor Strength 4/5 B/L upper and lower extremities                                                   LABS:  08-03    135  |  100  |  14  ----------------------------<  221<H>  5.1   |  20<L>  |  0.30<L>    Ca    8.9      03 Aug 2018 16:43                      CAPILLARY BLOOD GLUCOSE  N/A        RADIOLOGY & ADDITIONAL TESTS:    Imaging Personally Reviewed:  [ ] YES  [ ] NO      Consultants involved in case:   - Palliative Care  - Hospice team    Consultant(s) Notes Reviewed:  [ ] YES  [ ] NO:   Care Discussed with Consultants/Other Providers [ ] YES  [ ]

## 2018-08-06 NOTE — PROGRESS NOTE ADULT - PROBLEM SELECTOR PLAN 1
s/p Right parietal craniotomy, not on therapy (c/w goals of care)  - [facility] form and HCP form completed (Dr. Rashida Cortez is the HCP)  - worsening pain and mental status   - continue ativan for seizures, c/w home keppra, xanax  - family seeking comfort care and hospice evaluation   - apprec Palliative care recs  - Hospice & daughter met to coordinate dispo with hospice care
s/p Right parietal craniotomy, not on therapy (c/w goals of care)  - [facility] form and HCP form completed ( Dr. Rashida Cortez is the HCP)  - worsening pain and mental status   - continue ativan for seizures  - family seeking comfort care and hospice evaluation   - apprec Palliative care recs  - Hospice mtg today 11AM; Daughter states she has a meeting with the hospice team.
s/p Right parietal craniotomy, not on therapy (c/w goals of care)  - [facility] form and HCP form completed (Dr. Rashida Cortez is the HCP)  - worsening pain and mental status   - continue ativan for seizures, c/w home keppra, xanax  - family seeking comfort care and hospice evaluation   - apprec Palliative care recs  - Hospice mtg yesterday 11AM with pt's daughter
s/p Right parietal craniotomy, not on therapy (c/w goals of care)  - [facility] form and HCP form completed (Dr. Rashida Cortez is the HCP)  - worsening pain and mental status   - continue ativan for seizures, c/w home keppra, xanax  - family seeking comfort care and hospice evaluation   - apprec Palliative care recs  - Hospice mtg yesterday 11AM with pt's daughter

## 2018-08-06 NOTE — PROGRESS NOTE ADULT - PROBLEM SELECTOR PLAN 4
mild, likely due to poor PO intake   - encourage PO fluid hydration   - BMP with Na 138, 135  - no further labs

## 2018-08-06 NOTE — PROGRESS NOTE ADULT - ASSESSMENT
77 yo  female with PMH of HTN, HLD, breast ca s/p L radical mastectomy on anastrazole, R arterial thrombus, GBM s/p Right parietal craniotomy, not on therapy (consistent with goals of care) who presents with abdominal pain for 4-6 week, seeking comfort care and hospice evaluation
79 yo  female with PMH of HTN, HLD, breast ca s/p L radical mastectomy on anastrazole, R arterial thrombus, GBM s/p Right parietal craniotomy, not on therapy (consistent with goals of care) who presents with abdominal pain for 4-6 week, seeking comfort care and hospice evaluation
79 yo  female with PMH of HTN, HLD, breast ca s/p L radical mastectomy on anastrazole, R arterial thrombus, GBM s/p Right parietal craniotomy, not on therapy (consistent with goals of care) who presents with abdominal pain for 4-6 week, seeking comfort care and hospice evaluation
77 yo  female with PMH of HTN, HLD, breast ca s/p L radical mastectomy on anastrazole, R arterial thrombus, GBM s/p Right parietal craniotomy, not on therapy (consistent with goals of care) who presents with abdominal pain for 4-6 week, seeking comfort care and hospice evaluation

## 2018-08-09 ENCOUNTER — CHART COPY (OUTPATIENT)
Age: 78
End: 2018-08-09

## 2018-08-12 ENCOUNTER — CHART COPY (OUTPATIENT)
Age: 78
End: 2018-08-12

## 2018-08-12 DIAGNOSIS — R45.1 RESTLESSNESS AND AGITATION: ICD-10-CM

## 2018-08-12 RX ORDER — QUETIAPINE FUMARATE 25 MG/1
25 TABLET ORAL
Qty: 30 | Refills: 0 | Status: ACTIVE | COMMUNITY
Start: 2018-08-12 | End: 1900-01-01

## 2018-08-14 ENCOUNTER — INPATIENT (INPATIENT)
Facility: HOSPITAL | Age: 78
LOS: 5 days | Discharge: NURSING, REHAB OR LONG-TERM CARE | DRG: 54 | End: 2018-08-20
Attending: INTERNAL MEDICINE | Admitting: INTERNAL MEDICINE
Payer: MEDICARE

## 2018-08-14 ENCOUNTER — APPOINTMENT (OUTPATIENT)
Dept: NEUROLOGY | Facility: CLINIC | Age: 78
End: 2018-08-14

## 2018-08-14 ENCOUNTER — APPOINTMENT (OUTPATIENT)
Dept: MRI IMAGING | Facility: IMAGING CENTER | Age: 78
End: 2018-08-14

## 2018-08-14 ENCOUNTER — APPOINTMENT (OUTPATIENT)
Dept: INFUSION THERAPY | Facility: HOSPITAL | Age: 78
End: 2018-08-14

## 2018-08-14 ENCOUNTER — OTHER (OUTPATIENT)
Age: 78
End: 2018-08-14

## 2018-08-14 VITALS
HEIGHT: 62 IN | DIASTOLIC BLOOD PRESSURE: 88 MMHG | SYSTOLIC BLOOD PRESSURE: 121 MMHG | RESPIRATION RATE: 16 BRPM | OXYGEN SATURATION: 98 % | WEIGHT: 145.06 LBS | HEART RATE: 137 BPM

## 2018-08-14 DIAGNOSIS — R35.0 FREQUENCY OF MICTURITION: ICD-10-CM

## 2018-08-14 DIAGNOSIS — Z98.890 OTHER SPECIFIED POSTPROCEDURAL STATES: Chronic | ICD-10-CM

## 2018-08-14 LAB
ALBUMIN SERPL ELPH-MCNC: 2.9 G/DL — LOW (ref 3.5–5)
ALP SERPL-CCNC: 82 U/L — SIGNIFICANT CHANGE UP (ref 40–120)
ALT FLD-CCNC: 76 U/L DA — HIGH (ref 10–60)
ANION GAP SERPL CALC-SCNC: 12 MMOL/L — SIGNIFICANT CHANGE UP (ref 5–17)
AST SERPL-CCNC: 29 U/L — SIGNIFICANT CHANGE UP (ref 10–40)
BASOPHILS # BLD AUTO: 0.1 K/UL — SIGNIFICANT CHANGE UP (ref 0–0.2)
BASOPHILS NFR BLD AUTO: 0.7 % — SIGNIFICANT CHANGE UP (ref 0–2)
BILIRUB SERPL-MCNC: 0.5 MG/DL — SIGNIFICANT CHANGE UP (ref 0.2–1.2)
BUN SERPL-MCNC: 14 MG/DL — SIGNIFICANT CHANGE UP (ref 7–18)
CALCIUM SERPL-MCNC: 8.9 MG/DL — SIGNIFICANT CHANGE UP (ref 8.4–10.5)
CHLORIDE SERPL-SCNC: 102 MMOL/L — SIGNIFICANT CHANGE UP (ref 96–108)
CO2 SERPL-SCNC: 23 MMOL/L — SIGNIFICANT CHANGE UP (ref 22–31)
CREAT SERPL-MCNC: 0.64 MG/DL — SIGNIFICANT CHANGE UP (ref 0.5–1.3)
EOSINOPHIL # BLD AUTO: 0 K/UL — SIGNIFICANT CHANGE UP (ref 0–0.5)
EOSINOPHIL NFR BLD AUTO: 0 % — SIGNIFICANT CHANGE UP (ref 0–6)
GLUCOSE SERPL-MCNC: 143 MG/DL — HIGH (ref 70–99)
HCT VFR BLD CALC: 36.9 % — SIGNIFICANT CHANGE UP (ref 34.5–45)
HGB BLD-MCNC: 12.4 G/DL — SIGNIFICANT CHANGE UP (ref 11.5–15.5)
LYMPHOCYTES # BLD AUTO: 0.9 K/UL — LOW (ref 1–3.3)
LYMPHOCYTES # BLD AUTO: 7.8 % — LOW (ref 13–44)
MCHC RBC-ENTMCNC: 29.5 PG — SIGNIFICANT CHANGE UP (ref 27–34)
MCHC RBC-ENTMCNC: 33.6 GM/DL — SIGNIFICANT CHANGE UP (ref 32–36)
MCV RBC AUTO: 87.6 FL — SIGNIFICANT CHANGE UP (ref 80–100)
MONOCYTES # BLD AUTO: 0.6 K/UL — SIGNIFICANT CHANGE UP (ref 0–0.9)
MONOCYTES NFR BLD AUTO: 5.3 % — SIGNIFICANT CHANGE UP (ref 2–14)
NEUTROPHILS # BLD AUTO: 9.8 K/UL — HIGH (ref 1.8–7.4)
NEUTROPHILS NFR BLD AUTO: 86.2 % — HIGH (ref 43–77)
PLATELET # BLD AUTO: 444 K/UL — HIGH (ref 150–400)
POTASSIUM SERPL-MCNC: 3.3 MMOL/L — LOW (ref 3.5–5.3)
POTASSIUM SERPL-SCNC: 3.3 MMOL/L — LOW (ref 3.5–5.3)
PROT SERPL-MCNC: 7.1 G/DL — SIGNIFICANT CHANGE UP (ref 6–8.3)
RBC # BLD: 4.21 M/UL — SIGNIFICANT CHANGE UP (ref 3.8–5.2)
RBC # FLD: 14.8 % — HIGH (ref 10.3–14.5)
SODIUM SERPL-SCNC: 137 MMOL/L — SIGNIFICANT CHANGE UP (ref 135–145)
TROPONIN I SERPL-MCNC: <0.015 NG/ML — SIGNIFICANT CHANGE UP (ref 0–0.04)
WBC # BLD: 11.4 K/UL — HIGH (ref 3.8–10.5)
WBC # FLD AUTO: 11.4 K/UL — HIGH (ref 3.8–10.5)

## 2018-08-14 PROCEDURE — 70450 CT HEAD/BRAIN W/O DYE: CPT | Mod: 26

## 2018-08-14 RX ORDER — LEVETIRACETAM 250 MG/1
1500 TABLET, FILM COATED ORAL ONCE
Qty: 0 | Refills: 0 | Status: COMPLETED | OUTPATIENT
Start: 2018-08-14 | End: 2018-08-14

## 2018-08-14 RX ORDER — CIPROFLOXACIN HYDROCHLORIDE 500 MG/1
500 TABLET, FILM COATED ORAL
Qty: 14 | Refills: 0 | Status: ACTIVE | COMMUNITY
Start: 2018-08-14 | End: 1900-01-01

## 2018-08-14 RX ADMIN — Medication 1 MILLIGRAM(S): at 21:35

## 2018-08-14 NOTE — ED PROVIDER NOTE - MEDICAL DECISION MAKING DETAILS
79 y/o F pt with glioblastoma s/p right parietal craniotomy, H/O breast cancer. Since this afternoon, pt's  reports the pt was confused and combative. He suspected the pt was having a possible seizure but there is no apparent rhythmic tonic-clonic movement. Pt will need CT head, labs, and UA. Will give small dose of Ativan to assist with accomplishing CT.

## 2018-08-14 NOTE — ED PROVIDER NOTE - CONSTITUTIONAL, MLM
normal... Well appearing, well nourished, awake, alert, oriented to person, place, time/situation but is uncooperative, combative, and agitated.

## 2018-08-14 NOTE — ED PROVIDER NOTE - ENMT, MLM
Airway patent, Nasal mucosa clear. Mouth with normal mucosa. Throat has no vesicles, no oropharyngeal exudates and uvula is midline. No slurred speech.

## 2018-08-14 NOTE — ED PROVIDER NOTE - CARE PLAN
Principal Discharge DX:	Altered mental status, unspecified altered mental status type  Secondary Diagnosis:	Sinus tachycardia

## 2018-08-14 NOTE — ED PROVIDER NOTE - OBJECTIVE STATEMENT
77 y/o F pt with a significant PMHx of breast cancer, glioblastoma and a PSHx of breast surgery, craniotomy (1 month ago) BIB  reporting a possible seizure, confusion and combativeness.  claims pt is confused; no recent ETOH use. Pt's  denies injury, fall, injury to head, vomiting, fever, headache, chest pain, trouble breathing, LOC, urinary incontinence, or any other complaints. NKDA. 77 y/o F pt with a significant PMHx of breast cancer, glioblastoma and a PSHx of breast surgery, craniotomy (1 month ago) BIB  reporting a possible seizure, confusion and combativeness since the afternoon today, exact time unknown.  No recent ETOH use. Pt's  denies injury, fall, injury to head, vomiting, fever, headache, chest pain, trouble breathing, LOC, urinary incontinence, or any other complaints. NKDA.

## 2018-08-14 NOTE — ED PROVIDER NOTE - MUSCULOSKELETAL, MLM
Spine appears normal, range of motion is not limited, no muscle or joint tenderness. Nml strength in al extremities.

## 2018-08-14 NOTE — ED ADULT TRIAGE NOTE - CHIEF COMPLAINT QUOTE
per ems, patient had sudden onset of ams due to seizure, patient on Keppra, patient stated "im fine don't listen to anybody" alert and oriented.

## 2018-08-14 NOTE — ED PROVIDER NOTE - PROGRESS NOTE DETAILS
Daughter, Rashida, who is power of , came in and clarifies hx that Pt has recently stopped radiation and chemotherapy, trying to set up palliative care.  Pt is on Keppra 1500 mg BID.  Rashida can be reached at [phone].  At this time, still attempting to have CT head done but there is no transporter available according to CT tech. CT done and shows no ICH or acute brain pathology; findings consistent with known pathology.  Pt to be admitted to unattached doctor, Dr. Renteria, however I informed the MAR Dr. England for admission and to inform him in the morning.

## 2018-08-15 DIAGNOSIS — Z29.9 ENCOUNTER FOR PROPHYLACTIC MEASURES, UNSPECIFIED: ICD-10-CM

## 2018-08-15 DIAGNOSIS — G93.40 ENCEPHALOPATHY, UNSPECIFIED: ICD-10-CM

## 2018-08-15 DIAGNOSIS — R41.82 ALTERED MENTAL STATUS, UNSPECIFIED: ICD-10-CM

## 2018-08-15 DIAGNOSIS — R00.0 TACHYCARDIA, UNSPECIFIED: ICD-10-CM

## 2018-08-15 DIAGNOSIS — E78.5 HYPERLIPIDEMIA, UNSPECIFIED: ICD-10-CM

## 2018-08-15 DIAGNOSIS — I10 ESSENTIAL (PRIMARY) HYPERTENSION: ICD-10-CM

## 2018-08-15 DIAGNOSIS — C71.9 MALIGNANT NEOPLASM OF BRAIN, UNSPECIFIED: ICD-10-CM

## 2018-08-15 LAB
ANION GAP SERPL CALC-SCNC: 12 MMOL/L — SIGNIFICANT CHANGE UP (ref 5–17)
BASE EXCESS BLDA CALC-SCNC: 1.2 MMOL/L — SIGNIFICANT CHANGE UP (ref -2–2)
BASOPHILS # BLD AUTO: 0.1 K/UL — SIGNIFICANT CHANGE UP (ref 0–0.2)
BASOPHILS NFR BLD AUTO: 0.7 % — SIGNIFICANT CHANGE UP (ref 0–2)
BLOOD GAS COMMENTS ARTERIAL: SIGNIFICANT CHANGE UP
BUN SERPL-MCNC: 16 MG/DL — SIGNIFICANT CHANGE UP (ref 7–18)
CALCIUM SERPL-MCNC: 9 MG/DL — SIGNIFICANT CHANGE UP (ref 8.4–10.5)
CHLORIDE SERPL-SCNC: 102 MMOL/L — SIGNIFICANT CHANGE UP (ref 96–108)
CHOLEST SERPL-MCNC: 196 MG/DL — SIGNIFICANT CHANGE UP (ref 10–199)
CO2 SERPL-SCNC: 25 MMOL/L — SIGNIFICANT CHANGE UP (ref 22–31)
CREAT SERPL-MCNC: 0.45 MG/DL — LOW (ref 0.5–1.3)
D DIMER BLD IA.RAPID-MCNC: 781 NG/ML DDU — HIGH
EOSINOPHIL # BLD AUTO: 0 K/UL — SIGNIFICANT CHANGE UP (ref 0–0.5)
EOSINOPHIL NFR BLD AUTO: 0.3 % — SIGNIFICANT CHANGE UP (ref 0–6)
FOLATE SERPL-MCNC: >20 NG/ML — SIGNIFICANT CHANGE UP
GLUCOSE SERPL-MCNC: 104 MG/DL — HIGH (ref 70–99)
HBA1C BLD-MCNC: 6.4 % — HIGH (ref 4–5.6)
HCO3 BLDA-SCNC: 24 MMOL/L — SIGNIFICANT CHANGE UP (ref 23–27)
HCT VFR BLD CALC: 36.5 % — SIGNIFICANT CHANGE UP (ref 34.5–45)
HDLC SERPL-MCNC: 47 MG/DL — SIGNIFICANT CHANGE UP (ref 40–125)
HGB BLD-MCNC: 12.3 G/DL — SIGNIFICANT CHANGE UP (ref 11.5–15.5)
HOROWITZ INDEX BLDA+IHG-RTO: 21 — SIGNIFICANT CHANGE UP
LIPID PNL WITH DIRECT LDL SERPL: 92 MG/DL — SIGNIFICANT CHANGE UP
LYMPHOCYTES # BLD AUTO: 1.2 K/UL — SIGNIFICANT CHANGE UP (ref 1–3.3)
LYMPHOCYTES # BLD AUTO: 10.1 % — LOW (ref 13–44)
MAGNESIUM SERPL-MCNC: 2 MG/DL — SIGNIFICANT CHANGE UP (ref 1.6–2.6)
MCHC RBC-ENTMCNC: 29.8 PG — SIGNIFICANT CHANGE UP (ref 27–34)
MCHC RBC-ENTMCNC: 33.8 GM/DL — SIGNIFICANT CHANGE UP (ref 32–36)
MCV RBC AUTO: 88.3 FL — SIGNIFICANT CHANGE UP (ref 80–100)
MONOCYTES # BLD AUTO: 0.8 K/UL — SIGNIFICANT CHANGE UP (ref 0–0.9)
MONOCYTES NFR BLD AUTO: 6.5 % — SIGNIFICANT CHANGE UP (ref 2–14)
NEUTROPHILS # BLD AUTO: 9.9 K/UL — HIGH (ref 1.8–7.4)
NEUTROPHILS NFR BLD AUTO: 82.4 % — HIGH (ref 43–77)
PCO2 BLDA: 31 MMHG — LOW (ref 32–46)
PH BLDA: 7.49 — HIGH (ref 7.35–7.45)
PHOSPHATE SERPL-MCNC: 2.7 MG/DL — SIGNIFICANT CHANGE UP (ref 2.5–4.5)
PLATELET # BLD AUTO: 410 K/UL — HIGH (ref 150–400)
PO2 BLDA: 75 MMHG — SIGNIFICANT CHANGE UP (ref 74–108)
POTASSIUM SERPL-MCNC: 3.5 MMOL/L — SIGNIFICANT CHANGE UP (ref 3.5–5.3)
POTASSIUM SERPL-SCNC: 3.5 MMOL/L — SIGNIFICANT CHANGE UP (ref 3.5–5.3)
RBC # BLD: 4.14 M/UL — SIGNIFICANT CHANGE UP (ref 3.8–5.2)
RBC # FLD: 14.6 % — HIGH (ref 10.3–14.5)
SAO2 % BLDA: 93 % — SIGNIFICANT CHANGE UP (ref 92–96)
SODIUM SERPL-SCNC: 139 MMOL/L — SIGNIFICANT CHANGE UP (ref 135–145)
TOTAL CHOLESTEROL/HDL RATIO MEASUREMENT: 4.2 RATIO — SIGNIFICANT CHANGE UP (ref 3.3–7.1)
TRIGL SERPL-MCNC: 286 MG/DL — HIGH (ref 10–149)
TSH SERPL-MCNC: 2.51 UU/ML — SIGNIFICANT CHANGE UP (ref 0.34–4.82)
VIT B12 SERPL-MCNC: 909 PG/ML — SIGNIFICANT CHANGE UP (ref 232–1245)
WBC # BLD: 12 K/UL — HIGH (ref 3.8–10.5)
WBC # FLD AUTO: 12 K/UL — HIGH (ref 3.8–10.5)

## 2018-08-15 PROCEDURE — 99285 EMERGENCY DEPT VISIT HI MDM: CPT

## 2018-08-15 RX ORDER — LISINOPRIL 2.5 MG/1
10 TABLET ORAL DAILY
Qty: 0 | Refills: 0 | Status: DISCONTINUED | OUTPATIENT
Start: 2018-08-15 | End: 2018-08-19

## 2018-08-15 RX ORDER — SIMVASTATIN 20 MG/1
10 TABLET, FILM COATED ORAL AT BEDTIME
Qty: 0 | Refills: 0 | Status: DISCONTINUED | OUTPATIENT
Start: 2018-08-15 | End: 2018-08-17

## 2018-08-15 RX ORDER — ANASTROZOLE 1 MG/1
1 TABLET ORAL AT BEDTIME
Qty: 0 | Refills: 0 | Status: DISCONTINUED | OUTPATIENT
Start: 2018-08-15 | End: 2018-08-17

## 2018-08-15 RX ORDER — LEVETIRACETAM 250 MG/1
1500 TABLET, FILM COATED ORAL
Qty: 0 | Refills: 0 | Status: DISCONTINUED | OUTPATIENT
Start: 2018-08-15 | End: 2018-08-20

## 2018-08-15 RX ORDER — ASPIRIN/CALCIUM CARB/MAGNESIUM 324 MG
81 TABLET ORAL DAILY
Qty: 0 | Refills: 0 | Status: DISCONTINUED | OUTPATIENT
Start: 2018-08-15 | End: 2018-08-20

## 2018-08-15 RX ORDER — DEXAMETHASONE 0.5 MG/5ML
4 ELIXIR ORAL DAILY
Qty: 0 | Refills: 0 | Status: DISCONTINUED | OUTPATIENT
Start: 2018-08-15 | End: 2018-08-20

## 2018-08-15 RX ORDER — ALPRAZOLAM 0.25 MG
0.25 TABLET ORAL THREE TIMES A DAY
Qty: 0 | Refills: 0 | Status: DISCONTINUED | OUTPATIENT
Start: 2018-08-15 | End: 2018-08-20

## 2018-08-15 RX ORDER — METOPROLOL TARTRATE 50 MG
12.5 TABLET ORAL
Qty: 0 | Refills: 0 | Status: DISCONTINUED | OUTPATIENT
Start: 2018-08-15 | End: 2018-08-20

## 2018-08-15 RX ORDER — NYSTATIN 500MM UNIT
500000 POWDER (EA) MISCELLANEOUS
Qty: 0 | Refills: 0 | Status: DISCONTINUED | OUTPATIENT
Start: 2018-08-15 | End: 2018-08-20

## 2018-08-15 RX ORDER — PANTOPRAZOLE SODIUM 20 MG/1
40 TABLET, DELAYED RELEASE ORAL
Qty: 0 | Refills: 0 | Status: DISCONTINUED | OUTPATIENT
Start: 2018-08-15 | End: 2018-08-20

## 2018-08-15 RX ADMIN — Medication 500000 UNIT(S): at 06:40

## 2018-08-15 RX ADMIN — LEVETIRACETAM 1500 MILLIGRAM(S): 250 TABLET, FILM COATED ORAL at 20:16

## 2018-08-15 RX ADMIN — PANTOPRAZOLE SODIUM 40 MILLIGRAM(S): 20 TABLET, DELAYED RELEASE ORAL at 10:04

## 2018-08-15 RX ADMIN — Medication 500000 UNIT(S): at 20:16

## 2018-08-15 RX ADMIN — Medication 500000 UNIT(S): at 12:47

## 2018-08-15 RX ADMIN — LISINOPRIL 10 MILLIGRAM(S): 2.5 TABLET ORAL at 06:40

## 2018-08-15 RX ADMIN — LEVETIRACETAM 1500 MILLIGRAM(S): 250 TABLET, FILM COATED ORAL at 00:03

## 2018-08-15 RX ADMIN — Medication 12.5 MILLIGRAM(S): at 06:42

## 2018-08-15 RX ADMIN — Medication 81 MILLIGRAM(S): at 12:47

## 2018-08-15 RX ADMIN — Medication 4 MILLIGRAM(S): at 06:40

## 2018-08-15 RX ADMIN — Medication 12.5 MILLIGRAM(S): at 20:16

## 2018-08-15 RX ADMIN — LEVETIRACETAM 1500 MILLIGRAM(S): 250 TABLET, FILM COATED ORAL at 06:40

## 2018-08-15 NOTE — ED ADULT NURSE NOTE - ED STAT RN HANDOFF DETAILS 2
Report received from Laquita MIRANDA RN. Patient resting in bed. With left sided weakness. In no distress.

## 2018-08-15 NOTE — H&P ADULT - ASSESSMENT
78 year old female with PMH of HTN, HLD, Breast CA s/p L radical mastectomy on Anastrazole, R arterial thrombus, GBM s/p Right Parietal Craniotomy, Radiation, and IV therapy BIB family 2/2 concerning irrational and combative behavior - admitting for further evalulation of encephalopathy likely 2/2 glioblastoma versus infectious

## 2018-08-15 NOTE — PATIENT PROFILE ADULT. - FALLEN IN THE PAST
Immunosuppresion Meds: Tacrolimus  Patient Notified: 10/16/17  Patient Notified Time: 0906  Was Provider Consulted: No  Tacrolimus Result Date: 10/13/17  Tacrolimus Test Result: 3.3  Current Dose: 2/2.5  Current Range Goal: 4-6  New Dose: 2.5/2.5  Comments: [facility] notified of change and orders faxed       no

## 2018-08-15 NOTE — H&P ADULT - HISTORY OF PRESENT ILLNESS
78 year old female with PMH of HTN, HLD, Breast CA s/p L radical mastectomy on Anastrazole, R arterial thrombus, GBM s/p Right Parietal Craniotomy, Radiation, and IV therapy BIB family 2/2 concerning irrational and combative - spoke w/ daughter Rashida Cortez over the telephone who provides HPI - since recent discharge patient has become more confused and verbally abusive. Discussed w/ palliative team at prior stay at [facility] - family understood that the treatment modalities have failed and her quality of life would be poor. Family spoke w/ outpatient neuro oncologist Kamlesh who prescribed Quetiapine w/out improvement in her mood. Family was trying to do home hospice but family is pending Medicaid approval. GOC discussed; DNR/DNI.

## 2018-08-15 NOTE — H&P ADULT - PROBLEM SELECTOR PLAN 1
Likely 2/2 to worsening glioblastoma; r/o infectious cause 2/2 WBC 11K and sinus tachycardia  - CT head negative for acute process  ***F/u UA

## 2018-08-15 NOTE — H&P ADULT - NSHPPHYSICALEXAM_GEN_ALL_CORE
T(C): 36.5 (15 Aug 2018 02:06), Max: 36.5 (15 Aug 2018 02:06)  T(F): 97.7 (15 Aug 2018 02:06), Max: 97.7 (15 Aug 2018 02:06)  HR: 124 (15 Aug 2018 02:06) (124 - 137)  BP: 130/85 (15 Aug 2018 02:06) (121/88 - 130/85)  RR: 16 (15 Aug 2018 02:06) (16 - 16)  SpO2: 97% (15 Aug 2018 02:06) (97% - 98%)

## 2018-08-15 NOTE — ED ADULT NURSE REASSESSMENT NOTE - NS ED NURSE REASSESS COMMENT FT1
Per PCA Sadaf, patient has not voided since 7am today. Bladder scanned patient. Showed 145 cc. Tachycardic at 110. NP ben valdez made aware.

## 2018-08-15 NOTE — ED ADULT NURSE REASSESSMENT NOTE - NS ED NURSE REASSESS COMMENT FT1
Pt reassessed, observed sleeping, breathing room air, in no distress at time of A.M meds administration. Pt was alert enough to swallow meds, verbalized, "I want water.". Pt remained calm all through the night, A.M vitals entered, , Dr. Sloomon made aware verbally. Admitted to [facility], awaiting bed, nursing monitoring continues.

## 2018-08-15 NOTE — H&P ADULT - PROBLEM SELECTOR PLAN 6
IMPROVE VTE Individual Risk Assessment    RISK                                                          Points  [] Previous VTE                                           3  [] Thrombophilia                                        2  [] Lower limb paralysis                              2   [x] Current Cancer                                       2   [] Immobilization > 24 hrs                        1  [] ICU/CCU stay > 24 hours                       1  [x] Age > 60                                                   1    IMPROVE VTE Score: 3, DVT PPx w/ Lovenox

## 2018-08-15 NOTE — ED ADULT NURSE NOTE - OBJECTIVE STATEMENT
Pt BIBA and spouse, spouse says pt has not been herself lately, "always agitated". Pt is observed to be A&O x2, appears to know her environment, gets agitated intermittently. Spouse at bedside at time of arrival.

## 2018-08-15 NOTE — ED ADULT NURSE NOTE - NSIMPLEMENTINTERV_GEN_ALL_ED
Implemented All Universal Safety Interventions:  [place] to call system. Call bell, personal items and telephone within reach. Instruct patient to call for assistance. Room bathroom lighting operational. Non-slip footwear when patient is off stretcher. Physically safe environment: no spills, clutter or unnecessary equipment. Stretcher in lowest position, wheels locked, appropriate side rails in place.

## 2018-08-16 ENCOUNTER — CHART COPY (OUTPATIENT)
Age: 78
End: 2018-08-16

## 2018-08-16 LAB
APPEARANCE UR: CLEAR — SIGNIFICANT CHANGE UP
BILIRUB UR-MCNC: NEGATIVE — SIGNIFICANT CHANGE UP
COLOR SPEC: YELLOW — SIGNIFICANT CHANGE UP
DIFF PNL FLD: ABNORMAL
GLUCOSE UR QL: NEGATIVE — SIGNIFICANT CHANGE UP
KETONES UR-MCNC: NEGATIVE — SIGNIFICANT CHANGE UP
LEUKOCYTE ESTERASE UR-ACNC: NEGATIVE — SIGNIFICANT CHANGE UP
LEVETIRACETAM SERPL-MCNC: 27.9 MCG/ML — SIGNIFICANT CHANGE UP (ref 12–46)
NITRITE UR-MCNC: POSITIVE
PH UR: 7 — SIGNIFICANT CHANGE UP (ref 5–8)
PROT UR-MCNC: NEGATIVE — SIGNIFICANT CHANGE UP
SP GR SPEC: 1.01 — SIGNIFICANT CHANGE UP (ref 1.01–1.02)
UROBILINOGEN FLD QL: 1

## 2018-08-16 RX ORDER — CEFTRIAXONE 500 MG/1
1 INJECTION, POWDER, FOR SOLUTION INTRAMUSCULAR; INTRAVENOUS EVERY 24 HOURS
Qty: 0 | Refills: 0 | Status: DISCONTINUED | OUTPATIENT
Start: 2018-08-16 | End: 2018-08-20

## 2018-08-16 RX ORDER — ALPRAZOLAM 0.25 MG
0.25 TABLET ORAL ONCE
Qty: 0 | Refills: 0 | Status: DISCONTINUED | OUTPATIENT
Start: 2018-08-16 | End: 2018-08-16

## 2018-08-16 RX ADMIN — ANASTROZOLE 1 MILLIGRAM(S): 1 TABLET ORAL at 00:46

## 2018-08-16 RX ADMIN — Medication 500000 UNIT(S): at 18:03

## 2018-08-16 RX ADMIN — Medication 0.25 MILLIGRAM(S): at 02:30

## 2018-08-16 RX ADMIN — SIMVASTATIN 10 MILLIGRAM(S): 20 TABLET, FILM COATED ORAL at 21:14

## 2018-08-16 RX ADMIN — Medication 500000 UNIT(S): at 00:47

## 2018-08-16 RX ADMIN — Medication 81 MILLIGRAM(S): at 11:31

## 2018-08-16 RX ADMIN — LEVETIRACETAM 1500 MILLIGRAM(S): 250 TABLET, FILM COATED ORAL at 18:03

## 2018-08-16 RX ADMIN — ANASTROZOLE 1 MILLIGRAM(S): 1 TABLET ORAL at 21:14

## 2018-08-16 RX ADMIN — Medication 0.25 MILLIGRAM(S): at 20:14

## 2018-08-16 RX ADMIN — CEFTRIAXONE 100 GRAM(S): 500 INJECTION, POWDER, FOR SOLUTION INTRAMUSCULAR; INTRAVENOUS at 18:04

## 2018-08-16 RX ADMIN — Medication 4 MILLIGRAM(S): at 05:53

## 2018-08-16 RX ADMIN — Medication 500000 UNIT(S): at 05:55

## 2018-08-16 RX ADMIN — LISINOPRIL 10 MILLIGRAM(S): 2.5 TABLET ORAL at 05:53

## 2018-08-16 RX ADMIN — Medication 12.5 MILLIGRAM(S): at 05:53

## 2018-08-16 RX ADMIN — Medication 12.5 MILLIGRAM(S): at 18:03

## 2018-08-16 RX ADMIN — SIMVASTATIN 10 MILLIGRAM(S): 20 TABLET, FILM COATED ORAL at 00:47

## 2018-08-16 RX ADMIN — Medication 500000 UNIT(S): at 11:31

## 2018-08-16 RX ADMIN — LEVETIRACETAM 1500 MILLIGRAM(S): 250 TABLET, FILM COATED ORAL at 05:54

## 2018-08-16 RX ADMIN — PANTOPRAZOLE SODIUM 40 MILLIGRAM(S): 20 TABLET, DELAYED RELEASE ORAL at 05:53

## 2018-08-16 NOTE — PROGRESS NOTE ADULT - PROBLEM SELECTOR PLAN 1
Likely 2/2 to worsening glioblastoma; r/o infectious cause 2/2 WBC 11K and sinus tachycardia  - CT head negative for acute process  U/A positive   F/U Urine culture  Continue with Rocephin.

## 2018-08-17 DIAGNOSIS — F41.9 ANXIETY DISORDER, UNSPECIFIED: ICD-10-CM

## 2018-08-17 DIAGNOSIS — R53.81 OTHER MALAISE: ICD-10-CM

## 2018-08-17 DIAGNOSIS — Z51.5 ENCOUNTER FOR PALLIATIVE CARE: ICD-10-CM

## 2018-08-17 DIAGNOSIS — C50.912 MALIGNANT NEOPLASM OF UNSPECIFIED SITE OF LEFT FEMALE BREAST: ICD-10-CM

## 2018-08-17 LAB
ANION GAP SERPL CALC-SCNC: 12 MMOL/L — SIGNIFICANT CHANGE UP (ref 5–17)
BUN SERPL-MCNC: 11 MG/DL — SIGNIFICANT CHANGE UP (ref 7–18)
CALCIUM SERPL-MCNC: 8.4 MG/DL — SIGNIFICANT CHANGE UP (ref 8.4–10.5)
CHLORIDE SERPL-SCNC: 99 MMOL/L — SIGNIFICANT CHANGE UP (ref 96–108)
CO2 SERPL-SCNC: 23 MMOL/L — SIGNIFICANT CHANGE UP (ref 22–31)
CREAT SERPL-MCNC: 0.28 MG/DL — LOW (ref 0.5–1.3)
CULTURE RESULTS: NO GROWTH — SIGNIFICANT CHANGE UP
GLUCOSE SERPL-MCNC: 108 MG/DL — HIGH (ref 70–99)
HCT VFR BLD CALC: 34.2 % — LOW (ref 34.5–45)
HGB BLD-MCNC: 11.6 G/DL — SIGNIFICANT CHANGE UP (ref 11.5–15.5)
MAGNESIUM SERPL-MCNC: 1.8 MG/DL — SIGNIFICANT CHANGE UP (ref 1.6–2.6)
MCHC RBC-ENTMCNC: 29.5 PG — SIGNIFICANT CHANGE UP (ref 27–34)
MCHC RBC-ENTMCNC: 33.9 GM/DL — SIGNIFICANT CHANGE UP (ref 32–36)
MCV RBC AUTO: 87 FL — SIGNIFICANT CHANGE UP (ref 80–100)
PHOSPHATE SERPL-MCNC: 2.3 MG/DL — LOW (ref 2.5–4.5)
PLATELET # BLD AUTO: 413 K/UL — HIGH (ref 150–400)
POTASSIUM SERPL-MCNC: 3 MMOL/L — LOW (ref 3.5–5.3)
POTASSIUM SERPL-SCNC: 3 MMOL/L — LOW (ref 3.5–5.3)
RBC # BLD: 3.93 M/UL — SIGNIFICANT CHANGE UP (ref 3.8–5.2)
RBC # FLD: 14.6 % — HIGH (ref 10.3–14.5)
SODIUM SERPL-SCNC: 134 MMOL/L — LOW (ref 135–145)
SPECIMEN SOURCE: SIGNIFICANT CHANGE UP
WBC # BLD: 11.7 K/UL — HIGH (ref 3.8–10.5)
WBC # FLD AUTO: 11.7 K/UL — HIGH (ref 3.8–10.5)

## 2018-08-17 PROCEDURE — 99223 1ST HOSP IP/OBS HIGH 75: CPT

## 2018-08-17 RX ORDER — ACETAMINOPHEN 500 MG
650 TABLET ORAL EVERY 6 HOURS
Qty: 0 | Refills: 0 | Status: DISCONTINUED | OUTPATIENT
Start: 2018-08-17 | End: 2018-08-20

## 2018-08-17 RX ORDER — QUETIAPINE FUMARATE 200 MG/1
25 TABLET, FILM COATED ORAL AT BEDTIME
Qty: 0 | Refills: 0 | Status: DISCONTINUED | OUTPATIENT
Start: 2018-08-17 | End: 2018-08-20

## 2018-08-17 RX ORDER — POTASSIUM CHLORIDE 20 MEQ
40 PACKET (EA) ORAL EVERY 4 HOURS
Qty: 0 | Refills: 0 | Status: COMPLETED | OUTPATIENT
Start: 2018-08-17 | End: 2018-08-17

## 2018-08-17 RX ADMIN — Medication 500000 UNIT(S): at 23:01

## 2018-08-17 RX ADMIN — Medication 12.5 MILLIGRAM(S): at 05:44

## 2018-08-17 RX ADMIN — LISINOPRIL 10 MILLIGRAM(S): 2.5 TABLET ORAL at 05:44

## 2018-08-17 RX ADMIN — Medication 500000 UNIT(S): at 05:47

## 2018-08-17 RX ADMIN — LEVETIRACETAM 1500 MILLIGRAM(S): 250 TABLET, FILM COATED ORAL at 05:44

## 2018-08-17 RX ADMIN — QUETIAPINE FUMARATE 25 MILLIGRAM(S): 200 TABLET, FILM COATED ORAL at 22:44

## 2018-08-17 RX ADMIN — PANTOPRAZOLE SODIUM 40 MILLIGRAM(S): 20 TABLET, DELAYED RELEASE ORAL at 05:44

## 2018-08-17 RX ADMIN — Medication 500000 UNIT(S): at 18:10

## 2018-08-17 RX ADMIN — LEVETIRACETAM 1500 MILLIGRAM(S): 250 TABLET, FILM COATED ORAL at 18:11

## 2018-08-17 RX ADMIN — Medication 40 MILLIEQUIVALENT(S): at 13:20

## 2018-08-17 RX ADMIN — Medication 0.25 MILLIGRAM(S): at 05:43

## 2018-08-17 RX ADMIN — CEFTRIAXONE 100 GRAM(S): 500 INJECTION, POWDER, FOR SOLUTION INTRAMUSCULAR; INTRAVENOUS at 18:10

## 2018-08-17 RX ADMIN — Medication 500000 UNIT(S): at 11:23

## 2018-08-17 RX ADMIN — Medication 40 MILLIEQUIVALENT(S): at 11:24

## 2018-08-17 RX ADMIN — Medication 4 MILLIGRAM(S): at 05:44

## 2018-08-17 RX ADMIN — Medication 0.25 MILLIGRAM(S): at 22:49

## 2018-08-17 RX ADMIN — Medication 81 MILLIGRAM(S): at 11:25

## 2018-08-17 RX ADMIN — Medication 0.25 MILLIGRAM(S): at 18:35

## 2018-08-17 RX ADMIN — Medication 12.5 MILLIGRAM(S): at 18:11

## 2018-08-17 NOTE — PROGRESS NOTE ADULT - PROBLEM SELECTOR PLAN 2
Likely 2/2 agitation; resolved w/ 1 mg IV Lorazepam Likely 2/2 agitation; resolved w/ 1 mg IV Lorazepam.

## 2018-08-17 NOTE — CONSULT NOTE ADULT - PROBLEM SELECTOR RECOMMENDATION 4
2/2 glioblastoma s/p craniotomy, L breast Ca s/p mastectomy. Pt A&O x 2, now bedbound, dependent on all ADLs. For LTC with comfort care - hospice once medicaid active.

## 2018-08-17 NOTE — CONSULT NOTE ADULT - SUBJECTIVE AND OBJECTIVE BOX
HPI:  78 year old female with PMH of HTN, HLD, Breast CA s/p L radical mastectomy on Anastrazole, R arterial thrombus, GBM s/p Right Parietal Craniotomy, Radiation, and IV therapy BIB family 2/2 concerning irrational and combative - spoke w/ daughter Rashida Cortez over the telephone who provides HPI - since recent discharge patient has become more confused and verbally abusive. Discussed w/ palliative team at prior stay at [facility] - family understood that the treatment modalities have failed and her quality of life would be poor. Family spoke w/ outpatient neuro oncologist Kamlesh who prescribed Quetiapine w/out improvement in her mood. Family was trying to do home hospice but family is pending Medicaid approval. GOC discussed; DNR/DNI. (15 Aug 2018 03:34)      PAST MEDICAL & SURGICAL HISTORY:  Hyperlipidemia  Hypertension  Glioblastoma: s/p Right parietal craniotomy receiving radiation with concurrent and adjuvant chemo (temozolamide)  Breast cancer: s/p L radical mastectomy on anastrazole  History of craniotomy  H/O breast surgery      SOCIAL HISTORY:    Admitted from:  home    Preferred Language: english    Surrogate/HCP/Guardian: Rashida Cortez (dtr/surrogate): [phone]    FAMILY HISTORY:  No pertinent family history in first degree relatives    Baseline ADLs (prior to admission): bedbound, dependent on ADLs    No Known Allergies    Present Symptoms:   Dyspnea: none   Pain:    none        Review of Systems: Pt with no c/o at time of exam, reports she wants to "go home."     MEDICATIONS  (STANDING):  anastrozole 1 milliGRAM(s) Oral at bedtime  aspirin enteric coated 81 milliGRAM(s) Oral daily  cefTRIAXone   IVPB 1 Gram(s) IV Intermittent every 24 hours  dexamethasone     Tablet 4 milliGRAM(s) Oral daily  levETIRAcetam 1500 milliGRAM(s) Oral two times a day  lisinopril 10 milliGRAM(s) Oral daily  metoprolol tartrate 12.5 milliGRAM(s) Oral two times a day  nystatin    Suspension 533460 Unit(s) Oral four times a day  pantoprazole    Tablet 40 milliGRAM(s) Oral before breakfast  potassium chloride    Tablet ER 40 milliEquivalent(s) Oral every 4 hours    MEDICATIONS  (PRN):  ALPRAZolam 0.25 milliGRAM(s) Oral three times a day PRN Agitation      PHYSICAL EXAM:    Vital Signs Last 24 Hrs  T(C): 36.1 (17 Aug 2018 05:45), Max: 36.7 (16 Aug 2018 14:29)  T(F): 97 (17 Aug 2018 05:45), Max: 98.1 (16 Aug 2018 14:29)  HR: 108 (17 Aug 2018 05:45) (96 - 108)  BP: 129/73 (17 Aug 2018 05:45) (104/60 - 143/69)  BP(mean): --  RR: 17 (17 Aug 2018 05:45) (17 - 18)  SpO2: 98% (17 Aug 2018 05:45) (98% - 100%)    General: alert  oriented x 2, verbal, + anxiety   Karnofsky Performance Score/Palliative Performance Status Version2:    20 %    HEENT: s/p craniotomy  Lungs: comfortable   CV: tachycardia  GI: incontinent  :  incontinent    Musculoskeletal: bedbound, dependent on ADLs, L hemiplegia, loss of muscle mass/subcutaneous fat BLE  Skin: normal   Neuro: pt A&O x 2, verbal, answers questions, + anxiety - pt reporting she wants to home home  Oral intake ability: minimal  Diet: DASH/TLC    LABS:                        11.6   11.7  )-----------( 413      ( 17 Aug 2018 07:22 )             34.2     08-17    134<L>  |  99  |  11  ----------------------------<  108<H>  3.0<L>   |  23  |  0.28<L>    Ca    8.4      17 Aug 2018 07:22  Phos  2.3       Mg     1.8     08-17      Urinalysis Basic - ( 16 Aug 2018 14:53 )    Color: Yellow / Appearance: Clear / S.015 / pH: x  Gluc: x / Ketone: Negative  / Bili: Negative / Urobili: 1   Blood: x / Protein: Negative / Nitrite: Positive   Leuk Esterase: Negative / RBC: Negative /HPF / WBC 11-25 /HPF   Sq Epi: x / Non Sq Epi: Occasional /HPF / Bacteria: Few /HPF        RADIOLOGY & ADDITIONAL STUDIES:  < from: CT Head No Cont (18 @ 23:56) >  EXAM:  CT BRAIN                            PROCEDURE DATE:  2018          INTERPRETATION:  CLINICAL INFORMATION: Altered mental status.    TECHNIQUE:  Axial CT images were acquired through the head.  Intravenous contrast: None  Two-dimensional reformats were generated.    COMPARISON STUDY: CT head from 2018.  MRI from 2018.    FINDINGS:   There is redemonstration of a high right parietal cystic focus and   surrounding white matter lucency with subtle mass effect related to   patient's known glioblastoma.  There is no CT evidence of acute   intracranial hemorrhage, midline shift or acute territorial infarct. The   basal cisterns are patent.    There is mild generalized cerebral volume loss and mild to moderate   periventricular white matter hypoattenuation compatible chronic   microvascular ischemic disease.    There is mild mucosal thickening and foamy secretions in both sphenoid   sinuses.    The mastoid air cells and middle ear cavities are clear.    Patient is status post old right parietal craniotomy.  The calvarium and   skull base are otherwise grossly intact.    IMPRESSION:   Redemonstration of high right parietal cystic focus and surrounding white   matter lucency with subtle mass effect related to patient's known   glioblastoma.  Contrast-enhanced MRI can be performed as clinically   warranted to better characterize disease progression since previous MRI   of 2018.    No CT evidence of acute intracranial hemorrhage, midline shift or acute   territorial infarct.    Mucosal thickening and foamy secretions in the sphenoid sinuses.  Acute   sinusitis should be excluded clinically.    < end of copied text >      ADVANCE DIRECTIVES: DNR / MOLST forms completed as per daughter's wishes: DNR / DNI / dNH / no feeding tube / trial IV fluids / use abx; goal of care is comfort. HPI:  78 year old female with PMH of HTN, HLD, Breast CA s/p L radical mastectomy on Anastrazole, R arterial thrombus, GBM s/p Right Parietal Craniotomy, Radiation, and IV therapy BIB family 2/2 concerning irrational and combative - spoke w/ daughter Rashida Cortez over the telephone who provides HPI - since recent discharge patient has become more confused and verbally abusive. Discussed w/ palliative team at prior stay at [facility] - family understood that the treatment modalities have failed and her quality of life would be poor. Family spoke w/ outpatient neuro oncologist Kamlesh who prescribed Quetiapine w/out improvement in her mood. Family was trying to do home hospice but family is pending Medicaid approval. GOC discussed; DNR/DNI. (15 Aug 2018 03:34)      PAST MEDICAL & SURGICAL HISTORY:  Hyperlipidemia  Hypertension  Glioblastoma: s/p Right parietal craniotomy receiving radiation with concurrent and adjuvant chemo (temozolamide)  Breast cancer: s/p L radical mastectomy on anastrazole  History of craniotomy  H/O breast surgery      SOCIAL HISTORY:    Admitted from:  home    Preferred Language: english    Surrogate/HCP/Guardian: Rashida Cortez (dtr/surrogate): [phone]    FAMILY HISTORY:  No pertinent family history in first degree relatives    Baseline ADLs (prior to admission): bedbound, dependent on ADLs    No Known Allergies    Present Symptoms:   Dyspnea: none   Pain:    none        Review of Systems: Pt with no c/o at time of exam, reports she wants to "go home."     MEDICATIONS  (STANDING):  anastrozole 1 milliGRAM(s) Oral at bedtime  aspirin enteric coated 81 milliGRAM(s) Oral daily  cefTRIAXone   IVPB 1 Gram(s) IV Intermittent every 24 hours  dexamethasone     Tablet 4 milliGRAM(s) Oral daily  levETIRAcetam 1500 milliGRAM(s) Oral two times a day  lisinopril 10 milliGRAM(s) Oral daily  metoprolol tartrate 12.5 milliGRAM(s) Oral two times a day  nystatin    Suspension 213678 Unit(s) Oral four times a day  pantoprazole    Tablet 40 milliGRAM(s) Oral before breakfast  potassium chloride    Tablet ER 40 milliEquivalent(s) Oral every 4 hours    MEDICATIONS  (PRN):  ALPRAZolam 0.25 milliGRAM(s) Oral three times a day PRN Agitation      PHYSICAL EXAM:    Vital Signs Last 24 Hrs  T(C): 36.1 (17 Aug 2018 05:45), Max: 36.7 (16 Aug 2018 14:29)  T(F): 97 (17 Aug 2018 05:45), Max: 98.1 (16 Aug 2018 14:29)  HR: 108 (17 Aug 2018 05:45) (96 - 108)  BP: 129/73 (17 Aug 2018 05:45) (104/60 - 143/69)  BP(mean): --  RR: 17 (17 Aug 2018 05:45) (17 - 18)  SpO2: 98% (17 Aug 2018 05:45) (98% - 100%)    General: alert  oriented x 2, verbal, + anxiety   Karnofsky Performance Score/Palliative Performance Status Version2:    20 %    HEENT: s/p Right Parietal Craniotomy  Lungs: comfortable  CV: tachycardia  GI: incontinent  :  incontinent    Musculoskeletal: bedbound, dependent on ADLs, L hemiplegia, loss of muscle mass/subcutaneous fat BLE  Skin: s/p L radical mastectomy  Neuro: pt A&O x 2, verbal, answers questions, + anxiety - pt reporting she wants to home   Oral intake ability: minimal  Diet: DASH/TLC    LABS:                        11.6   11.7  )-----------( 413      ( 17 Aug 2018 07:22 )             34.2         134<L>  |  99  |  11  ----------------------------<  108<H>  3.0<L>   |  23  |  0.28<L>    Ca    8.4      17 Aug 2018 07:22  Phos  2.3       Mg     1.8     08-17      Urinalysis Basic - ( 16 Aug 2018 14:53 )    Color: Yellow / Appearance: Clear / S.015 / pH: x  Gluc: x / Ketone: Negative  / Bili: Negative / Urobili: 1   Blood: x / Protein: Negative / Nitrite: Positive   Leuk Esterase: Negative / RBC: Negative /HPF / WBC 11-25 /HPF   Sq Epi: x / Non Sq Epi: Occasional /HPF / Bacteria: Few /HPF        RADIOLOGY & ADDITIONAL STUDIES:  < from: CT Head No Cont (18 @ 23:56) >  EXAM:  CT BRAIN                            PROCEDURE DATE:  2018          INTERPRETATION:  CLINICAL INFORMATION: Altered mental status.    TECHNIQUE:  Axial CT images were acquired through the head.  Intravenous contrast: None  Two-dimensional reformats were generated.    COMPARISON STUDY: CT head from 2018.  MRI from 2018.    FINDINGS:   There is redemonstration of a high right parietal cystic focus and   surrounding white matter lucency with subtle mass effect related to   patient's known glioblastoma.  There is no CT evidence of acute   intracranial hemorrhage, midline shift or acute territorial infarct. The   basal cisterns are patent.    There is mild generalized cerebral volume loss and mild to moderate   periventricular white matter hypoattenuation compatible chronic   microvascular ischemic disease.    There is mild mucosal thickening and foamy secretions in both sphenoid   sinuses.    The mastoid air cells and middle ear cavities are clear.    Patient is status post old right parietal craniotomy.  The calvarium and   skull base are otherwise grossly intact.    IMPRESSION:   Redemonstration of high right parietal cystic focus and surrounding white   matter lucency with subtle mass effect related to patient's known   glioblastoma.  Contrast-enhanced MRI can be performed as clinically   warranted to better characterize disease progression since previous MRI   of 2018.    No CT evidence of acute intracranial hemorrhage, midline shift or acute   territorial infarct.    Mucosal thickening and foamy secretions in the sphenoid sinuses.  Acute   sinusitis should be excluded clinically.    < end of copied text >      ADVANCE DIRECTIVES: DNR / MOLST forms completed as per daughter's wishes: DNR / DNI / dNH / no feeding tube / trial IV fluids / use abx; goal of care is comfort.

## 2018-08-17 NOTE — CONSULT NOTE ADULT - PROBLEM SELECTOR RECOMMENDATION 2
ECOG 4. Per daughter's report, pt diagnosed with L breast cancer, s/p L mastectomy when she had a seizure following surgery and + brain mass was identified. s/p craniotomy. Per CT: redemonstration of high right parietal cystic focus and surrounding white matter lucency with subtle mass effect related to patient's known glioblastoma. Pt eligible for residential hospice. Goal of care is comfort: DNR / DNI / DNH / no feeding tube per MOLST. ECOG 4. Per daughter's report, pt diagnosed with L breast cancer, s/p L mastectomy when she had a seizure following surgery and + brain mass was identified. s/p Right Parietal Craniotomy. Per CT: redemonstration of high right parietal cystic focus and surrounding white matter lucency with subtle mass effect related to patient's known glioblastoma. Continues keppra and decadron. Pt eligible for residential hospice. Goal of care is comfort: DNR / DNI / DNH / no feeding tube per MOLST.

## 2018-08-17 NOTE — CONSULT NOTE ADULT - PROBLEM SELECTOR RECOMMENDATION 9
Pt noted with + anxiety - reporting she wants to go home and calling out for her mother. Continue xanax.

## 2018-08-17 NOTE — PROGRESS NOTE ADULT - PROBLEM SELECTOR PLAN 1
Likely 2/2 to worsening glioblastoma; r/o infectious cause   - CT head negative for acute process  F/U Urine culture  Continue with Rocephin.  Awaiting placement to in facility hospice and got approval from medicaid. Likely 2/2 to worsening glioblastoma; r/o infectious cause   - CT head negative for acute process  F/U Urine culture  Continue with Rocephin.  Potassium and phosphorus are low and repleted.  Monitor BMP.  Awaiting placement to in facility hospice and got approval from medicaid.

## 2018-08-17 NOTE — CONSULT NOTE ADULT - PROBLEM SELECTOR RECOMMENDATION 3
ECOG 4. Pt s/p L mastectomy. Was continuing Anastrazole; however, daughter now requesting to discontinue at this time, as patient with glioblastoma and goal of care is comfort. ECOG 4. Pt s/p L radical mastectomy. Spoke with patient's daughter on the phone - requesting to discontinue Anastrazole at this time, stating "it's not going to help her anyway."  Goal of care is comfort.

## 2018-08-17 NOTE — CONSULT NOTE ADULT - PROBLEM SELECTOR RECOMMENDATION 5
Spoke with patient's  at bedside - he is deferring decision making to his daughter, Cydney. Spoke with Cydney on the phone - discussed status. Reviewed MOLST and risks vs benefits of resuscitation, intubation, artificial nutrition. Daughter reports "just make her comfortable" - requests to d/c cholesterol medication and oral chemo agent, stating "it's not going to help her anyway." MOLST completed as per daughter's wishes: DNR / DNI / DNH / no feeding tube / trial IV fluids / use abx; goal of care is comfort. Discharge plan: LTC with comfort care (once medicaid active, pt eligible for residential hospice).  All questions answered; supportive counseling provided.

## 2018-08-18 RX ADMIN — Medication 500000 UNIT(S): at 17:56

## 2018-08-18 RX ADMIN — QUETIAPINE FUMARATE 25 MILLIGRAM(S): 200 TABLET, FILM COATED ORAL at 21:40

## 2018-08-18 RX ADMIN — Medication 500000 UNIT(S): at 12:12

## 2018-08-18 RX ADMIN — LEVETIRACETAM 1500 MILLIGRAM(S): 250 TABLET, FILM COATED ORAL at 17:56

## 2018-08-18 RX ADMIN — Medication 81 MILLIGRAM(S): at 12:13

## 2018-08-18 RX ADMIN — Medication 12.5 MILLIGRAM(S): at 17:56

## 2018-08-18 RX ADMIN — CEFTRIAXONE 100 GRAM(S): 500 INJECTION, POWDER, FOR SOLUTION INTRAMUSCULAR; INTRAVENOUS at 17:57

## 2018-08-18 NOTE — CHART NOTE - NSCHARTNOTEFT_GEN_A_CORE
Patient is calm and combative. I evaluated at the bedside she has no behavioral disturbance.
patient was seen and evaluated please see resident doctor's H&P section for full information
I was paged by the nurse that patient is refusing her medication and labs. Patient was assessed at the bedside, she was calm but continued to refuse medications.

## 2018-08-18 NOTE — PROGRESS NOTE ADULT - PROBLEM SELECTOR PLAN 1
Likely 2/2 to worsening glioblastoma; r/o infectious cause   - CT head negative for acute process  F/U Urine culture  Continue with Rocephin.  Monitor BMP.  Awaiting placement to in facility hospice and got approval from medicaid.

## 2018-08-19 ENCOUNTER — TRANSCRIPTION ENCOUNTER (OUTPATIENT)
Age: 78
End: 2018-08-19

## 2018-08-19 LAB
ANION GAP SERPL CALC-SCNC: 10 MMOL/L — SIGNIFICANT CHANGE UP (ref 5–17)
BUN SERPL-MCNC: 14 MG/DL — SIGNIFICANT CHANGE UP (ref 7–18)
CALCIUM SERPL-MCNC: 8.7 MG/DL — SIGNIFICANT CHANGE UP (ref 8.4–10.5)
CHLORIDE SERPL-SCNC: 104 MMOL/L — SIGNIFICANT CHANGE UP (ref 96–108)
CO2 SERPL-SCNC: 25 MMOL/L — SIGNIFICANT CHANGE UP (ref 22–31)
CREAT SERPL-MCNC: 0.3 MG/DL — LOW (ref 0.5–1.3)
GLUCOSE SERPL-MCNC: 96 MG/DL — SIGNIFICANT CHANGE UP (ref 70–99)
HCT VFR BLD CALC: 35.4 % — SIGNIFICANT CHANGE UP (ref 34.5–45)
HGB BLD-MCNC: 11.7 G/DL — SIGNIFICANT CHANGE UP (ref 11.5–15.5)
MAGNESIUM SERPL-MCNC: 1.9 MG/DL — SIGNIFICANT CHANGE UP (ref 1.6–2.6)
MCHC RBC-ENTMCNC: 29.3 PG — SIGNIFICANT CHANGE UP (ref 27–34)
MCHC RBC-ENTMCNC: 33 GM/DL — SIGNIFICANT CHANGE UP (ref 32–36)
MCV RBC AUTO: 88.7 FL — SIGNIFICANT CHANGE UP (ref 80–100)
PHOSPHATE SERPL-MCNC: 3.1 MG/DL — SIGNIFICANT CHANGE UP (ref 2.5–4.5)
PLATELET # BLD AUTO: 370 K/UL — SIGNIFICANT CHANGE UP (ref 150–400)
POTASSIUM SERPL-MCNC: 3.3 MMOL/L — LOW (ref 3.5–5.3)
POTASSIUM SERPL-SCNC: 3.3 MMOL/L — LOW (ref 3.5–5.3)
RBC # BLD: 3.99 M/UL — SIGNIFICANT CHANGE UP (ref 3.8–5.2)
RBC # FLD: 15.4 % — HIGH (ref 10.3–14.5)
SODIUM SERPL-SCNC: 139 MMOL/L — SIGNIFICANT CHANGE UP (ref 135–145)
WBC # BLD: 8.5 K/UL — SIGNIFICANT CHANGE UP (ref 3.8–10.5)
WBC # FLD AUTO: 8.5 K/UL — SIGNIFICANT CHANGE UP (ref 3.8–10.5)

## 2018-08-19 RX ORDER — SODIUM CHLORIDE 9 MG/ML
1000 INJECTION INTRAMUSCULAR; INTRAVENOUS; SUBCUTANEOUS
Qty: 0 | Refills: 0 | Status: DISCONTINUED | OUTPATIENT
Start: 2018-08-19 | End: 2018-08-20

## 2018-08-19 RX ORDER — SODIUM CHLORIDE 9 MG/ML
500 INJECTION INTRAMUSCULAR; INTRAVENOUS; SUBCUTANEOUS ONCE
Qty: 0 | Refills: 0 | Status: COMPLETED | OUTPATIENT
Start: 2018-08-19 | End: 2018-08-19

## 2018-08-19 RX ADMIN — SODIUM CHLORIDE 50 MILLILITER(S): 9 INJECTION INTRAMUSCULAR; INTRAVENOUS; SUBCUTANEOUS at 16:17

## 2018-08-19 RX ADMIN — Medication 4 MILLIGRAM(S): at 06:39

## 2018-08-19 RX ADMIN — Medication 0.25 MILLIGRAM(S): at 21:32

## 2018-08-19 RX ADMIN — Medication 500000 UNIT(S): at 00:29

## 2018-08-19 RX ADMIN — Medication 12.5 MILLIGRAM(S): at 06:39

## 2018-08-19 RX ADMIN — Medication 81 MILLIGRAM(S): at 11:34

## 2018-08-19 RX ADMIN — Medication 500000 UNIT(S): at 11:34

## 2018-08-19 RX ADMIN — Medication 500000 UNIT(S): at 17:37

## 2018-08-19 RX ADMIN — PANTOPRAZOLE SODIUM 40 MILLIGRAM(S): 20 TABLET, DELAYED RELEASE ORAL at 06:40

## 2018-08-19 RX ADMIN — QUETIAPINE FUMARATE 25 MILLIGRAM(S): 200 TABLET, FILM COATED ORAL at 21:32

## 2018-08-19 RX ADMIN — LISINOPRIL 10 MILLIGRAM(S): 2.5 TABLET ORAL at 06:39

## 2018-08-19 RX ADMIN — LEVETIRACETAM 1500 MILLIGRAM(S): 250 TABLET, FILM COATED ORAL at 17:37

## 2018-08-19 RX ADMIN — CEFTRIAXONE 100 GRAM(S): 500 INJECTION, POWDER, FOR SOLUTION INTRAMUSCULAR; INTRAVENOUS at 17:37

## 2018-08-19 RX ADMIN — SODIUM CHLORIDE 1000 MILLILITER(S): 9 INJECTION INTRAMUSCULAR; INTRAVENOUS; SUBCUTANEOUS at 16:17

## 2018-08-19 RX ADMIN — Medication 500000 UNIT(S): at 06:40

## 2018-08-19 RX ADMIN — LEVETIRACETAM 1500 MILLIGRAM(S): 250 TABLET, FILM COATED ORAL at 06:39

## 2018-08-19 NOTE — DISCHARGE NOTE ADULT - PLAN OF CARE
Resolution of symptoms You came here with altered mental status and agitation due to Urinary tract infection as urinalysis was positive. But urine cultures were negative. You were treated with antibiotic and seroquel for agitation. You improved clinically. Your daughter requested for in facility hospice. Follow up with your Primary medical doctor Control of blood pressure You have high blood pressure. Please continue the current medication regimen. Please record blood pressure readings daily and take it to the primary medical doctor. Please eat low salt diet and fresh vegetables. Follow up with your medical doctor.

## 2018-08-19 NOTE — DISCHARGE NOTE ADULT - CARE PLAN
Principal Discharge DX:	Altered mental status, unspecified altered mental status type  Goal:	Resolution of symptoms  Assessment and plan of treatment:	You came here with altered mental status and agitation due to Urinary tract infection as urinalysis was positive. But urine cultures were negative. You were treated with antibiotic and seroquel for agitation. You improved clinically. Your daughter requested for in facility hospice. Follow up with your Primary medical doctor  Secondary Diagnosis:	Hypertension  Goal:	Control of blood pressure  Assessment and plan of treatment:	You have high blood pressure. Please continue the current medication regimen. Please record blood pressure readings daily and take it to the primary medical doctor. Please eat low salt diet and fresh vegetables. Follow up with your medical doctor.

## 2018-08-19 NOTE — DISCHARGE NOTE ADULT - PATIENT PORTAL LINK FT
You can access the [facility] Patient Portal, offered by [facility], by registering with the following website: [email or web address]

## 2018-08-19 NOTE — DISCHARGE NOTE ADULT - MEDICATION SUMMARY - MEDICATIONS TO TAKE
I will START or STAY ON the medications listed below when I get home from the hospital:    dexamethasone 1 mg oral tablet  -- 4 tab(s) by mouth once a day in the morning  -- Indication: For Glioblastoma    aspirin 81 mg oral delayed release tablet  -- 1 tab(s) by mouth once a day  -- Indication: For Hyperlipidemia    lisinopril 10 mg oral tablet  -- 1 tab(s) by mouth once a day  -- Indication: For Hypertension    levETIRAcetam 750 mg oral tablet  -- 2 tab(s) by mouth 2 times a day  -- Indication: For Seizure    nystatin 100,000 units/mL oral suspension  -- 5 milliliter(s) by mouth 4 times a day  -- Indication: For fungal infection    Zocor 10 mg oral tablet  -- 1 tab(s) by mouth once a day (at bedtime)  -- Indication: For Hyperlipidemia    anastrozole 1 mg oral tablet  -- 1 tab(s) by mouth once a day (at bedtime)  -- Indication: For breast cancer    QUEtiapine 25 mg oral tablet  -- 1 tab(s) by mouth once a day (at bedtime)  -- Indication: For Agitation    ALPRAZolam 0.25 mg oral tablet  -- 1 tab(s) by mouth 3 times a day, As Needed MDD:three times a day  -- Indication: For Agitation    metoprolol tartrate 25 mg oral tablet  -- 0.5 tab(s) (12.5mg) by mouth 2 times a day  -- Indication: For Hypertension    pantoprazole 40 mg oral delayed release tablet  -- 1 tab(s) by mouth once a day  -- Indication: For Heartburn

## 2018-08-19 NOTE — DISCHARGE NOTE ADULT - HOSPITAL COURSE
78 year old female with PMH of HTN, HLD, Breast CA s/p L radical mastectomy on Anastrazole, R arterial thrombus, GBM s/p Right Parietal Craniotomy, Radiation, and IV therapy BIB family 2/2 concerning irrational and combative and  patient has become more confused and verbally abusive. Her urine analysis came positive but urine cultures were negative. She was treated with antibiotic. She was treated with Seroquel for agitation. She was calm and no behavioral issues noted. Family requested for hospice. Palliative care was consulted and they evaluated and assessed the patient. She is eligible for in facility hospice, Patient is clinically improving and stable for discharge.

## 2018-08-20 VITALS
TEMPERATURE: 98 F | RESPIRATION RATE: 18 BRPM | OXYGEN SATURATION: 99 % | DIASTOLIC BLOOD PRESSURE: 57 MMHG | SYSTOLIC BLOOD PRESSURE: 92 MMHG | HEART RATE: 112 BPM

## 2018-08-20 LAB
ANION GAP SERPL CALC-SCNC: 10 MMOL/L — SIGNIFICANT CHANGE UP (ref 5–17)
BUN SERPL-MCNC: 20 MG/DL — HIGH (ref 7–18)
CALCIUM SERPL-MCNC: 8.8 MG/DL — SIGNIFICANT CHANGE UP (ref 8.4–10.5)
CHLORIDE SERPL-SCNC: 106 MMOL/L — SIGNIFICANT CHANGE UP (ref 96–108)
CO2 SERPL-SCNC: 24 MMOL/L — SIGNIFICANT CHANGE UP (ref 22–31)
CREAT SERPL-MCNC: 0.32 MG/DL — LOW (ref 0.5–1.3)
GLUCOSE SERPL-MCNC: 94 MG/DL — SIGNIFICANT CHANGE UP (ref 70–99)
HCT VFR BLD CALC: 33.7 % — LOW (ref 34.5–45)
HGB BLD-MCNC: 11.4 G/DL — LOW (ref 11.5–15.5)
MAGNESIUM SERPL-MCNC: 2.1 MG/DL — SIGNIFICANT CHANGE UP (ref 1.6–2.6)
MCHC RBC-ENTMCNC: 30 PG — SIGNIFICANT CHANGE UP (ref 27–34)
MCHC RBC-ENTMCNC: 33.7 GM/DL — SIGNIFICANT CHANGE UP (ref 32–36)
MCV RBC AUTO: 88.8 FL — SIGNIFICANT CHANGE UP (ref 80–100)
PHOSPHATE SERPL-MCNC: 2.8 MG/DL — SIGNIFICANT CHANGE UP (ref 2.5–4.5)
PLATELET # BLD AUTO: 362 K/UL — SIGNIFICANT CHANGE UP (ref 150–400)
POTASSIUM SERPL-MCNC: 3.3 MMOL/L — LOW (ref 3.5–5.3)
POTASSIUM SERPL-SCNC: 3.3 MMOL/L — LOW (ref 3.5–5.3)
RBC # BLD: 3.8 M/UL — SIGNIFICANT CHANGE UP (ref 3.8–5.2)
RBC # FLD: 15.3 % — HIGH (ref 10.3–14.5)
SODIUM SERPL-SCNC: 140 MMOL/L — SIGNIFICANT CHANGE UP (ref 135–145)
WBC # BLD: 12 K/UL — HIGH (ref 3.8–10.5)
WBC # FLD AUTO: 12 K/UL — HIGH (ref 3.8–10.5)

## 2018-08-20 RX ORDER — METOPROLOL TARTRATE 50 MG
0.5 TABLET ORAL
Qty: 0 | Refills: 0 | COMMUNITY

## 2018-08-20 RX ORDER — METOPROLOL TARTRATE 50 MG
25 TABLET ORAL
Qty: 0 | Refills: 0 | Status: DISCONTINUED | OUTPATIENT
Start: 2018-08-20 | End: 2018-08-20

## 2018-08-20 RX ORDER — ASPIRIN/CALCIUM CARB/MAGNESIUM 324 MG
1 TABLET ORAL
Qty: 30 | Refills: 0 | OUTPATIENT
Start: 2018-08-20 | End: 2018-09-18

## 2018-08-20 RX ORDER — METOPROLOL TARTRATE 50 MG
0.5 TABLET ORAL
Qty: 30 | Refills: 0 | OUTPATIENT
Start: 2018-08-20 | End: 2018-09-18

## 2018-08-20 RX ORDER — NYSTATIN 500MM UNIT
5 POWDER (EA) MISCELLANEOUS
Qty: 30 | Refills: 0 | OUTPATIENT
Start: 2018-08-20 | End: 2018-09-18

## 2018-08-20 RX ORDER — PANTOPRAZOLE SODIUM 20 MG/1
1 TABLET, DELAYED RELEASE ORAL
Qty: 30 | Refills: 0 | OUTPATIENT
Start: 2018-08-20 | End: 2018-09-18

## 2018-08-20 RX ORDER — DEXAMETHASONE 0.5 MG/5ML
4 ELIXIR ORAL
Qty: 120 | Refills: 0 | OUTPATIENT
Start: 2018-08-20 | End: 2018-09-18

## 2018-08-20 RX ORDER — DEXAMETHASONE 0.5 MG/5ML
2 ELIXIR ORAL DAILY
Qty: 0 | Refills: 0 | Status: DISCONTINUED | OUTPATIENT
Start: 2018-08-20 | End: 2018-08-20

## 2018-08-20 RX ORDER — POTASSIUM CHLORIDE 20 MEQ
40 PACKET (EA) ORAL ONCE
Qty: 0 | Refills: 0 | Status: COMPLETED | OUTPATIENT
Start: 2018-08-20 | End: 2018-08-20

## 2018-08-20 RX ORDER — ASPIRIN/CALCIUM CARB/MAGNESIUM 324 MG
1 TABLET ORAL
Qty: 0 | Refills: 0 | COMMUNITY

## 2018-08-20 RX ORDER — ALPRAZOLAM 0.25 MG
1 TABLET ORAL
Qty: 90 | Refills: 0 | OUTPATIENT
Start: 2018-08-20 | End: 2018-09-18

## 2018-08-20 RX ORDER — SIMVASTATIN 20 MG/1
1 TABLET, FILM COATED ORAL
Qty: 30 | Refills: 0 | OUTPATIENT
Start: 2018-08-20 | End: 2018-09-18

## 2018-08-20 RX ORDER — QUETIAPINE FUMARATE 200 MG/1
1 TABLET, FILM COATED ORAL
Qty: 30 | Refills: 0 | OUTPATIENT
Start: 2018-08-20 | End: 2018-09-18

## 2018-08-20 RX ORDER — LEVETIRACETAM 250 MG/1
2 TABLET, FILM COATED ORAL
Qty: 0 | Refills: 0 | COMMUNITY

## 2018-08-20 RX ORDER — NYSTATIN 500MM UNIT
5 POWDER (EA) MISCELLANEOUS
Qty: 0 | Refills: 0 | COMMUNITY

## 2018-08-20 RX ORDER — LISINOPRIL 2.5 MG/1
1 TABLET ORAL
Qty: 30 | Refills: 0 | OUTPATIENT
Start: 2018-08-20 | End: 2018-09-18

## 2018-08-20 RX ORDER — LEVETIRACETAM 250 MG/1
2 TABLET, FILM COATED ORAL
Qty: 120 | Refills: 0 | OUTPATIENT
Start: 2018-08-20 | End: 2018-09-18

## 2018-08-20 RX ORDER — DEXAMETHASONE 0.5 MG/5ML
4 ELIXIR ORAL
Qty: 0 | Refills: 0 | COMMUNITY

## 2018-08-20 RX ORDER — ANASTROZOLE 1 MG/1
1 TABLET ORAL
Qty: 30 | Refills: 0 | OUTPATIENT
Start: 2018-08-20 | End: 2018-09-18

## 2018-08-20 RX ORDER — DEXAMETHASONE 0.5 MG/5ML
2 ELIXIR ORAL ONCE
Qty: 0 | Refills: 0 | Status: DISCONTINUED | OUTPATIENT
Start: 2018-08-20 | End: 2018-08-20

## 2018-08-20 RX ORDER — ANASTROZOLE 1 MG/1
1 TABLET ORAL
Qty: 0 | Refills: 0 | COMMUNITY

## 2018-08-20 RX ADMIN — Medication 500000 UNIT(S): at 00:07

## 2018-08-20 RX ADMIN — LEVETIRACETAM 1500 MILLIGRAM(S): 250 TABLET, FILM COATED ORAL at 05:52

## 2018-08-20 RX ADMIN — Medication 12.5 MILLIGRAM(S): at 05:52

## 2018-08-20 RX ADMIN — Medication 500000 UNIT(S): at 05:51

## 2018-08-20 RX ADMIN — Medication 81 MILLIGRAM(S): at 11:42

## 2018-08-20 RX ADMIN — PANTOPRAZOLE SODIUM 40 MILLIGRAM(S): 20 TABLET, DELAYED RELEASE ORAL at 05:53

## 2018-08-20 RX ADMIN — Medication 4 MILLIGRAM(S): at 05:51

## 2018-08-20 RX ADMIN — Medication 40 MILLIEQUIVALENT(S): at 11:41

## 2018-08-20 RX ADMIN — Medication 500000 UNIT(S): at 11:42

## 2018-08-20 RX ADMIN — Medication 0.25 MILLIGRAM(S): at 08:31

## 2018-08-20 NOTE — PROGRESS NOTE ADULT - PROBLEM SELECTOR PLAN 3
Family interested in facility hospice. Got approval from medicaid.   Awaiting for placement.  - Resume Keppra 1500 BID
Family interested in hospice. Got approval from medicaid.   Awaiting for placement.  - Resume Keppra 1500 BID

## 2018-08-20 NOTE — PROGRESS NOTE ADULT - PROBLEM SELECTOR PLAN 1
Likely 2/2 to worsening glioblastoma; r/o infectious cause    Urine culture is negative  Monitor BMP.  Awaiting placement to nursing home with hospice care and got approval from medicaid.

## 2018-08-20 NOTE — PROGRESS NOTE ADULT - PROBLEM SELECTOR PLAN 4
BP acceptable; resume home BP medications

## 2018-08-20 NOTE — PROGRESS NOTE ADULT - PROBLEM SELECTOR PLAN 5
Resume Statin; f/u Lipid panel

## 2018-08-20 NOTE — PROGRESS NOTE ADULT - SUBJECTIVE AND OBJECTIVE BOX
PGY 1 Note discussed with supervising resident and primary attending    Patient is a 78y old  Female who presents with a chief complaint of Seizure (15 Aug 2018 11:18)      INTERVAL HPI/OVERNIGHT EVENTS:   Pt refused to take her medications. No acute events otherwise     MEDICATIONS  (STANDING):  aspirin enteric coated 81 milliGRAM(s) Oral daily  cefTRIAXone   IVPB 1 Gram(s) IV Intermittent every 24 hours  dexamethasone     Tablet 4 milliGRAM(s) Oral daily  levETIRAcetam 1500 milliGRAM(s) Oral two times a day  lisinopril 10 milliGRAM(s) Oral daily  metoprolol tartrate 12.5 milliGRAM(s) Oral two times a day  nystatin    Suspension 174657 Unit(s) Oral four times a day  pantoprazole    Tablet 40 milliGRAM(s) Oral before breakfast  QUEtiapine 25 milliGRAM(s) Oral at bedtime    MEDICATIONS  (PRN):  acetaminophen   Tablet. 650 milliGRAM(s) Oral every 6 hours PRN Moderate Pain (4 - 6)  ALPRAZolam 0.25 milliGRAM(s) Oral three times a day PRN Agitation      __________________________________________________  REVIEW OF SYSTEMS:    CONSTITUTIONAL: No fever,   EYES: no acute visual disturbances  NECK: No pain or stiffness  RESPIRATORY: No cough; No shortness of breath  CARDIOVASCULAR: No chest pain, no palpitations  GASTROINTESTINAL: No pain. No nausea or vomiting; No diarrhea   NEUROLOGICAL: No headache or numbness, no tremors  MUSCULOSKELETAL: No joint pain, no muscle pain  GENITOURINARY: no dysuria, no frequency, no hesitancy  PSYCHIATRY: no depression , no anxiety  ALL OTHER  ROS negative        Vital Signs Last 24 Hrs  T(C): 36.9 (18 Aug 2018 14:47), Max: 36.9 (18 Aug 2018 05:43)  T(F): 98.4 (18 Aug 2018 14:47), Max: 98.5 (18 Aug 2018 05:43)  HR: 112 (18 Aug 2018 18:10) (104 - 123)  BP: 118/67 (18 Aug 2018 18:10) (118/67 - 135/92)  BP(mean): --  RR: 18 (18 Aug 2018 14:47) (17 - 18)  SpO2: 96% (18 Aug 2018 14:47) (95% - 97%)    ________________________________________________  PHYSICAL EXAM:  GENERAL: NAD  HEENT: Normocephalic;  conjunctivae and sclerae clear; moist mucous membranes;   NECK : supple  CHEST/LUNG: Clear to auscultation bilaterally with good air entry   HEART: S1 S2  regular; no murmurs, gallops or rubs  ABDOMEN: Soft, Nontender, Nondistended; Bowel sounds present  EXTREMITIES: no cyanosis; no edema; no calf tenderness  SKIN: warm and dry; no rash  NERVOUS SYSTEM:  Awake and alert; Oriented  to place, person and time ; no new deficits    _________________________________________________  LABS:                        11.6   11.7  )-----------( 413      ( 17 Aug 2018 07:22 )             34.2     08-17    134<L>  |  99  |  11  ----------------------------<  108<H>  3.0<L>   |  23  |  0.28<L>    Ca    8.4      17 Aug 2018 07:22  Phos  2.3     08-17  Mg     1.8     08-17          CAPILLARY BLOOD GLUCOSE            RADIOLOGY & ADDITIONAL TESTS:    Imaging Personally Reviewed:  YES/NO    Consultant(s) Notes Reviewed:   YES/ No    Care Discussed with Consultants :     Plan of care was discussed with patient and /or primary care giver; all questions and concerns were addressed and care was aligned with patient's wishes.
PGY 1 Note discussed with supervising resident and primary attending    Patient is a 78y old  Female who presents with a chief complaint of Seizure (15 Aug 2018 11:18)      INTERVAL HPI/OVERNIGHT EVENTS: Patient is agitated and pulling out the tele monitor and moving on the bed.    MEDICATIONS  (STANDING):  anastrozole 1 milliGRAM(s) Oral at bedtime  aspirin enteric coated 81 milliGRAM(s) Oral daily  cefTRIAXone   IVPB 1 Gram(s) IV Intermittent every 24 hours  dexamethasone     Tablet 4 milliGRAM(s) Oral daily  levETIRAcetam 1500 milliGRAM(s) Oral two times a day  lisinopril 10 milliGRAM(s) Oral daily  metoprolol tartrate 12.5 milliGRAM(s) Oral two times a day  nystatin    Suspension 593019 Unit(s) Oral four times a day  pantoprazole    Tablet 40 milliGRAM(s) Oral before breakfast  simvastatin 10 milliGRAM(s) Oral at bedtime    MEDICATIONS  (PRN):  ALPRAZolam 0.25 milliGRAM(s) Oral three times a day PRN Agitation      __________________________________________________  REVIEW OF SYSTEMS:    CONSTITUTIONAL: No fever,   EYES: no acute visual disturbances  NECK: No pain or stiffness  RESPIRATORY: No cough; No shortness of breath  CARDIOVASCULAR: No chest pain, no palpitations  GASTROINTESTINAL: No pain. No nausea or vomiting; No diarrhea   NEUROLOGICAL: No headache or numbness, no tremors  MUSCULOSKELETAL: No joint pain, no muscle pain  GENITOURINARY: no dysuria, no frequency, no hesitancy  PSYCHIATRY: no depression , no anxiety  ALL OTHER  ROS negative        Vital Signs Last 24 Hrs  T(C): 36.7 (16 Aug 2018 14:29), Max: 36.8 (16 Aug 2018 05:29)  T(F): 98.1 (16 Aug 2018 14:29), Max: 98.2 (16 Aug 2018 05:29)  HR: 96 (16 Aug 2018 14:29) (82 - 110)  BP: 104/60 (16 Aug 2018 14:29) (104/60 - 148/95)  BP(mean): --  RR: 18 (16 Aug 2018 14:29) (18 - 18)  SpO2: 98% (16 Aug 2018 14:29) (95% - 98%)    ________________________________________________  PHYSICAL EXAM:  GENERAL: NAD  HEENT: Normocephalic;  conjunctivae and sclerae clear; moist mucous membranes;   NECK : supple  CHEST/LUNG: Clear to auscultation bilaterally with good air entry   HEART: S1 S2  regular; no murmurs, gallops or rubs  ABDOMEN: Soft, Nontender, Nondistended; Bowel sounds present  EXTREMITIES: no cyanosis; no edema; no calf tenderness  SKIN: warm and dry; no rash  NERVOUS SYSTEM:  Awake and alert; Oriented  to place, person and time ; no new deficits    _________________________________________________  LABS:                        12.3   12.0  )-----------( 410      ( 15 Aug 2018 08:40 )             36.5     08-15    139  |  102  |  16  ----------------------------<  104<H>  3.5   |  25  |  0.45<L>    Ca    9.0      15 Aug 2018 08:40  Phos  2.7     08-15  Mg     2.0     08-15    TPro  7.1  /  Alb  2.9<L>  /  TBili  0.5  /  DBili  x   /  AST  29  /  ALT  76<H>  /  AlkPhos  82  08-14      Urinalysis Basic - ( 16 Aug 2018 14:53 )    Color: Yellow / Appearance: Clear / S.015 / pH: x  Gluc: x / Ketone: Negative  / Bili: Negative / Urobili: 1   Blood: x / Protein: Negative / Nitrite: Positive   Leuk Esterase: Negative / RBC: Negative /HPF / WBC 11-25 /HPF   Sq Epi: x / Non Sq Epi: Occasional /HPF / Bacteria: Few /HPF      CAPILLARY BLOOD GLUCOSE            RADIOLOGY & ADDITIONAL TESTS:    Imaging Personally Reviewed:  YES    Consultant(s) Notes Reviewed:   YES    Care Discussed with Consultants : YES     Plan of care was discussed with patient and /or primary care giver; all questions and concerns were addressed and care was aligned with patient's wishes.
PGY 1 Note discussed with supervising resident and primary attending    Patient is a 78y old  Female who presents with a chief complaint of Seizure (15 Aug 2018 11:18)      INTERVAL HPI/OVERNIGHT EVENTS: no events noted overnight.    MEDICATIONS  (STANDING):  aspirin enteric coated 81 milliGRAM(s) Oral daily  cefTRIAXone   IVPB 1 Gram(s) IV Intermittent every 24 hours  dexamethasone     Tablet 4 milliGRAM(s) Oral daily  levETIRAcetam 1500 milliGRAM(s) Oral two times a day  lisinopril 10 milliGRAM(s) Oral daily  metoprolol tartrate 12.5 milliGRAM(s) Oral two times a day  nystatin    Suspension 761668 Unit(s) Oral four times a day  pantoprazole    Tablet 40 milliGRAM(s) Oral before breakfast  QUEtiapine 25 milliGRAM(s) Oral at bedtime    MEDICATIONS  (PRN):  ALPRAZolam 0.25 milliGRAM(s) Oral three times a day PRN Agitation      __________________________________________________  REVIEW OF SYSTEMS:    CONSTITUTIONAL: No fever,   EYES: no acute visual disturbances  NECK: No pain or stiffness  RESPIRATORY: No cough; No shortness of breath  CARDIOVASCULAR: No chest pain, no palpitations  GASTROINTESTINAL: No pain. No nausea or vomiting; No diarrhea   NEUROLOGICAL: No headache or numbness, no tremors  MUSCULOSKELETAL: No joint pain, no muscle pain  GENITOURINARY: no dysuria, no frequency, no hesitancy  PSYCHIATRY: no depression , no anxiety  ALL OTHER  ROS negative        Vital Signs Last 24 Hrs  T(C): 36.7 (17 Aug 2018 14:08), Max: 36.7 (16 Aug 2018 21:09)  T(F): 98 (17 Aug 2018 14:08), Max: 98 (16 Aug 2018 21:09)  HR: 112 (17 Aug 2018 14:08) (106 - 112)  BP: 108/74 (17 Aug 2018 14:08) (108/74 - 143/69)  BP(mean): --  RR: 18 (17 Aug 2018 14:08) (17 - 18)  SpO2: 97% (17 Aug 2018 14:08) (97% - 100%)    ________________________________________________  PHYSICAL EXAM:  GENERAL: NAD  HEENT: Normocephalic;  conjunctivae and sclerae clear; moist mucous membranes;   NECK : supple  CHEST/LUNG: Clear to auscultation bilaterally with good air entry   HEART: S1 S2  regular; no murmurs, gallops or rubs  ABDOMEN: Soft, Nontender, Nondistended; Bowel sounds present  EXTREMITIES: no cyanosis; no edema; no calf tenderness  SKIN: warm and dry; no rash  NERVOUS SYSTEM:  Awake and alert; Oriented  to place, person and time ; no new deficits    _________________________________________________  LABS:                        11.6   11.7  )-----------( 413      ( 17 Aug 2018 07:22 )             34.2     08-    134<L>  |  99  |  11  ----------------------------<  108<H>  3.0<L>   |  23  |  0.28<L>    Ca    8.4      17 Aug 2018 07:22  Phos  2.3     -  Mg     1.8             Urinalysis Basic - ( 16 Aug 2018 14:53 )    Color: Yellow / Appearance: Clear / S.015 / pH: x  Gluc: x / Ketone: Negative  / Bili: Negative / Urobili: 1   Blood: x / Protein: Negative / Nitrite: Positive   Leuk Esterase: Negative / RBC: Negative /HPF / WBC 11-25 /HPF   Sq Epi: x / Non Sq Epi: Occasional /HPF / Bacteria: Few /HPF      CAPILLARY BLOOD GLUCOSE            RADIOLOGY & ADDITIONAL TESTS:    Imaging Personally Reviewed:  YES    Consultant(s) Notes Reviewed:   YES    Care Discussed with Consultants : YES     Plan of care was discussed with patient and /or primary care giver; all questions and concerns were addressed and care was aligned with patient's wishes.
Patient is a 78y old  Female who presents with a chief complaint of AMS/combative/GBM/breast CA, family member interested home hospice program,  consult. fall precaution      INTERVAL HPI/OVERNIGHT EVENTS:  T(C): 36.8 (08-16-18 @ 05:29), Max: 36.8 (08-16-18 @ 05:29)  HR: 110 (08-16-18 @ 05:29) (82 - 115)  BP: 148/95 (08-16-18 @ 05:29) (121/87 - 148/95)  RR: 18 (08-16-18 @ 05:29) (18 - 18)  SpO2: 98% (08-16-18 @ 05:29) (95% - 99%)  Wt(kg): --    LABS:                        12.3   12.0  )-----------( 410      ( 15 Aug 2018 08:40 )             36.5     08-15    139  |  102  |  16  ----------------------------<  104<H>  3.5   |  25  |  0.45<L>    Ca    9.0      15 Aug 2018 08:40  Phos  2.7     08-15  Mg     2.0     08-15    TPro  7.1  /  Alb  2.9<L>  /  TBili  0.5  /  DBili  x   /  AST  29  /  ALT  76<H>  /  AlkPhos  82  08-14        CAPILLARY BLOOD GLUCOSE        ABG - ( 15 Aug 2018 13:57 )  pH, Arterial: 7.49  pH, Blood: x     /  pCO2: 31    /  pO2: 75    / HCO3: 24    / Base Excess: 1.2   /  SaO2: 93                  RADIOLOGY & ADDITIONAL TESTS:  < from: CT Head No Cont (08.14.18 @ 23:56) >  IMPRESSION:   Redemonstration of high right parietal cystic focus and surrounding white   matter lucency with subtle mass effect related to patient's known   glioblastoma.  Contrast-enhanced MRI can be performed as clinically   warranted to better characterize disease progression since previous MRI   of 06/12/2018.    No CT evidence of acute intracranial hemorrhage, midline shift or acute   territorial infarct.    Mucosal thickening and foamy secretions in the sphenoid sinuses.  Acute   sinusitis should be excluded clinically.      < end of copied text >    Consultant(s) Notes Reviewed:  [x ] YES  [ ] NO    PHYSICAL EXAM:  GENERAL: well built, well nourished  HEAD:  Atraumatic, Normocephalic  EYES: closed  ENT: No tonsillar erythema, exudates, or enlargement; Moist mucous membranes, Good dentition, No lesions  NECK: Supple, No JVD, Normal thyroid, no enlarged nodes  NERVOUS SYSTEM:  Alert & Oriented X3, Good concentration; Motor Strength 5/5 B/L upper and lower extremities; DTRs 2+ intact and symmetric, sensory intact  CHEST/LUNG: B/L good air entry; No rales, rhonchi, or wheezing  HEART: S1S2 mild tachy  ABDOMEN: Soft, Nontender, Nondistended; Bowel sounds present  EXTREMITIES:  2+ Peripheral Pulses, No clubbing, cyanosis, or edema  LYMPH: No lymphadenopathy noted  SKIN: No rashes or lesions    Care Discussed with Consultants/Other Providers [ x] YES  [ ] NO
Patient is a 78y old  Female who presents with a chief complaint of AMS/combative/metastatic CA brain/breast/seizure disorder/UTI      INTERVAL HPI/OVERNIGHT EVENTS:  T(C): 36.7 (08-19-18 @ 05:35), Max: 36.9 (08-18-18 @ 14:47)  HR: 117 (08-19-18 @ 05:35) (112 - 130)  BP: 129/67 (08-19-18 @ 05:35) (96/58 - 129/67)  RR: 18 (08-19-18 @ 05:35) (18 - 19)  SpO2: 99% (08-19-18 @ 05:35) (96% - 99%)  Wt(kg): --    LABS:                        11.7   8.5   )-----------( 370      ( 19 Aug 2018 07:17 )             35.4     08-19    139  |  104  |  14  ----------------------------<  96  3.3<L>   |  25  |  0.30<L>    Ca    8.7      19 Aug 2018 07:17  Phos  3.1     08-19  Mg     1.9     08-19          CAPILLARY BLOOD GLUCOSE            RADIOLOGY & ADDITIONAL TESTS:    Consultant(s) Notes Reviewed:  [x ] YES  [ ] NO    PHYSICAL EXAM:  GENERAL: well built, well nourished  HEAD:  Atraumatic, Normocephalic  EYES: EOMI, PERRLA, conjunctiva and sclera clear  ENT: No tonsillar erythema, exudates, or enlargement; Moist mucous membranes, Good dentition, No lesions  NECK: Supple, No JVD, Normal thyroid, no enlarged nodes  NERVOUS SYSTEM:  confused calm  CHEST/LUNG: B/L good air entry; No rales, rhonchi, or wheezing  HEART: S1S2 normal, no S3, Regular rate and rhythm; No murmurs, rubs, or gallops  ABDOMEN: Soft, Nontender, Nondistended; Bowel sounds present  EXTREMITIES:  2+ Peripheral Pulses, No clubbing, cyanosis, or edema  LYMPH: No lymphadenopathy noted  SKIN: No rashes or lesions    Care Discussed with Consultants/Other Providers [ x] YES  [ ] NO
Patient is a 78y old  Female who presents with a chief complaint of AMS/combative/metastatic Ca brain/breast, pending NH for hospice care      INTERVAL HPI/OVERNIGHT EVENTS:  T(C): 36.3 (08-20-18 @ 05:03), Max: 36.7 (08-19-18 @ 14:31)  HR: 114 (08-20-18 @ 05:03) (112 - 127)  BP: 124/62 (08-20-18 @ 05:03) (85/45 - 124/62)  RR: 18 (08-20-18 @ 05:03) (17 - 18)  SpO2: 99% (08-20-18 @ 05:03) (96% - 99%)  Wt(kg): --    LABS:                        11.4   12.0  )-----------( 362      ( 20 Aug 2018 06:57 )             33.7     08-20    140  |  106  |  20<H>  ----------------------------<  94  3.3<L>   |  24  |  0.32<L>    Ca    8.8      20 Aug 2018 06:57  Phos  2.8     08-20  Mg     2.1     08-20          CAPILLARY BLOOD GLUCOSE            RADIOLOGY & ADDITIONAL TESTS:    Consultant(s) Notes Reviewed:  [x ] YES  [ ] NO    PHYSICAL EXAM:  GENERAL: well built, well nourished  HEAD:  Atraumatic, Normocephalic  EYES: EOMI, PERRLA, conjunctiva and sclera clear  ENT: No tonsillar erythema, exudates, or enlargement; Moist mucous membranes, Good dentition, No lesions  NECK: Supple, No JVD, Normal thyroid, no enlarged nodes  NERVOUS SYSTEM:  confused arousable not follow command  CHEST/LUNG: B/L good air entry; No rales, rhonchi, or wheezing  HEART: S1S2 normal, no S3, Regular rate and rhythm; No murmurs, rubs, or gallops  ABDOMEN: Soft, Nontender, Nondistended; Bowel sounds present  EXTREMITIES:  2+ Peripheral Pulses, No clubbing, cyanosis, or edema  LYMPH: No lymphadenopathy noted  SKIN: No rashes or lesions    Care Discussed with Consultants/Other Providers [ x] YES  [ ] NO
Patient is a 78y old  Female who presents with a chief complaint of AMS/combative/metastatic brain Ca/breast Ca/UTI      INTERVAL HPI/OVERNIGHT EVENTS:  T(C): 36.1 (18 @ 05:45), Max: 36.7 (18 @ 14:29)  HR: 108 (18 @ 05:45) (89 - 108)  BP: 129/73 (18 @ 05:45) (104/60 - 143/69)  RR: 17 (18 @ 05:45) (17 - 18)  SpO2: 98% (18 @ 05:45) (98% - 100%)  Wt(kg): --    LABS:                        11.6   11.7  )-----------( 413      ( 17 Aug 2018 07:22 )             34.2         134<L>  |  99  |  11  ----------------------------<  108<H>  3.0<L>   |  23  |  0.28<L>    Ca    8.4      17 Aug 2018 07:22  Phos  2.3       Mg     1.8             Urinalysis Basic - ( 16 Aug 2018 14:53 )    Color: Yellow / Appearance: Clear / S.015 / pH: x  Gluc: x / Ketone: Negative  / Bili: Negative / Urobili: 1   Blood: x / Protein: Negative / Nitrite: Positive   Leuk Esterase: Negative / RBC: Negative /HPF / WBC 11-25 /HPF   Sq Epi: x / Non Sq Epi: Occasional /HPF / Bacteria: Few /HPF      CAPILLARY BLOOD GLUCOSE        ABG - ( 15 Aug 2018 13:57 )  pH, Arterial: 7.49  pH, Blood: x     /  pCO2: 31    /  pO2: 75    / HCO3: 24    / Base Excess: 1.2   /  SaO2: 93                  RADIOLOGY & ADDITIONAL TESTS:    Consultant(s) Notes Reviewed:  [x ] YES  [ ] NO    PHYSICAL EXAM:  GENERAL: well built, well nourished  HEAD:  Atraumatic, Normocephalic  EYES: EOMI, PERRLA, conjunctiva and sclera clear  ENT: No tonsillar erythema, exudates, or enlargement; Moist mucous membranes, Good dentition, No lesions  NECK: Supple, No JVD, Normal thyroid, no enlarged nodes  NERVOUS SYSTEM:  confused  CHEST/LUNG: B/L good air entry; No rales, rhonchi, or wheezing  HEART: S1S2 mild tachy  ABDOMEN: Soft, Nontender, Nondistended; Bowel sounds present  EXTREMITIES:  2+ Peripheral Pulses, No clubbing, cyanosis, or edema  LYMPH: No lymphadenopathy noted  SKIN: No rashes or lesions    Care Discussed with Consultants/Other Providers [ x] YES  [ ] NO
Patient is a 78y old  Female who presents with a chief complaint of AMS/combative/metastatic breast CA/brain tumor/UTI      INTERVAL HPI/OVERNIGHT EVENTS:  T(C): 36.9 (18 @ 05:43), Max: 36.9 (18 @ 05:43)  HR: 104 (18 @ 05:43) (104 - 123)  BP: 129/63 (18 @ 05:43) (108/74 - 135/92)  RR: 17 (18 @ 05:43) (17 - 18)  SpO2: 97% (18 @ 05:43) (95% - 97%)  Wt(kg): --    LABS:                        11.6   11.7  )-----------( 413      ( 17 Aug 2018 07:22 )             34.2         134<L>  |  99  |  11  ----------------------------<  108<H>  3.0<L>   |  23  |  0.28<L>    Ca    8.4      17 Aug 2018 07:22  Phos  2.3       Mg     1.8             Urinalysis Basic - ( 16 Aug 2018 14:53 )    Color: Yellow / Appearance: Clear / S.015 / pH: x  Gluc: x / Ketone: Negative  / Bili: Negative / Urobili: 1   Blood: x / Protein: Negative / Nitrite: Positive   Leuk Esterase: Negative / RBC: Negative /HPF / WBC 11-25 /HPF   Sq Epi: x / Non Sq Epi: Occasional /HPF / Bacteria: Few /HPF      CAPILLARY BLOOD GLUCOSE      Culture - Urine (18 @ 22:50)    Specimen Source: .Urine Clean Catch (Midstream)    Culture Results:   No growth          RADIOLOGY & ADDITIONAL TESTS:    Consultant(s) Notes Reviewed:  [x ] YES  [ ] NO    PHYSICAL EXAM:  GENERAL: well built, well nourished  HEAD:  Atraumatic, Normocephalic  EYES: EOMI, PERRLA, conjunctiva and sclera clear  ENT: No tonsillar erythema, exudates, or enlargement; Moist mucous membranes, Good dentition, No lesions  NECK: Supple, No JVD, Normal thyroid, no enlarged nodes  NERVOUS SYSTEM: confused not follow command  CHEST/LUNG: B/L good air entry; No rales, rhonchi, or wheezing  HEART: S1S2 normal, no S3, Regular rate and rhythm; No murmurs, rubs, or gallops  ABDOMEN: Soft, Nontender, Nondistended; Bowel sounds present  EXTREMITIES:  2+ Peripheral Pulses, No clubbing, cyanosis, or edema  LYMPH: No lymphadenopathy noted  SKIN: No rashes or lesions    Care Discussed with Consultants/Other Providers [ x] YES  [ ] NO
PGY 1 Note discussed with supervising resident and primary attending    Patient is a 78y old  Female who presents with a chief complaint of Seizure (19 Aug 2018 20:30)      INTERVAL HPI/OVERNIGHT EVENTS: no events noted overnight.    MEDICATIONS  (STANDING):  aspirin enteric coated 81 milliGRAM(s) Oral daily  cefTRIAXone   IVPB 1 Gram(s) IV Intermittent every 24 hours  dexamethasone     Tablet 4 milliGRAM(s) Oral daily  levETIRAcetam 1500 milliGRAM(s) Oral two times a day  metoprolol tartrate 25 milliGRAM(s) Oral two times a day  nystatin    Suspension 660295 Unit(s) Oral four times a day  pantoprazole    Tablet 40 milliGRAM(s) Oral before breakfast  QUEtiapine 25 milliGRAM(s) Oral at bedtime  sodium chloride 0.9%. 1000 milliLiter(s) (50 mL/Hr) IV Continuous <Continuous>    MEDICATIONS  (PRN):  acetaminophen   Tablet. 650 milliGRAM(s) Oral every 6 hours PRN Moderate Pain (4 - 6)  ALPRAZolam 0.25 milliGRAM(s) Oral three times a day PRN Agitation      __________________________________________________  REVIEW OF SYSTEMS:    CONSTITUTIONAL: No fever,   EYES: no acute visual disturbances  NECK: No pain or stiffness  RESPIRATORY: No cough; No shortness of breath  CARDIOVASCULAR: No chest pain, no palpitations  GASTROINTESTINAL: No pain. No nausea or vomiting; No diarrhea   NEUROLOGICAL: No headache or numbness, no tremors  MUSCULOSKELETAL: No joint pain, no muscle pain  GENITOURINARY: no dysuria, no frequency, no hesitancy  PSYCHIATRY: no depression , no anxiety  ALL OTHER  ROS negative        Vital Signs Last 24 Hrs  T(C): 36.3 (20 Aug 2018 05:03), Max: 36.7 (19 Aug 2018 14:31)  T(F): 97.3 (20 Aug 2018 05:03), Max: 98.1 (19 Aug 2018 22:17)  HR: 114 (20 Aug 2018 05:03) (112 - 127)  BP: 124/62 (20 Aug 2018 05:03) (85/45 - 124/62)  BP(mean): --  RR: 18 (20 Aug 2018 05:03) (17 - 18)  SpO2: 99% (20 Aug 2018 05:03) (96% - 99%)    ________________________________________________  PHYSICAL EXAM:  GENERAL: NAD  HEENT: Normocephalic;  conjunctivae and sclerae clear; moist mucous membranes;   NECK : supple  CHEST/LUNG: Clear to auscultation bilaterally with good air entry   HEART: S1 S2  regular; no murmurs, gallops or rubs  ABDOMEN: Soft, Nontender, Nondistended; Bowel sounds present  EXTREMITIES: no cyanosis; no edema; no calf tenderness  SKIN: warm and dry; no rash  NERVOUS SYSTEM:  Awake and alert; Oriented  to place, person and time ; no new deficits    _________________________________________________  LABS:                        11.4   12.0  )-----------( 362      ( 20 Aug 2018 06:57 )             33.7     08-20    140  |  106  |  20<H>  ----------------------------<  94  3.3<L>   |  24  |  0.32<L>    Ca    8.8      20 Aug 2018 06:57  Phos  2.8     08-20  Mg     2.1     08-20          CAPILLARY BLOOD GLUCOSE            RADIOLOGY & ADDITIONAL TESTS:    Imaging Personally Reviewed:  YES    Consultant(s) Notes Reviewed:   YES    Care Discussed with Consultants : YES     Plan of care was discussed with patient and /or primary care giver; all questions and concerns were addressed and care was aligned with patient's wishes.

## 2018-08-20 NOTE — PROGRESS NOTE ADULT - ASSESSMENT
78 yr old female from home H/O breast CA/brain Ca/with mets/right partial craniotomy, admit hospital for AMS/combative, family member interested home hospice program,  consult, palliative consult, fall precaution, pain management, full assistant ADLS.
78 yr old female from home H/O breast CA/brain Ca/with mets/right partial craniotomy, admit hospital for AMS/combative, family member interested in facility hospice program,  consult, palliative consult, fall precaution, pain management, full assistant ADLS.
78 yr old female from home H/O breast CA/brain Ca/with mets/right partial craniotomy, admit hospital for AMS/combative, family member interested in facility hospice program,  consult, palliative consult, fall precaution, pain management, full assistant ADLS.  follow up for D/C planning hospice care    Problem/Plan - 1:  ·  Problem: Encephalopathy.  Plan: Likely 2/2 to worsening glioblastoma; r/o infectious cause 2/2 WBC 11K and sinus tachycardia  - CT head negative for acute process  U/A positive   F/U Urine culture  Continue with Rocephin.     Problem/Plan - 2:  ·  Problem: Sinus tachycardia.  Plan: Likely 2/2 agitation; resolved w/ 1 mg IV Lorazepam.     Problem/Plan - 3:  ·  Problem: Glioblastoma.  Plan: Family interested in facility hospice. Got approval from medicaid.   Awaiting for placement.  - Resume Keppra 1500 BID.     Problem/Plan - 4:  ·  Problem: Hypertension.  Plan: BP acceptable; resume home BP medications.     Problem/Plan - 5:  ·  Problem: Hyperlipidemia.  Plan: Resume Statin; f/u Lipid panel.     Problem/Plan - 6:  Problem: Prophylactic measure. Plan: IMPROVE VTE Individual Risk Assessment    RISK                                                          Points  [] Previous VTE                                           3
78 yr old female from home H/O breast CA/brain Ca/with mets/right partial craniotomy, admit hospital for AMS/combative, family member interested in facility hospice program,  consult, palliative consult, fall precaution, pain management, full assistant ADLS.  follow up for D/C planning hospice care at NH, fall precaution    Problem/Plan - 1:  ·  Problem: Encephalopathy.  Plan: Likely 2/2 to worsening glioblastoma; r/o infectious cause 2/2 WBC 11K and sinus tachycardia  - CT head negative for acute process  U/A positive   F/U Urine culture  Continue with Rocephin.     Problem/Plan - 2:  ·  Problem: Sinus tachycardia.  Plan: Likely 2/2 agitation; resolved w/ 1 mg IV Lorazepam.     Problem/Plan - 3:  ·  Problem: Glioblastoma.  Plan: Family interested in facility hospice. Got approval from medicaid.   Awaiting for placement.  - Resume Keppra 1500 BID.     Problem/Plan - 4:  ·  Problem: Hypertension.  Plan: BP acceptable; resume home BP medications.     Problem/Plan - 5:  ·  Problem: Hyperlipidemia.  Plan: Resume Statin; f/u Lipid panel.     Problem/Plan - 6:  Problem: Prophylactic measure. Plan: IMPROVE VTE Individual Risk Assessment    RISK                                                          Points  [] Previous VTE                                           3
78 yr old female from home H/O breast CA/brain Ca/with mets/right partial craniotomy, admit hospital for AMS/combative, family member interested in facility hospice program,  consult, palliative consult, fall precaution, pain management, full assistant ADLS.  follow up for D/C planning hospice care at NH, fall precaution    Problem/Plan - 1:  ·  Problem: Encephalopathy.  Plan: Likely 2/2 to worsening glioblastoma; r/o infectious cause 2/2 WBC 11K and sinus tachycardia  - CT head negative for acute process  U/A positive   F/U Urine culture  Continue with Rocephin.     Problem/Plan - 2:  ·  Problem: Sinus tachycardia.  Plan: Likely 2/2 agitation; resolved w/ 1 mg IV Lorazepam.     Problem/Plan - 3:  ·  Problem: Glioblastoma.  Plan: Family interested in facility hospice. Got approval from medicaid.   Awaiting for placement.  - Resume Keppra 1500 BID.     Problem/Plan - 4:  ·  Problem: Hypertension.  Plan: BP acceptable; resume home BP medications.     Problem/Plan - 5:  ·  Problem: Hyperlipidemia.  Plan: Resume Statin; f/u Lipid panel.     Problem/Plan - 6:  Problem: Prophylactic measure. Plan: IMPROVE VTE Individual Risk Assessment    RISK                                                          Points  [] Previous VTE                                           3
78 yr old female from home H/O breast CA/brain Ca/with mets/right partial craniotomy, admit hospital for AMS/combative, family member interested in facility hospice program,  consult, palliative consult, fall precaution, pain management, full assistant ADLS.  follow up for D/C planning hospice care at NH, fall precaution, fall precaution, full assistant ADLS    Problem/Plan - 1:  ·  Problem: Encephalopathy.  Plan: Likely 2/2 to worsening glioblastoma; r/o infectious cause 2/2 WBC 11K and sinus tachycardia  - CT head negative for acute process  U/A positive   F/U Urine culture  Continue with Rocephin.     Problem/Plan - 2:  ·  Problem: Sinus tachycardia.  Plan: Likely 2/2 agitation; resolved w/ 1 mg IV Lorazepam.     Problem/Plan - 3:  ·  Problem: Glioblastoma.  Plan: Family interested in facility hospice. Got approval from medicaid.   Awaiting for placement.  - Resume Keppra 1500 BID.     Problem/Plan - 4:  ·  Problem: Hypertension.  Plan: BP acceptable; resume home BP medications.     Problem/Plan - 5:  ·  Problem: Hyperlipidemia.  Plan: Resume Statin; f/u Lipid panel.     Problem/Plan - 6:  Problem: Prophylactic measure. Plan: IMPROVE VTE Individual Risk Assessment    RISK                                                          Points  [] Previous VTE                                           3
78 yr old female from home H/O breast CA/brain Ca/with mets/right partial craniotomy, admit hospital for AMS/combative, family member interested in facility hospice program,  consult, palliative consult, fall precaution, pain management, full assistant ADLS.

## 2018-08-28 ENCOUNTER — APPOINTMENT (OUTPATIENT)
Dept: INFUSION THERAPY | Facility: HOSPITAL | Age: 78
End: 2018-08-28

## 2018-09-11 ENCOUNTER — APPOINTMENT (OUTPATIENT)
Dept: INFUSION THERAPY | Facility: HOSPITAL | Age: 78
End: 2018-09-11

## 2018-09-25 ENCOUNTER — APPOINTMENT (OUTPATIENT)
Dept: INFUSION THERAPY | Facility: HOSPITAL | Age: 78
End: 2018-09-25

## 2018-09-28 ENCOUNTER — OTHER (OUTPATIENT)
Age: 78
End: 2018-09-28

## 2018-10-03 NOTE — PHYSICAL THERAPY INITIAL EVALUATION ADULT - HEALTH SCREEN CRITERIA
[address], 
[id number], [address] [facility] 
[phone] Patient: Cynthia Crockett MRN: [id number] :1974 Visit Information Date & Time Provider Department Dept. Phone Encounter #  
 10/3/2018 10:20 AM Khris Grubbs MD [facility] at [facility] [phone] [id number] Follow-up Instructions Return in about 6 months (around 4/3/2019) for MS. Upcoming Health Maintenance Date Due Pneumococcal 19-64 Medium Risk (1 of 1 - PPSV23) 1992 DTaP/Tdap/Td series (1 - Tdap) 1994 PAP AKA CERVICAL CYTOLOGY 3/12/2018 MEDICARE YEARLY EXAM 3/20/2018 Influenza Age 5 to Adult 2018 Allergies as of 10/3/2018  Review Complete On: 10/3/2018 By: Khris Grubbs MD  
 No Known Allergies Current Immunizations  Reviewed on 2014 Name Date Influenza Vaccine (Quad) PF 2014 Not reviewed this visit You Were Diagnosed With   
  
 Codes Comments MS (multiple sclerosis) ([facility]    -  Primary ICD-10-CM: G35 
ICD-9-CM: 255 Fibromyalgia     ICD-10-CM: M79.7 ICD-9-CM: 729.1 Tobacco abuse     ICD-10-CM: Z72.0 ICD-9-CM: 305.1 Anxiety     ICD-10-CM: F41.9 ICD-9-CM: 300.00 Vitals BP Pulse Height(growth percentile) Weight(growth percentile) SpO2 BMI  
 120/82 70 5' 4\" (1.626 m) 208 lb (94.3 kg) 98% 35.7 kg/m2 OB Status Smoking Status Having regular periods Current Every Day Smoker Vitals History BMI and BSA Data Body Mass Index Body Surface Area 35.7 kg/m 2 2.06 m 2 Preferred Pharmacy Pharmacy Name Phone [facility] [address], [facility] [phone] Your Updated Medication List  
  
   
This list is accurate as of 10/3/18 11:09 AM.  Always use your most recent med list.  
  
  
  
  
 amitriptyline 25 mg tablet Commonly known as:  ELAVIL  
 Take 4 Tabs by mouth nightly. Indications: FIBROMYALGIA  
  
 diazePAM 2 mg tablet Commonly known as:  VALIUM Use 30 minutes prior to MRI  
  
 dimethyl fumarate 240 mg Cpdr  
Commonly known as:  Hebert [place] Take 240 mg by mouth two (2) times a day. DULoxetine 60 mg capsule Commonly known as:  CYMBALTA Take 1 Cap by mouth two (2) times a day. * gabapentin 400 mg capsule Commonly known as:  NEURONTIN  
take 4 capsules by mouth three times a day * gabapentin 800 mg tablet Commonly known as:  NEURONTIN Take 2 Tabs by mouth three (3) times daily. * gabapentin 800 mg tablet Commonly known as:  NEURONTIN Take 2 Tabs by mouth three (3) times daily. tiZANidine 4 mg tablet Commonly known as:  Rick Du Take one and one half tablet three times a day XANAX 0.5 mg tablet Generic drug:  ALPRAZolam  
Take 0.5 mg by mouth three (3) times daily as needed for Anxiety. zolpidem 10 mg tablet Commonly known as:  AMBIEN  
TAKE ONE TABLET BY MOUTH EVERY NIGHT in the BED AS NEEDED FOR SLEEP * Notice: This list has 3 medication(s) that are the same as other medications prescribed for you. Read the directions carefully, and ask your doctor or other care provider to review them with you. Follow-up Instructions Return in about 6 months (around 4/3/2019) for MS. To-Do List   
 10/03/2018 Imaging:  MRI BRAIN W WO CONT   
  
 10/03/2018 Imaging:  MRI CERV SPINE W WO CONT Patient Instructions Office Policies · Phone calls/patient messages: Please allow up to 24 hours for someone in the office to contact you about your call or message. Be mindful your provider may be out of the office or your message may require further review. We encourage you to use [employer] for your messages as this is a faster, more efficient way to communicate with our office · Medication Refills: yes Prescription medications require up to 48 business hours to process. We encourage you to use [employer] for your refills. For controlled medications: Please allow up to 72 business hours to process. Certain medications may require you to  a written prescription at our office. NO narcotic/controlled medications will be prescribed after 4pm Monday through Friday or on weekends · Form/Paperwork Completion: 
Please note there is a $25 fee for all paperwork completed by our providers. We ask that you allow 7-14 business days. Pre-payment is due prior to picking up/faxing the completed form. You may also download your forms to [employer] to have your doctor print off. Introducing [facility]! Dear Tracie Zaldivar: Thank you for requesting a Blip account. Our records indicate that you already have an active Blip account. You can access your account anytime at [email or web address] Did you know that you can access your hospital and ER discharge instructions at any time in [employer]ip? You can also review all of your test results from your hospital stay or ER visit. Additional Information If you have questions, please visit the Frequently Asked Questions section of the Blip website at [email or web address] Remember, Blip is NOT to be used for urgent needs. For medical emergencies, dial 911. Now available from your iPhone and Android! Please provide this summary of care documentation to your next provider. Your primary care clinician is listed as Katharine Sorto. If you have any questions after today's visit, please call [phone].

## 2018-10-13 ENCOUNTER — EMERGENCY (EMERGENCY)
Facility: HOSPITAL | Age: 78
LOS: 1 days | Discharge: HOME | End: 2018-10-13
Attending: EMERGENCY MEDICINE
Payer: MEDICARE

## 2018-10-13 VITALS
HEIGHT: 62 IN | RESPIRATION RATE: 16 BRPM | WEIGHT: 119.93 LBS | TEMPERATURE: 99 F | DIASTOLIC BLOOD PRESSURE: 83 MMHG | SYSTOLIC BLOOD PRESSURE: 130 MMHG | OXYGEN SATURATION: 99 % | HEART RATE: 98 BPM

## 2018-10-13 DIAGNOSIS — Z98.890 OTHER SPECIFIED POSTPROCEDURAL STATES: Chronic | ICD-10-CM

## 2018-10-13 LAB
ALBUMIN SERPL ELPH-MCNC: 3 G/DL — LOW (ref 3.5–5)
ALP SERPL-CCNC: 96 U/L — SIGNIFICANT CHANGE UP (ref 40–120)
ALT FLD-CCNC: 61 U/L DA — HIGH (ref 10–60)
ANION GAP SERPL CALC-SCNC: 10 MMOL/L — SIGNIFICANT CHANGE UP (ref 5–17)
APAP SERPL-MCNC: <2 UG/ML — LOW (ref 10–30)
AST SERPL-CCNC: 36 U/L — SIGNIFICANT CHANGE UP (ref 10–40)
BASOPHILS # BLD AUTO: 0.1 K/UL — SIGNIFICANT CHANGE UP (ref 0–0.2)
BASOPHILS NFR BLD AUTO: 0.8 % — SIGNIFICANT CHANGE UP (ref 0–2)
BILIRUB SERPL-MCNC: 0.4 MG/DL — SIGNIFICANT CHANGE UP (ref 0.2–1.2)
BUN SERPL-MCNC: 18 MG/DL — SIGNIFICANT CHANGE UP (ref 7–18)
CALCIUM SERPL-MCNC: 9 MG/DL — SIGNIFICANT CHANGE UP (ref 8.4–10.5)
CHLORIDE SERPL-SCNC: 107 MMOL/L — SIGNIFICANT CHANGE UP (ref 96–108)
CO2 SERPL-SCNC: 24 MMOL/L — SIGNIFICANT CHANGE UP (ref 22–31)
CREAT SERPL-MCNC: 0.38 MG/DL — LOW (ref 0.5–1.3)
EOSINOPHIL # BLD AUTO: 0 K/UL — SIGNIFICANT CHANGE UP (ref 0–0.5)
EOSINOPHIL NFR BLD AUTO: 0.1 % — SIGNIFICANT CHANGE UP (ref 0–6)
ETHANOL SERPL-MCNC: <3 MG/DL — SIGNIFICANT CHANGE UP (ref 0–10)
GLUCOSE SERPL-MCNC: 76 MG/DL — SIGNIFICANT CHANGE UP (ref 70–99)
HCT VFR BLD CALC: 36.9 % — SIGNIFICANT CHANGE UP (ref 34.5–45)
HGB BLD-MCNC: 11.6 G/DL — SIGNIFICANT CHANGE UP (ref 11.5–15.5)
LYMPHOCYTES # BLD AUTO: 1.9 K/UL — SIGNIFICANT CHANGE UP (ref 1–3.3)
LYMPHOCYTES # BLD AUTO: 13.4 % — SIGNIFICANT CHANGE UP (ref 13–44)
MCHC RBC-ENTMCNC: 27.6 PG — SIGNIFICANT CHANGE UP (ref 27–34)
MCHC RBC-ENTMCNC: 31.5 GM/DL — LOW (ref 32–36)
MCV RBC AUTO: 87.7 FL — SIGNIFICANT CHANGE UP (ref 80–100)
MONOCYTES # BLD AUTO: 0.9 K/UL — SIGNIFICANT CHANGE UP (ref 0–0.9)
MONOCYTES NFR BLD AUTO: 6.4 % — SIGNIFICANT CHANGE UP (ref 2–14)
NEUTROPHILS # BLD AUTO: 11.1 K/UL — HIGH (ref 1.8–7.4)
NEUTROPHILS NFR BLD AUTO: 79.3 % — HIGH (ref 43–77)
PLATELET # BLD AUTO: 512 K/UL — HIGH (ref 150–400)
POTASSIUM SERPL-MCNC: 4.2 MMOL/L — SIGNIFICANT CHANGE UP (ref 3.5–5.3)
POTASSIUM SERPL-SCNC: 4.2 MMOL/L — SIGNIFICANT CHANGE UP (ref 3.5–5.3)
PROT SERPL-MCNC: 7 G/DL — SIGNIFICANT CHANGE UP (ref 6–8.3)
RBC # BLD: 4.21 M/UL — SIGNIFICANT CHANGE UP (ref 3.8–5.2)
RBC # FLD: 16.5 % — HIGH (ref 10.3–14.5)
SALICYLATES SERPL-MCNC: 2.6 MG/DL — LOW (ref 2.8–20)
SODIUM SERPL-SCNC: 141 MMOL/L — SIGNIFICANT CHANGE UP (ref 135–145)
WBC # BLD: 14 K/UL — HIGH (ref 3.8–10.5)
WBC # FLD AUTO: 14 K/UL — HIGH (ref 3.8–10.5)

## 2018-10-13 PROCEDURE — 99285 EMERGENCY DEPT VISIT HI MDM: CPT

## 2018-10-13 RX ORDER — LEVETIRACETAM 250 MG/1
250 TABLET, FILM COATED ORAL ONCE
Qty: 0 | Refills: 0 | Status: COMPLETED | OUTPATIENT
Start: 2018-10-13 | End: 2018-10-13

## 2018-10-13 NOTE — ED ADULT NURSE NOTE - OBJECTIVE STATEMENT
Pt. is aox3 came to er c/o having a seizure as per  pt took her medication her right asrm started shaking then her right leg. pt jaw also shook and her speech became slurred. still with slurred speech. To be seen by md. Iv placed by EMS

## 2018-10-14 VITALS
HEART RATE: 89 BPM | OXYGEN SATURATION: 100 % | SYSTOLIC BLOOD PRESSURE: 138 MMHG | DIASTOLIC BLOOD PRESSURE: 81 MMHG | TEMPERATURE: 98 F | RESPIRATION RATE: 16 BRPM

## 2018-10-14 LAB
APPEARANCE UR: SIGNIFICANT CHANGE UP
BILIRUB UR-MCNC: NEGATIVE — SIGNIFICANT CHANGE UP
COLOR SPEC: YELLOW — SIGNIFICANT CHANGE UP
DIFF PNL FLD: ABNORMAL
GLUCOSE UR QL: NEGATIVE — SIGNIFICANT CHANGE UP
KETONES UR-MCNC: NEGATIVE — SIGNIFICANT CHANGE UP
LEUKOCYTE ESTERASE UR-ACNC: ABNORMAL
NITRITE UR-MCNC: POSITIVE
PCP SPEC-MCNC: SIGNIFICANT CHANGE UP
PH UR: 6.5 — SIGNIFICANT CHANGE UP (ref 5–8)
PROT UR-MCNC: 30 MG/DL
SP GR SPEC: 1.01 — SIGNIFICANT CHANGE UP (ref 1.01–1.02)
UROBILINOGEN FLD QL: NEGATIVE — SIGNIFICANT CHANGE UP

## 2018-10-14 PROCEDURE — 80053 COMPREHEN METABOLIC PANEL: CPT

## 2018-10-14 PROCEDURE — 70450 CT HEAD/BRAIN W/O DYE: CPT | Mod: 26

## 2018-10-14 PROCEDURE — 96375 TX/PRO/DX INJ NEW DRUG ADDON: CPT

## 2018-10-14 PROCEDURE — 87186 SC STD MICRODIL/AGAR DIL: CPT

## 2018-10-14 PROCEDURE — 71045 X-RAY EXAM CHEST 1 VIEW: CPT | Mod: 26

## 2018-10-14 PROCEDURE — 80307 DRUG TEST PRSMV CHEM ANLYZR: CPT

## 2018-10-14 PROCEDURE — 83735 ASSAY OF MAGNESIUM: CPT

## 2018-10-14 PROCEDURE — 71045 X-RAY EXAM CHEST 1 VIEW: CPT

## 2018-10-14 PROCEDURE — 85027 COMPLETE CBC AUTOMATED: CPT

## 2018-10-14 PROCEDURE — 96374 THER/PROPH/DIAG INJ IV PUSH: CPT

## 2018-10-14 PROCEDURE — 80177 DRUG SCRN QUAN LEVETIRACETAM: CPT

## 2018-10-14 PROCEDURE — 87086 URINE CULTURE/COLONY COUNT: CPT

## 2018-10-14 PROCEDURE — 99284 EMERGENCY DEPT VISIT MOD MDM: CPT | Mod: 25

## 2018-10-14 PROCEDURE — 70450 CT HEAD/BRAIN W/O DYE: CPT

## 2018-10-14 PROCEDURE — 81001 URINALYSIS AUTO W/SCOPE: CPT

## 2018-10-14 RX ORDER — CEFTRIAXONE 500 MG/1
1 INJECTION, POWDER, FOR SOLUTION INTRAMUSCULAR; INTRAVENOUS ONCE
Qty: 0 | Refills: 0 | Status: COMPLETED | OUTPATIENT
Start: 2018-10-14 | End: 2018-10-14

## 2018-10-14 RX ORDER — CEFUROXIME AXETIL 250 MG
1 TABLET ORAL
Qty: 20 | Refills: 0 | OUTPATIENT
Start: 2018-10-14 | End: 2018-10-23

## 2018-10-14 RX ADMIN — LEVETIRACETAM 250 MILLIGRAM(S): 250 TABLET, FILM COATED ORAL at 02:32

## 2018-10-14 RX ADMIN — CEFTRIAXONE 100 GRAM(S): 500 INJECTION, POWDER, FOR SOLUTION INTRAMUSCULAR; INTRAVENOUS at 03:22

## 2018-10-14 RX ADMIN — Medication 0.5 MILLIGRAM(S): at 03:22

## 2018-10-14 NOTE — ED PROVIDER NOTE - MEDICAL DECISION MAKING DETAILS
77 y/o F pt w/ focal seizure today, will check labs, CT scan, and reassess 77 y/o F pt w/ focal seizure today, will check labs, CT scan, and reassess.   No further seizure activity noted. u/a reveals Urinary tract infection. patient prefers to go home. home with ceftin in ambulenece

## 2018-10-14 NOTE — ED ADULT NURSE REASSESSMENT NOTE - NS ED NURSE REASSESS COMMENT FT1
pt to be discharged home waiting for ambulance, pt is confusion.  is with pt. daughter is also involved with care.

## 2018-10-14 NOTE — ED PROVIDER NOTE - OBJECTIVE STATEMENT
79 y/o F pt w/ PMHx of GBM, s/p resection 2 months ago, on Keppra and Dexamethazone, pending palliative care presents to ED c/o left leg, arm and facial tremors x this evening, today. Pt reports tremors lasting one hour. Per Pt daughter, pt has had similar tremors, seizure-like activity up until one month ago, told to give Pt Ativan if seizure like activity returned. Pt was given Ativan today, symptoms did not cease, and was brought to ED. Pt also c/o foul smelling urine yesterday, started on unknown antibiotic, took 1 pill this morning. Pt and family would prefer if Pt could go home. NKDA

## 2018-10-14 NOTE — ED ADULT NURSE REASSESSMENT NOTE - NS ED NURSE REASSESS COMMENT FT1
Ambulette has arrived to transport pt back to her home. Pt's  is with pt. pt is confused needs constant re-orientatiom

## 2018-10-16 LAB
-  AMIKACIN: SIGNIFICANT CHANGE UP
-  AMOXICILLIN/CLAVULANIC ACID: SIGNIFICANT CHANGE UP
-  AMPICILLIN/SULBACTAM: SIGNIFICANT CHANGE UP
-  AMPICILLIN: SIGNIFICANT CHANGE UP
-  AZTREONAM: SIGNIFICANT CHANGE UP
-  CEFAZOLIN: SIGNIFICANT CHANGE UP
-  CEFEPIME: SIGNIFICANT CHANGE UP
-  CEFOXITIN: SIGNIFICANT CHANGE UP
-  CEFTRIAXONE: SIGNIFICANT CHANGE UP
-  CIPROFLOXACIN: SIGNIFICANT CHANGE UP
-  ERTAPENEM: SIGNIFICANT CHANGE UP
-  GENTAMICIN: SIGNIFICANT CHANGE UP
-  IMIPENEM: SIGNIFICANT CHANGE UP
-  LEVOFLOXACIN: SIGNIFICANT CHANGE UP
-  MEROPENEM: SIGNIFICANT CHANGE UP
-  NITROFURANTOIN: SIGNIFICANT CHANGE UP
-  PIPERACILLIN/TAZOBACTAM: SIGNIFICANT CHANGE UP
-  TIGECYCLINE: SIGNIFICANT CHANGE UP
-  TOBRAMYCIN: SIGNIFICANT CHANGE UP
-  TRIMETHOPRIM/SULFAMETHOXAZOLE: SIGNIFICANT CHANGE UP
CULTURE RESULTS: SIGNIFICANT CHANGE UP
LEVETIRACETAM SERPL-MCNC: 71.4 MCG/ML — HIGH (ref 12–46)
METHOD TYPE: SIGNIFICANT CHANGE UP
ORGANISM # SPEC MICROSCOPIC CNT: SIGNIFICANT CHANGE UP
ORGANISM # SPEC MICROSCOPIC CNT: SIGNIFICANT CHANGE UP
SPECIMEN SOURCE: SIGNIFICANT CHANGE UP

## 2018-10-17 LAB — MAGNESIUM RBC-MCNC: 6.8 MG/DL — HIGH (ref 4–6.4)

## 2018-10-25 PROCEDURE — 83036 HEMOGLOBIN GLYCOSYLATED A1C: CPT

## 2018-10-25 PROCEDURE — 82746 ASSAY OF FOLIC ACID SERUM: CPT

## 2018-10-25 PROCEDURE — 80177 DRUG SCRN QUAN LEVETIRACETAM: CPT

## 2018-10-25 PROCEDURE — 82962 GLUCOSE BLOOD TEST: CPT

## 2018-10-25 PROCEDURE — 82607 VITAMIN B-12: CPT

## 2018-10-25 PROCEDURE — 84443 ASSAY THYROID STIM HORMONE: CPT

## 2018-10-25 PROCEDURE — 82803 BLOOD GASES ANY COMBINATION: CPT

## 2018-10-25 PROCEDURE — 80061 LIPID PANEL: CPT

## 2018-10-25 PROCEDURE — 80053 COMPREHEN METABOLIC PANEL: CPT

## 2018-10-25 PROCEDURE — 85027 COMPLETE CBC AUTOMATED: CPT

## 2018-10-25 PROCEDURE — 87086 URINE CULTURE/COLONY COUNT: CPT

## 2018-10-25 PROCEDURE — 99285 EMERGENCY DEPT VISIT HI MDM: CPT | Mod: 25

## 2018-10-25 PROCEDURE — 83735 ASSAY OF MAGNESIUM: CPT

## 2018-10-25 PROCEDURE — 80048 BASIC METABOLIC PNL TOTAL CA: CPT

## 2018-10-25 PROCEDURE — 85379 FIBRIN DEGRADATION QUANT: CPT

## 2018-10-25 PROCEDURE — 81001 URINALYSIS AUTO W/SCOPE: CPT

## 2018-10-25 PROCEDURE — 70450 CT HEAD/BRAIN W/O DYE: CPT

## 2018-10-25 PROCEDURE — 84100 ASSAY OF PHOSPHORUS: CPT

## 2018-10-25 PROCEDURE — 84484 ASSAY OF TROPONIN QUANT: CPT

## 2018-10-25 PROCEDURE — 93005 ELECTROCARDIOGRAM TRACING: CPT

## 2018-12-07 NOTE — ED ADULT NURSE NOTE - PRO INTERPRETER NEED 2
Per Dr Tracy, pt was called with test results. Pt agreed with tx plan. See message from provider below for details.  Rx sent to [facility].  Lab orders entered, patient transferred to schedule   English

## 2019-11-11 NOTE — ED ADULT NURSE NOTE - HARM RISK FACTORS
Patient called back stating she discussed with Dr. Olena Khan the option of seeing Dr. Blackburn to discuss the option of doing the prep work with less to drink.    Patient would like to have a referral sent to Dr. Blackburn's office and would like to have a Colonoscopy here in town rather than travel.    Referral sent.           yes

## 2019-12-10 NOTE — PATIENT PROFILE ADULT. - MEDICATION ADMINISTRATION INFO, PROFILE
[facility] [place] [facility] [address] [place] [address]  [phone]      12/10/2019      To Whom It May Concern:      For your information, Ashley Duvall, had an appointment in our office today with:    Roxana Packer LPC     Sincerely,        Roxana Packer, LPC   no concerns

## 2019-12-13 NOTE — CONSULT NOTE ADULT - NSPROBSELRECBLANK_8_GEN
Patient has a yearly follow up scheduled in feb patient states she has had a respiratory infection for 3 weeks and continues to have a wheezing cough. Requesting a sooner appointment, next available is 12/26. Gerardo Ribera patient asking if nurse can add her on sooner? DISPLAY PLAN FREE TEXT

## 2020-03-13 NOTE — DISCHARGE NOTE ADULT - REASON FOR ADMISSION
Patient was admitted on 1/23 with seizure and was found to have a left frontal parietal brain lesion. Patient was admitted on 1/23/18 with seizure and was found to have a left frontal parietal brain lesion.  1/26/18 s/p right craniotomy for tumor resection, final pathology pending at time of discharge. Will need further adjuvant RT (radiation) and chemotherapy arranged as outpatient.  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 Please consult Dr Yin prior to starting an anticoagulation in future.  (normal spontaneous vaginal delivery)

## 2020-12-25 NOTE — ED ADULT NURSE NOTE - PAIN: PRESENCE, MLM
Pharmacy Medication History  Admission medication history interview status for the 12/25/2020  admission is complete. See EPIC admission navigator for prior to admission medications       Medication history sources: Patient  Location of interview: Phone  Adherence Assessment: Good    Significant changes made to the medication list:  None      Additional medication history information:   Last discharge summary/med review was 11/25/20, according to patient there have been no changes since then     Medication reconciliation completed by provider prior to medication history? No    Time spent in this activity: 10 minutes      Prior to Admission medications    Medication Sig Last Dose Taking? Auth Provider   calcium-vitamin D (CALCIUM 600 + D) 600-400 MG-UNIT per tablet Take 1 tablet by mouth daily 12/24/2020 at Unknown time Yes Art Cheung MD   Cholecalciferol (VITAMIN D) 2000 units tablet Take 2,000 Units by mouth daily 12/24/2020 at Unknown time Yes Art Cheung MD   Cyanocobalamin (B-12 SUPER STRENGTH) 5000 MCG/ML LIQD Place 0.5 mLs under the tongue every other day FOR VITAMIN B12 SUPPLEMENTATION, PLEASE PLACE UNDER THE TONGUE 12/23/2020 at Unknown time Yes Karley Irene MD   glimepiride (AMARYL) 1 MG tablet Take 4 tablets (4 mg) by mouth every morning (before breakfast) 12/24/2020 at Unknown time Yes Pio Hinds MD   Iron-Vitamin C (VITRON-C PO) Take 1 tablet by mouth daily 12/24/2020 at Unknown time Yes Reported, Patient   Lactobacillus (PROBIOTIC ACIDOPHILUS) CAPS Take 1 capsule by mouth daily  12/24/2020 at Unknown time Yes Reported, Patient   multivitamin w/minerals (THERA-VIT-M) tablet Take 1 tablet by mouth daily 12/24/2020 at Unknown time Yes Reported, Patient   potassium citrate (UROCIT-K) 10 MEQ (1080 MG) CR tablet TAKE 1 TABLET BY MOUTH TWICE A DAY 12/24/2020 at Unknown time Yes Art Cheung MD   simvastatin (ZOCOR) 20 MG tablet Take 1 tablet (20 mg) by mouth At Bedtime  12/24/2020 at Unknown time Yes Art Cheung MD   tamsulosin (FLOMAX) 0.4 MG capsule Take 1 capsule (0.4 mg) by mouth daily 12/24/2020 at Unknown time Yes Art Cheung MD   blood glucose (ACCU-CHEK UCHE PLUS) test strip 1 strip by In Vitro route daily  at -  Art Cheung MD   blood glucose monitoring (ACCU-CHEK UCHE PLUS) meter device kit TEST BLOOD SUGAR ONE TIME DAILY  OR AS DIRECTED  at -  Art Cheung MD   pantoprazole (PROTONIX) 40 MG EC tablet Take 1 tablet (40 mg) by mouth every morning (before breakfast)   Elvi Rashid MD       The information provided in this note is only as accurate as the sources available at the time of the update(s).      non-verbal indicator of pain/discomfort not present

## 2021-02-11 NOTE — ED PROVIDER NOTE - CROS ED ROS STATEMENT
Please contact patient with cost of the following:    Female:   -SONO/TET  - FET cycle   -HIV, HEP B/C, RPR, CBC, BLOOD TYPING, GC/CHLAMYDIA        to have:                  -HIV, HEP B/C, RPR, GC/CHLAMYDIA  
all other ROS negative except as per HPI

## 2021-04-09 NOTE — H&P ADULT - PROBLEM/PLAN-2
CT RN Call 3    Situation: Patient triggered for high risk readmission. Risk for Unplanned Readmission Score at Discharge is: 21%  Patient is enrolled in High Risk Transitions in Care program.    Background:  Admit 2/22-3/23 risk 21% Dx: AMS, confusion PMH: dementia, Afib on warfarin, DLBCL on remission, portal HTN and esophageal varices s/p banding (no cirrhosis), BPH, HTN, latent syphilis (treated), chonic GI bleeds, and iron deficiency anemia  *lives with son    Assessment:     - Primary Caregiver: self  - Assessment Completed with: patient  - Patient gave permission to discuss with n/a  - Patient has their AVS:no new AVS  - AVS was reviewed with the patient:  n/a  - Since discharge, patient is feeling good  - Activity: stayed the same. Patient reports he is done with therapy.   - The patient is taking all medications as prescribed.  AVS medications and  instructions were reviewed with the patient.   - Validate that any new medications were picked up:  No changes  - Medication Review completed in Epic  n/a  - The patient did not have questions or concerns regarding the medications prescribed.  - The patient is having pain at this time. Reports his usual pain to back.   - The patient's appetite is Normal  - Patient is having bowel movements.  - Safety Concerns: none  - Follow up care:  Upcoming appointments . n/a   - Assisted Patient in making the following appointments n/a  - Patient verbalized understanding of upcoming appointment date/time and will be able to attend the appointment.  - How will the patient get to the appointment? patient will drive.  - Reviewed appropriate contacts for questions / concerns -directed patient to call CT RN as first stop unless life threating situation  - Recommendation: Will follow-up with patient on 4/16/21  - Patient's preferred time of day to call n/a.      
DISPLAY PLAN FREE TEXT

## 2021-07-15 NOTE — PATIENT PROFILE ADULT. - NSNUTRITIONRISK_GI_A_CORE
No indicators present
Normal vision: sees adequately in most situations; can see medication labels, newsprint

## 2021-08-03 NOTE — CONSULT NOTE ADULT - PROBLEM SELECTOR PROBLEM 3
Malignant neoplasm of left female breast, unspecified estrogen receptor status, unspecified site of breast no

## 2022-01-26 NOTE — OCCUPATIONAL THERAPY INITIAL EVALUATION ADULT - PHYSICAL ASSIST/NONPHYSICAL ASSIST: SIT/STAND, REHAB EVAL
OB Provider reported that the vagina was inspected and no foreign body was found/Laps, needles and instrument count was correct
verbal cues/supervision/2 person assist

## 2022-09-15 NOTE — DISCHARGE NOTE ADULT - NS AS DC FU INST LIST INST
Pt states \"I just want to be comfortable\", states will not put on gown for potential xray.  
Pt walking out of ED. Pt confirms they are leaving.  
no

## 2022-11-15 NOTE — ED PROVIDER NOTE - OBJECTIVE STATEMENT
77 F w Breast Ca, primary brain mass taking decadron , and keppra daily. She was BIBA s/p seizure which included left sided leg and arm involuntary movement. She was given Valium 5mg IV x 1 dose. She reports that she had just taken keppra prior to the episode, and typically her seizure activity occurs in the left leg only, but this time it included the left arm. The keppra dose has been 750mg twice daily for the past 2 weeks which was increased to 100mg bid 2 days ago. She is scheduled to start brain radiation in 2 days for the next 3 weeks. Yes

## 2023-01-22 NOTE — ED ADULT NURSE NOTE - NSSISCREENINGQ4_ED_A_ED
No impaired balance/cognition/decreased flexibility/impaired motor control/impaired postural control/decreased ROM/decreased strength

## 2023-06-15 NOTE — OCCUPATIONAL THERAPY INITIAL EVALUATION ADULT - LEVEL OF INDEPENDENCE: DRESS LOWER BODY, OT EVAL
don socks in long sitting/minimum assist (75% patients effort) Adbry Pregnancy And Lactation Text: It is unknown if this medication will adversely affect pregnancy or breast feeding.

## 2023-10-26 NOTE — H&P ADULT - PROBLEM SELECTOR PLAN 2
s/p L breast surgery 1 week ago at [facility]  recent PET scan neg for mets  primary team to get med list no

## 2024-03-25 NOTE — H&P ADULT - PMH
Patient in office for prolia   Breast cancer  s/p L radical mastectomy on anastrazole  Glioblastoma  s/p Right parietal craniotomy receiving radiation with concurrent and adjuvant chemo (temozolamide)  Hyperlipidemia    Hypertension

## 2024-04-07 NOTE — PATIENT PROFILE ADULT. - FUNCTIONAL LEVEL PRIOR: EATING
..  Risk  Yes No   Present to ED because of fall  x   Age > 70  x   Altered Mental Status    Intoxicated with Alcohol or    Substance Confusion  x   Impaired Mobility       Ambulance or transfers with  assistive devices or assist    Ambulates with unsteady gait and requires assistance     Unable to ambulate or transfer  x   Nursing Judgement (free text)        Yes to any risk = high fall risk    [ ] Fall Precautions In Place or Implemented per patients plan of care.  [ ] Bed Alarm (Active and Audible) pt educated on call light usage, to call RN when needed  [ ] Yellow Non-Skid Socks  [ ] Fall Risk Band  [ ] Bed in Lowest Position  [ ] Side Rail x2  [ ] Call Light Within Reach  [ ] Patient belongings within reach  [ ] Other: [ ].Fall Precautions In Place or Implemented per patients plan of care.    
(2) assistive person

## 2024-04-11 NOTE — ED PROVIDER NOTE - NSCAREINITIATED _GEN_ER
cMEMS range 8-14, readings: 4/11 16, 4/10 14, 4/08 16, 4/06 18. Patient is taking torsemide 40mg BID. Per Dr Willis, patient to increase torsemide to 80mg BID for 1 day. RN called patient's daughter Lily, no answer, left voicemail with call back number.   Darwin Howell)

## 2025-04-02 NOTE — PROGRESS NOTE ADULT - PROBLEM/PLAN-6
Discharge Summary    Patient: Lara Montesinos MRN: [id number]  CSN: [id number]    YOB: 1965  Age: 60 y.o.  Sex: female    DOA: 3/30/2025 LOS:  LOS: 2 days   Discharge Date:      Admission Diagnosis: Hypokalemia [E87.6]  Ureterolithiasis [N20.1]  Abdominal pain, unspecified abdominal location [R10.9]  Obstructive uropathy [N13.9]    Discharge Diagnosis:    Obstructive uropathy  Nephrolithiasis  Hydronephrosis   Left renal cyst  Sickle cell trait    Discharge Condition: Stable    PHYSICAL EXAM  Visit Vitals  BP (!) 125/90   Pulse 58   Temp 98.2 °F (36.8 °C) (Oral)   Resp 17   Ht 1.575 m (5' 2\")   Wt 71.4 kg (157 lb 6.4 oz)   SpO2 98%   BMI 28.79 kg/m²       General: Alert, cooperative, no acute distress    HEENT: NC, Atraumatic.  PERRLA, EOMI. Anicteric sclerae.  Lungs:  CTA Bilaterally. No Wheezing/Rhonchi/Rales.  Heart:  Regular  rhythm,  No murmur, No Rubs, No Gallops  Abdomen: Soft, Non distended, Non tender.  +Bowel sounds, no HSM  Extremities: No c/c/e  Psych:   Good insight. Not anxious or agitated.  Neurologic:  CN 2-12 grossly intact, oriented X 3.  No acute neurological                                 Deficits,     Hospital Course: Per the H&P:The patient is a 60 y.o. female with significant past medical history of multiple intra-abdominal surgeries who presents with complaint of abdominal pain x 3-day duration associated with nausea and vomiting.  Patient was asked about any fever or chills.  Patient denies.  Patient denies any chest pain palpitation shortness of breath or lateralizing symptoms.  As part of patient's evaluation laboratory studies were obtained.  Patient was found to have borderline elevation of white count with left shift increasing concern for infection.  Electrolyte panel overall reassuring.  Urinalysis obtained and found to be reassuring.  CT scan of patient abdomen pelvis obtained revealing patient have evidence of 8 x 7 mm stone in the right proximal ureter with 
DISPLAY PLAN FREE TEXT
